# Patient Record
Sex: MALE | Race: WHITE | NOT HISPANIC OR LATINO
[De-identification: names, ages, dates, MRNs, and addresses within clinical notes are randomized per-mention and may not be internally consistent; named-entity substitution may affect disease eponyms.]

---

## 2015-01-19 RX ORDER — ATORVASTATIN CALCIUM 80 MG/1
0.5 TABLET, FILM COATED ORAL
Qty: 0 | Refills: 0 | DISCHARGE
Start: 2015-01-19

## 2015-01-19 RX ORDER — METOPROLOL TARTRATE 50 MG
1 TABLET ORAL
Qty: 0 | Refills: 0 | DISCHARGE
Start: 2015-01-19

## 2017-03-23 ENCOUNTER — APPOINTMENT (OUTPATIENT)
Dept: RHEUMATOLOGY | Facility: CLINIC | Age: 62
End: 2017-03-23

## 2017-03-23 VITALS
HEART RATE: 102 BPM | BODY MASS INDEX: 49.32 KG/M2 | HEIGHT: 62 IN | SYSTOLIC BLOOD PRESSURE: 120 MMHG | TEMPERATURE: 97.3 F | WEIGHT: 268 LBS | OXYGEN SATURATION: 94 % | DIASTOLIC BLOOD PRESSURE: 74 MMHG

## 2017-03-23 DIAGNOSIS — I10 ESSENTIAL (PRIMARY) HYPERTENSION: ICD-10-CM

## 2017-03-24 ENCOUNTER — APPOINTMENT (OUTPATIENT)
Dept: RHEUMATOLOGY | Facility: CLINIC | Age: 62
End: 2017-03-24

## 2017-03-24 LAB
ALBUMIN SERPL ELPH-MCNC: 4.1 G/DL
ALP BLD-CCNC: 69 U/L
ALT SERPL-CCNC: 34 U/L
ANION GAP SERPL CALC-SCNC: 16 MMOL/L
APPEARANCE: CLEAR
AST SERPL-CCNC: 23 U/L
BACTERIA: NEGATIVE
BASOPHILS # BLD AUTO: 0.05 K/UL
BASOPHILS NFR BLD AUTO: 0.4 %
BILIRUB SERPL-MCNC: 0.3 MG/DL
BILIRUBIN URINE: NEGATIVE
BLOOD URINE: NEGATIVE
BUN SERPL-MCNC: 27 MG/DL
C3 SERPL-MCNC: 147 MG/DL
C4 SERPL-MCNC: 37 MG/DL
CALCIUM SERPL-MCNC: 9.4 MG/DL
CHLORIDE SERPL-SCNC: 100 MMOL/L
CO2 SERPL-SCNC: 23 MMOL/L
COLOR: YELLOW
CREAT SERPL-MCNC: 1.3 MG/DL
CREAT SPEC-SCNC: 78 MG/DL
CREAT/PROT UR: 0.1 RATIO
DSDNA AB SER-ACNC: 51 IU/ML
EOSINOPHIL # BLD AUTO: 0.2 K/UL
EOSINOPHIL NFR BLD AUTO: 1.8 %
ERYTHROCYTE [SEDIMENTATION RATE] IN BLOOD BY WESTERGREN METHOD: 9 MM/HR
GLUCOSE QUALITATIVE U: 1000
GLUCOSE SERPL-MCNC: 164 MG/DL
HCT VFR BLD CALC: 48.2 %
HGB BLD-MCNC: 15.9 G/DL
IGA SER QL IEP: 240 MG/DL
IGG SER QL IEP: 981 MG/DL
IGM SER QL IEP: 93 MG/DL
IMM GRANULOCYTES NFR BLD AUTO: 0.6 %
KETONES URINE: NEGATIVE
LEUKOCYTE ESTERASE URINE: NEGATIVE
LYMPHOCYTES # BLD AUTO: 1.46 K/UL
LYMPHOCYTES NFR BLD AUTO: 13.1 %
MAN DIFF?: NORMAL
MCHC RBC-ENTMCNC: 29.6 PG
MCHC RBC-ENTMCNC: 33 GM/DL
MCV RBC AUTO: 89.8 FL
MICROSCOPIC-UA: NORMAL
MONOCYTES # BLD AUTO: 1.28 K/UL
MONOCYTES NFR BLD AUTO: 11.5 %
NEUTROPHILS # BLD AUTO: 8.11 K/UL
NEUTROPHILS NFR BLD AUTO: 72.6 %
NITRITE URINE: NEGATIVE
PH URINE: 5.5
PLATELET # BLD AUTO: 173 K/UL
POTASSIUM SERPL-SCNC: 4.1 MMOL/L
PROT SERPL-MCNC: 6.8 G/DL
PROT UR-MCNC: 5 MG/DL
PROTEIN URINE: NEGATIVE
RBC # BLD: 5.37 M/UL
RBC # FLD: 14.3 %
RED BLOOD CELLS URINE: 1 /HPF
SODIUM SERPL-SCNC: 139 MMOL/L
SPECIFIC GRAVITY URINE: 1.03
SQUAMOUS EPITHELIAL CELLS: 0 /HPF
URATE SERPL-MCNC: 5.6 MG/DL
UROBILINOGEN URINE: NORMAL
WBC # FLD AUTO: 11.17 K/UL
WHITE BLOOD CELLS URINE: 0 /HPF

## 2017-03-27 LAB
ADJUSTED MITOGEN: >10 IU/ML
ADJUSTED TB AG: -0.04 IU/ML
M TB IFN-G BLD-IMP: NEGATIVE
QUANTIFERON GOLD NIL: 0.21 IU/ML

## 2017-04-10 ENCOUNTER — FORM ENCOUNTER (OUTPATIENT)
Age: 62
End: 2017-04-10

## 2017-04-11 ENCOUNTER — APPOINTMENT (OUTPATIENT)
Dept: RADIOLOGY | Facility: CLINIC | Age: 62
End: 2017-04-11

## 2017-04-11 ENCOUNTER — OUTPATIENT (OUTPATIENT)
Dept: OUTPATIENT SERVICES | Facility: HOSPITAL | Age: 62
LOS: 1 days | End: 2017-04-11
Payer: MEDICARE

## 2017-04-11 DIAGNOSIS — Z95.5 PRESENCE OF CORONARY ANGIOPLASTY IMPLANT AND GRAFT: Chronic | ICD-10-CM

## 2017-04-11 DIAGNOSIS — Z98.89 OTHER SPECIFIED POSTPROCEDURAL STATES: Chronic | ICD-10-CM

## 2017-04-11 DIAGNOSIS — G56.00 CARPAL TUNNEL SYNDROME, UNSPECIFIED UPPER LIMB: Chronic | ICD-10-CM

## 2017-04-11 DIAGNOSIS — R10.31 RIGHT LOWER QUADRANT PAIN: ICD-10-CM

## 2017-04-11 PROCEDURE — 73525 CONTRAST X-RAY OF HIP: CPT | Mod: 2,5

## 2017-04-11 PROCEDURE — 77002 NEEDLE LOCALIZATION BY XRAY: CPT | Mod: 26

## 2017-04-11 PROCEDURE — 27093 INJECTION FOR HIP X-RAY: CPT

## 2017-04-11 PROCEDURE — 27093 INJECTION FOR HIP X-RAY: CPT | Mod: 50

## 2017-04-20 ENCOUNTER — APPOINTMENT (OUTPATIENT)
Dept: RHEUMATOLOGY | Facility: CLINIC | Age: 62
End: 2017-04-20

## 2017-04-20 VITALS
DIASTOLIC BLOOD PRESSURE: 80 MMHG | HEART RATE: 96 BPM | SYSTOLIC BLOOD PRESSURE: 129 MMHG | WEIGHT: 265 LBS | HEIGHT: 62 IN | OXYGEN SATURATION: 93 % | BODY MASS INDEX: 48.76 KG/M2

## 2017-04-20 LAB
ALBUMIN SERPL ELPH-MCNC: 4.3 G/DL
ALP BLD-CCNC: 77 U/L
ALT SERPL-CCNC: 49 U/L
ANION GAP SERPL CALC-SCNC: 19 MMOL/L
APPEARANCE: CLEAR
AST SERPL-CCNC: 31 U/L
BACTERIA: NEGATIVE
BASOPHILS # BLD AUTO: 0.02 K/UL
BASOPHILS NFR BLD AUTO: 0.2 %
BILIRUB SERPL-MCNC: 0.3 MG/DL
BILIRUBIN URINE: NEGATIVE
BLOOD URINE: NEGATIVE
BUN SERPL-MCNC: 27 MG/DL
CALCIUM SERPL-MCNC: 9.7 MG/DL
CHLORIDE SERPL-SCNC: 96 MMOL/L
CK SERPL-CCNC: 229 U/L
CO2 SERPL-SCNC: 24 MMOL/L
COLOR: YELLOW
CREAT SERPL-MCNC: 1.57 MG/DL
CREAT SPEC-SCNC: 37 MG/DL
CREAT/PROT UR: 0.2 RATIO
EOSINOPHIL # BLD AUTO: 0.15 K/UL
EOSINOPHIL NFR BLD AUTO: 1.2 %
GLUCOSE QUALITATIVE U: 1000 MG/DL
GLUCOSE SERPL-MCNC: 285 MG/DL
HCT VFR BLD CALC: 48.7 %
HGB BLD-MCNC: 16.3 G/DL
IMM GRANULOCYTES NFR BLD AUTO: 1.1 %
KETONES URINE: NEGATIVE
LEUKOCYTE ESTERASE URINE: NEGATIVE
LYMPHOCYTES # BLD AUTO: 1.41 K/UL
LYMPHOCYTES NFR BLD AUTO: 11.6 %
MAN DIFF?: NORMAL
MCHC RBC-ENTMCNC: 30.2 PG
MCHC RBC-ENTMCNC: 33.5 GM/DL
MCV RBC AUTO: 90.4 FL
MICROSCOPIC-UA: NORMAL
MONOCYTES # BLD AUTO: 1.32 K/UL
MONOCYTES NFR BLD AUTO: 10.8 %
NEUTROPHILS # BLD AUTO: 9.17 K/UL
NEUTROPHILS NFR BLD AUTO: 75.1 %
NITRITE URINE: NEGATIVE
PH URINE: 6
PLATELET # BLD AUTO: 154 K/UL
POTASSIUM SERPL-SCNC: 4 MMOL/L
PROT SERPL-MCNC: 7.1 G/DL
PROT UR-MCNC: 6 MG/DL
PROTEIN URINE: NEGATIVE MG/DL
RBC # BLD: 5.39 M/UL
RBC # FLD: 14.8 %
RED BLOOD CELLS URINE: 0 /HPF
SODIUM SERPL-SCNC: 139 MMOL/L
SPECIFIC GRAVITY URINE: 1.03
SQUAMOUS EPITHELIAL CELLS: 0 /HPF
UROBILINOGEN URINE: NORMAL MG/DL
WBC # FLD AUTO: 12.2 K/UL
WHITE BLOOD CELLS URINE: 0 /HPF

## 2017-04-20 RX ORDER — PREDNISONE 2.5 MG/1
2.5 TABLET ORAL
Qty: 70 | Refills: 0 | Status: DISCONTINUED | COMMUNITY
Start: 2017-03-23 | End: 2017-04-20

## 2017-04-20 RX ORDER — METHYLPRED ACET/NACL,ISO-OS/PF 40 MG/ML
40 VIAL (ML) INJECTION
Qty: 1 | Refills: 0 | Status: COMPLETED | OUTPATIENT
Start: 2017-04-20

## 2017-04-20 RX ADMIN — METHYLPREDNISOLONE ACETATE MG/ML: 40 INJECTION, SUSPENSION INTRA-ARTICULAR; INTRALESIONAL; INTRAMUSCULAR; SOFT TISSUE at 00:00

## 2017-04-23 LAB
C3 SERPL-MCNC: 154 MG/DL
C4 SERPL-MCNC: 38 MG/DL
DSDNA AB SER-ACNC: 60 IU/ML
ERYTHROCYTE [SEDIMENTATION RATE] IN BLOOD BY WESTERGREN METHOD: 18 MM/HR

## 2017-05-04 RX ORDER — PREDNISONE 10 MG/1
10 TABLET ORAL DAILY
Qty: 7 | Refills: 0 | Status: COMPLETED | COMMUNITY
Start: 2017-05-04 | End: 2017-05-11

## 2017-05-23 ENCOUNTER — EMERGENCY (EMERGENCY)
Facility: HOSPITAL | Age: 62
LOS: 1 days | Discharge: HOME | End: 2017-05-23
Attending: EMERGENCY MEDICINE | Admitting: EMERGENCY MEDICINE
Payer: MEDICARE

## 2017-05-23 ENCOUNTER — APPOINTMENT (OUTPATIENT)
Dept: RHEUMATOLOGY | Facility: CLINIC | Age: 62
End: 2017-05-23

## 2017-05-23 VITALS
OXYGEN SATURATION: 92 % | SYSTOLIC BLOOD PRESSURE: 79 MMHG | DIASTOLIC BLOOD PRESSURE: 53 MMHG | TEMPERATURE: 98.1 F | HEART RATE: 103 BPM

## 2017-05-23 VITALS
RESPIRATION RATE: 20 BRPM | DIASTOLIC BLOOD PRESSURE: 69 MMHG | HEIGHT: 62 IN | TEMPERATURE: 98 F | SYSTOLIC BLOOD PRESSURE: 111 MMHG | OXYGEN SATURATION: 95 % | HEART RATE: 104 BPM

## 2017-05-23 VITALS — DIASTOLIC BLOOD PRESSURE: 60 MMHG | SYSTOLIC BLOOD PRESSURE: 90 MMHG | HEART RATE: 104 BPM

## 2017-05-23 VITALS — DIASTOLIC BLOOD PRESSURE: 55 MMHG | SYSTOLIC BLOOD PRESSURE: 75 MMHG

## 2017-05-23 VITALS
RESPIRATION RATE: 20 BRPM | HEART RATE: 98 BPM | SYSTOLIC BLOOD PRESSURE: 109 MMHG | OXYGEN SATURATION: 95 % | DIASTOLIC BLOOD PRESSURE: 56 MMHG

## 2017-05-23 VITALS — SYSTOLIC BLOOD PRESSURE: 85 MMHG | DIASTOLIC BLOOD PRESSURE: 60 MMHG

## 2017-05-23 DIAGNOSIS — Z98.89 OTHER SPECIFIED POSTPROCEDURAL STATES: Chronic | ICD-10-CM

## 2017-05-23 DIAGNOSIS — G56.00 CARPAL TUNNEL SYNDROME, UNSPECIFIED UPPER LIMB: Chronic | ICD-10-CM

## 2017-05-23 DIAGNOSIS — Z95.5 PRESENCE OF CORONARY ANGIOPLASTY IMPLANT AND GRAFT: Chronic | ICD-10-CM

## 2017-05-23 DIAGNOSIS — I95.9 HYPOTENSION, UNSPECIFIED: ICD-10-CM

## 2017-05-23 LAB
ALBUMIN SERPL ELPH-MCNC: 4 G/DL — SIGNIFICANT CHANGE UP (ref 3.3–5)
ALP SERPL-CCNC: 65 U/L — SIGNIFICANT CHANGE UP (ref 40–120)
ALT FLD-CCNC: 34 U/L RC — SIGNIFICANT CHANGE UP (ref 10–45)
ANION GAP SERPL CALC-SCNC: 15 MMOL/L — SIGNIFICANT CHANGE UP (ref 5–17)
AST SERPL-CCNC: 22 U/L — SIGNIFICANT CHANGE UP (ref 10–40)
BASOPHILS # BLD AUTO: 0 K/UL — SIGNIFICANT CHANGE UP (ref 0–0.2)
BASOPHILS NFR BLD AUTO: 0 % — SIGNIFICANT CHANGE UP (ref 0–2)
BILIRUB SERPL-MCNC: 0.3 MG/DL — SIGNIFICANT CHANGE UP (ref 0.2–1.2)
BUN SERPL-MCNC: 32 MG/DL — HIGH (ref 7–23)
CALCIUM SERPL-MCNC: 9.3 MG/DL — SIGNIFICANT CHANGE UP (ref 8.4–10.5)
CHLORIDE SERPL-SCNC: 101 MMOL/L — SIGNIFICANT CHANGE UP (ref 96–108)
CO2 SERPL-SCNC: 26 MMOL/L — SIGNIFICANT CHANGE UP (ref 22–31)
CREAT SERPL-MCNC: 1.52 MG/DL — HIGH (ref 0.5–1.3)
EOSINOPHIL # BLD AUTO: 0.1 K/UL — SIGNIFICANT CHANGE UP (ref 0–0.5)
EOSINOPHIL NFR BLD AUTO: 1.1 % — SIGNIFICANT CHANGE UP (ref 0–6)
GLUCOSE SERPL-MCNC: 130 MG/DL — HIGH (ref 70–99)
HCT VFR BLD CALC: 48.5 % — SIGNIFICANT CHANGE UP (ref 39–50)
HGB BLD-MCNC: 16.2 G/DL — SIGNIFICANT CHANGE UP (ref 13–17)
LYMPHOCYTES # BLD AUTO: 1.4 K/UL — SIGNIFICANT CHANGE UP (ref 1–3.3)
LYMPHOCYTES # BLD AUTO: 11 % — LOW (ref 13–44)
MCHC RBC-ENTMCNC: 30.1 PG — SIGNIFICANT CHANGE UP (ref 27–34)
MCHC RBC-ENTMCNC: 33.4 GM/DL — SIGNIFICANT CHANGE UP (ref 32–36)
MCV RBC AUTO: 90.2 FL — SIGNIFICANT CHANGE UP (ref 80–100)
MONOCYTES # BLD AUTO: 1.5 K/UL — HIGH (ref 0–0.9)
MONOCYTES NFR BLD AUTO: 11.8 % — SIGNIFICANT CHANGE UP (ref 2–14)
NEUTROPHILS # BLD AUTO: 10 K/UL — HIGH (ref 1.8–7.4)
NEUTROPHILS NFR BLD AUTO: 76.1 % — SIGNIFICANT CHANGE UP (ref 43–77)
PLATELET # BLD AUTO: 154 K/UL — SIGNIFICANT CHANGE UP (ref 150–400)
POTASSIUM SERPL-MCNC: 4.2 MMOL/L — SIGNIFICANT CHANGE UP (ref 3.5–5.3)
POTASSIUM SERPL-SCNC: 4.2 MMOL/L — SIGNIFICANT CHANGE UP (ref 3.5–5.3)
PROT SERPL-MCNC: 7 G/DL — SIGNIFICANT CHANGE UP (ref 6–8.3)
RBC # BLD: 5.38 M/UL — SIGNIFICANT CHANGE UP (ref 4.2–5.8)
RBC # FLD: 13.8 % — SIGNIFICANT CHANGE UP (ref 10.3–14.5)
SODIUM SERPL-SCNC: 142 MMOL/L — SIGNIFICANT CHANGE UP (ref 135–145)
WBC # BLD: 13.1 K/UL — HIGH (ref 3.8–10.5)
WBC # FLD AUTO: 13.1 K/UL — HIGH (ref 3.8–10.5)

## 2017-05-23 PROCEDURE — 71046 X-RAY EXAM CHEST 2 VIEWS: CPT

## 2017-05-23 PROCEDURE — 99284 EMERGENCY DEPT VISIT MOD MDM: CPT

## 2017-05-23 PROCEDURE — 99283 EMERGENCY DEPT VISIT LOW MDM: CPT | Mod: 25

## 2017-05-23 PROCEDURE — 71020: CPT | Mod: 26

## 2017-05-23 PROCEDURE — 85027 COMPLETE CBC AUTOMATED: CPT

## 2017-05-23 PROCEDURE — 80053 COMPREHEN METABOLIC PANEL: CPT

## 2017-05-23 NOTE — ED ADULT NURSE NOTE - OBJECTIVE STATEMENT
Patient states that he was at his rheumatoid MD today and had his BP taken and was told it was low. Patient states that he was told to come to the ED. Pt states that he refused to take an ambulance and drove himself here. Patients BP on arrival is WNL, patient denies any CP, SOB, dizziness, lightheadedness or pain. Patient states that he would like to leave if possible. Patient A/Ox4 and ambulatory

## 2017-05-23 NOTE — ED PROVIDER NOTE - OBJECTIVE STATEMENT
62 year old male patient sent to the ED from rheumatology doctor's office with hypotension after a routine check up at the office. Lowest noted blood pressure is 71/50 today. Patient reports being asymptomatic. Reports some fatigue recently after a recent URI like illness. Patient reports taking some of his medications prior to visiting the doctor, but is unsure which. Reports taking Cialis last night.   Denies chest pain, palpitations, weakness, dizziness.  Rheumatology: Dr. Alicia Barroso ([phone]) 62 year old male patient with extensive past medical history sent to the ED from rheumatology doctor's office with hypotension after a routine check up at the office. Lowest noted blood pressure is 71/50 today. Patient reports being asymptomatic. Reports some fatigue recently after a recent URI like illness. Recently completed a steroid taper prescribed by his pulmonologist for his URI. Reports some left sided chest wall discomfort with palpation and cough. Patient reports taking some of his medications prior to visiting the doctor, but is unsure which. Reports taking Cialis last night.   Denies fever, chills, chest pain, palpitations, weakness, dizziness.  Rheumatology: Dr. Alicia Barroso ([phone]) 62 year old male patient with extensive past medical history sent to the ED from rheumatology doctor's office with hypotension after a routine check up at the office. Lowest noted blood pressure is 71/50 today. Patient reports being asymptomatic. Reports some fatigue recently after a recent URI like illness. Recently completed a 10 day course of penicillin and steroid taper prescribed by his pulmonologist for his URI. Did not have an x-ray. Reports some left sided chest wall discomfort with palpation and occasional productive cough with yellow sputum. Patient reports taking some of his medications prior to visiting the doctor, but is unsure which. Reports taking Cialis last night.   Denies fever, chills, chest pain, palpitations, weakness, dizziness.  Rheumatology: Dr. Alicia Barroso ([phone]) 62 year old male patient with extensive past medical history sent to the ED from rheumatology doctor's office with hypotension after a routine check up at the office. Lowest noted blood pressure is 71/50 today. Patient reports being asymptomatic. Reports some fatigue recently after a recent URI like illness. Recently completed a 10 day course of penicillin and steroid taper prescribed by his pulmonologist for his URI. Did not have an x-ray. Reports some left sided chest wall discomfort with palpation and coughing. Notes occasional productive cough with yellow sputum. Patient reports taking some of his medications prior to visiting the doctor, but is unsure which. Reports taking Cialis last night. Has appt to [facility] with pulmonologist Dr Linda Grissom on Friday.   Denies fever, chills, chest pain, palpitations, weakness, dizziness.  Rheumatology: Dr. Alicia Barroso ([phone])

## 2017-05-23 NOTE — ED ADULT NURSE NOTE - PSH
Carpal tunnel syndrome    H/O knee surgery    H/O umbilical hernia repair  mesh  History of rotator cuff surgery    Presence of stent in coronary artery

## 2017-05-23 NOTE — ED ADULT NURSE NOTE - PMH
Asthma    Diabetes    Former smoker    History of hypertension    HLD (hyperlipidemia)    HTN (hypertension)    Lupus    TONYA (obstructive sleep apnea)    Psoriatic arthritis

## 2017-05-23 NOTE — ED PROVIDER NOTE - NEUROLOGICAL, MLM
Alert and oriented, no focal deficits, no motor or sensory deficits. Full strength to all extremities.

## 2017-05-23 NOTE — ED PROVIDER NOTE - PROGRESS NOTE DETAILS
Dr. Godoy: Upon review of AEHR, pt resting HR appears to be 90-100s. Dr. Godoy: Spoke w Dr Barroso who states checked BP in office multiple times and it was low. In setting of recent URI was concerned for sepsis vs dehydration. Sent in for eval. Dr. Godoy: Xray shows no PNA, effusion, CHF. Creatinine slightly elevated at 1.52, however in past has ranged from 1.34-1.50. Advise to stay hydrated and FU with Pulmonology on Friday. Return for any worsening.

## 2017-05-23 NOTE — ED PROVIDER NOTE - NS ED MD SCRIBE ATTENDING SCRIBE SECTIONS
PROGRESS NOTE/HISTORY OF PRESENT ILLNESS/RESULTS/PHYSICAL EXAM/DISPOSITION/VITAL SIGNS( Pullset)/PAST MEDICAL/SURGICAL/SOCIAL HISTORY/REVIEW OF SYSTEMS

## 2017-05-23 NOTE — ED PROVIDER NOTE - MEDICAL DECISION MAKING DETAILS
62 year old male patient with extensive pmhx sent in by rheumatologist for evaluation of low blood pressure noted in her office today. In the ED, patient's blood pressure is within normal limits and is presently without complaints. 62 year old male patient with extensive pmhx sent in by rheumatologist for evaluation of low blood pressure noted in her office today. In the ED, patient's blood pressure is within normal limits and is presently without complaints. Attempted to contact Dr. Barroso. Awaiting return phone call. Reviewed her documentation in the eher. Appears she was concerned for sepsis, will obtain blood work and a chest x-ray. Will monitor vital signs. 62 year old male patient with extensive pmhx sent in by rheumatologist for evaluation of low blood pressure noted in her office today. In the ED, patient's blood pressure is within normal limits and is presently without complaints. Attempted to contact Dr. Barroso. Awaiting return phone call. Reviewed her documentation in the AEHR. Appears she was concerned for sepsis, will obtain blood work and a chest x-ray. Will monitor vital signs.

## 2017-05-23 NOTE — ED ADULT NURSE NOTE - CHPI ED SYMPTOMS NEG
no vomiting/no dizziness/no weakness/no pain/no chills/no numbness/no tingling/no fever/no nausea/no decreased eating/drinking

## 2017-07-12 ENCOUNTER — RESULT REVIEW (OUTPATIENT)
Age: 62
End: 2017-07-12

## 2017-07-12 ENCOUNTER — LABORATORY RESULT (OUTPATIENT)
Age: 62
End: 2017-07-12

## 2017-07-13 ENCOUNTER — APPOINTMENT (OUTPATIENT)
Dept: DERMATOLOGY | Facility: CLINIC | Age: 62
End: 2017-07-13

## 2017-07-13 ENCOUNTER — APPOINTMENT (OUTPATIENT)
Dept: RHEUMATOLOGY | Facility: CLINIC | Age: 62
End: 2017-07-13

## 2017-07-13 VITALS — DIASTOLIC BLOOD PRESSURE: 80 MMHG | SYSTOLIC BLOOD PRESSURE: 128 MMHG

## 2017-07-13 DIAGNOSIS — M75.50 BURSITIS OF UNSPECIFIED SHOULDER: ICD-10-CM

## 2017-07-13 RX ORDER — METHYLPRED ACET/NACL,ISO-OS/PF 40 MG/ML
40 VIAL (ML) INJECTION
Qty: 1 | Refills: 0 | Status: COMPLETED | OUTPATIENT
Start: 2017-07-13

## 2017-07-13 RX ORDER — HYALURONATE SODIUM 30 MG/2 ML
30 SYRINGE (ML) INTRAARTICULAR
Refills: 0 | Status: COMPLETED | OUTPATIENT
Start: 2017-07-13

## 2017-07-13 RX ORDER — LIDOCAINE HYDROCHLORIDE 10 MG/ML
1 INJECTION, SOLUTION INFILTRATION; PERINEURAL
Refills: 0 | Status: COMPLETED | OUTPATIENT
Start: 2017-07-13

## 2017-07-13 RX ADMIN — LIDOCAINE HYDROCHLORIDE %: 10 INJECTION, SOLUTION INFILTRATION; PERINEURAL at 00:00

## 2017-07-13 RX ADMIN — Medication 0 MG/2ML: at 00:00

## 2017-07-13 RX ADMIN — METHYLPREDNISOLONE ACETATE MG/ML: 40 INJECTION, SUSPENSION INTRA-ARTICULAR; INTRALESIONAL; INTRAMUSCULAR; SOFT TISSUE at 00:00

## 2017-07-20 ENCOUNTER — APPOINTMENT (OUTPATIENT)
Dept: RHEUMATOLOGY | Facility: CLINIC | Age: 62
End: 2017-07-20

## 2017-07-20 VITALS
SYSTOLIC BLOOD PRESSURE: 105 MMHG | OXYGEN SATURATION: 93 % | HEART RATE: 97 BPM | DIASTOLIC BLOOD PRESSURE: 65 MMHG | WEIGHT: 265 LBS | BODY MASS INDEX: 48.76 KG/M2 | HEIGHT: 62 IN | TEMPERATURE: 97.9 F

## 2017-07-20 RX ORDER — LIDOCAINE HYDROCHLORIDE 10 MG/ML
1 INJECTION, SOLUTION INFILTRATION; PERINEURAL
Refills: 0 | Status: COMPLETED | OUTPATIENT
Start: 2017-07-20

## 2017-07-20 RX ORDER — HYALURONATE SODIUM 30 MG/2 ML
30 SYRINGE (ML) INTRAARTICULAR
Refills: 0 | Status: COMPLETED | OUTPATIENT
Start: 2017-07-20

## 2017-07-20 RX ADMIN — Medication 0 MG/2ML: at 00:00

## 2017-07-20 RX ADMIN — LIDOCAINE HYDROCHLORIDE 0 %: 10 INJECTION, SOLUTION INFILTRATION; PERINEURAL at 00:00

## 2017-07-27 ENCOUNTER — APPOINTMENT (OUTPATIENT)
Dept: RHEUMATOLOGY | Facility: CLINIC | Age: 62
End: 2017-07-27
Payer: MEDICARE

## 2017-07-27 VITALS
WEIGHT: 264 LBS | HEART RATE: 98 BPM | SYSTOLIC BLOOD PRESSURE: 111 MMHG | BODY MASS INDEX: 48.58 KG/M2 | DIASTOLIC BLOOD PRESSURE: 70 MMHG | HEIGHT: 62 IN | TEMPERATURE: 97.7 F

## 2017-07-27 LAB
APPEARANCE: CLEAR
BASOPHILS # BLD AUTO: 0.03 K/UL
BASOPHILS NFR BLD AUTO: 0.3 %
BILIRUBIN URINE: NEGATIVE
BLOOD URINE: NEGATIVE
COLOR: YELLOW
CREAT SPEC-SCNC: 58 MG/DL
CREAT/PROT UR: 0.1 RATIO
EOSINOPHIL # BLD AUTO: 0.14 K/UL
EOSINOPHIL NFR BLD AUTO: 1.3 %
GLUCOSE QUALITATIVE U: 1000 MG/DL
HCT VFR BLD CALC: 49.2 %
HGB BLD-MCNC: 16 G/DL
IMM GRANULOCYTES NFR BLD AUTO: 0.5 %
KETONES URINE: NEGATIVE
LEUKOCYTE ESTERASE URINE: NEGATIVE
LYMPHOCYTES # BLD AUTO: 1.64 K/UL
LYMPHOCYTES NFR BLD AUTO: 14.8 %
MAN DIFF?: NORMAL
MCHC RBC-ENTMCNC: 29.7 PG
MCHC RBC-ENTMCNC: 32.5 GM/DL
MCV RBC AUTO: 91.4 FL
MONOCYTES # BLD AUTO: 0.87 K/UL
MONOCYTES NFR BLD AUTO: 7.9 %
NEUTROPHILS # BLD AUTO: 8.34 K/UL
NEUTROPHILS NFR BLD AUTO: 75.2 %
NITRITE URINE: NEGATIVE
PH URINE: 6
PLATELET # BLD AUTO: 157 K/UL
PROT UR-MCNC: 7 MG/DL
PROT UR-MCNC: 7 MG/DL
PROTEIN URINE: NEGATIVE MG/DL
RBC # BLD: 5.38 M/UL
RBC # FLD: 14.7 %
SPECIFIC GRAVITY URINE: 1.03
UROBILINOGEN URINE: NORMAL MG/DL
WBC # FLD AUTO: 11.07 K/UL

## 2017-07-27 PROCEDURE — 20610 DRAIN/INJ JOINT/BURSA W/O US: CPT | Mod: 50

## 2017-07-27 RX ORDER — HYALURONATE SODIUM 30 MG/2 ML
30 SYRINGE (ML) INTRAARTICULAR
Refills: 0 | Status: COMPLETED | OUTPATIENT
Start: 2017-07-27

## 2017-07-27 RX ORDER — LIDOCAINE HYDROCHLORIDE 10 MG/ML
1 INJECTION, SOLUTION INFILTRATION; PERINEURAL
Refills: 0 | Status: COMPLETED | OUTPATIENT
Start: 2017-07-27

## 2017-07-27 RX ADMIN — Medication 0 MG/2ML: at 00:00

## 2017-07-27 RX ADMIN — LIDOCAINE HYDROCHLORIDE %: 10 INJECTION, SOLUTION INFILTRATION; PERINEURAL at 00:00

## 2017-07-28 LAB
25(OH)D3 SERPL-MCNC: 32.7 NG/ML
ALBUMIN SERPL ELPH-MCNC: 4.1 G/DL
ALP BLD-CCNC: 68 U/L
ALT SERPL-CCNC: 30 U/L
ANION GAP SERPL CALC-SCNC: 19 MMOL/L
AST SERPL-CCNC: 24 U/L
BILIRUB SERPL-MCNC: 0.5 MG/DL
BUN SERPL-MCNC: 24 MG/DL
C3 SERPL-MCNC: 162 MG/DL
C4 SERPL-MCNC: 39 MG/DL
CALCIUM SERPL-MCNC: 9.1 MG/DL
CHLORIDE SERPL-SCNC: 98 MMOL/L
CO2 SERPL-SCNC: 25 MMOL/L
CREAT SERPL-MCNC: 1.28 MG/DL
CRP SERPL-MCNC: 1.9 MG/DL
ERYTHROCYTE [SEDIMENTATION RATE] IN BLOOD BY WESTERGREN METHOD: 14 MM/HR
GLUCOSE SERPL-MCNC: 166 MG/DL
POTASSIUM SERPL-SCNC: 3.5 MMOL/L
PROT SERPL-MCNC: 7 G/DL
SODIUM SERPL-SCNC: 142 MMOL/L

## 2017-08-29 ENCOUNTER — APPOINTMENT (OUTPATIENT)
Dept: RHEUMATOLOGY | Facility: CLINIC | Age: 62
End: 2017-08-29
Payer: MEDICARE

## 2017-08-29 ENCOUNTER — APPOINTMENT (OUTPATIENT)
Dept: DERMATOLOGY | Facility: CLINIC | Age: 62
End: 2017-08-29

## 2017-08-29 VITALS
WEIGHT: 267 LBS | DIASTOLIC BLOOD PRESSURE: 68 MMHG | HEART RATE: 99 BPM | TEMPERATURE: 97.9 F | RESPIRATION RATE: 16 BRPM | BODY MASS INDEX: 49.13 KG/M2 | HEIGHT: 62 IN | OXYGEN SATURATION: 94 % | SYSTOLIC BLOOD PRESSURE: 113 MMHG

## 2017-08-29 PROCEDURE — 99215 OFFICE O/P EST HI 40 MIN: CPT

## 2017-08-31 LAB
ALBUMIN SERPL ELPH-MCNC: 4.1 G/DL
ALP BLD-CCNC: 88 U/L
ALT SERPL-CCNC: 35 U/L
ANION GAP SERPL CALC-SCNC: 24 MMOL/L
APPEARANCE: CLEAR
AST SERPL-CCNC: 24 U/L
BACTERIA: NEGATIVE
BASOPHILS # BLD AUTO: 0.03 K/UL
BASOPHILS NFR BLD AUTO: 0.2 %
BILIRUB SERPL-MCNC: 0.2 MG/DL
BILIRUBIN URINE: NEGATIVE
BLOOD URINE: NEGATIVE
BUN SERPL-MCNC: 33 MG/DL
C3 SERPL-MCNC: 147 MG/DL
C4 SERPL-MCNC: 37 MG/DL
CALCIUM SERPL-MCNC: 9.8 MG/DL
CHLORIDE SERPL-SCNC: 99 MMOL/L
CO2 SERPL-SCNC: 15 MMOL/L
COLOR: YELLOW
CREAT SERPL-MCNC: 1.57 MG/DL
CREAT SPEC-SCNC: 42 MG/DL
CREAT/PROT UR: NORMAL
DSDNA AB SER-ACNC: 61 IU/ML
EOSINOPHIL # BLD AUTO: 0.17 K/UL
EOSINOPHIL NFR BLD AUTO: 1.2 %
ERYTHROCYTE [SEDIMENTATION RATE] IN BLOOD BY WESTERGREN METHOD: 10 MM/HR
GLUCOSE QUALITATIVE U: 1000 MG/DL
GLUCOSE SERPL-MCNC: 366 MG/DL
HCT VFR BLD CALC: 50.8 %
HGB BLD-MCNC: 16.1 G/DL
IMM GRANULOCYTES NFR BLD AUTO: 0.6 %
KETONES URINE: NEGATIVE
LEUKOCYTE ESTERASE URINE: NEGATIVE
LYMPHOCYTES # BLD AUTO: 1.36 K/UL
LYMPHOCYTES NFR BLD AUTO: 9.9 %
MAN DIFF?: NORMAL
MCHC RBC-ENTMCNC: 29.4 PG
MCHC RBC-ENTMCNC: 31.7 GM/DL
MCV RBC AUTO: 92.9 FL
MICROSCOPIC-UA: NORMAL
MONOCYTES # BLD AUTO: 1.19 K/UL
MONOCYTES NFR BLD AUTO: 8.7 %
NEUTROPHILS # BLD AUTO: 10.86 K/UL
NEUTROPHILS NFR BLD AUTO: 79.4 %
NITRITE URINE: NEGATIVE
PH URINE: 5.5
PLATELET # BLD AUTO: 151 K/UL
POTASSIUM SERPL-SCNC: 4.4 MMOL/L
PROT SERPL-MCNC: 7.2 G/DL
PROT UR-MCNC: <4 MG/DL
PROTEIN URINE: NEGATIVE MG/DL
RBC # BLD: 5.47 M/UL
RBC # FLD: 14.4 %
RED BLOOD CELLS URINE: 2 /HPF
SODIUM SERPL-SCNC: 138 MMOL/L
SPECIFIC GRAVITY URINE: 1.03
SQUAMOUS EPITHELIAL CELLS: 0 /HPF
URATE SERPL-MCNC: 5 MG/DL
UROBILINOGEN URINE: NORMAL MG/DL
WBC # FLD AUTO: 13.69 K/UL
WHITE BLOOD CELLS URINE: 0 /HPF

## 2017-09-06 ENCOUNTER — RX RENEWAL (OUTPATIENT)
Age: 62
End: 2017-09-06

## 2017-09-29 ENCOUNTER — APPOINTMENT (OUTPATIENT)
Dept: RHEUMATOLOGY | Facility: CLINIC | Age: 62
End: 2017-09-29
Payer: MEDICARE

## 2017-09-29 VITALS
HEART RATE: 80 BPM | HEIGHT: 62 IN | RESPIRATION RATE: 16 BRPM | DIASTOLIC BLOOD PRESSURE: 66 MMHG | WEIGHT: 267 LBS | SYSTOLIC BLOOD PRESSURE: 106 MMHG | TEMPERATURE: 97.9 F | BODY MASS INDEX: 49.13 KG/M2 | OXYGEN SATURATION: 96 %

## 2017-09-29 DIAGNOSIS — M25.579 PAIN IN UNSPECIFIED ANKLE AND JOINTS OF UNSPECIFIED FOOT: ICD-10-CM

## 2017-09-29 DIAGNOSIS — Z00.00 ENCOUNTER FOR GENERAL ADULT MEDICAL EXAMINATION W/OUT ABNORMAL FINDINGS: ICD-10-CM

## 2017-09-29 PROCEDURE — G0008: CPT

## 2017-09-29 PROCEDURE — 90686 IIV4 VACC NO PRSV 0.5 ML IM: CPT

## 2017-09-29 PROCEDURE — 99214 OFFICE O/P EST MOD 30 MIN: CPT | Mod: 25

## 2017-09-29 RX ORDER — ACETAMINOPHEN, DEXTROMETHORPHAN HBR, DOXYLAMINE SUCCINATE 650; 30; 12.5 MG/30ML; MG/30ML; MG/30ML
32G X 4 MM LIQUID ORAL
Qty: 400 | Refills: 0 | Status: COMPLETED | COMMUNITY
Start: 2017-08-31

## 2017-09-29 RX ORDER — METHOCARBAMOL 500 MG/1
500 TABLET, FILM COATED ORAL
Qty: 7 | Refills: 0 | Status: COMPLETED | COMMUNITY
Start: 2017-04-28

## 2017-09-29 RX ORDER — LOPERAMIDE HYDROCHLORIDE 2 MG/1
2 CAPSULE ORAL
Qty: 60 | Refills: 0 | Status: COMPLETED | COMMUNITY
Start: 2017-02-02

## 2017-09-29 RX ORDER — PREDNISONE 2.5 MG/1
2.5 TABLET ORAL
Qty: 76 | Refills: 0 | Status: DISCONTINUED | COMMUNITY
Start: 2017-08-29 | End: 2017-09-29

## 2017-09-29 RX ORDER — DOXYCYCLINE 100 MG/1
100 CAPSULE ORAL
Qty: 14 | Refills: 0 | Status: COMPLETED | COMMUNITY
Start: 2017-06-22

## 2017-09-29 RX ORDER — DOXYCYCLINE HYCLATE 100 MG/1
100 CAPSULE ORAL
Qty: 20 | Refills: 0 | Status: COMPLETED | COMMUNITY
Start: 2017-03-30

## 2017-09-29 RX ORDER — DOXYCYCLINE HYCLATE 100 MG/1
100 TABLET ORAL
Qty: 10 | Refills: 0 | Status: COMPLETED | COMMUNITY
Start: 2017-06-28

## 2017-09-30 LAB
ALBUMIN SERPL ELPH-MCNC: 4.1 G/DL
ALP BLD-CCNC: 71 U/L
ALT SERPL-CCNC: 34 U/L
ANION GAP SERPL CALC-SCNC: 18 MMOL/L
AST SERPL-CCNC: 21 U/L
BASOPHILS # BLD AUTO: 0.02 K/UL
BASOPHILS NFR BLD AUTO: 0.2 %
BILIRUB SERPL-MCNC: 0.4 MG/DL
BUN SERPL-MCNC: 31 MG/DL
CALCIUM SERPL-MCNC: 9.4 MG/DL
CHLORIDE SERPL-SCNC: 98 MMOL/L
CO2 SERPL-SCNC: 25 MMOL/L
CREAT SERPL-MCNC: 1.58 MG/DL
EOSINOPHIL # BLD AUTO: 0.15 K/UL
EOSINOPHIL NFR BLD AUTO: 1.5 %
GLUCOSE SERPL-MCNC: 98 MG/DL
HCT VFR BLD CALC: 48.1 %
HGB BLD-MCNC: 15.8 G/DL
IMM GRANULOCYTES NFR BLD AUTO: 0.6 %
LYMPHOCYTES # BLD AUTO: 1.39 K/UL
LYMPHOCYTES NFR BLD AUTO: 14.2 %
MAN DIFF?: NORMAL
MCHC RBC-ENTMCNC: 30.2 PG
MCHC RBC-ENTMCNC: 32.8 GM/DL
MCV RBC AUTO: 91.8 FL
MONOCYTES # BLD AUTO: 1.22 K/UL
MONOCYTES NFR BLD AUTO: 12.4 %
NEUTROPHILS # BLD AUTO: 6.96 K/UL
NEUTROPHILS NFR BLD AUTO: 71.1 %
PLATELET # BLD AUTO: 154 K/UL
POTASSIUM SERPL-SCNC: 4.1 MMOL/L
PROT SERPL-MCNC: 7 G/DL
RBC # BLD: 5.24 M/UL
RBC # FLD: 14.8 %
SODIUM SERPL-SCNC: 141 MMOL/L
WBC # FLD AUTO: 9.8 K/UL

## 2017-10-30 ENCOUNTER — MESSAGE (OUTPATIENT)
Age: 62
End: 2017-10-30

## 2017-11-09 ENCOUNTER — CLINICAL ADVICE (OUTPATIENT)
Age: 62
End: 2017-11-09

## 2017-11-09 ENCOUNTER — APPOINTMENT (OUTPATIENT)
Dept: RHEUMATOLOGY | Facility: CLINIC | Age: 62
End: 2017-11-09
Payer: MEDICARE

## 2017-11-09 VITALS
TEMPERATURE: 98.9 F | WEIGHT: 266 LBS | HEART RATE: 100 BPM | SYSTOLIC BLOOD PRESSURE: 118 MMHG | BODY MASS INDEX: 48.95 KG/M2 | DIASTOLIC BLOOD PRESSURE: 78 MMHG | OXYGEN SATURATION: 95 % | HEIGHT: 62 IN

## 2017-11-09 PROCEDURE — 99214 OFFICE O/P EST MOD 30 MIN: CPT | Mod: 25

## 2017-11-09 PROCEDURE — 96372 THER/PROPH/DIAG INJ SC/IM: CPT

## 2017-11-09 RX ORDER — PREDNISONE 2.5 MG/1
2.5 TABLET ORAL DAILY
Qty: 30 | Refills: 0 | Status: DISCONTINUED | COMMUNITY
Start: 2017-10-31 | End: 2017-11-09

## 2017-11-09 RX ORDER — METHYLPRED ACET/NACL,ISO-OS/PF 40 MG/ML
40 VIAL (ML) INJECTION
Qty: 1 | Refills: 0 | Status: COMPLETED | OUTPATIENT
Start: 2017-11-09

## 2017-11-09 RX ORDER — HYALURONATE SODIUM 30 MG/2 ML
30 SYRINGE (ML) INTRAARTICULAR
Qty: 6 | Refills: 0 | Status: COMPLETED | COMMUNITY
Start: 2017-07-07 | End: 2017-07-07

## 2017-11-09 RX ORDER — HYDROCORTISONE 1 %
12 CREAM (GRAM) TOPICAL TWICE DAILY
Qty: 1 | Refills: 3 | Status: DISCONTINUED | COMMUNITY
Start: 2017-07-13 | End: 2017-11-09

## 2017-11-09 RX ORDER — MYCOPHENOLATE MOFETIL 500 MG/1
500 TABLET ORAL TWICE DAILY
Qty: 180 | Refills: 0 | Status: DISCONTINUED | COMMUNITY
Start: 2017-03-23 | End: 2017-11-09

## 2017-11-09 RX ADMIN — METHYLPREDNISOLONE ACETATE 0 MG/ML: 40 INJECTION, SUSPENSION INTRA-ARTICULAR; INTRALESIONAL; INTRAMUSCULAR; SOFT TISSUE at 00:00

## 2017-11-13 LAB
AMPHET UR-MCNC: NEGATIVE
BARBITURATES UR-MCNC: NEGATIVE
BENZODIAZ UR-MCNC: NEGATIVE
COCAINE METAB.OTHER UR-MCNC: NEGATIVE
CREATININE, URINE: 45.5 MG/DL
METHADONE UR-MCNC: NEGATIVE
METHAQUALONE UR-MCNC: NEGATIVE
OPIATES UR-MCNC: NEGATIVE
PCP UR-MCNC: NEGATIVE
PROPOXYPH UR QL: NEGATIVE
THC UR QL: NEGATIVE

## 2017-12-19 ENCOUNTER — APPOINTMENT (OUTPATIENT)
Dept: RHEUMATOLOGY | Facility: CLINIC | Age: 62
End: 2017-12-19

## 2017-12-26 ENCOUNTER — APPOINTMENT (OUTPATIENT)
Dept: RHEUMATOLOGY | Facility: CLINIC | Age: 62
End: 2017-12-26
Payer: MEDICARE

## 2017-12-26 VITALS
HEIGHT: 62 IN | WEIGHT: 266 LBS | HEART RATE: 101 BPM | DIASTOLIC BLOOD PRESSURE: 78 MMHG | SYSTOLIC BLOOD PRESSURE: 129 MMHG | TEMPERATURE: 98.3 F | OXYGEN SATURATION: 96 % | BODY MASS INDEX: 48.95 KG/M2

## 2017-12-26 PROCEDURE — 20610 DRAIN/INJ JOINT/BURSA W/O US: CPT | Mod: LT

## 2017-12-26 PROCEDURE — 20550 NJX 1 TENDON SHEATH/LIGAMENT: CPT | Mod: RT

## 2017-12-26 PROCEDURE — 99214 OFFICE O/P EST MOD 30 MIN: CPT | Mod: 25

## 2017-12-28 ENCOUNTER — MED ADMIN CHARGE (OUTPATIENT)
Age: 62
End: 2017-12-28

## 2017-12-28 RX ORDER — METHYLPRED ACET/NACL,ISO-OS/PF 40 MG/ML
40 VIAL (ML) INJECTION
Qty: 1 | Refills: 0 | Status: COMPLETED | OUTPATIENT
Start: 2017-12-28

## 2017-12-28 RX ORDER — LIDOCAINE HYDROCHLORIDE 10 MG/ML
1 INJECTION, SOLUTION INFILTRATION; PERINEURAL
Refills: 0 | Status: COMPLETED | OUTPATIENT
Start: 2017-12-28

## 2017-12-28 RX ADMIN — METHYLPREDNISOLONE ACETATE MG/ML: 40 INJECTION, SUSPENSION INTRA-ARTICULAR; INTRALESIONAL; INTRAMUSCULAR; SOFT TISSUE at 00:00

## 2017-12-28 RX ADMIN — LIDOCAINE HYDROCHLORIDE %: 10 INJECTION, SOLUTION INFILTRATION; PERINEURAL at 00:00

## 2018-01-26 ENCOUNTER — APPOINTMENT (OUTPATIENT)
Dept: RHEUMATOLOGY | Facility: CLINIC | Age: 63
End: 2018-01-26

## 2018-02-15 ENCOUNTER — APPOINTMENT (OUTPATIENT)
Dept: RHEUMATOLOGY | Facility: CLINIC | Age: 63
End: 2018-02-15
Payer: MEDICARE

## 2018-02-15 VITALS
HEIGHT: 62 IN | SYSTOLIC BLOOD PRESSURE: 154 MMHG | BODY MASS INDEX: 48.95 KG/M2 | RESPIRATION RATE: 16 BRPM | TEMPERATURE: 98 F | OXYGEN SATURATION: 98 % | WEIGHT: 266 LBS | HEART RATE: 90 BPM | DIASTOLIC BLOOD PRESSURE: 87 MMHG

## 2018-02-15 DIAGNOSIS — I77.1 STRICTURE OF ARTERY: ICD-10-CM

## 2018-02-15 LAB
APPEARANCE: CLEAR
BACTERIA: NEGATIVE
BILIRUBIN URINE: NEGATIVE
BLOOD URINE: NEGATIVE
COLOR: YELLOW
GLUCOSE QUALITATIVE U: NEGATIVE MG/DL
KETONES URINE: NEGATIVE
LEUKOCYTE ESTERASE URINE: NEGATIVE
MICROSCOPIC-UA: NORMAL
NITRITE URINE: NEGATIVE
PH URINE: 5.5
PROTEIN URINE: 100 MG/DL
RED BLOOD CELLS URINE: 1 /HPF
SPECIFIC GRAVITY URINE: 1.02
SQUAMOUS EPITHELIAL CELLS: 0 /HPF
UROBILINOGEN URINE: NEGATIVE MG/DL
WHITE BLOOD CELLS URINE: 1 /HPF

## 2018-02-15 PROCEDURE — 20610 DRAIN/INJ JOINT/BURSA W/O US: CPT | Mod: 50

## 2018-02-15 PROCEDURE — 99215 OFFICE O/P EST HI 40 MIN: CPT | Mod: 25

## 2018-02-15 PROCEDURE — 96372 THER/PROPH/DIAG INJ SC/IM: CPT | Mod: 59

## 2018-02-15 RX ORDER — SYRINGE WITH NEEDLE, 1 ML 25GX5/8"
22G X 1-1/2" SYRINGE, EMPTY DISPOSABLE MISCELLANEOUS
Qty: 10 | Refills: 0 | Status: COMPLETED | COMMUNITY
Start: 2018-01-01

## 2018-02-15 RX ORDER — MOMETASONE FUROATE 1 MG/G
0.1 CREAM TOPICAL
Qty: 45 | Refills: 0 | Status: COMPLETED | COMMUNITY
Start: 2018-01-11

## 2018-02-15 RX ORDER — HYLAN G-F 20 16MG/2ML
48 SYRINGE (ML) INTRAARTICULAR
Refills: 0 | Status: COMPLETED | OUTPATIENT
Start: 2018-02-15

## 2018-02-15 RX ORDER — PREDNISONE 2.5 MG/1
2.5 TABLET ORAL
Qty: 74 | Refills: 0 | Status: DISCONTINUED | COMMUNITY
Start: 2017-11-09 | End: 2018-02-15

## 2018-02-15 RX ORDER — PREDNISOLONE ACETATE 10 MG/ML
1 SUSPENSION/ DROPS OPHTHALMIC
Qty: 5 | Refills: 0 | Status: COMPLETED | COMMUNITY
Start: 2018-01-18

## 2018-02-15 RX ORDER — AMOXICILLIN AND CLAVULANATE POTASSIUM 875; 125 MG/1; MG/1
875-125 TABLET, COATED ORAL
Qty: 28 | Refills: 0 | Status: COMPLETED | COMMUNITY
Start: 2017-12-04

## 2018-02-15 RX ORDER — METHYLPRED ACET/NACL,ISO-OS/PF 40 MG/ML
40 VIAL (ML) INJECTION
Qty: 1 | Refills: 0 | Status: COMPLETED | OUTPATIENT
Start: 2018-02-15

## 2018-02-15 RX ADMIN — METHYLPREDNISOLONE ACETATE 1 MG/ML: 40 INJECTION, SUSPENSION INTRA-ARTICULAR; INTRALESIONAL; INTRAMUSCULAR; SOFT TISSUE at 00:00

## 2018-02-15 RX ADMIN — Medication 0 MG/6ML: at 00:00

## 2018-02-15 RX ADMIN — Medication 48 MG/6ML: at 00:00

## 2018-02-16 LAB
ALBUMIN SERPL ELPH-MCNC: 4.1 G/DL
ALBUMIN SERPL ELPH-MCNC: 4.2 G/DL
ALP BLD-CCNC: 68 U/L
ALP BLD-CCNC: 69 U/L
ALT SERPL-CCNC: 38 U/L
ALT SERPL-CCNC: 38 U/L
ANION GAP SERPL CALC-SCNC: 15 MMOL/L
ANION GAP SERPL CALC-SCNC: 17 MMOL/L
AST SERPL-CCNC: 29 U/L
AST SERPL-CCNC: 29 U/L
BASOPHILS # BLD AUTO: 0.04 K/UL
BASOPHILS # BLD AUTO: 0.06 K/UL
BASOPHILS NFR BLD AUTO: 0.4 %
BASOPHILS NFR BLD AUTO: 0.5 %
BILIRUB SERPL-MCNC: 0.4 MG/DL
BILIRUB SERPL-MCNC: 0.4 MG/DL
BUN SERPL-MCNC: 19 MG/DL
BUN SERPL-MCNC: 20 MG/DL
C3 SERPL-MCNC: 170 MG/DL
C4 SERPL-MCNC: 34 MG/DL
CALCIUM SERPL-MCNC: 9.7 MG/DL
CALCIUM SERPL-MCNC: 9.8 MG/DL
CHLORIDE SERPL-SCNC: 97 MMOL/L
CHLORIDE SERPL-SCNC: 98 MMOL/L
CO2 SERPL-SCNC: 26 MMOL/L
CO2 SERPL-SCNC: 26 MMOL/L
CREAT SERPL-MCNC: 1.22 MG/DL
CREAT SERPL-MCNC: 1.25 MG/DL
CREAT SPEC-SCNC: 130 MG/DL
CREAT/PROT UR: 0.5 RATIO
DSDNA AB SER-ACNC: 56 IU/ML
EOSINOPHIL # BLD AUTO: 0.25 K/UL
EOSINOPHIL # BLD AUTO: 0.26 K/UL
EOSINOPHIL NFR BLD AUTO: 2.3 %
EOSINOPHIL NFR BLD AUTO: 2.3 %
ERYTHROCYTE [SEDIMENTATION RATE] IN BLOOD BY WESTERGREN METHOD: 9 MM/HR
GLUCOSE SERPL-MCNC: 227 MG/DL
GLUCOSE SERPL-MCNC: 227 MG/DL
HCT VFR BLD CALC: 48.1 %
HCT VFR BLD CALC: 48.7 %
HGB BLD-MCNC: 16.2 G/DL
HGB BLD-MCNC: 16.2 G/DL
IMM GRANULOCYTES NFR BLD AUTO: 0.4 %
IMM GRANULOCYTES NFR BLD AUTO: 0.8 %
LYMPHOCYTES # BLD AUTO: 2.03 K/UL
LYMPHOCYTES # BLD AUTO: 2.07 K/UL
LYMPHOCYTES NFR BLD AUTO: 18.5 %
LYMPHOCYTES NFR BLD AUTO: 18.5 %
MAN DIFF?: NORMAL
MAN DIFF?: NORMAL
MCHC RBC-ENTMCNC: 30.2 PG
MCHC RBC-ENTMCNC: 30.3 PG
MCHC RBC-ENTMCNC: 33.3 GM/DL
MCHC RBC-ENTMCNC: 33.7 GM/DL
MCV RBC AUTO: 89.6 FL
MCV RBC AUTO: 91 FL
MONOCYTES # BLD AUTO: 0.93 K/UL
MONOCYTES # BLD AUTO: 0.96 K/UL
MONOCYTES NFR BLD AUTO: 8.3 %
MONOCYTES NFR BLD AUTO: 8.8 %
NEUTROPHILS # BLD AUTO: 7.58 K/UL
NEUTROPHILS # BLD AUTO: 7.79 K/UL
NEUTROPHILS NFR BLD AUTO: 69.2 %
NEUTROPHILS NFR BLD AUTO: 70 %
PLATELET # BLD AUTO: 160 K/UL
PLATELET # BLD AUTO: 168 K/UL
POTASSIUM SERPL-SCNC: 4.3 MMOL/L
POTASSIUM SERPL-SCNC: 4.4 MMOL/L
PROT SERPL-MCNC: 7 G/DL
PROT SERPL-MCNC: 7 G/DL
PROT UR-MCNC: 67 MG/DL
RBC # BLD: 5.35 M/UL
RBC # BLD: 5.37 M/UL
RBC # FLD: 13.8 %
RBC # FLD: 13.9 %
SODIUM SERPL-SCNC: 139 MMOL/L
SODIUM SERPL-SCNC: 140 MMOL/L
URATE SERPL-MCNC: 6.4 MG/DL
WBC # FLD AUTO: 10.95 K/UL
WBC # FLD AUTO: 11.16 K/UL

## 2018-02-17 LAB
AMPHET UR-MCNC: NEGATIVE
BARBITURATES UR-MCNC: NEGATIVE
BENZODIAZ UR-MCNC: NEGATIVE
COCAINE METAB.OTHER UR-MCNC: NEGATIVE
CREATININE, URINE: 123.7 MG/DL
METHADONE UR-MCNC: NEGATIVE
METHAQUALONE UR-MCNC: NEGATIVE
OPIATES UR-MCNC: NEGATIVE
PCP UR-MCNC: NEGATIVE
PROPOXYPH UR QL: NEGATIVE
THC UR QL: NEGATIVE

## 2018-03-27 ENCOUNTER — APPOINTMENT (OUTPATIENT)
Dept: RHEUMATOLOGY | Facility: CLINIC | Age: 63
End: 2018-03-27
Payer: MEDICARE

## 2018-03-27 VITALS
HEIGHT: 62 IN | OXYGEN SATURATION: 98 % | BODY MASS INDEX: 47.84 KG/M2 | HEART RATE: 88 BPM | SYSTOLIC BLOOD PRESSURE: 131 MMHG | DIASTOLIC BLOOD PRESSURE: 82 MMHG | RESPIRATION RATE: 16 BRPM | TEMPERATURE: 98 F | WEIGHT: 260 LBS

## 2018-03-27 PROCEDURE — 99215 OFFICE O/P EST HI 40 MIN: CPT

## 2018-03-27 RX ORDER — METOLAZONE 2.5 MG/1
2.5 TABLET ORAL
Qty: 30 | Refills: 0 | Status: ACTIVE | COMMUNITY
Start: 2018-02-14

## 2018-03-27 RX ORDER — MELOXICAM 15 MG/1
15 TABLET ORAL
Qty: 30 | Refills: 0 | Status: DISCONTINUED | COMMUNITY
Start: 2018-02-20 | End: 2018-03-27

## 2018-04-14 ENCOUNTER — INPATIENT (INPATIENT)
Facility: HOSPITAL | Age: 63
LOS: 1 days | Discharge: ANOTHER HOSPITAL | DRG: 281 | End: 2018-04-16
Attending: INTERNAL MEDICINE | Admitting: INTERNAL MEDICINE
Payer: MEDICARE

## 2018-04-14 VITALS
HEIGHT: 62 IN | HEART RATE: 100 BPM | TEMPERATURE: 98 F | WEIGHT: 259.93 LBS | RESPIRATION RATE: 18 BRPM | DIASTOLIC BLOOD PRESSURE: 96 MMHG | SYSTOLIC BLOOD PRESSURE: 156 MMHG | OXYGEN SATURATION: 92 %

## 2018-04-14 DIAGNOSIS — I24.9 ACUTE ISCHEMIC HEART DISEASE, UNSPECIFIED: ICD-10-CM

## 2018-04-14 DIAGNOSIS — N18.9 CHRONIC KIDNEY DISEASE, UNSPECIFIED: ICD-10-CM

## 2018-04-14 DIAGNOSIS — G56.00 CARPAL TUNNEL SYNDROME, UNSPECIFIED UPPER LIMB: Chronic | ICD-10-CM

## 2018-04-14 DIAGNOSIS — E11.9 TYPE 2 DIABETES MELLITUS WITHOUT COMPLICATIONS: ICD-10-CM

## 2018-04-14 DIAGNOSIS — I10 ESSENTIAL (PRIMARY) HYPERTENSION: ICD-10-CM

## 2018-04-14 DIAGNOSIS — Z95.5 PRESENCE OF CORONARY ANGIOPLASTY IMPLANT AND GRAFT: Chronic | ICD-10-CM

## 2018-04-14 DIAGNOSIS — Z98.89 OTHER SPECIFIED POSTPROCEDURAL STATES: Chronic | ICD-10-CM

## 2018-04-14 DIAGNOSIS — Z29.9 ENCOUNTER FOR PROPHYLACTIC MEASURES, UNSPECIFIED: ICD-10-CM

## 2018-04-14 DIAGNOSIS — J45.909 UNSPECIFIED ASTHMA, UNCOMPLICATED: ICD-10-CM

## 2018-04-14 DIAGNOSIS — L93.0 DISCOID LUPUS ERYTHEMATOSUS: ICD-10-CM

## 2018-04-14 DIAGNOSIS — R07.9 CHEST PAIN, UNSPECIFIED: ICD-10-CM

## 2018-04-14 LAB
ALBUMIN SERPL ELPH-MCNC: 3.2 G/DL — LOW (ref 3.5–5)
ALP SERPL-CCNC: 79 U/L — SIGNIFICANT CHANGE UP (ref 40–120)
ALT FLD-CCNC: 47 U/L DA — SIGNIFICANT CHANGE UP (ref 10–60)
ANION GAP SERPL CALC-SCNC: 10 MMOL/L — SIGNIFICANT CHANGE UP (ref 5–17)
APPEARANCE UR: CLEAR — SIGNIFICANT CHANGE UP
AST SERPL-CCNC: 31 U/L — SIGNIFICANT CHANGE UP (ref 10–40)
BASOPHILS # BLD AUTO: 0.1 K/UL — SIGNIFICANT CHANGE UP (ref 0–0.2)
BASOPHILS NFR BLD AUTO: 0.8 % — SIGNIFICANT CHANGE UP (ref 0–2)
BILIRUB SERPL-MCNC: 0.3 MG/DL — SIGNIFICANT CHANGE UP (ref 0.2–1.2)
BILIRUB UR-MCNC: NEGATIVE — SIGNIFICANT CHANGE UP
BUN SERPL-MCNC: 28 MG/DL — HIGH (ref 7–18)
CALCIUM SERPL-MCNC: 8.1 MG/DL — LOW (ref 8.4–10.5)
CHLORIDE SERPL-SCNC: 103 MMOL/L — SIGNIFICANT CHANGE UP (ref 96–108)
CHOLEST SERPL-MCNC: 104 MG/DL — SIGNIFICANT CHANGE UP (ref 10–199)
CK MB BLD-MCNC: 1.4 % — SIGNIFICANT CHANGE UP (ref 0–3.5)
CK MB CFR SERPL CALC: 10.4 NG/ML — HIGH (ref 0–3.6)
CK SERPL-CCNC: 725 U/L — HIGH (ref 35–232)
CO2 SERPL-SCNC: 25 MMOL/L — SIGNIFICANT CHANGE UP (ref 22–31)
COLOR SPEC: YELLOW — SIGNIFICANT CHANGE UP
CREAT SERPL-MCNC: 1.45 MG/DL — HIGH (ref 0.5–1.3)
D DIMER BLD IA.RAPID-MCNC: 156 NG/ML DDU — SIGNIFICANT CHANGE UP
DIFF PNL FLD: ABNORMAL
EOSINOPHIL # BLD AUTO: 0.1 K/UL — SIGNIFICANT CHANGE UP (ref 0–0.5)
EOSINOPHIL NFR BLD AUTO: 0.9 % — SIGNIFICANT CHANGE UP (ref 0–6)
GLUCOSE BLDC GLUCOMTR-MCNC: 224 MG/DL — HIGH (ref 70–99)
GLUCOSE BLDC GLUCOMTR-MCNC: 281 MG/DL — HIGH (ref 70–99)
GLUCOSE SERPL-MCNC: 292 MG/DL — HIGH (ref 70–99)
GLUCOSE UR QL: 1000 MG/DL
HBA1C BLD-MCNC: 10.2 % — HIGH (ref 4–5.6)
HCT VFR BLD CALC: 47 % — SIGNIFICANT CHANGE UP (ref 39–50)
HDLC SERPL-MCNC: 36 MG/DL — LOW (ref 40–125)
HGB BLD-MCNC: 15.1 G/DL — SIGNIFICANT CHANGE UP (ref 13–17)
KETONES UR-MCNC: NEGATIVE — SIGNIFICANT CHANGE UP
LEUKOCYTE ESTERASE UR-ACNC: NEGATIVE — SIGNIFICANT CHANGE UP
LIPID PNL WITH DIRECT LDL SERPL: 27 MG/DL — SIGNIFICANT CHANGE UP
LYMPHOCYTES # BLD AUTO: 1.2 K/UL — SIGNIFICANT CHANGE UP (ref 1–3.3)
LYMPHOCYTES # BLD AUTO: 10.8 % — LOW (ref 13–44)
MCHC RBC-ENTMCNC: 29.4 PG — SIGNIFICANT CHANGE UP (ref 27–34)
MCHC RBC-ENTMCNC: 32.1 GM/DL — SIGNIFICANT CHANGE UP (ref 32–36)
MCV RBC AUTO: 91.7 FL — SIGNIFICANT CHANGE UP (ref 80–100)
MONOCYTES # BLD AUTO: 1.1 K/UL — HIGH (ref 0–0.9)
MONOCYTES NFR BLD AUTO: 10.1 % — SIGNIFICANT CHANGE UP (ref 2–14)
NEUTROPHILS # BLD AUTO: 8.7 K/UL — HIGH (ref 1.8–7.4)
NEUTROPHILS NFR BLD AUTO: 77.3 % — HIGH (ref 43–77)
NITRITE UR-MCNC: NEGATIVE — SIGNIFICANT CHANGE UP
PCP SPEC-MCNC: SIGNIFICANT CHANGE UP
PH UR: 5 — SIGNIFICANT CHANGE UP (ref 5–8)
PLATELET # BLD AUTO: 135 K/UL — LOW (ref 150–400)
POTASSIUM SERPL-MCNC: 3.8 MMOL/L — SIGNIFICANT CHANGE UP (ref 3.5–5.3)
POTASSIUM SERPL-SCNC: 3.8 MMOL/L — SIGNIFICANT CHANGE UP (ref 3.5–5.3)
PROT SERPL-MCNC: 6.8 G/DL — SIGNIFICANT CHANGE UP (ref 6–8.3)
PROT UR-MCNC: 100
RBC # BLD: 5.13 M/UL — SIGNIFICANT CHANGE UP (ref 4.2–5.8)
RBC # FLD: 12.9 % — SIGNIFICANT CHANGE UP (ref 10.3–14.5)
SODIUM SERPL-SCNC: 138 MMOL/L — SIGNIFICANT CHANGE UP (ref 135–145)
SP GR SPEC: 1.01 — SIGNIFICANT CHANGE UP (ref 1.01–1.02)
TOTAL CHOLESTEROL/HDL RATIO MEASUREMENT: 2.9 RATIO — LOW (ref 3.4–9.6)
TRIGL SERPL-MCNC: 204 MG/DL — HIGH (ref 10–149)
TROPONIN I SERPL-MCNC: 0.28 NG/ML — HIGH (ref 0–0.04)
TROPONIN I SERPL-MCNC: 0.5 NG/ML — HIGH (ref 0–0.04)
TROPONIN I SERPL-MCNC: 1.63 NG/ML — HIGH (ref 0–0.04)
TROPONIN I SERPL-MCNC: <0.015 NG/ML — SIGNIFICANT CHANGE UP (ref 0–0.04)
TSH SERPL-MCNC: 1.9 UU/ML — SIGNIFICANT CHANGE UP (ref 0.34–4.82)
UROBILINOGEN FLD QL: NEGATIVE — SIGNIFICANT CHANGE UP
WBC # BLD: 11.3 K/UL — HIGH (ref 3.8–10.5)
WBC # FLD AUTO: 11.3 K/UL — HIGH (ref 3.8–10.5)

## 2018-04-14 PROCEDURE — 71045 X-RAY EXAM CHEST 1 VIEW: CPT | Mod: 26

## 2018-04-14 PROCEDURE — 99285 EMERGENCY DEPT VISIT HI MDM: CPT | Mod: 25

## 2018-04-14 RX ORDER — ATORVASTATIN CALCIUM 80 MG/1
40 TABLET, FILM COATED ORAL AT BEDTIME
Qty: 0 | Refills: 0 | Status: DISCONTINUED | OUTPATIENT
Start: 2018-04-14 | End: 2018-04-16

## 2018-04-14 RX ORDER — INSULIN LISPRO 100/ML
VIAL (ML) SUBCUTANEOUS
Qty: 0 | Refills: 0 | Status: DISCONTINUED | OUTPATIENT
Start: 2018-04-14 | End: 2018-04-16

## 2018-04-14 RX ORDER — SPIRONOLACTONE 25 MG/1
25 TABLET, FILM COATED ORAL DAILY
Qty: 0 | Refills: 0 | Status: DISCONTINUED | OUTPATIENT
Start: 2018-04-14 | End: 2018-04-16

## 2018-04-14 RX ORDER — FEBUXOSTAT 40 MG/1
40 TABLET ORAL DAILY
Qty: 0 | Refills: 0 | Status: DISCONTINUED | OUTPATIENT
Start: 2018-04-14 | End: 2018-04-16

## 2018-04-14 RX ORDER — HYDROXYCHLOROQUINE SULFATE 200 MG
200 TABLET ORAL
Qty: 0 | Refills: 0 | Status: DISCONTINUED | OUTPATIENT
Start: 2018-04-14 | End: 2018-04-16

## 2018-04-14 RX ORDER — ENOXAPARIN SODIUM 100 MG/ML
40 INJECTION SUBCUTANEOUS DAILY
Qty: 0 | Refills: 0 | Status: DISCONTINUED | OUTPATIENT
Start: 2018-04-14 | End: 2018-04-14

## 2018-04-14 RX ORDER — LOSARTAN POTASSIUM 100 MG/1
50 TABLET, FILM COATED ORAL DAILY
Qty: 0 | Refills: 0 | Status: DISCONTINUED | OUTPATIENT
Start: 2018-04-14 | End: 2018-04-16

## 2018-04-14 RX ORDER — ASPIRIN/CALCIUM CARB/MAGNESIUM 324 MG
325 TABLET ORAL ONCE
Qty: 0 | Refills: 0 | Status: COMPLETED | OUTPATIENT
Start: 2018-04-14 | End: 2018-04-14

## 2018-04-14 RX ORDER — ASPIRIN/CALCIUM CARB/MAGNESIUM 324 MG
81 TABLET ORAL DAILY
Qty: 0 | Refills: 0 | Status: DISCONTINUED | OUTPATIENT
Start: 2018-04-14 | End: 2018-04-16

## 2018-04-14 RX ORDER — ENOXAPARIN SODIUM 100 MG/ML
30 INJECTION SUBCUTANEOUS DAILY
Qty: 0 | Refills: 0 | Status: DISCONTINUED | OUTPATIENT
Start: 2018-04-14 | End: 2018-04-14

## 2018-04-14 RX ORDER — INSULIN GLARGINE 100 [IU]/ML
50 INJECTION, SOLUTION SUBCUTANEOUS AT BEDTIME
Qty: 0 | Refills: 0 | Status: DISCONTINUED | OUTPATIENT
Start: 2018-04-14 | End: 2018-04-16

## 2018-04-14 RX ORDER — CLOPIDOGREL BISULFATE 75 MG/1
75 TABLET, FILM COATED ORAL DAILY
Qty: 0 | Refills: 0 | Status: DISCONTINUED | OUTPATIENT
Start: 2018-04-14 | End: 2018-04-16

## 2018-04-14 RX ORDER — PANTOPRAZOLE SODIUM 20 MG/1
40 TABLET, DELAYED RELEASE ORAL
Qty: 0 | Refills: 0 | Status: DISCONTINUED | OUTPATIENT
Start: 2018-04-14 | End: 2018-04-16

## 2018-04-14 RX ORDER — IPRATROPIUM/ALBUTEROL SULFATE 18-103MCG
3 AEROSOL WITH ADAPTER (GRAM) INHALATION EVERY 6 HOURS
Qty: 0 | Refills: 0 | Status: DISCONTINUED | OUTPATIENT
Start: 2018-04-14 | End: 2018-04-16

## 2018-04-14 RX ORDER — METOPROLOL TARTRATE 50 MG
50 TABLET ORAL DAILY
Qty: 0 | Refills: 0 | Status: DISCONTINUED | OUTPATIENT
Start: 2018-04-14 | End: 2018-04-16

## 2018-04-14 RX ORDER — HEPARIN SODIUM 5000 [USP'U]/ML
5000 INJECTION INTRAVENOUS; SUBCUTANEOUS EVERY 8 HOURS
Qty: 0 | Refills: 0 | Status: DISCONTINUED | OUTPATIENT
Start: 2018-04-14 | End: 2018-04-16

## 2018-04-14 RX ADMIN — Medication 50 MILLIGRAM(S): at 17:59

## 2018-04-14 RX ADMIN — Medication 200 MILLIGRAM(S): at 17:59

## 2018-04-14 RX ADMIN — ATORVASTATIN CALCIUM 40 MILLIGRAM(S): 80 TABLET, FILM COATED ORAL at 22:29

## 2018-04-14 RX ADMIN — Medication 3 MILLILITER(S): at 20:44

## 2018-04-14 RX ADMIN — Medication 6: at 18:00

## 2018-04-14 RX ADMIN — HEPARIN SODIUM 5000 UNIT(S): 5000 INJECTION INTRAVENOUS; SUBCUTANEOUS at 22:29

## 2018-04-14 RX ADMIN — Medication 325 MILLIGRAM(S): at 12:40

## 2018-04-14 RX ADMIN — INSULIN GLARGINE 50 UNIT(S): 100 INJECTION, SOLUTION SUBCUTANEOUS at 22:30

## 2018-04-14 NOTE — H&P ADULT - PROBLEM SELECTOR PLAN 5
patient takes lantus, metformin and apidra  at home, will hold OHA and start on HSS along with lantus.  monitor accuchecks.  f/u a1c

## 2018-04-14 NOTE — H&P ADULT - PROBLEM SELECTOR PLAN 7
[] Previous VTE                                                3  [] Thrombophilia                                             2  [] Lower limb paralysis                                   2    [] Current Cancer                                             2   [x] Immobilization > 24 hrs                              1  [] ICU/CCU stay > 24 hours                             1  [x] Age > 60                                                         1    IMPROVE VTE Score: 2, DVT prophylaxis with lovenox SQ.  GI ppx with protonix given patient on dual antiplatelet and age>60. [] Previous VTE                                                3  [] Thrombophilia                                             2  [] Lower limb paralysis                                   2    [] Current Cancer                                             2   [x] Immobilization > 24 hrs                              1  [] ICU/CCU stay > 24 hours                             1  [x] Age > 60                                                         1    IMPROVE VTE Score: 2, DVT prophylaxis with heparin SQ.  GI ppx with protonix given patient on dual antiplatelet and age>60.

## 2018-04-14 NOTE — H&P ADULT - HISTORY OF PRESENT ILLNESS
63 year old morbidly obese M from home with PMH of  Asthma, HTN, DM, TONYA-no CPAP, SLE, CAD s/p stents, BPH, HLD, psoriatic arthritis presented to ED with c/o chest pain since yesterday night. Patient was at his baseline health until yesterday when he was about to go to bed at 11pm he started to have sudden onset left sided chest pain in upper portion of chest, constant, 5-6 intensity, non radiating, sharp, no aggravating or relieving factors, but reports having burping which is unusual, not associated with SOB, palpitations, dizziness, diaphoresis, nausea vomiting. Patient reports lifting heavy weights and working more than usual a day before having the above symptoms. patient took 81 mg of aspirin and called EMS who gave 2X 81 mg of aspirin, symptoms lasted until 530Am after which patient is asymptomatic. patient was recently treated with some medication which he cannot remember for bronchitis.     In ED, patient had stable vitals on admission. EKG on admission- NSR @96 BPM , no ST T wave changes, EKG 2 sinus rhythm @ 99BPM, no ST T wave changes. cardiac enzymes x1- negative, T2- 0.2, T3- 0.5 , CXR s/o  no evidence of consolidation or effusion. Labs pertinent for WBC-11.3, platelets of 135 , bun/ creat - 28/1.45, s/p 1 dose of 325mg of aspirin in ED.     will admit to telemetry for evaluation of chest pain r/o ACS

## 2018-04-14 NOTE — H&P ADULT - NSHPLABSRESULTS_GEN_ALL_CORE
15.1   11.3  )-----------( 135      ( 14 Apr 2018 07:18 )             47.0       04-14    138  |  103  |  28<H>  ----------------------------<  292<H>  3.8   |  25  |  1.45<H>    Ca    8.1<L>      14 Apr 2018 07:18    TPro  6.8  /  Alb  3.2<L>  /  TBili  0.3  /  DBili  x   /  AST  31  /  ALT  47  /  AlkPhos  79  04-14                      Lactate Trend      CARDIAC MARKERS ( 14 Apr 2018 13:21 )  0.503 ng/mL / x     / x     / x     / x      CARDIAC MARKERS ( 14 Apr 2018 10:59 )  0.281 ng/mL / x     / x     / x     / x      CARDIAC MARKERS ( 14 Apr 2018 07:18 )  <0.015 ng/mL / x     / x     / x     / x            < from: Xray Chest 1 View AP/PA (04.14.18 @ 08:25) >      EXAM:  XR CHEST AP OR PA 1V                            PROCEDURE DATE:  04/14/2018          INTERPRETATION:  CLINICAL INFORMATION: Chest pain.    A single portable view of the chest was obtained.     Comparison: 5/23/2017.     The mediastinal cardiac silhouette is unremarkable.    The lungs are clear.     No acute osseous finding.     IMPRESSION:    No acute process.

## 2018-04-14 NOTE — ED PROVIDER NOTE - MEDICAL DECISION MAKING DETAILS
Pt with multiple cardiac risk factors presents with chest pain. Initial work up, including troponin, EKG and CXR were unremarkable. Advised pt to be admitted for high cardiac risk, however, he is adamant to sign out AMA but agrees to second troponin at 1100. Informed pt of risk of MI and other cardiac and pulmonary complications, however, he accepts responsibility and wishes to leave AMA. Pt demonstrates nml decision making capacities.

## 2018-04-14 NOTE — H&P ADULT - MUSCULOSKELETAL
detailed exam no joint erythema/no joint warmth/ROM intact/no calf tenderness/no joint swelling/normal strength

## 2018-04-14 NOTE — H&P ADULT - RS GEN PE MLT RESP DETAILS PC
airway patent/breath sounds equal/no rhonchi/respirations non-labored/good air movement/no chest wall tenderness/clear to auscultation bilaterally/no rales

## 2018-04-14 NOTE — H&P ADULT - PROBLEM SELECTOR PLAN 3
patient takes lopressor, metolazone, spironolactone, cialis at home.  will continue with lopressor, metolazone, spironolactone with parameters.  monitor Bp closely.

## 2018-04-14 NOTE — ED PROVIDER NOTE - PROGRESS NOTE DETAILS
Pt signed out to Dr. Connolly and follow up for second troponin. Second trop noted, I DW cardiologist Dr Garzon who would admit, but pt is refusing, he wants to repeat the troponin a third time and will decide if he will sign out or stay. He is aaox3, risk benefits, alternatives and hazards reviewed and pt voices understanding. He is aware that he can die. Second trop noted, I DW cardiologist Dr Garzon who would admit.

## 2018-04-14 NOTE — CHART NOTE - NSCHARTNOTEFT_GEN_A_CORE
Troponin level of 1.630  from previous 0.503 noted.   EKG was repeated and no ST T wave changes noted  patient denies symptoms- denies chest pain, palpitation, diaphoresis.   will follow up with another set of cardiac enzymes in AM.

## 2018-04-14 NOTE — ED PROVIDER NOTE - OBJECTIVE STATEMENT
64 y/o M pt with PMHx of CAD, DM, HTN, and HLD presents to ED c/o left sided chest pain x last night. Pt states that pain does not radiate. Pt denies drug use. Pt reports no palpitations, no fever, no chills, no shortness of breath, no cough, or any other complaints.

## 2018-04-14 NOTE — H&P ADULT - PROBLEM SELECTOR PLAN 2
patient has CKD stage III with baseline creatinine of 1.4-1.5  patient has creatinine of 1.45 on admission which is at his baseline.  will avoid nephrotoxic medications.  f/u BMP in AM.

## 2018-04-14 NOTE — ED PROVIDER NOTE - PMH
Asthma    CAD (coronary artery disease)    Diabetes    Former smoker    History of hypertension    HLD (hyperlipidemia)    HTN (hypertension)    Lupus    TONYA (obstructive sleep apnea)    Psoriatic arthritis

## 2018-04-14 NOTE — H&P ADULT - PROBLEM SELECTOR PLAN 1
likely non cardiac( atypical chest pain), more likely due to GERD versus musculoskeletal, but given Hx of multiple risk factors CAD, HTN, HLD, morbid obesity, ex smoker, autoimmune disease, family history of CAD, will admit to telemetry to r/o ACS.  EKG on admission- NSR @96 BPM , no ST T wave changes, EKG 2 sinus rhythm @ 99BPM, no ST T wave changes. cardiac enzymes x1- negative, T2- 0.2, T3- 0.5  will trend cardiac enzymes now and in AM.  will continue home dose of aspirin, plavix, statin and lopressor.  patient had recent echo a week ago, primary team to get documents from Dr Omer office.  may need ischemic eval given multiple risk factors.

## 2018-04-15 ENCOUNTER — TRANSCRIPTION ENCOUNTER (OUTPATIENT)
Age: 63
End: 2018-04-15

## 2018-04-15 LAB
24R-OH-CALCIDIOL SERPL-MCNC: 31.1 NG/ML — SIGNIFICANT CHANGE UP (ref 30–80)
ANION GAP SERPL CALC-SCNC: 9 MMOL/L — SIGNIFICANT CHANGE UP (ref 5–17)
BUN SERPL-MCNC: 21 MG/DL — HIGH (ref 7–18)
CALCIUM SERPL-MCNC: 8.4 MG/DL — SIGNIFICANT CHANGE UP (ref 8.4–10.5)
CHLORIDE SERPL-SCNC: 102 MMOL/L — SIGNIFICANT CHANGE UP (ref 96–108)
CK MB BLD-MCNC: 1 % — SIGNIFICANT CHANGE UP (ref 0–3.5)
CK MB CFR SERPL CALC: 6.9 NG/ML — HIGH (ref 0–3.6)
CK SERPL-CCNC: 680 U/L — HIGH (ref 35–232)
CO2 SERPL-SCNC: 29 MMOL/L — SIGNIFICANT CHANGE UP (ref 22–31)
CREAT SERPL-MCNC: 1.22 MG/DL — SIGNIFICANT CHANGE UP (ref 0.5–1.3)
GLUCOSE BLDC GLUCOMTR-MCNC: 174 MG/DL — HIGH (ref 70–99)
GLUCOSE BLDC GLUCOMTR-MCNC: 203 MG/DL — HIGH (ref 70–99)
GLUCOSE BLDC GLUCOMTR-MCNC: 223 MG/DL — HIGH (ref 70–99)
GLUCOSE BLDC GLUCOMTR-MCNC: 244 MG/DL — HIGH (ref 70–99)
GLUCOSE SERPL-MCNC: 205 MG/DL — HIGH (ref 70–99)
HCT VFR BLD CALC: 46.6 % — SIGNIFICANT CHANGE UP (ref 39–50)
HGB BLD-MCNC: 16.1 G/DL — SIGNIFICANT CHANGE UP (ref 13–17)
MAGNESIUM SERPL-MCNC: 1.4 MG/DL — LOW (ref 1.6–2.6)
MCHC RBC-ENTMCNC: 31.7 PG — SIGNIFICANT CHANGE UP (ref 27–34)
MCHC RBC-ENTMCNC: 34.6 GM/DL — SIGNIFICANT CHANGE UP (ref 32–36)
MCV RBC AUTO: 91.6 FL — SIGNIFICANT CHANGE UP (ref 80–100)
PHOSPHATE SERPL-MCNC: 3.1 MG/DL — SIGNIFICANT CHANGE UP (ref 2.5–4.5)
PLATELET # BLD AUTO: 151 K/UL — SIGNIFICANT CHANGE UP (ref 150–400)
POTASSIUM SERPL-MCNC: 3.3 MMOL/L — LOW (ref 3.5–5.3)
POTASSIUM SERPL-SCNC: 3.3 MMOL/L — LOW (ref 3.5–5.3)
RBC # BLD: 5.08 M/UL — SIGNIFICANT CHANGE UP (ref 4.2–5.8)
RBC # FLD: 12.8 % — SIGNIFICANT CHANGE UP (ref 10.3–14.5)
SODIUM SERPL-SCNC: 140 MMOL/L — SIGNIFICANT CHANGE UP (ref 135–145)
TROPONIN I SERPL-MCNC: 1.54 NG/ML — HIGH (ref 0–0.04)
VIT B12 SERPL-MCNC: 518 PG/ML — SIGNIFICANT CHANGE UP (ref 232–1245)
WBC # BLD: 13 K/UL — HIGH (ref 3.8–10.5)
WBC # FLD AUTO: 13 K/UL — HIGH (ref 3.8–10.5)

## 2018-04-15 RX ORDER — SODIUM CHLORIDE 0.65 %
1 AEROSOL, SPRAY (ML) NASAL THREE TIMES A DAY
Qty: 0 | Refills: 0 | Status: DISCONTINUED | OUTPATIENT
Start: 2018-04-15 | End: 2018-04-16

## 2018-04-15 RX ORDER — LOSARTAN POTASSIUM 100 MG/1
1 TABLET, FILM COATED ORAL
Qty: 30 | Refills: 0 | OUTPATIENT
Start: 2018-04-15 | End: 2018-05-14

## 2018-04-15 RX ORDER — POTASSIUM CHLORIDE 20 MEQ
20 PACKET (EA) ORAL
Qty: 0 | Refills: 0 | Status: COMPLETED | OUTPATIENT
Start: 2018-04-15 | End: 2018-04-15

## 2018-04-15 RX ORDER — LORATADINE 10 MG/1
10 TABLET ORAL AT BEDTIME
Qty: 0 | Refills: 0 | Status: DISCONTINUED | OUTPATIENT
Start: 2018-04-15 | End: 2018-04-16

## 2018-04-15 RX ADMIN — Medication 20 MILLIEQUIVALENT(S): at 16:49

## 2018-04-15 RX ADMIN — PANTOPRAZOLE SODIUM 40 MILLIGRAM(S): 20 TABLET, DELAYED RELEASE ORAL at 06:08

## 2018-04-15 RX ADMIN — Medication 2: at 16:55

## 2018-04-15 RX ADMIN — Medication 50 MILLIGRAM(S): at 18:10

## 2018-04-15 RX ADMIN — Medication 81 MILLIGRAM(S): at 12:05

## 2018-04-15 RX ADMIN — FEBUXOSTAT 40 MILLIGRAM(S): 40 TABLET ORAL at 12:04

## 2018-04-15 RX ADMIN — Medication 50 MILLIGRAM(S): at 06:09

## 2018-04-15 RX ADMIN — Medication 200 MILLIGRAM(S): at 18:10

## 2018-04-15 RX ADMIN — LOSARTAN POTASSIUM 50 MILLIGRAM(S): 100 TABLET, FILM COATED ORAL at 06:09

## 2018-04-15 RX ADMIN — Medication 4: at 07:58

## 2018-04-15 RX ADMIN — Medication 20 MILLIEQUIVALENT(S): at 12:05

## 2018-04-15 RX ADMIN — HEPARIN SODIUM 5000 UNIT(S): 5000 INJECTION INTRAVENOUS; SUBCUTANEOUS at 16:49

## 2018-04-15 RX ADMIN — CLOPIDOGREL BISULFATE 75 MILLIGRAM(S): 75 TABLET, FILM COATED ORAL at 12:05

## 2018-04-15 RX ADMIN — HEPARIN SODIUM 5000 UNIT(S): 5000 INJECTION INTRAVENOUS; SUBCUTANEOUS at 22:12

## 2018-04-15 RX ADMIN — INSULIN GLARGINE 50 UNIT(S): 100 INJECTION, SOLUTION SUBCUTANEOUS at 21:19

## 2018-04-15 RX ADMIN — Medication 3 MILLILITER(S): at 15:24

## 2018-04-15 RX ADMIN — LORATADINE 10 MILLIGRAM(S): 10 TABLET ORAL at 21:19

## 2018-04-15 RX ADMIN — ATORVASTATIN CALCIUM 40 MILLIGRAM(S): 80 TABLET, FILM COATED ORAL at 21:18

## 2018-04-15 RX ADMIN — Medication 3 MILLILITER(S): at 12:09

## 2018-04-15 RX ADMIN — SPIRONOLACTONE 25 MILLIGRAM(S): 25 TABLET, FILM COATED ORAL at 06:09

## 2018-04-15 RX ADMIN — Medication 3 MILLILITER(S): at 20:03

## 2018-04-15 RX ADMIN — Medication 200 MILLIGRAM(S): at 06:09

## 2018-04-15 RX ADMIN — HEPARIN SODIUM 5000 UNIT(S): 5000 INJECTION INTRAVENOUS; SUBCUTANEOUS at 06:09

## 2018-04-15 RX ADMIN — Medication 4: at 12:16

## 2018-04-15 RX ADMIN — Medication 1 SPRAY(S): at 23:46

## 2018-04-15 NOTE — PROGRESS NOTE ADULT - SUBJECTIVE AND OBJECTIVE BOX
CHIEF COMPLAINT:Patient is a 63y old  Male who presents with a chief complaint of chest pain .Pt has had no further chest pain.    	  REVIEW OF SYSTEMS:  CONSTITUTIONAL: No fever, weight loss, or fatigue  EYES: No eye pain, visual disturbances, or discharge  ENT:  No difficulty hearing, tinnitus, vertigo; No sinus or throat pain  NECK: No pain or stiffness  RESPIRATORY: No cough, wheezing, chills or hemoptysis; No Shortness of Breath  CARDIOVASCULAR: No chest pain, palpitations, passing out, dizziness, or leg swelling  GASTROINTESTINAL: No abdominal or epigastric pain. No nausea, vomiting, or hematemesis; No diarrhea or constipation. No melena or hematochezia.  GENITOURINARY: No dysuria, frequency, hematuria, or incontinence  NEUROLOGICAL: No headaches, memory loss, loss of strength, numbness, or tremors  SKIN: No itching, burning, rashes, or lesions   LYMPH Nodes: No enlarged glands  ENDOCRINE: No heat or cold intolerance; No hair loss  MUSCULOSKELETAL: No joint pain or swelling; No muscle, back, or extremity pain  PSYCHIATRIC: No depression, anxiety, mood swings, or difficulty sleeping  HEME/LYMPH: No easy bruising, or bleeding gums  ALLERGY AND IMMUNOLOGIC: No hives or eczema	      PHYSICAL EXAM:  T(C): 36.2 (04-15-18 @ 08:21), Max: 37.2 (04-14-18 @ 15:49)  HR: 80 (04-15-18 @ 08:21) (80 - 102)  BP: 130/77 (04-15-18 @ 08:21) (130/69 - 157/78)  RR: 18 (04-15-18 @ 08:21) (18 - 20)  SpO2: 96% (04-15-18 @ 08:21) (95% - 99%)      Appearance: Normal	  HEENT:   Normal oral mucosa, PERRL, EOMI	  Lymphatic: No lymphadenopathy  Cardiovascular: Normal S1 S2, No JVD, No murmurs, No edema  Respiratory: Lungs clear to auscultation	  Psychiatry: A & O x 3, Mood & affect appropriate  Gastrointestinal:  Soft, Non-tender, + BS	  Skin: No rashes, No ecchymoses, No cyanosis	  Neurologic: Non-focal  Extremities: Normal range of motion, No clubbing, cyanosis or edema  Vascular: Peripheral pulses palpable 2+ bilaterally    MEDICATIONS  (STANDING):  ALBUTerol/ipratropium for Nebulization 3 milliLiter(s) Nebulizer every 6 hours  aspirin enteric coated 81 milliGRAM(s) Oral daily  atorvastatin 40 milliGRAM(s) Oral at bedtime  clopidogrel Tablet 75 milliGRAM(s) Oral daily  febuxostat 40 milliGRAM(s) Oral daily  heparin  Injectable 5000 Unit(s) SubCutaneous every 8 hours  hydroxychloroquine 200 milliGRAM(s) Oral two times a day  insulin glargine Injectable (LANTUS) 50 Unit(s) SubCutaneous at bedtime  insulin lispro (HumaLOG) corrective regimen sliding scale   SubCutaneous three times a day before meals  losartan 50 milliGRAM(s) Oral daily  metolazone 5 milliGRAM(s) Oral daily  metoprolol tartrate 50 milliGRAM(s) Oral daily  pantoprazole    Tablet 40 milliGRAM(s) Oral before breakfast  potassium chloride    Tablet ER 20 milliEquivalent(s) Oral every 2 hours  pregabalin 50 milliGRAM(s) Oral two times a day  spironolactone 25 milliGRAM(s) Oral daily      TELEMETRY: 	      LABS:	 	    CARDIAC MARKERS:  CARDIAC MARKERS ( 15 Apr 2018 07:48 )  1.540 ng/mL / x     / 680 U/L / x     / 6.9 ng/mL  CARDIAC MARKERS ( 14 Apr 2018 19:56 )  1.630 ng/mL / x     / 725 U/L / x     / 10.4 ng/mL  CARDIAC MARKERS ( 14 Apr 2018 13:21 )  0.503 ng/mL / x     / x     / x     / x      CARDIAC MARKERS ( 14 Apr 2018 10:59 )  0.281 ng/mL / x     / x     / x     / x      CARDIAC MARKERS ( 14 Apr 2018 07:18 )  <0.015 ng/mL / x     / x     / x     / x                             16.1   13.0  )-----------( 151      ( 15 Apr 2018 07:48 )             46.6     04-15    140  |  102  |  21<H>  ----------------------------<  205<H>  3.3<L>   |  29  |  1.22    Ca    8.4      15 Apr 2018 07:48  Phos  3.1     04-15  Mg     1.4     04-15    TPro  6.8  /  Alb  3.2<L>  /  TBili  0.3  /  DBili  x   /  AST  31  /  ALT  47  /  AlkPhos  79  04-14      Lipid Profile: Cholesterol 104  LDL 27  HDL 36      HgA1c: Hemoglobin A1C, Whole Blood: 10.2 % (04-14 @ 22:38)    TSH: Thyroid Stimulating Hormone, Serum: 1.90 uU/mL (04-14 @ 15:27)      	  Cath 12/16-patent stent lad and rca

## 2018-04-15 NOTE — DISCHARGE NOTE ADULT - MEDICATION SUMMARY - MEDICATIONS TO TAKE
I will START or STAY ON the medications listed below when I get home from the hospital:    Cialis 5 mg oral tablet  -- 1 tab(s) by mouth once a day    home  -- Indication: For Cialis    spironolactone 25 mg oral tablet  -- 1 tab(s) by mouth once a day HOSP/home  -- Indication: For HTN (hypertension)    aspirin 81 mg oral tablet, chewable  -- 1 tab(s) by mouth once a day   hospital  -- Indication: For CAD    losartan 50 mg oral tablet  -- 1 tab(s) by mouth once a day  -- Indication: For HTN (hypertension)    Lyrica 50 mg oral capsule  -- 1 cap(s) by mouth 2 times a day HOSP/home  -- Indication: For Neuropathic pain    Lantus  -- 50 unit(s) subcutaneous once a day (at bedtime) HOME  -- Indication: For Diabetes    metFORMIN 500 mg oral tablet  -- 2 tab(s) by mouth 2 times a day  -- Indication: For Diabetes    Apidra 100 units/mL subcutaneous solution  -- 35  subcutaneous 3 times a day    home  -- Indication: For Diabetes    loratadine 10 mg oral tablet  -- 1 tab(s) by mouth once a day (at bedtime)  -- Indication: For Allergies    atorvastatin 80 mg oral tablet  -- 0.5 tab(s) by mouth once a day (at bedtime)  -- Indication: For CAD    Uloric 40 mg oral tablet  -- 1 tab(s) by mouth once a day    home  -- Indication: For ANTIMETABOLIC    hydroxychloroquine 200 mg oral tablet  -- 1 tab(s) by mouth 2 times a day   HOSP/home  -- Indication: For Lupus    Plavix 75 mg oral tablet  -- 1 tab(s) by mouth once a day  -- Indication: For CAD    metoprolol tartrate 50 mg oral tablet  -- 1 tab(s) by mouth once a day  -- Indication: For HTN (hypertension)    metolazone 5 mg oral tablet  -- 1 tab(s) by mouth once a day   home/hospital  -- Indication: For HTN (hypertension)    potassium chloride 20 mEq oral tablet, extended release  -- 1 tab(s) by mouth 2 times a day    home  -- Indication: For SUPP    magnesium carbonate 250 mg oral capsule  -- Indication: For SUPP    pantoprazole 40 mg oral delayed release tablet  -- 1 tab(s) by mouth once a day HOSP/home  -- Indication: For GERD

## 2018-04-15 NOTE — DISCHARGE NOTE ADULT - MEDICATION SUMMARY - MEDICATIONS TO STOP TAKING
I will STOP taking the medications listed below when I get home from the hospital:    Benicar 20 mg oral tablet  -- 1 tab(s) by mouth once a day    home

## 2018-04-15 NOTE — DISCHARGE NOTE ADULT - PATIENT PORTAL LINK FT
You can access the [email or web address], offered by [facility], by registering with the following website: [email or web address]

## 2018-04-15 NOTE — DISCHARGE NOTE ADULT - CARE PLAN
Principal Discharge DX:	NSTEMI (non-ST elevated myocardial infarction)  Goal:	cardiac cath  Assessment and plan of treatment:	You will be transferred to [facility] for cardiac cath.  Secondary Diagnosis:	HTN (hypertension)  Goal:	Blood pressure less than 130/80 mmHg  Assessment and plan of treatment:	Continue with blood pressure medication. Maintain a healthy diet that consist of low sugar, low fat, low sodium diet. Exercise frequently if possible. Follow up with primary care physician in one week after discharge.  Secondary Diagnosis:	Lupus  Assessment and plan of treatment:	Continue with home medications.  Secondary Diagnosis:	Diabetes  Goal:	HbA1C less than 7%  Assessment and plan of treatment:	Continue with blood sugar medication as instructed in the note. HbA1C was found in admission on 10.2% .  You must maintain a healthy diet that consist of low sugar, low fat, low sodium diet. Exercise frequently if possible. Consider repeating your Hemoglobin A1c within 3 months after discharge to monitor your average blood glucose control. Follow up with primary care physician in one week after discharge.  Secondary Diagnosis:	Asthma  Assessment and plan of treatment:	Continue with medications instructed by your primary care physician. Avoid triggers for asthma. If you're allergic to dust mites, pollens or molds, they can make your asthma symptoms get worse. Cold air, exercise, fumes from chemicals or perfume, tobacco or wood smoke, and weather changes can also make asthma symptoms worse. So can common colds and sinus infections. Vaccinate with influenza vaccine yearly. Follow up with primary care physician one week after discharge.

## 2018-04-15 NOTE — PROGRESS NOTE ADULT - ASSESSMENT
63 yr old male with PMHX of CAD,DM,HTN,TONYA-not birgit bipap,Lupus,Lipid D/O with Non-STEMI.  1.Tele monitoring.  2.CAD-cont cardiac medication.  3.DM-Insulin,. uncontrolled, Endo eval called.  4.HTN-Cont Arb.  5.TONYA-refuses bipap.  6.Lupus-plaquenil.  7.Replace k+.  8.GI and DVT prophylaxis.  9.D/W pt options cath vs stress test-unable to do allergic to isotope and clausterphobic, agree for cath in am.

## 2018-04-15 NOTE — DISCHARGE NOTE ADULT - PLAN OF CARE
cardiac cath You will be transferred to [facility] for cardiac cath. Blood pressure less than 130/80 mmHg Continue with blood pressure medication. Maintain a healthy diet that consist of low sugar, low fat, low sodium diet. Exercise frequently if possible. Follow up with primary care physician in one week after discharge. Continue with home medications. HbA1C less than 7% Continue with blood sugar medication as instructed in the note. HbA1C was found in admission on 10.2% .  You must maintain a healthy diet that consist of low sugar, low fat, low sodium diet. Exercise frequently if possible. Consider repeating your Hemoglobin A1c within 3 months after discharge to monitor your average blood glucose control. Follow up with primary care physician in one week after discharge. Continue with medications instructed by your primary care physician. Avoid triggers for asthma. If you're allergic to dust mites, pollens or molds, they can make your asthma symptoms get worse. Cold air, exercise, fumes from chemicals or perfume, tobacco or wood smoke, and weather changes can also make asthma symptoms worse. So can common colds and sinus infections. Vaccinate with influenza vaccine yearly. Follow up with primary care physician one week after discharge.

## 2018-04-15 NOTE — DISCHARGE NOTE ADULT - HOSPITAL COURSE
63 year old morbidly obese M from home with PMH of  Asthma, HTN, DM, TONYA-no CPAP, SLE, CAD s/p stents, BPH, HLD, psoriatic arthritis presented to ED with c/o chest pain since yesterday night. Patient was at his baseline health until yesterday when he was about to go to bed at 11pm he started to have sudden onset left sided chest pain in upper portion of chest, constant, 5-6 intensity, non radiating, sharp, no aggravating or relieving factors, but reports having burping which is unusual, not associated with SOB, palpitations, dizziness, diaphoresis, nausea vomiting. Patient reports lifting heavy weights and working more than usual a day before having the above symptoms. patient took 81 mg of aspirin and called EMS who gave 2X 81 mg of aspirin, symptoms lasted until 530Am after which patient is asymptomatic. patient was recently treated with some medication which he cannot remember for bronchitis.     In ED, patient had stable vitals on admission. EKG on admission- NSR @96 BPM , no ST T wave changes, EKG 2 sinus rhythm @ 99BPM, no ST T wave changes. cardiac enzymes x1- negative, T2- 0.2, T3- 0.5 , CXR s/o  no evidence of consolidation or effusion. Labs pertinent for WBC-11.3, platelets of 135 , bun/ creat - 28/1.45, s/p 1 dose of 325mg of aspirin in ED.     will admit to telemetry for evaluation of chest pain r/o ACS      DRAFT******************************* TO EDIT 63 year old morbidly obese M from home with PMH of  Asthma, HTN, DM, TONYA-no CPAP, SLE, CAD s/p stents, BPH, HLD, psoriatic arthritis presented to ED with c/o chest pain since yesterday night. Patient was at his baseline health until yesterday when he was about to go to bed at 11pm he started to have sudden onset left sided chest pain in upper portion of chest, constant, 5-6 intensity, non radiating, sharp, no aggravating or relieving factors, but reports having burping which is unusual, not associated with SOB, palpitations, dizziness, diaphoresis, nausea vomiting. Patient reports lifting heavy weights and working more than usual a day before having the above symptoms. patient took 81 mg of aspirin and called EMS who gave 2X 81 mg of aspirin, symptoms lasted until 530Am after which patient is asymptomatic. patient was recently treated with some medication which he cannot remember for bronchitis.     In ED, patient had stable vitals on admission. EKG on admission- NSR @96 BPM , no ST T wave changes, EKG 2 sinus rhythm @ 99BPM, no ST T wave changes. cardiac enzymes x1- negative, T2- 0.2, T3- 0.5 , CXR s/o  no evidence of consolidation or effusion. Labs pertinent for WBC-11.3, platelets of 135 , bun/ creat - 28/1.45, s/p 1 dose of 325mg of aspirin in ED.     Admitted to telemetry for evaluation of chest pain r/o ACS  Hx of multiple risk factors CAD, HTN, HLD, morbid obesity, ex smoker, autoimmune disease, family history of CAD.  EKG on admission- NSR @96 BPM , no ST T wave changes, EKG 2 sinus rhythm @ 99BPM, no ST T wave changes. cardiac enzymes x1- negative, T2- 0.2, T3- 0.5  TSH: 1.90, HbA1C: 10.2%, Lipid panel: TG's: 204, HDL: 36, Cholesterol: 104.  Patient was started on aspirin, plavix, statin and lopressor. Evaluated by Dr Garzon. Diagnosed with NSTEMI. Patient will be transferred to [facility] for cardiac cath.    Given patient's improved clinical status and current hemodynamic stability, decision was made to discharge.  Please refer to patient's complete medical chart with documents for a full hospital course, for this is only a brief summary.

## 2018-04-16 ENCOUNTER — TRANSCRIPTION ENCOUNTER (OUTPATIENT)
Age: 63
End: 2018-04-16

## 2018-04-16 ENCOUNTER — CHART COPY (OUTPATIENT)
Age: 63
End: 2018-04-16

## 2018-04-16 ENCOUNTER — OUTPATIENT (OUTPATIENT)
Dept: INPATIENT UNIT | Facility: HOSPITAL | Age: 63
LOS: 1 days | End: 2018-04-16
Payer: MEDICARE

## 2018-04-16 VITALS
RESPIRATION RATE: 18 BRPM | SYSTOLIC BLOOD PRESSURE: 135 MMHG | TEMPERATURE: 98 F | HEART RATE: 85 BPM | DIASTOLIC BLOOD PRESSURE: 61 MMHG | OXYGEN SATURATION: 96 %

## 2018-04-16 VITALS
TEMPERATURE: 98 F | HEART RATE: 107 BPM | OXYGEN SATURATION: 93 % | RESPIRATION RATE: 17 BRPM | SYSTOLIC BLOOD PRESSURE: 137 MMHG | DIASTOLIC BLOOD PRESSURE: 73 MMHG

## 2018-04-16 VITALS
HEIGHT: 62 IN | DIASTOLIC BLOOD PRESSURE: 72 MMHG | TEMPERATURE: 98 F | WEIGHT: 271.17 LBS | SYSTOLIC BLOOD PRESSURE: 141 MMHG | HEART RATE: 100 BPM | RESPIRATION RATE: 18 BRPM | OXYGEN SATURATION: 96 %

## 2018-04-16 DIAGNOSIS — Z98.89 OTHER SPECIFIED POSTPROCEDURAL STATES: Chronic | ICD-10-CM

## 2018-04-16 DIAGNOSIS — Z95.5 PRESENCE OF CORONARY ANGIOPLASTY IMPLANT AND GRAFT: Chronic | ICD-10-CM

## 2018-04-16 DIAGNOSIS — G56.00 CARPAL TUNNEL SYNDROME, UNSPECIFIED UPPER LIMB: Chronic | ICD-10-CM

## 2018-04-16 DIAGNOSIS — I21.4 NON-ST ELEVATION (NSTEMI) MYOCARDIAL INFARCTION: ICD-10-CM

## 2018-04-16 LAB
ANION GAP SERPL CALC-SCNC: 7 MMOL/L — SIGNIFICANT CHANGE UP (ref 5–17)
BUN SERPL-MCNC: 20 MG/DL — HIGH (ref 7–18)
CALCIUM SERPL-MCNC: 8.5 MG/DL — SIGNIFICANT CHANGE UP (ref 8.4–10.5)
CHLORIDE SERPL-SCNC: 100 MMOL/L — SIGNIFICANT CHANGE UP (ref 96–108)
CO2 SERPL-SCNC: 32 MMOL/L — HIGH (ref 22–31)
CREAT SERPL-MCNC: 1.3 MG/DL — SIGNIFICANT CHANGE UP (ref 0.5–1.3)
GLUCOSE BLDC GLUCOMTR-MCNC: 197 MG/DL — HIGH (ref 70–99)
GLUCOSE BLDC GLUCOMTR-MCNC: 228 MG/DL — HIGH (ref 70–99)
GLUCOSE BLDC GLUCOMTR-MCNC: 246 MG/DL — HIGH (ref 70–99)
GLUCOSE SERPL-MCNC: 179 MG/DL — HIGH (ref 70–99)
HCT VFR BLD CALC: 48.5 % — SIGNIFICANT CHANGE UP (ref 39–50)
HGB BLD-MCNC: 16.1 G/DL — SIGNIFICANT CHANGE UP (ref 13–17)
MAGNESIUM SERPL-MCNC: 1.6 MG/DL — SIGNIFICANT CHANGE UP (ref 1.6–2.6)
MCHC RBC-ENTMCNC: 30.6 PG — SIGNIFICANT CHANGE UP (ref 27–34)
MCHC RBC-ENTMCNC: 33.2 GM/DL — SIGNIFICANT CHANGE UP (ref 32–36)
MCV RBC AUTO: 91.9 FL — SIGNIFICANT CHANGE UP (ref 80–100)
PHOSPHATE SERPL-MCNC: 3.9 MG/DL — SIGNIFICANT CHANGE UP (ref 2.5–4.5)
PLATELET # BLD AUTO: 140 K/UL — LOW (ref 150–400)
POTASSIUM SERPL-MCNC: 3.3 MMOL/L — LOW (ref 3.5–5.3)
POTASSIUM SERPL-SCNC: 3.3 MMOL/L — LOW (ref 3.5–5.3)
RBC # BLD: 5.28 M/UL — SIGNIFICANT CHANGE UP (ref 4.2–5.8)
RBC # FLD: 13.3 % — SIGNIFICANT CHANGE UP (ref 10.3–14.5)
SODIUM SERPL-SCNC: 139 MMOL/L — SIGNIFICANT CHANGE UP (ref 135–145)
WBC # BLD: 13.4 K/UL — HIGH (ref 3.8–10.5)
WBC # FLD AUTO: 13.4 K/UL — HIGH (ref 3.8–10.5)

## 2018-04-16 PROCEDURE — 99152 MOD SED SAME PHYS/QHP 5/>YRS: CPT

## 2018-04-16 PROCEDURE — 82550 ASSAY OF CK (CPK): CPT

## 2018-04-16 PROCEDURE — 93010 ELECTROCARDIOGRAM REPORT: CPT

## 2018-04-16 PROCEDURE — 82962 GLUCOSE BLOOD TEST: CPT

## 2018-04-16 PROCEDURE — 82306 VITAMIN D 25 HYDROXY: CPT

## 2018-04-16 PROCEDURE — C1769: CPT

## 2018-04-16 PROCEDURE — C1887: CPT

## 2018-04-16 PROCEDURE — 80053 COMPREHEN METABOLIC PANEL: CPT

## 2018-04-16 PROCEDURE — 99153 MOD SED SAME PHYS/QHP EA: CPT

## 2018-04-16 PROCEDURE — 81001 URINALYSIS AUTO W/SCOPE: CPT

## 2018-04-16 PROCEDURE — 80048 BASIC METABOLIC PNL TOTAL CA: CPT

## 2018-04-16 PROCEDURE — 82553 CREATINE MB FRACTION: CPT

## 2018-04-16 PROCEDURE — 80061 LIPID PANEL: CPT

## 2018-04-16 PROCEDURE — 99285 EMERGENCY DEPT VISIT HI MDM: CPT | Mod: 25

## 2018-04-16 PROCEDURE — 94640 AIRWAY INHALATION TREATMENT: CPT

## 2018-04-16 PROCEDURE — 84484 ASSAY OF TROPONIN QUANT: CPT

## 2018-04-16 PROCEDURE — 84443 ASSAY THYROID STIM HORMONE: CPT

## 2018-04-16 PROCEDURE — 93454 CORONARY ARTERY ANGIO S&I: CPT

## 2018-04-16 PROCEDURE — 93306 TTE W/DOPPLER COMPLETE: CPT

## 2018-04-16 PROCEDURE — 93005 ELECTROCARDIOGRAM TRACING: CPT

## 2018-04-16 PROCEDURE — 80307 DRUG TEST PRSMV CHEM ANLYZR: CPT

## 2018-04-16 PROCEDURE — 85027 COMPLETE CBC AUTOMATED: CPT

## 2018-04-16 PROCEDURE — 83036 HEMOGLOBIN GLYCOSYLATED A1C: CPT

## 2018-04-16 PROCEDURE — 71045 X-RAY EXAM CHEST 1 VIEW: CPT

## 2018-04-16 PROCEDURE — 85379 FIBRIN DEGRADATION QUANT: CPT

## 2018-04-16 PROCEDURE — 83735 ASSAY OF MAGNESIUM: CPT

## 2018-04-16 PROCEDURE — 84100 ASSAY OF PHOSPHORUS: CPT

## 2018-04-16 PROCEDURE — 82607 VITAMIN B-12: CPT

## 2018-04-16 RX ORDER — DEXTROSE 50 % IN WATER 50 %
25 SYRINGE (ML) INTRAVENOUS ONCE
Qty: 0 | Refills: 0 | Status: DISCONTINUED | OUTPATIENT
Start: 2018-04-16 | End: 2018-04-16

## 2018-04-16 RX ORDER — MAGNESIUM CARBONATE 54 MG/5 ML
0 LIQUID (ML) ORAL
Qty: 0 | Refills: 0 | COMMUNITY

## 2018-04-16 RX ORDER — INSULIN GLARGINE 100 [IU]/ML
40 INJECTION, SOLUTION SUBCUTANEOUS AT BEDTIME
Qty: 0 | Refills: 0 | Status: DISCONTINUED | OUTPATIENT
Start: 2018-04-16 | End: 2018-04-16

## 2018-04-16 RX ORDER — DEXTROSE 50 % IN WATER 50 %
12.5 SYRINGE (ML) INTRAVENOUS ONCE
Qty: 0 | Refills: 0 | Status: DISCONTINUED | OUTPATIENT
Start: 2018-04-16 | End: 2018-04-16

## 2018-04-16 RX ORDER — INSULIN LISPRO 100/ML
VIAL (ML) SUBCUTANEOUS AT BEDTIME
Qty: 0 | Refills: 0 | Status: DISCONTINUED | OUTPATIENT
Start: 2018-04-16 | End: 2018-04-16

## 2018-04-16 RX ORDER — GLUCAGON INJECTION, SOLUTION 0.5 MG/.1ML
1 INJECTION, SOLUTION SUBCUTANEOUS ONCE
Qty: 0 | Refills: 0 | Status: DISCONTINUED | OUTPATIENT
Start: 2018-04-16 | End: 2018-04-16

## 2018-04-16 RX ORDER — METFORMIN HYDROCHLORIDE 850 MG/1
2 TABLET ORAL
Qty: 0 | Refills: 0 | COMMUNITY

## 2018-04-16 RX ORDER — LORATADINE 10 MG/1
1 TABLET ORAL
Qty: 0 | Refills: 0 | DISCHARGE
Start: 2018-04-16

## 2018-04-16 RX ORDER — SODIUM CHLORIDE 9 MG/ML
1000 INJECTION, SOLUTION INTRAVENOUS
Qty: 0 | Refills: 0 | Status: DISCONTINUED | OUTPATIENT
Start: 2018-04-16 | End: 2018-04-16

## 2018-04-16 RX ORDER — INSULIN LISPRO 100/ML
28 VIAL (ML) SUBCUTANEOUS
Qty: 0 | Refills: 0 | Status: DISCONTINUED | OUTPATIENT
Start: 2018-04-16 | End: 2018-04-16

## 2018-04-16 RX ORDER — POTASSIUM CHLORIDE 20 MEQ
40 PACKET (EA) ORAL ONCE
Qty: 0 | Refills: 0 | Status: COMPLETED | OUTPATIENT
Start: 2018-04-16 | End: 2018-04-16

## 2018-04-16 RX ORDER — DEXTROSE 50 % IN WATER 50 %
1 SYRINGE (ML) INTRAVENOUS ONCE
Qty: 0 | Refills: 0 | Status: DISCONTINUED | OUTPATIENT
Start: 2018-04-16 | End: 2018-04-16

## 2018-04-16 RX ORDER — INSULIN LISPRO 100/ML
VIAL (ML) SUBCUTANEOUS
Qty: 0 | Refills: 0 | Status: DISCONTINUED | OUTPATIENT
Start: 2018-04-16 | End: 2018-04-16

## 2018-04-16 RX ADMIN — PANTOPRAZOLE SODIUM 40 MILLIGRAM(S): 20 TABLET, DELAYED RELEASE ORAL at 06:25

## 2018-04-16 RX ADMIN — Medication 50 MILLIGRAM(S): at 06:24

## 2018-04-16 RX ADMIN — Medication 40 MILLIEQUIVALENT(S): at 08:17

## 2018-04-16 RX ADMIN — LOSARTAN POTASSIUM 50 MILLIGRAM(S): 100 TABLET, FILM COATED ORAL at 06:25

## 2018-04-16 RX ADMIN — Medication 50 MILLIGRAM(S): at 06:25

## 2018-04-16 RX ADMIN — Medication 4: at 13:46

## 2018-04-16 RX ADMIN — Medication 3 MILLILITER(S): at 02:32

## 2018-04-16 RX ADMIN — Medication 200 MILLIGRAM(S): at 06:24

## 2018-04-16 RX ADMIN — Medication 3 MILLILITER(S): at 09:00

## 2018-04-16 RX ADMIN — Medication 1 SPRAY(S): at 06:26

## 2018-04-16 RX ADMIN — Medication 50 MILLIGRAM(S): at 08:17

## 2018-04-16 RX ADMIN — SPIRONOLACTONE 25 MILLIGRAM(S): 25 TABLET, FILM COATED ORAL at 06:25

## 2018-04-16 RX ADMIN — Medication 2: at 08:09

## 2018-04-16 RX ADMIN — Medication 28 UNIT(S): at 13:47

## 2018-04-16 NOTE — DIETITIAN INITIAL EVALUATION ADULT. - ORAL INTAKE PTA
good/Usual breakfast - ham and egg sandwich on roll and coffee with Splenda; Lunch - left over from dinner or cold cut sandwich; Dinner - Chinese or Italian take out, sausage and peppers, mac and cheese, pizza, burgers; Pt snacks on cakes and potato chips. Pt drinks diet soda and water with Crystal Lite

## 2018-04-16 NOTE — DIETITIAN INITIAL EVALUATION ADULT. - SIGNS/SYMPTOMS
review of usual diet recall shows excessive caloric/fat/concentrated intakes, BMI 49 pt verbalizing incomplete diet information and requesting diet education

## 2018-04-16 NOTE — DIETITIAN INITIAL EVALUATION ADULT. - ADHERENCE
poor/Pt with morbid obesity and T2DM, does not follow any modified diet, admits to snacking on concentrated sweets, enjoys fatty foods, and has difficulty with portion controlling

## 2018-04-16 NOTE — DISCHARGE NOTE ADULT - HOSPITAL COURSE
63 year  male, morbidly obese with PMH of  Asthma, HTN, DM, TONYA-no CPAP, SLE, CAD s/p stents, BPH, HLD, psoriatic arthritis presented to [facility] ED  with c/o Left sided chest pain. Patient states he was at his baseline health until the previous day, when he was about to go to bed at 11pm he started to have sudden onset left sided chest pain in upper portion of chest, constant,6-8intensity, non radiating, sharp, no aggravating or relieving factors, but reports having burping which is unusual, not associated with SOB, palpitations, dizziness, diaphoresis, nausea vomiting. Patient reports lifting heavy weights and working more than usual a day before having the above symptoms. patient took 81 mg of aspirin and called EMS who gave 2X 81 mg of aspirin, symptoms lasted until 530Am after which patient is asymptomatic. Patient was recently treated with some medication which he cannot remember for bronchitis. In ED, patient had stable vitals on admission. EKG on admission- NSR @96 BPM , no ST T wave changes, EKG 2 sinus rhythm @ 99BPM, no ST T wave changes. Cardiac enzymes x1- negative, but CE elevated with the third set ( Troponin 1.54>, 1.63 with ( peak). CXR s/o  no evidence of consolidation or effusion. Patient was ruled in for NSTEMI and transferred to [facility] for cardiac cath.  Endo: Dr. Rapp, Card: Dr. Omer. (16 Apr 2018 10:20) 63 year  male, morbidly obese with PMH of  Asthma, HTN, DM, TONYA-no CPAP, SLE, CAD s/p stents, BPH, HLD, psoriatic arthritis presented to [facility] ED  with c/o Left sided chest pain. Patient states he was at his baseline health until the previous day, when he was about to go to bed at 11pm he started to have sudden onset left sided chest pain in upper portion of chest, constant,6-8intensity, non radiating, sharp, no aggravating or relieving factors, but reports having burping which is unusual, not associated with SOB, palpitations, dizziness, diaphoresis, nausea vomiting. Patient reports lifting heavy weights and working more than usual a day before having the above symptoms. patient took 81 mg of aspirin and called EMS who gave 2X 81 mg of aspirin, symptoms lasted until 530Am after which patient is asymptomatic. Patient was recently treated with some medication which he cannot remember for bronchitis. In ED, patient had stable vitals on admission. EKG on admission- NSR @96 BPM , no ST T wave changes, EKG 2 sinus rhythm @ 99BPM, no ST T wave changes. Cardiac enzymes x1- negative, but CE elevated with the third set ( Troponin 1.54>, 1.63 with ( peak). CXR s/o  no evidence of consolidation or effusion. Patient was ruled in for NSTEMI and transferred to [facility] for cardiac cath.  Patient is now s/p diagnotic LHC via RRA access showing luminal irregularities.  He tolerated the procedure well.  RRA site benign without presence of bleeding/hematoma, patient is without complaints.  He remains CP free and hemodynamically stable.  Patients hospital course was otherwise uneventful.  Case discussed with Dr. Tovar, patient is now medically stable for discharge home today.    Endo: Dr. Rapp, Card: Dr. Omer. (16 Apr 2018 10:20) 63 year  male, morbidly obese with PMH of  Asthma, HTN, DM, TONYA-no CPAP, SLE, CAD s/p stents, BPH, HLD, psoriatic arthritis presented to [facility] ED  with c/o Left sided chest pain. Patient states he was at his baseline health until the previous day, when he was about to go to bed at 11pm he started to have sudden onset left sided chest pain in upper portion of chest, constant,6-8intensity, non radiating, sharp, no aggravating or relieving factors, but reports having burping which is unusual, not associated with SOB, palpitations, dizziness, diaphoresis, nausea vomiting. Patient reports lifting heavy weights and working more than usual a day before having the above symptoms. patient took 81 mg of aspirin and called EMS who gave 2X 81 mg of aspirin, symptoms lasted until 530Am after which patient is asymptomatic. Patient was recently treated with some medication which he cannot remember for bronchitis. In ED, patient had stable vitals on admission. EKG on admission- NSR @96 BPM , no ST T wave changes, EKG 2 sinus rhythm @ 99BPM, no ST T wave changes. Cardiac enzymes x1- negative, but CE elevated with the third set ( Troponin 1.54>, 1.63 with ( peak). CXR s/o  no evidence of consolidation or effusion. Patient was ruled in for NSTEMI and transferred to [facility] for cardiac cath.  Patient is now s/p diagnotic LHC via RRA access showing luminal irregularities.  He tolerated the procedure well.  RRA site benign without presence of bleeding/hematoma, patient is without complaints.  He remains CP free and hemodynamically stable.  Patients hospital course was otherwise uneventful.  Case discussed with Dr. Tovar, patient is now medically stable for discharge home today.  Patient's A1C is 10.2, Endo Dr. Rapp aware. Patient will be following up with Endocrinologist as outpatient.   Endo: Dr. Rapp, Card: Dr. Omer. (16 Apr 2018 10:20)

## 2018-04-16 NOTE — DIETITIAN INITIAL EVALUATION ADULT. - OTHER INFO
Nutrition consult received for education. Pt reports gaining 15 pounds past 3 months for reasons mentioned above, current wt is 271.1 pounds. Pt with good appetite and po intakes, 75% po intakes noted per flowsheet. No GI distress, +BM today. Pt denies chewing/swallowing difficulties. NKFA. PTA takes multivitamin, vitamin D and potassium supplement. Pt with T2DM, has been on Levemir and Metformin, checks FS 3 x day with usual BG around 200-400's.

## 2018-04-16 NOTE — DISCHARGE NOTE ADULT - MEDICATION SUMMARY - MEDICATIONS TO TAKE
I will START or STAY ON the medications listed below when I get home from the hospital:    Cialis 5 mg oral tablet  -- 1 tab(s) by mouth once a day, home  -- Indication: For ED     spironolactone 25 mg oral tablet  -- 1 tab(s) by mouth once a day HOSP/home  -- Indication: For diuretic    aspirin 81 mg oral tablet, chewable  -- 1 tab(s) by mouth once a day hospital/ Home  -- Indication: For coronary artery disease    tramadol-acetaminophen 37.5 mg-325 mg oral tablet  -- 1 tab(s) by mouth every 4 hours, As Needed, home  -- Indication: For pain    Benicar 20 mg oral tablet  -- 1 tab(s) by mouth once a day, home  -- Indication: For high blood pressure    alfuzosin 10 mg oral tablet, extended release  -- 1 tab(s) by mouth once a day, home  -- Indication: For prostate    Lyrica 50 mg oral capsule  -- 1 cap(s) by mouth 2 times a day HOSP/home  -- Indication: For pain    Lantus  -- 50 unit(s) subcutaneous once a day (at bedtime) HOME/ hosp  -- Indication: For diabetes    Apidra 100 units/mL subcutaneous solution  -- 35  subcutaneous 3 times a day, home  -- Indication: For diabetes    metFORMIN 500 mg oral tablet  -- 2 tab(s) by mouth 2 times a day, Home  Hold for 2 days you may resume Thursday 4/19  -- Indication: For diabetes    loratadine 10 mg oral tablet  -- 1 tab(s) by mouth once a day (at bedtime), home/ hosp  -- Indication: For allergies    atorvastatin 80 mg oral tablet  -- 0.5 tab(s) by mouth once a day (at bedtime), home/ hosp  -- Indication: For high cholesterol    Uloric 40 mg oral tablet  -- 1 tab(s) by mouth once a day, home/ hosp  -- Indication: For gout    hydroxychloroquine 200 mg oral tablet  -- 1 tab(s) by mouth 2 times a day   HOSP/home  -- Indication: For SLE    Plavix 75 mg oral tablet  -- 1 tab(s) by mouth once a day, Home/ hosp  -- Indication: For coronary artery disease    metoprolol tartrate 50 mg oral tablet  -- 1 tab(s) by mouth once a day, Home/ hosp  -- Indication: For high blood pressure    metolazone 5 mg oral tablet  -- 1 tab(s) by mouth once a day   home/hospital  -- Indication: For diuretic    potassium chloride 20 mEq oral tablet, extended release  -- 1 tab(s) by mouth 2 times a day, home  -- Indication: For supplement    pantoprazole 40 mg oral delayed release tablet  -- 1 tab(s) by mouth once a day HOSP/home  -- Indication: For reflux    Vitamin D3 2000 intl units oral capsule  -- 1 cap(s) by mouth once a day, home  -- Indication: For supplement

## 2018-04-16 NOTE — DIETITIAN INITIAL EVALUATION ADULT. - NS AS NUTRI INTERV ED CONTENT3
Educated pt on consistent carbohydrate diet, portion sizing including benefit of mixed meals, "my plate" model, label reading and strategies to promote improved glucose control. Recommended pt to increase consumption of whole grains, consume protein and fiber with CHO, avoid concentrated sweets. Discussed healthier snack and meal ideas, carbohydrate counting methods, and reviewed sample meal. Recommended pt to start food log along with regular checking of FS at home for better glycemic control. Discussed heart healthy/wt management diet; identified foods high in sodium/calories and fats, limit sodium intake, increased consumption of lean meats, fruit and vegetables, healthy fats and oils, cooking/eating out tips, and healthy snacking options.

## 2018-04-16 NOTE — DIETITIAN INITIAL EVALUATION ADULT. - NS AS NUTRI INTERV MEALS SNACK
Recommend to continue with Consistent Carbohydrate DASH. Provide food preferences within therapeutic diet when requested.

## 2018-04-16 NOTE — PROGRESS NOTE ADULT - ASSESSMENT
63 yr old male with PMHX of CAD,DM,HTN,TONYA-not birgit bipap,Lupus,Lipid D/O with Non-STEMI.  1.Tele monitoring.  2.CAD-cont cardiac medication.  3.DM-Insulin.  4.HTN-Cont Arb.  5.TONYA-refuses bipap.  6.Lupus-plaquenil.  7.Replace k+.  8.GI and DVT prophylaxis.  9.D/W pt options cath vs stress test-unable to do allergic to isotope and clausterphobic, agree for cath today.  10.Pt has not had reaction to iv contrast given during his previous cath, will give prednisone 50mg pox1.

## 2018-04-16 NOTE — DISCHARGE NOTE ADULT - ADDITIONAL INSTRUCTIONS
Follow-up with your Cardiologist in 1-2 weeks Follow-up with your Cardiologist in 1-2 weeks. Please follow up with Dr. Rapp ( endo) and Dr. Grissom ( pulmonology)  No heavy lifting,  pushing, pulling with affected arm for 2 weeks.  No strenuous  activity  for 2 weeks. No sex for 1 week.  No driving for 2 days. You may shower 24 hours following procedure but avoid baths and swimming for 1 week. Check wrist site for bleeding and/or swelling daily following procedure. Call your doctor/cardiologist immediately should it occur or if you have increased/persistent pain at the site. Follow up with your cardiologist in 1- 2 weeks. You may call [facility] at [phone] or [phone] after office hours and weekends with any questions or concerns following your procedure.

## 2018-04-16 NOTE — DIETITIAN INITIAL EVALUATION ADULT. - PERTINENT LABORATORY DATA
Na 139 [135 - 145], K+ 3.3 [3.5 - 5.3], BUN 20 [7 - 18], Cr 1.30 [0.50 - 1.30],  [70 - 99], Phos 3.9 [2.5 - 4.5], Alk Phos --, AST --, ALT --, Mg 1.6 [1.6 - 2.6], Ca 8.5 [8.4 - 10.5], HbA1c 10.2%; Fingersticks (4/14-16) [id number]

## 2018-04-16 NOTE — CHART NOTE - NSCHARTNOTEFT_GEN_A_CORE
Upon Nutritional Assessment by the Registered Dietitian your patient was determined to meet criteria / has evidence of the following diagnosis/diagnoses:          [ ]  Mild Protein Calorie Malnutrition        [ ]  Moderate Protein Calorie Malnutrition        [ ] Severe Protein Calorie Malnutrition        [ ] Unspecified Protein Calorie Malnutrition        [ ] Underweight / BMI <19        [x ] Morbid Obesity / BMI > 40      Findings as based on:  [ x] Comprehensive nutrition assessment   [ ] Nutrition Focused Physical Exam  [x ] Other: Excessive caloric intakes per usual diet recall, BMI 49      Nutrition Plan/Recommendations:  Wt management diet education        PROVIDER Section:     By signing this assessment you are acknowledging and agree with the diagnosis/diagnoses assigned by the Registered Dietitian    Comments:

## 2018-04-16 NOTE — PROGRESS NOTE ADULT - SUBJECTIVE AND OBJECTIVE BOX
CHIEF COMPLAINT:Patient is a 63y old  Male who presents with a chief complaint of chest pain.Pt appears comfortable.    	  REVIEW OF SYSTEMS:  CONSTITUTIONAL: No fever, weight loss, or fatigue  EYES: No eye pain, visual disturbances, or discharge  ENT:  No difficulty hearing, tinnitus, vertigo; No sinus or throat pain  NECK: No pain or stiffness  RESPIRATORY: No cough, wheezing, chills or hemoptysis; No Shortness of Breath  CARDIOVASCULAR: No chest pain, palpitations, passing out, dizziness, or leg swelling  GASTROINTESTINAL: No abdominal or epigastric pain. No nausea, vomiting, or hematemesis; No diarrhea or constipation. No melena or hematochezia.  GENITOURINARY: No dysuria, frequency, hematuria, or incontinence  NEUROLOGICAL: No headaches, memory loss, loss of strength, numbness, or tremors  SKIN: No itching, burning, rashes, or lesions   LYMPH Nodes: No enlarged glands  ENDOCRINE: No heat or cold intolerance; No hair loss  MUSCULOSKELETAL: No joint pain or swelling; No muscle, back, or extremity pain  PSYCHIATRIC: No depression, anxiety, mood swings, or difficulty sleeping  HEME/LYMPH: No easy bruising, or bleeding gums  ALLERGY AND IMMUNOLOGIC: No hives or eczema	      PHYSICAL EXAM:  T(C): 36.8 (04-16-18 @ 07:49), Max: 37 (04-16-18 @ 05:00)  HR: 85 (04-16-18 @ 07:49) (80 - 101)  BP: 135/61 (04-16-18 @ 07:49) (130/69 - 151/84)  RR: 18 (04-16-18 @ 07:49) (18 - 20)  SpO2: 96% (04-16-18 @ 07:49) (93% - 97%)  Wt(kg): --  I&O's Summary    15 Apr 2018 07:01  -  16 Apr 2018 07:00  --------------------------------------------------------  IN: 250 mL / OUT: 0 mL / NET: 250 mL        Appearance: Normal	  HEENT:   Normal oral mucosa, PERRL, EOMI	  Lymphatic: No lymphadenopathy  Cardiovascular: Normal S1 S2, No JVD, No murmurs, No edema  Respiratory: Lungs clear to auscultation	  Psychiatry: A & O x 3, Mood & affect appropriate  Gastrointestinal:  Soft, Non-tender, + BS	  Skin: No rashes, No ecchymoses, No cyanosis	  Neurologic: Non-focal  Extremities: Normal range of motion, No clubbing, cyanosis or edema  Vascular: Peripheral pulses palpable 2+ bilaterally    MEDICATIONS  (STANDING):  ALBUTerol/ipratropium for Nebulization 3 milliLiter(s) Nebulizer every 6 hours  aspirin enteric coated 81 milliGRAM(s) Oral daily  atorvastatin 40 milliGRAM(s) Oral at bedtime  clopidogrel Tablet 75 milliGRAM(s) Oral daily  febuxostat 40 milliGRAM(s) Oral daily  heparin  Injectable 5000 Unit(s) SubCutaneous every 8 hours  hydroxychloroquine 200 milliGRAM(s) Oral two times a day  insulin glargine Injectable (LANTUS) 50 Unit(s) SubCutaneous at bedtime  insulin lispro (HumaLOG) corrective regimen sliding scale   SubCutaneous three times a day before meals  loratadine 10 milliGRAM(s) Oral at bedtime  losartan 50 milliGRAM(s) Oral daily  metolazone 5 milliGRAM(s) Oral daily  metoprolol tartrate 50 milliGRAM(s) Oral daily  pantoprazole    Tablet 40 milliGRAM(s) Oral before breakfast  potassium chloride    Tablet ER 40 milliEquivalent(s) Oral once  predniSONE   Tablet 50 milliGRAM(s) Oral once  pregabalin 50 milliGRAM(s) Oral two times a day  spironolactone 25 milliGRAM(s) Oral daily      TELEMETRY: 	  nsr    	  LABS:	 	    CARDIAC MARKERS:  CARDIAC MARKERS ( 15 Apr 2018 07:48 )  1.540 ng/mL / x     / 680 U/L / x     / 6.9 ng/mL  CARDIAC MARKERS ( 14 Apr 2018 19:56 )  1.630 ng/mL / x     / 725 U/L / x     / 10.4 ng/mL  CARDIAC MARKERS ( 14 Apr 2018 13:21 )  0.503 ng/mL / x     / x     / x     / x      CARDIAC MARKERS ( 14 Apr 2018 10:59 )  0.281 ng/mL / x     / x     / x     / x                                    16.1   13.4  )-----------( 140      ( 16 Apr 2018 05:39 )             48.5     04-16    139  |  100  |  20<H>  ----------------------------<  179<H>  3.3<L>   |  32<H>  |  1.30    Ca    8.5      16 Apr 2018 05:39  Phos  3.9     04-16  Mg     1.6     04-16        Lipid Profile: Cholesterol 104  LDL 27  HDL 36      HgA1c: Hemoglobin A1C, Whole Blood: 10.2 % (04-14 @ 22:38)    TSH: Thyroid Stimulating Hormone, Serum: 1.90 uU/mL (04-14 @ 15:27)

## 2018-04-16 NOTE — H&P CARDIOLOGY - PMH
Asthma    CAD (coronary artery disease)    Diabetes  Type 2  Former smoker    History of hypertension    HLD (hyperlipidemia)    HTN (hypertension)    Lupus    TONYA (obstructive sleep apnea)    Psoriatic arthritis Asthma    CAD (coronary artery disease)    Diabetes  Type 2, A1c: 10.2  Former smoker    History of hypertension    HLD (hyperlipidemia)    HTN (hypertension)    Lupus    TONYA (obstructive sleep apnea)    Psoriatic arthritis

## 2018-04-16 NOTE — DISCHARGE NOTE ADULT - CARE PROVIDER_API CALL
Bear Omer), Cardiology; Internal Medicine  [address] [place] [address]  Phone: [phone]  Fax: [phone] Bear Omer), Cardiology; Internal Medicine  [address] [place] [address]  Phone: [phone]  Fax: [phone]    Nuha Rapp), EndocrinologyMetabDiabetes  [address] [facility] [place]  [address]  [place] [address]  Phone: [phone]  Fax: [phone]    Linda Grissom (), Critical Care Medicine; Internal Medicine; Pulmonary Disease  [facility] [facility] [place]  [address]  [place] [address]  Phone: [phone]  Fax: [phone]

## 2018-04-16 NOTE — DISCHARGE NOTE ADULT - CARE PLAN
Principal Discharge DX:	Chest pain, unspecified type  Goal:	You will remain chest pain free  Assessment and plan of treatment:	Low salt, low fat diet.   Weight management.   Take medications as prescribed.    No smoking.  Follow up appointments with your doctor(s)  as instructed.   No heavy lifting for 2 weeks, no strenuous activity  ( pushing/ pulling) no driving for x 2 days,  you may shower 24 hours following procedure but no bathing or swimming for x1  week, no strenuous sex for x 1 week & follow up with your cardiologist in 1-2 week  Secondary Diagnosis:	Diabetes  Goal:	Your hemoglobin A1C will be at a normal range (normal range is from 6-8)  Assessment and plan of treatment:	Continue to follow with your primary care MD or your endocrinologist. Discuss what the goal hemoglobin A1C level is for you.  Follow a heart healthy diabetic diet. If you check your fingerstick glucose at home, call your MD if it is greater than 250mg/dL on 2 occasions or less than 100mg/dL on 2 occasions. Know signs of low blood sugar, such as: dizziness, shakiness, sweating, confusion, hunger, nervousness- drink 4 ounces apple juice if occurs and call your doctor. Know early signs of high blood sugar, such as: frequent urination, increased thirst, blurry vision, fatigue, headache - call your doctor if this occurs.  Secondary Diagnosis:	History of hypertension  Goal:	Your blood pressure will be controlled.  Assessment and plan of treatment:	Continue with your blood pressure medications; eat a heart healthy diet with low salt diet; exercise regularly (consult with your physician or cardiologist first); maintain a heart healthy weight; if you smoke - quit (A resource to help you stop smoking is the [facility] [employer] for Tobacco Control – phone number [phone].); include healthy ways to manage stress. Continue to follow with your primary care physician or cardiologist.  Secondary Diagnosis:	Hyperlipidemia, unspecified hyperlipidemia type  Goal:	Your LDL cholesterol will be less than 70mg/dL  Assessment and plan of treatment:	Continue with your cholesterol medications. Eat a heart healthy diet that is low in saturated fats and salt, and includes whole grains, fruits, vegetables and lean protein; exercise regularly (consult with your physician or cardiologist first); maintain a heart healthy weight; if you smoke - quit (A resource to help you stop smoking is the [facility] [employer] for Tobacco Control – phone number [phone].). Continue to follow with your primary physician or cardiologist.

## 2018-04-16 NOTE — H&P CARDIOLOGY - PSH
Carpal tunnel syndrome    H/O knee surgery    H/O umbilical hernia repair  mesh  History of rotator cuff surgery    Presence of stent in coronary artery  5 stents

## 2018-04-16 NOTE — DISCHARGE NOTE ADULT - MEDICATION SUMMARY - MEDICATIONS TO STOP TAKING
I will STOP taking the medications listed below when I get home from the hospital:    losartan 50 mg oral tablet  -- 1 tab(s) by mouth once a day, Hosp

## 2018-04-16 NOTE — DISCHARGE NOTE ADULT - PATIENT PORTAL LINK FT
You can access the [facility] Patient Portal, offered by [facility], by registering with the following website: [email or web address]

## 2018-04-16 NOTE — H&P CARDIOLOGY - HISTORY OF PRESENT ILLNESS
63 year old morbidly obese M from home with PMH of  Asthma, HTN, DM, TONYA-no CPAP, SLE, CAD s/p stents, BPH, HLD, psoriatic arthritis presented to ED with c/o chest pain since yesterday night. Patient was at his baseline health until yesterday when he was about to go to bed at 11pm he started to have sudden onset left sided chest pain in upper portion of chest, constant, 5-6 intensity, non radiating, sharp, no aggravating or relieving factors, but reports having burping which is unusual, not associated with SOB, palpitations, dizziness, diaphoresis, nausea vomiting. Patient reports lifting heavy weights and working more than usual a day before having the above symptoms. patient took 81 mg of aspirin and called EMS who gave 2X 81 mg of aspirin, symptoms lasted until 530Am after which patient is asymptomatic. patient was recently treated with some medication which he cannot remember for bronchitis.     In ED, patient had stable vitals on admission. EKG on admission- NSR @96 BPM , no ST T wave changes, EKG 2 sinus rhythm @ 99BPM, no ST T wave changes. cardiac enzymes x1- negative, T2- 0.2, T3- 0.5 , CXR s/o  no evidence of consolidation or effusion. Labs pertinent for WBC-11.3, platelets of 135 , bun/ creat - 28/1.45, s/p 1 dose of 325mg of aspirin in ED.     will admit to telemetry for evaluation of chest pain r/o ACS 63 year  male, morbidly obese with PMH of  Asthma, HTN, DM, TONYA-no CPAP, SLE, CAD s/p stents, BPH, HLD, psoriatic arthritis presented to [facility] ED  with c/o Left sided chest pain. Patient states he was at his baseline health until the previous day, when he was about to go to bed at 11pm he started to have sudden onset left sided chest pain in upper portion of chest, constant,6-8intensity, non radiating, sharp, no aggravating or relieving factors, but reports having burping which is unusual, not associated with SOB, palpitations, dizziness, diaphoresis, nausea vomiting. Patient reports lifting heavy weights and working more than usual a day before having the above symptoms. patient took 81 mg of aspirin and called EMS who gave 2X 81 mg of aspirin, symptoms lasted until 530Am after which patient is asymptomatic. Patient was recently treated with some medication which he cannot remember for bronchitis. In ED, patient had stable vitals on admission. EKG on admission- NSR @96 BPM , no ST T wave changes, EKG 2 sinus rhythm @ 99BPM, no ST T wave changes. Cardiac enzymes x1- negative, but CE elevated with the third set ( Troponin 1.54>, 1.63 with ( peak). CXR s/o  no evidence of consolidation or effusion. Patient was ruled in for NSTEMI and transferred to [facility] for cardiac cath.  Endo: Dr. Rapp, Card: Dr. Omer.

## 2018-04-16 NOTE — DIETITIAN INITIAL EVALUATION ADULT. - ENERGY NEEDS
Height: 62 inches, Weight: 271.1 pounds  BMI: 49 kg/m2 IBW: 118 pounds (+/-10%), %IBW: 230%  Pertinent Info: Per chart, 64 y/o male with T2DM, HLD, HTN, admitted for cardiac cath. +2 bilateral feet, ankle, legs edema, no pressure ulcers noted at this time.

## 2018-04-16 NOTE — DISCHARGE NOTE ADULT - PLAN OF CARE
You will remain chest pain free Low salt, low fat diet.   Weight management.   Take medications as prescribed.    No smoking.  Follow up appointments with your doctor(s)  as instructed.   No heavy lifting for 2 weeks, no strenuous activity  ( pushing/ pulling) no driving for x 2 days,  you may shower 24 hours following procedure but no bathing or swimming for x1  week, no strenuous sex for x 1 week & follow up with your cardiologist in 1-2 week Your hemoglobin A1C will be at a normal range (normal range is from 6-8) Continue to follow with your primary care MD or your endocrinologist. Discuss what the goal hemoglobin A1C level is for you.  Follow a heart healthy diabetic diet. If you check your fingerstick glucose at home, call your MD if it is greater than 250mg/dL on 2 occasions or less than 100mg/dL on 2 occasions. Know signs of low blood sugar, such as: dizziness, shakiness, sweating, confusion, hunger, nervousness- drink 4 ounces apple juice if occurs and call your doctor. Know early signs of high blood sugar, such as: frequent urination, increased thirst, blurry vision, fatigue, headache - call your doctor if this occurs. Your blood pressure will be controlled. Continue with your blood pressure medications; eat a heart healthy diet with low salt diet; exercise regularly (consult with your physician or cardiologist first); maintain a heart healthy weight; if you smoke - quit (A resource to help you stop smoking is the [facility] for Tobacco Control – phone number [phone].); include healthy ways to manage stress. Continue to follow with your primary care physician or cardiologist. Your LDL cholesterol will be less than 70mg/dL Continue with your cholesterol medications. Eat a heart healthy diet that is low in saturated fats and salt, and includes whole grains, fruits, vegetables and lean protein; exercise regularly (consult with your physician or cardiologist first); maintain a heart healthy weight; if you smoke - quit (A resource to help you stop smoking is the [facility] for Tobacco Control – phone number [phone].). Continue to follow with your primary physician or cardiologist.

## 2018-05-17 ENCOUNTER — APPOINTMENT (OUTPATIENT)
Dept: RHEUMATOLOGY | Facility: CLINIC | Age: 63
End: 2018-05-17
Payer: MEDICARE

## 2018-05-17 VITALS
TEMPERATURE: 98.4 F | RESPIRATION RATE: 16 BRPM | SYSTOLIC BLOOD PRESSURE: 130 MMHG | HEART RATE: 84 BPM | DIASTOLIC BLOOD PRESSURE: 81 MMHG | OXYGEN SATURATION: 98 %

## 2018-05-17 PROCEDURE — 96372 THER/PROPH/DIAG INJ SC/IM: CPT

## 2018-05-17 PROCEDURE — 99214 OFFICE O/P EST MOD 30 MIN: CPT | Mod: 25

## 2018-05-17 RX ORDER — METHYLPRED ACET/NACL,ISO-OS/PF 40 MG/ML
40 VIAL (ML) INJECTION
Qty: 1 | Refills: 0 | Status: COMPLETED | OUTPATIENT
Start: 2018-05-17

## 2018-05-17 RX ORDER — MYCOPHENILIC ACID 360 MG/1
360 TABLET, DELAYED RELEASE ORAL
Qty: 360 | Refills: 1 | Status: DISCONTINUED | COMMUNITY
Start: 2017-11-09 | End: 2018-05-17

## 2018-05-17 RX ADMIN — METHYLPREDNISOLONE ACETATE MG/ML: 40 INJECTION, SUSPENSION INTRA-ARTICULAR; INTRALESIONAL; INTRAMUSCULAR; SOFT TISSUE at 00:00

## 2018-05-18 LAB
ALBUMIN SERPL ELPH-MCNC: 4.2 G/DL
ALP BLD-CCNC: 66 U/L
ALT SERPL-CCNC: 35 U/L
ANION GAP SERPL CALC-SCNC: 16 MMOL/L
APPEARANCE: CLEAR
AST SERPL-CCNC: 26 U/L
BACTERIA: NEGATIVE
BASOPHILS # BLD AUTO: 0.04 K/UL
BASOPHILS NFR BLD AUTO: 0.3 %
BILIRUB SERPL-MCNC: 0.4 MG/DL
BILIRUBIN URINE: NEGATIVE
BLOOD URINE: NEGATIVE
BUN SERPL-MCNC: 22 MG/DL
C3 SERPL-MCNC: 158 MG/DL
C4 SERPL-MCNC: 35 MG/DL
CALCIUM SERPL-MCNC: 9.3 MG/DL
CHLORIDE SERPL-SCNC: 102 MMOL/L
CO2 SERPL-SCNC: 22 MMOL/L
COLOR: YELLOW
CREAT SERPL-MCNC: 1.28 MG/DL
CREAT SPEC-SCNC: 123 MG/DL
CREAT/PROT UR: 0.3 RATIO
EOSINOPHIL # BLD AUTO: 0.17 K/UL
EOSINOPHIL NFR BLD AUTO: 1.3 %
ERYTHROCYTE [SEDIMENTATION RATE] IN BLOOD BY WESTERGREN METHOD: 8 MM/HR
GLUCOSE QUALITATIVE U: NEGATIVE MG/DL
GLUCOSE SERPL-MCNC: 236 MG/DL
HCT VFR BLD CALC: 50.2 %
HGB BLD-MCNC: 16.4 G/DL
IMM GRANULOCYTES NFR BLD AUTO: 1 %
KETONES URINE: NEGATIVE
LEUKOCYTE ESTERASE URINE: NEGATIVE
LYMPHOCYTES # BLD AUTO: 1.58 K/UL
LYMPHOCYTES NFR BLD AUTO: 12.4 %
MAN DIFF?: NORMAL
MCHC RBC-ENTMCNC: 30.1 PG
MCHC RBC-ENTMCNC: 32.7 GM/DL
MCV RBC AUTO: 92.1 FL
MICROSCOPIC-UA: NORMAL
MONOCYTES # BLD AUTO: 1.39 K/UL
MONOCYTES NFR BLD AUTO: 10.9 %
NEUTROPHILS # BLD AUTO: 9.39 K/UL
NEUTROPHILS NFR BLD AUTO: 74.1 %
NITRITE URINE: NEGATIVE
PH URINE: 5
PLATELET # BLD AUTO: 171 K/UL
POTASSIUM SERPL-SCNC: 4.7 MMOL/L
PROT SERPL-MCNC: 7.1 G/DL
PROT UR-MCNC: 34 MG/DL
PROTEIN URINE: 30 MG/DL
RBC # BLD: 5.45 M/UL
RBC # FLD: 14.1 %
RED BLOOD CELLS URINE: 1 /HPF
SODIUM SERPL-SCNC: 140 MMOL/L
SPECIFIC GRAVITY URINE: 1.02
SQUAMOUS EPITHELIAL CELLS: 1 /HPF
URATE SERPL-MCNC: 4.7 MG/DL
UROBILINOGEN URINE: NEGATIVE MG/DL
WBC # FLD AUTO: 12.7 K/UL
WHITE BLOOD CELLS URINE: 2 /HPF

## 2018-05-19 LAB — DSDNA AB SER-ACNC: 57 IU/ML

## 2018-05-21 ENCOUNTER — CLINICAL ADVICE (OUTPATIENT)
Age: 63
End: 2018-05-21

## 2018-06-28 ENCOUNTER — APPOINTMENT (OUTPATIENT)
Dept: RHEUMATOLOGY | Facility: CLINIC | Age: 63
End: 2018-06-28
Payer: MEDICARE

## 2018-06-28 VITALS
TEMPERATURE: 98.2 F | BODY MASS INDEX: 48.76 KG/M2 | HEART RATE: 80 BPM | SYSTOLIC BLOOD PRESSURE: 123 MMHG | HEIGHT: 62 IN | WEIGHT: 265 LBS | OXYGEN SATURATION: 93 % | DIASTOLIC BLOOD PRESSURE: 83 MMHG | RESPIRATION RATE: 16 BRPM

## 2018-06-28 PROCEDURE — 20610 DRAIN/INJ JOINT/BURSA W/O US: CPT | Mod: 50

## 2018-06-28 PROCEDURE — 99214 OFFICE O/P EST MOD 30 MIN: CPT | Mod: 25

## 2018-06-28 RX ORDER — LIDOCAINE HYDROCHLORIDE 10 MG/ML
1 INJECTION, SOLUTION INFILTRATION; PERINEURAL
Refills: 0 | Status: COMPLETED | OUTPATIENT
Start: 2018-06-28

## 2018-06-28 RX ORDER — METHYLPRED ACET/NACL,ISO-OS/PF 40 MG/ML
40 VIAL (ML) INJECTION
Qty: 1 | Refills: 0 | Status: COMPLETED | OUTPATIENT
Start: 2018-06-28

## 2018-06-28 RX ORDER — PREDNISONE 2.5 MG/1
2.5 TABLET ORAL
Qty: 70 | Refills: 0 | Status: DISCONTINUED | COMMUNITY
Start: 2018-02-15 | End: 2018-06-28

## 2018-06-28 RX ADMIN — LIDOCAINE HYDROCHLORIDE %: 10 INJECTION, SOLUTION INFILTRATION; PERINEURAL at 00:00

## 2018-06-28 RX ADMIN — METHYLPREDNISOLONE ACETATE 1 MG/ML: 40 INJECTION, SUSPENSION INTRA-ARTICULAR; INTRALESIONAL; INTRAMUSCULAR; SOFT TISSUE at 00:00

## 2018-07-16 ENCOUNTER — LABORATORY RESULT (OUTPATIENT)
Age: 63
End: 2018-07-16

## 2018-07-16 ENCOUNTER — APPOINTMENT (OUTPATIENT)
Dept: DERMATOLOGY | Facility: CLINIC | Age: 63
End: 2018-07-16
Payer: MEDICARE

## 2018-07-16 VITALS — DIASTOLIC BLOOD PRESSURE: 86 MMHG | SYSTOLIC BLOOD PRESSURE: 138 MMHG

## 2018-07-16 DIAGNOSIS — Z12.83 ENCOUNTER FOR SCREENING FOR MALIGNANT NEOPLASM OF SKIN: ICD-10-CM

## 2018-07-16 DIAGNOSIS — L85.3 XEROSIS CUTIS: ICD-10-CM

## 2018-07-16 DIAGNOSIS — D48.5 NEOPLASM OF UNCERTAIN BEHAVIOR OF SKIN: ICD-10-CM

## 2018-07-16 PROCEDURE — 17110 DESTRUCTION B9 LES UP TO 14: CPT | Mod: 59

## 2018-07-16 PROCEDURE — 11100 BX SKIN SUBCUTANEOUS&/MUCOUS MEMBRANE 1 LESION: CPT | Mod: 59

## 2018-07-16 PROCEDURE — 99214 OFFICE O/P EST MOD 30 MIN: CPT | Mod: 25

## 2018-07-20 ENCOUNTER — RESULT REVIEW (OUTPATIENT)
Age: 63
End: 2018-07-20

## 2018-08-20 ENCOUNTER — RX RENEWAL (OUTPATIENT)
Age: 63
End: 2018-08-20

## 2018-08-24 ENCOUNTER — OTHER (OUTPATIENT)
Age: 63
End: 2018-08-24

## 2018-08-31 ENCOUNTER — APPOINTMENT (OUTPATIENT)
Dept: RHEUMATOLOGY | Facility: CLINIC | Age: 63
End: 2018-08-31
Payer: MEDICARE

## 2018-08-31 VITALS
DIASTOLIC BLOOD PRESSURE: 81 MMHG | OXYGEN SATURATION: 94 % | SYSTOLIC BLOOD PRESSURE: 141 MMHG | TEMPERATURE: 98 F | RESPIRATION RATE: 16 BRPM | HEART RATE: 82 BPM | HEIGHT: 62 IN

## 2018-08-31 PROBLEM — I25.10 ATHEROSCLEROTIC HEART DISEASE OF NATIVE CORONARY ARTERY WITHOUT ANGINA PECTORIS: Chronic | Status: ACTIVE | Noted: 2018-04-16

## 2018-08-31 PROCEDURE — 99214 OFFICE O/P EST MOD 30 MIN: CPT | Mod: 25

## 2018-08-31 PROCEDURE — 20610 DRAIN/INJ JOINT/BURSA W/O US: CPT | Mod: 50

## 2018-08-31 RX ORDER — LIDOCAINE HYDROCHLORIDE 10 MG/ML
1 INJECTION, SOLUTION INFILTRATION; PERINEURAL
Refills: 0 | Status: COMPLETED | OUTPATIENT
Start: 2018-08-31

## 2018-08-31 RX ORDER — PREDNISONE 10 MG/1
10 TABLET ORAL
Qty: 12 | Refills: 0 | Status: DISCONTINUED | COMMUNITY
Start: 2018-08-24 | End: 2018-08-31

## 2018-08-31 RX ORDER — METHYLPRED ACET/NACL,ISO-OS/PF 40 MG/ML
40 VIAL (ML) INJECTION
Qty: 1 | Refills: 0 | Status: COMPLETED | OUTPATIENT
Start: 2018-08-31

## 2018-08-31 RX ORDER — TESTOSTERONE CYPIONATE 200 MG/ML
200 INJECTION, SOLUTION INTRAMUSCULAR
Qty: 4 | Refills: 0 | Status: DISCONTINUED | COMMUNITY
Start: 2017-12-23 | End: 2018-08-31

## 2018-08-31 RX ADMIN — METHYLPREDNISOLONE ACETATE 1 MG/ML: 40 INJECTION, SUSPENSION INTRA-ARTICULAR; INTRALESIONAL; INTRAMUSCULAR; SOFT TISSUE at 00:00

## 2018-08-31 RX ADMIN — LIDOCAINE HYDROCHLORIDE %: 10 INJECTION, SOLUTION INFILTRATION; PERINEURAL at 00:00

## 2018-09-04 LAB
ALBUMIN SERPL ELPH-MCNC: 4.6 G/DL
ALP BLD-CCNC: 75 U/L
ALT SERPL-CCNC: 38 U/L
AMPHET UR-MCNC: NEGATIVE
ANION GAP SERPL CALC-SCNC: 18 MMOL/L
APPEARANCE: CLEAR
AST SERPL-CCNC: 29 U/L
BACTERIA: NEGATIVE
BARBITURATES UR-MCNC: NEGATIVE
BASOPHILS # BLD AUTO: 0.03 K/UL
BASOPHILS NFR BLD AUTO: 0.3 %
BENZODIAZ UR-MCNC: NEGATIVE
BILIRUB SERPL-MCNC: 0.4 MG/DL
BILIRUBIN URINE: NEGATIVE
BLOOD URINE: NEGATIVE
BUN SERPL-MCNC: 24 MG/DL
C3 SERPL-MCNC: 191 MG/DL
C4 SERPL-MCNC: 39 MG/DL
CALCIUM SERPL-MCNC: 9.2 MG/DL
CHLORIDE SERPL-SCNC: 99 MMOL/L
CO2 SERPL-SCNC: 24 MMOL/L
COCAINE METAB.OTHER UR-MCNC: NEGATIVE
COLOR: YELLOW
CREAT SERPL-MCNC: 1.19 MG/DL
CREAT SPEC-SCNC: 86 MG/DL
CREAT/PROT UR: 0.4 RATIO
CREATININE, URINE: 88.4 MG/DL
DSDNA AB SER-ACNC: 46 IU/ML
EOSINOPHIL # BLD AUTO: 0.25 K/UL
EOSINOPHIL NFR BLD AUTO: 2.1 %
ERYTHROCYTE [SEDIMENTATION RATE] IN BLOOD BY WESTERGREN METHOD: 9 MM/HR
GLUCOSE QUALITATIVE U: NEGATIVE MG/DL
GLUCOSE SERPL-MCNC: 108 MG/DL
HCT VFR BLD CALC: 46.3 %
HGB BLD-MCNC: 15.7 G/DL
IGA SER QL IEP: 231 MG/DL
IMM GRANULOCYTES NFR BLD AUTO: 0.8 %
KETONES URINE: NEGATIVE
LEUKOCYTE ESTERASE URINE: NEGATIVE
LYMPHOCYTES # BLD AUTO: 1.46 K/UL
LYMPHOCYTES NFR BLD AUTO: 12.4 %
MAN DIFF?: NORMAL
MCHC RBC-ENTMCNC: 30.6 PG
MCHC RBC-ENTMCNC: 33.9 GM/DL
MCV RBC AUTO: 90.3 FL
METHADONE UR-MCNC: NEGATIVE
METHAQUALONE UR-MCNC: NEGATIVE
MICROSCOPIC-UA: NORMAL
MONOCYTES # BLD AUTO: 1.12 K/UL
MONOCYTES NFR BLD AUTO: 9.5 %
NEUTROPHILS # BLD AUTO: 8.78 K/UL
NEUTROPHILS NFR BLD AUTO: 74.9 %
NITRITE URINE: NEGATIVE
OPIATES UR-MCNC: NEGATIVE
PCP UR-MCNC: NEGATIVE
PH URINE: 5.5
PLATELET # BLD AUTO: 159 K/UL
POTASSIUM SERPL-SCNC: 4 MMOL/L
PROPOXYPH UR QL: NEGATIVE
PROT SERPL-MCNC: 7.1 G/DL
PROT UR-MCNC: 32 MG/DL
PROTEIN URINE: 30 MG/DL
RBC # BLD: 5.13 M/UL
RBC # FLD: 14 %
RED BLOOD CELLS URINE: 1 /HPF
SODIUM SERPL-SCNC: 142 MMOL/L
SPECIFIC GRAVITY URINE: 1.02
SQUAMOUS EPITHELIAL CELLS: 0 /HPF
THC UR QL: NEGATIVE
URATE SERPL-MCNC: 7.4 MG/DL
UROBILINOGEN URINE: NEGATIVE MG/DL
WBC # FLD AUTO: 11.73 K/UL
WHITE BLOOD CELLS URINE: 0 /HPF

## 2018-09-10 ENCOUNTER — MEDICATION RENEWAL (OUTPATIENT)
Age: 63
End: 2018-09-10

## 2018-09-18 ENCOUNTER — RESULT CHARGE (OUTPATIENT)
Age: 63
End: 2018-09-18

## 2018-09-19 ENCOUNTER — LABORATORY RESULT (OUTPATIENT)
Age: 63
End: 2018-09-19

## 2018-09-19 ENCOUNTER — APPOINTMENT (OUTPATIENT)
Dept: PULMONOLOGY | Facility: CLINIC | Age: 63
End: 2018-09-19
Payer: MEDICARE

## 2018-09-19 VITALS
HEART RATE: 89 BPM | OXYGEN SATURATION: 96 % | WEIGHT: 268 LBS | BODY MASS INDEX: 49.32 KG/M2 | DIASTOLIC BLOOD PRESSURE: 77 MMHG | SYSTOLIC BLOOD PRESSURE: 127 MMHG | HEIGHT: 62 IN | TEMPERATURE: 98.2 F

## 2018-09-19 PROCEDURE — ZZZZZ: CPT

## 2018-09-19 PROCEDURE — 94060 EVALUATION OF WHEEZING: CPT

## 2018-09-19 PROCEDURE — 71046 X-RAY EXAM CHEST 2 VIEWS: CPT

## 2018-09-19 PROCEDURE — 99214 OFFICE O/P EST MOD 30 MIN: CPT | Mod: 25

## 2018-09-19 PROCEDURE — 36415 COLL VENOUS BLD VENIPUNCTURE: CPT

## 2018-09-21 LAB
A ALTERNATA IGE QN: 0.46 KUA/L
A FUMIGATUS IGE QN: <0.1 KUA/L
BASOPHILS # BLD AUTO: 0.03 K/UL
BASOPHILS NFR BLD AUTO: 0.3 %
C ALBICANS IGE QN: <0.1 KUA/L
C HERBARUM IGE QN: <0.1 KUA/L
CAT DANDER IGE QN: <0.1 KUA/L
CLAM IGE QN: <0.1 KUA/L
CODFISH IGE QN: <0.1 KUA/L
COMMON RAGWEED IGE QN: <0.1 KUA/L
CORN IGE QN: <0.1 KUA/L
COW MILK IGE QN: <0.1 KUA/L
D FARINAE IGE QN: <0.1 KUA/L
D PTERONYSS IGE QN: <0.1 KUA/L
DEPRECATED A ALTERNATA IGE RAST QL: ABNORMAL
DEPRECATED A FUMIGATUS IGE RAST QL: 0
DEPRECATED C ALBICANS IGE RAST QL: 0
DEPRECATED C HERBARUM IGE RAST QL: 0
DEPRECATED CAT DANDER IGE RAST QL: 0
DEPRECATED CLAM IGE RAST QL: 0
DEPRECATED CODFISH IGE RAST QL: 0
DEPRECATED COMMON RAGWEED IGE RAST QL: 0
DEPRECATED CORN IGE RAST QL: 0
DEPRECATED COW MILK IGE RAST QL: 0
DEPRECATED D FARINAE IGE RAST QL: 0
DEPRECATED D PTERONYSS IGE RAST QL: 0
DEPRECATED DOG DANDER IGE RAST QL: 0
DEPRECATED EGG WHITE IGE RAST QL: 0
DEPRECATED M RACEMOSUS IGE RAST QL: 0
DEPRECATED PEANUT IGE RAST QL: 0
DEPRECATED ROACH IGE RAST QL: 0
DEPRECATED SCALLOP IGE RAST QL: 0.11 KUA/L
DEPRECATED SESAME SEED IGE RAST QL: 0
DEPRECATED SHRIMP IGE RAST QL: 0
DEPRECATED SOYBEAN IGE RAST QL: 0
DEPRECATED TIMOTHY IGE RAST QL: 0
DEPRECATED WALNUT IGE RAST QL: 0
DEPRECATED WHEAT IGE RAST QL: 0
DEPRECATED WHITE OAK IGE RAST QL: 0
DOG DANDER IGE QN: <0.1 KUA/L
EGG WHITE IGE QN: <0.1 KUA/L
EOSINOPHIL # BLD AUTO: 0.24 K/UL
EOSINOPHIL NFR BLD AUTO: 2 %
HCT VFR BLD CALC: 47.6 %
HGB BLD-MCNC: 15.5 G/DL
IMM GRANULOCYTES NFR BLD AUTO: 0.7 %
LYMPHOCYTES # BLD AUTO: 1.66 K/UL
LYMPHOCYTES NFR BLD AUTO: 14 %
M RACEMOSUS IGE QN: <0.1 KUA/L
MAN DIFF?: NORMAL
MCHC RBC-ENTMCNC: 30.5 PG
MCHC RBC-ENTMCNC: 32.6 GM/DL
MCV RBC AUTO: 93.5 FL
MONOCYTES # BLD AUTO: 1.25 K/UL
MONOCYTES NFR BLD AUTO: 10.6 %
NEUTROPHILS # BLD AUTO: 8.57 K/UL
NEUTROPHILS NFR BLD AUTO: 72.4 %
PEANUT IGE QN: <0.1 KUA/L
PLATELET # BLD AUTO: 197 K/UL
RBC # BLD: 5.09 M/UL
RBC # FLD: 13.6 %
ROACH IGE QN: <0.1 KUA/L
SCALLOP IGE QN: <0.1 KUA/L
SCALLOP IGE QN: NORMAL
SESAME SEED IGE QN: <0.1 KUA/L
SOYBEAN IGE QN: <0.1 KUA/L
TIMOTHY IGE QN: <0.1 KUA/L
WALNUT IGE QN: <0.1 KUA/L
WBC # FLD AUTO: 11.83 K/UL
WHEAT IGE QN: <0.1 KUA/L
WHITE OAK IGE QN: <0.1 KUA/L

## 2018-09-28 LAB
A ALTERNATA IGE QN: 0.46 KUA/L
A FUMIGATUS IGE QN: <0.1 KUA/L
BERMUDA GRASS IGE QN: <0.1 KUA/L
BOXELDER IGE QN: <0.1 KUA/L
C HERBARUM IGE QN: <0.1 KUA/L
CALIF WALNUT IGE QN: <0.1 KUA/L
CAT DANDER IGE QN: <0.1 KUA/L
CMN PIGWEED IGE QN: <0.1 KUA/L
COMMON RAGWEED IGE QN: <0.1 KUA/L
COTTONWOOD IGE QN: <0.1 KUA/L
D FARINAE IGE QN: <0.1 KUA/L
D PTERONYSS IGE QN: <0.1 KUA/L
DEPRECATED A ALTERNATA IGE RAST QL: ABNORMAL
DEPRECATED A FUMIGATUS IGE RAST QL: 0
DEPRECATED BERMUDA GRASS IGE RAST QL: 0
DEPRECATED BOXELDER IGE RAST QL: 0
DEPRECATED C HERBARUM IGE RAST QL: 0
DEPRECATED CAT DANDER IGE RAST QL: 0
DEPRECATED COMMON PIGWEED IGE RAST QL: 0
DEPRECATED COMMON RAGWEED IGE RAST QL: 0
DEPRECATED COTTONWOOD IGE RAST QL: 0
DEPRECATED D FARINAE IGE RAST QL: 0
DEPRECATED D PTERONYSS IGE RAST QL: 0
DEPRECATED DOG DANDER IGE RAST QL: 0
DEPRECATED GOOSEFOOT IGE RAST QL: 0
DEPRECATED LONDON PLANE IGE RAST QL: 0
DEPRECATED MUGWORT IGE RAST QL: 0
DEPRECATED P NOTATUM IGE RAST QL: 0
DEPRECATED RED CEDAR IGE RAST QL: 0
DEPRECATED ROACH IGE RAST QL: 0
DEPRECATED SHEEP SORREL IGE RAST QL: 0
DEPRECATED SILVER BIRCH IGE RAST QL: 0
DEPRECATED TIMOTHY IGE RAST QL: 0
DEPRECATED WHITE ASH IGE RAST QL: 0
DEPRECATED WHITE OAK IGE RAST QL: 0
DOG DANDER IGE QN: <0.1 KUA/L
GOOSEFOOT IGE QN: <0.1 KUA/L
LONDON PLANE IGE QN: <0.1 KUA/L
MUGWORT IGE QN: <0.1 KUA/L
MULBERRY (T70) CLASS: 0
MULBERRY (T70) CONC: <0.1 KUA/L
P NOTATUM IGE QN: <0.1 KUA/L
RED CEDAR IGE QN: <0.1 KUA/L
ROACH IGE QN: <0.1 KUA/L
SHEEP SORREL IGE QN: <0.1 KUA/L
SILVER BIRCH IGE QN: <0.1 KUA/L
TIMOTHY IGE QN: <0.1 KUA/L
TREE ALLERG MIX1 IGE QL: 0
WHITE ASH IGE QN: <0.1 KUA/L
WHITE ELM IGE QN: 0
WHITE ELM IGE QN: <0.1 KUA/L
WHITE OAK IGE QN: <0.1 KUA/L

## 2018-10-02 ENCOUNTER — APPOINTMENT (OUTPATIENT)
Dept: UROLOGY | Facility: CLINIC | Age: 63
End: 2018-10-02
Payer: MEDICARE

## 2018-10-02 VITALS
RESPIRATION RATE: 16 BRPM | SYSTOLIC BLOOD PRESSURE: 120 MMHG | TEMPERATURE: 98.4 F | HEART RATE: 88 BPM | DIASTOLIC BLOOD PRESSURE: 79 MMHG

## 2018-10-02 DIAGNOSIS — Z80.42 FAMILY HISTORY OF MALIGNANT NEOPLASM OF PROSTATE: ICD-10-CM

## 2018-10-02 PROCEDURE — 99203 OFFICE O/P NEW LOW 30 MIN: CPT

## 2018-10-05 LAB — PSA SERPL-MCNC: 0.4 NG/ML

## 2018-10-08 ENCOUNTER — APPOINTMENT (OUTPATIENT)
Dept: DERMATOLOGY | Facility: CLINIC | Age: 63
End: 2018-10-08
Payer: MEDICARE

## 2018-10-08 VITALS — SYSTOLIC BLOOD PRESSURE: 116 MMHG | DIASTOLIC BLOOD PRESSURE: 74 MMHG

## 2018-10-08 PROCEDURE — 99213 OFFICE O/P EST LOW 20 MIN: CPT | Mod: 25

## 2018-10-08 PROCEDURE — 17003 DESTRUCT PREMALG LES 2-14: CPT

## 2018-10-08 PROCEDURE — 17000 DESTRUCT PREMALG LESION: CPT

## 2018-10-11 ENCOUNTER — RESULT CHARGE (OUTPATIENT)
Age: 63
End: 2018-10-11

## 2018-10-12 ENCOUNTER — APPOINTMENT (OUTPATIENT)
Dept: PULMONOLOGY | Facility: CLINIC | Age: 63
End: 2018-10-12
Payer: MEDICARE

## 2018-10-12 VITALS
SYSTOLIC BLOOD PRESSURE: 147 MMHG | OXYGEN SATURATION: 94 % | DIASTOLIC BLOOD PRESSURE: 84 MMHG | RESPIRATION RATE: 12 BRPM | TEMPERATURE: 98.4 F | HEART RATE: 85 BPM

## 2018-10-12 PROCEDURE — 36415 COLL VENOUS BLD VENIPUNCTURE: CPT

## 2018-10-12 PROCEDURE — 94060 EVALUATION OF WHEEZING: CPT

## 2018-10-12 PROCEDURE — 99213 OFFICE O/P EST LOW 20 MIN: CPT | Mod: 25

## 2018-10-16 ENCOUNTER — APPOINTMENT (OUTPATIENT)
Dept: UROLOGY | Facility: CLINIC | Age: 63
End: 2018-10-16
Payer: MEDICARE

## 2018-10-16 VITALS
WEIGHT: 268 LBS | DIASTOLIC BLOOD PRESSURE: 86 MMHG | HEIGHT: 62 IN | SYSTOLIC BLOOD PRESSURE: 148 MMHG | BODY MASS INDEX: 49.32 KG/M2 | HEART RATE: 91 BPM | RESPIRATION RATE: 16 BRPM

## 2018-10-16 PROCEDURE — 99214 OFFICE O/P EST MOD 30 MIN: CPT

## 2018-10-22 LAB
25(OH)D3 SERPL-MCNC: 35 NG/ML
ANION GAP SERPL CALC-SCNC: 14 MMOL/L
APPEARANCE: CLEAR
BACTERIA: NEGATIVE
BASOPHILS # BLD AUTO: 0.05 K/UL
BASOPHILS NFR BLD AUTO: 0.5 %
BILIRUBIN URINE: NEGATIVE
BLOOD URINE: NEGATIVE
BUN SERPL-MCNC: 20 MG/DL
CALCIUM SERPL-MCNC: 9.7 MG/DL
CHLORIDE SERPL-SCNC: 101 MMOL/L
CHOLEST SERPL-MCNC: 100 MG/DL
CHOLEST/HDLC SERPL: 2.8 RATIO
CO2 SERPL-SCNC: 28 MMOL/L
COLOR: YELLOW
CREAT SERPL-MCNC: 1.22 MG/DL
EOSINOPHIL # BLD AUTO: 0.23 K/UL
EOSINOPHIL NFR BLD AUTO: 2.1 %
ESTRADIOL SERPL-MCNC: 26 PG/ML
GLUCOSE QUALITATIVE U: NEGATIVE MG/DL
GLUCOSE SERPL-MCNC: 81 MG/DL
HBA1C MFR BLD HPLC: 8.1 %
HCT VFR BLD CALC: 46 %
HDLC SERPL-MCNC: 36 MG/DL
HGB BLD-MCNC: 14.7 G/DL
HYALINE CASTS: 0 /LPF
IMM GRANULOCYTES NFR BLD AUTO: 0.7 %
KETONES URINE: NEGATIVE
LDLC SERPL CALC-MCNC: 31 MG/DL
LEUKOCYTE ESTERASE URINE: NEGATIVE
LH SERPL-ACNC: 18 IU/L
LYMPHOCYTES # BLD AUTO: 1.76 K/UL
LYMPHOCYTES NFR BLD AUTO: 16.2 %
MAN DIFF?: NORMAL
MCHC RBC-ENTMCNC: 29.2 PG
MCHC RBC-ENTMCNC: 32 GM/DL
MCV RBC AUTO: 91.3 FL
MICROSCOPIC-UA: NORMAL
MONOCYTES # BLD AUTO: 1.33 K/UL
MONOCYTES NFR BLD AUTO: 12.2 %
NEUTROPHILS # BLD AUTO: 7.42 K/UL
NEUTROPHILS NFR BLD AUTO: 68.3 %
NITRITE URINE: NEGATIVE
PH URINE: 5.5
PLATELET # BLD AUTO: 189 K/UL
POTASSIUM SERPL-SCNC: 3.9 MMOL/L
PROTEIN URINE: 100 MG/DL
RBC # BLD: 5.04 M/UL
RBC # FLD: 13.6 %
RED BLOOD CELLS URINE: 1 /HPF
SODIUM SERPL-SCNC: 143 MMOL/L
SPECIFIC GRAVITY URINE: 1.02
SQUAMOUS EPITHELIAL CELLS: 0 /HPF
TESTOST BND SERPL-MCNC: 4.4 PG/ML
TESTOST SERPL-MCNC: 276.9 NG/DL
TRIGL SERPL-MCNC: 167 MG/DL
TSH SERPL-ACNC: 2.97 UIU/ML
UROBILINOGEN URINE: NEGATIVE MG/DL
WBC # FLD AUTO: 10.87 K/UL
WHITE BLOOD CELLS URINE: 2 /HPF

## 2018-10-23 ENCOUNTER — APPOINTMENT (OUTPATIENT)
Dept: PULMONOLOGY | Facility: CLINIC | Age: 63
End: 2018-10-23

## 2018-10-23 ENCOUNTER — APPOINTMENT (OUTPATIENT)
Dept: UROLOGY | Facility: CLINIC | Age: 63
End: 2018-10-23
Payer: MEDICARE

## 2018-10-23 ENCOUNTER — OUTPATIENT (OUTPATIENT)
Dept: OUTPATIENT SERVICES | Facility: HOSPITAL | Age: 63
LOS: 1 days | End: 2018-10-23
Payer: MEDICARE

## 2018-10-23 DIAGNOSIS — G56.00 CARPAL TUNNEL SYNDROME, UNSPECIFIED UPPER LIMB: Chronic | ICD-10-CM

## 2018-10-23 DIAGNOSIS — Z95.5 PRESENCE OF CORONARY ANGIOPLASTY IMPLANT AND GRAFT: Chronic | ICD-10-CM

## 2018-10-23 DIAGNOSIS — Z98.89 OTHER SPECIFIED POSTPROCEDURAL STATES: Chronic | ICD-10-CM

## 2018-10-23 PROCEDURE — 93980 PENILE VASCULAR STUDY: CPT | Mod: 26

## 2018-10-23 PROCEDURE — 93980 PENILE VASCULAR STUDY: CPT

## 2018-10-23 PROCEDURE — 54235 NJX CORPORA CAVERNOSA RX AGT: CPT

## 2018-10-23 PROCEDURE — 99213 OFFICE O/P EST LOW 20 MIN: CPT | Mod: 25

## 2018-10-24 DIAGNOSIS — N52.9 MALE ERECTILE DYSFUNCTION, UNSPECIFIED: ICD-10-CM

## 2018-10-30 ENCOUNTER — APPOINTMENT (OUTPATIENT)
Dept: UROLOGY | Facility: CLINIC | Age: 63
End: 2018-10-30
Payer: MEDICARE

## 2018-10-30 DIAGNOSIS — N52.9 MALE ERECTILE DYSFUNCTION, UNSPECIFIED: ICD-10-CM

## 2018-10-30 PROCEDURE — 99214 OFFICE O/P EST MOD 30 MIN: CPT

## 2018-11-06 ENCOUNTER — APPOINTMENT (OUTPATIENT)
Dept: RHEUMATOLOGY | Facility: CLINIC | Age: 63
End: 2018-11-06
Payer: MEDICARE

## 2018-11-06 VITALS
DIASTOLIC BLOOD PRESSURE: 68 MMHG | OXYGEN SATURATION: 95 % | WEIGHT: 266 LBS | HEIGHT: 62 IN | BODY MASS INDEX: 48.95 KG/M2 | SYSTOLIC BLOOD PRESSURE: 126 MMHG | HEART RATE: 97 BPM

## 2018-11-06 DIAGNOSIS — M25.569 PAIN IN UNSPECIFIED KNEE: ICD-10-CM

## 2018-11-06 PROCEDURE — 99215 OFFICE O/P EST HI 40 MIN: CPT

## 2018-11-07 LAB
ALBUMIN SERPL ELPH-MCNC: 4 G/DL
ALP BLD-CCNC: 83 U/L
ALT SERPL-CCNC: 37 U/L
ANION GAP SERPL CALC-SCNC: 16 MMOL/L
APPEARANCE: CLEAR
AST SERPL-CCNC: 20 U/L
BACTERIA: NEGATIVE
BASOPHILS # BLD AUTO: 0.01 K/UL
BASOPHILS NFR BLD AUTO: 0.1 %
BILIRUB SERPL-MCNC: 0.3 MG/DL
BILIRUBIN URINE: NEGATIVE
BLOOD URINE: NEGATIVE
BUN SERPL-MCNC: 18 MG/DL
C3 SERPL-MCNC: 167 MG/DL
C4 SERPL-MCNC: 35 MG/DL
CALCIUM SERPL-MCNC: 9.4 MG/DL
CHLORIDE SERPL-SCNC: 101 MMOL/L
CO2 SERPL-SCNC: 24 MMOL/L
COLOR: YELLOW
CREAT SERPL-MCNC: 1.12 MG/DL
CREAT SPEC-SCNC: 105 MG/DL
CREAT/PROT UR: 0.4 RATIO
DSDNA AB SER-ACNC: 52 IU/ML
EOSINOPHIL # BLD AUTO: 0.06 K/UL
EOSINOPHIL NFR BLD AUTO: 0.5 %
ERYTHROCYTE [SEDIMENTATION RATE] IN BLOOD BY WESTERGREN METHOD: 12 MM/HR
GLUCOSE QUALITATIVE U: NEGATIVE MG/DL
GLUCOSE SERPL-MCNC: 202 MG/DL
HCT VFR BLD CALC: 47.7 %
HGB BLD-MCNC: 15.5 G/DL
HYALINE CASTS: 1 /LPF
IMM GRANULOCYTES NFR BLD AUTO: 0.7 %
KETONES URINE: NEGATIVE
LEUKOCYTE ESTERASE URINE: NEGATIVE
LYMPHOCYTES # BLD AUTO: 1.22 K/UL
LYMPHOCYTES NFR BLD AUTO: 10.2 %
MAN DIFF?: NORMAL
MCHC RBC-ENTMCNC: 30.4 PG
MCHC RBC-ENTMCNC: 32.5 GM/DL
MCV RBC AUTO: 93.5 FL
MICROSCOPIC-UA: NORMAL
MONOCYTES # BLD AUTO: 1.06 K/UL
MONOCYTES NFR BLD AUTO: 8.9 %
NEUTROPHILS # BLD AUTO: 9.51 K/UL
NEUTROPHILS NFR BLD AUTO: 79.6 %
NITRITE URINE: NEGATIVE
PH URINE: 5.5
PLATELET # BLD AUTO: 200 K/UL
POTASSIUM SERPL-SCNC: 4.7 MMOL/L
PROT SERPL-MCNC: 6.7 G/DL
PROT UR-MCNC: 45 MG/DL
PROTEIN URINE: 30 MG/DL
RBC # BLD: 5.1 M/UL
RBC # FLD: 13.4 %
RED BLOOD CELLS URINE: 1 /HPF
SODIUM SERPL-SCNC: 141 MMOL/L
SPECIFIC GRAVITY URINE: 1.02
SQUAMOUS EPITHELIAL CELLS: 0 /HPF
URATE SERPL-MCNC: 5.3 MG/DL
UROBILINOGEN URINE: NEGATIVE MG/DL
WBC # FLD AUTO: 11.94 K/UL
WHITE BLOOD CELLS URINE: 0 /HPF

## 2018-11-09 LAB
AMPHET UR-MCNC: NEGATIVE
BARBITURATES UR-MCNC: NEGATIVE
BENZODIAZ UR-MCNC: NEGATIVE
COCAINE METAB.OTHER UR-MCNC: NEGATIVE
CREATININE, URINE: 114 MG/DL
METHADONE UR-MCNC: NEGATIVE
METHAQUALONE UR-MCNC: NEGATIVE
OPIATES UR-MCNC: NEGATIVE
PCP UR-MCNC: NEGATIVE
PROPOXYPH UR QL: NEGATIVE
THC UR QL: NEGATIVE

## 2018-11-30 ENCOUNTER — APPOINTMENT (OUTPATIENT)
Dept: RHEUMATOLOGY | Facility: CLINIC | Age: 63
End: 2018-11-30

## 2018-12-13 ENCOUNTER — APPOINTMENT (OUTPATIENT)
Dept: UROLOGY | Facility: CLINIC | Age: 63
End: 2018-12-13

## 2018-12-15 ENCOUNTER — APPOINTMENT (OUTPATIENT)
Dept: ORTHOPEDIC SURGERY | Facility: CLINIC | Age: 63
End: 2018-12-15
Payer: MEDICARE

## 2018-12-15 VITALS
WEIGHT: 262 LBS | DIASTOLIC BLOOD PRESSURE: 82 MMHG | HEART RATE: 98 BPM | HEIGHT: 62 IN | SYSTOLIC BLOOD PRESSURE: 129 MMHG | BODY MASS INDEX: 48.21 KG/M2

## 2018-12-15 PROCEDURE — 99214 OFFICE O/P EST MOD 30 MIN: CPT

## 2018-12-15 PROCEDURE — 73562 X-RAY EXAM OF KNEE 3: CPT | Mod: 50

## 2018-12-15 RX ORDER — ATORVASTATIN CALCIUM 80 MG/1
80 TABLET, FILM COATED ORAL
Qty: 30 | Refills: 0 | Status: DISCONTINUED | COMMUNITY
Start: 2018-04-20 | End: 2018-12-15

## 2018-12-15 RX ORDER — CLOPIDOGREL BISULFATE 75 MG/1
75 TABLET, FILM COATED ORAL
Refills: 0 | Status: ACTIVE | COMMUNITY

## 2018-12-15 RX ORDER — AZITHROMYCIN 250 MG/1
250 TABLET, FILM COATED ORAL
Qty: 1 | Refills: 0 | Status: DISCONTINUED | COMMUNITY
Start: 2018-09-19 | End: 2018-12-15

## 2018-12-15 RX ORDER — INSULIN GLARGINE 300 U/ML
300 INJECTION, SOLUTION SUBCUTANEOUS
Qty: 18 | Refills: 0 | Status: DISCONTINUED | COMMUNITY
Start: 2018-02-28 | End: 2018-12-15

## 2018-12-15 RX ORDER — LOSARTAN POTASSIUM 50 MG/1
50 TABLET, FILM COATED ORAL
Qty: 30 | Refills: 0 | Status: DISCONTINUED | COMMUNITY
Start: 2018-04-15 | End: 2018-12-15

## 2018-12-31 ENCOUNTER — APPOINTMENT (OUTPATIENT)
Dept: DERMATOLOGY | Facility: CLINIC | Age: 63
End: 2018-12-31

## 2019-01-17 ENCOUNTER — APPOINTMENT (OUTPATIENT)
Dept: RHEUMATOLOGY | Facility: CLINIC | Age: 64
End: 2019-01-17
Payer: MEDICARE

## 2019-01-17 VITALS
WEIGHT: 268 LBS | HEIGHT: 62 IN | DIASTOLIC BLOOD PRESSURE: 74 MMHG | SYSTOLIC BLOOD PRESSURE: 132 MMHG | BODY MASS INDEX: 49.32 KG/M2 | HEART RATE: 85 BPM | OXYGEN SATURATION: 96 % | TEMPERATURE: 98.2 F

## 2019-01-17 PROCEDURE — 99214 OFFICE O/P EST MOD 30 MIN: CPT

## 2019-01-18 LAB
25(OH)D3 SERPL-MCNC: 39.1 NG/ML
ALBUMIN SERPL ELPH-MCNC: 4 G/DL
ALP BLD-CCNC: 69 U/L
ALT SERPL-CCNC: 41 U/L
ANION GAP SERPL CALC-SCNC: 14 MMOL/L
APPEARANCE: CLEAR
AST SERPL-CCNC: 25 U/L
BACTERIA: NEGATIVE
BASOPHILS # BLD AUTO: 0.02 K/UL
BASOPHILS NFR BLD AUTO: 0.2 %
BILIRUB SERPL-MCNC: 0.3 MG/DL
BILIRUBIN URINE: NEGATIVE
BLOOD URINE: NEGATIVE
BUN SERPL-MCNC: 20 MG/DL
C3 SERPL-MCNC: 172 MG/DL
C4 SERPL-MCNC: 38 MG/DL
CALCIUM SERPL-MCNC: 8.9 MG/DL
CHLORIDE SERPL-SCNC: 103 MMOL/L
CO2 SERPL-SCNC: 27 MMOL/L
COLOR: YELLOW
CREAT SERPL-MCNC: 1.3 MG/DL
CREAT SPEC-SCNC: 129 MG/DL
CREAT/PROT UR: 0.4 RATIO
EOSINOPHIL # BLD AUTO: 0.22 K/UL
EOSINOPHIL NFR BLD AUTO: 2 %
ERYTHROCYTE [SEDIMENTATION RATE] IN BLOOD BY WESTERGREN METHOD: 18 MM/HR
GLUCOSE QUALITATIVE U: NEGATIVE MG/DL
GLUCOSE SERPL-MCNC: 107 MG/DL
HCT VFR BLD CALC: 44 %
HGB BLD-MCNC: 14.1 G/DL
HYALINE CASTS: 1 /LPF
IMM GRANULOCYTES NFR BLD AUTO: 0.4 %
KETONES URINE: NEGATIVE
LEUKOCYTE ESTERASE URINE: NEGATIVE
LYMPHOCYTES # BLD AUTO: 1.84 K/UL
LYMPHOCYTES NFR BLD AUTO: 17 %
MAN DIFF?: NORMAL
MCHC RBC-ENTMCNC: 29.4 PG
MCHC RBC-ENTMCNC: 32 GM/DL
MCV RBC AUTO: 91.9 FL
MICROSCOPIC-UA: NORMAL
MONOCYTES # BLD AUTO: 1.22 K/UL
MONOCYTES NFR BLD AUTO: 11.3 %
NEUTROPHILS # BLD AUTO: 7.47 K/UL
NEUTROPHILS NFR BLD AUTO: 69.1 %
NITRITE URINE: NEGATIVE
PH URINE: 5.5
PLATELET # BLD AUTO: 175 K/UL
POTASSIUM SERPL-SCNC: 4.1 MMOL/L
PROT SERPL-MCNC: 6.5 G/DL
PROT UR-MCNC: 46 MG/DL
PROTEIN URINE: 30 MG/DL
RBC # BLD: 4.79 M/UL
RBC # FLD: 13.7 %
RED BLOOD CELLS URINE: 1 /HPF
SODIUM SERPL-SCNC: 144 MMOL/L
SPECIFIC GRAVITY URINE: 1.02
SQUAMOUS EPITHELIAL CELLS: 0 /HPF
URATE SERPL-MCNC: 6.5 MG/DL
UROBILINOGEN URINE: NEGATIVE MG/DL
WBC # FLD AUTO: 10.81 K/UL
WHITE BLOOD CELLS URINE: 1 /HPF

## 2019-01-20 LAB — DSDNA AB SER-ACNC: 40 IU/ML

## 2019-01-21 LAB
AMPHET UR-MCNC: NEGATIVE
BARBITURATES UR-MCNC: NEGATIVE
BENZODIAZ UR-MCNC: NEGATIVE
COCAINE METAB.OTHER UR-MCNC: NEGATIVE
CREATININE, URINE: 135.5 MG/DL
METHADONE UR-MCNC: NEGATIVE
METHAQUALONE UR-MCNC: NEGATIVE
OPIATES UR-MCNC: NEGATIVE
PCP UR-MCNC: NEGATIVE
PROPOXYPH UR QL: NEGATIVE
THC UR QL: NEGATIVE

## 2019-03-06 ENCOUNTER — CLINICAL ADVICE (OUTPATIENT)
Age: 64
End: 2019-03-06

## 2019-03-19 ENCOUNTER — APPOINTMENT (OUTPATIENT)
Dept: ORTHOPEDIC SURGERY | Facility: CLINIC | Age: 64
End: 2019-03-19

## 2019-04-08 ENCOUNTER — RX RENEWAL (OUTPATIENT)
Age: 64
End: 2019-04-08

## 2019-04-30 ENCOUNTER — APPOINTMENT (OUTPATIENT)
Dept: RHEUMATOLOGY | Facility: CLINIC | Age: 64
End: 2019-04-30
Payer: MEDICARE

## 2019-04-30 VITALS
BODY MASS INDEX: 49.2 KG/M2 | SYSTOLIC BLOOD PRESSURE: 132 MMHG | OXYGEN SATURATION: 98 % | HEART RATE: 85 BPM | TEMPERATURE: 97.9 F | DIASTOLIC BLOOD PRESSURE: 82 MMHG | WEIGHT: 269 LBS

## 2019-04-30 DIAGNOSIS — L82.0 INFLAMED SEBORRHEIC KERATOSIS: ICD-10-CM

## 2019-04-30 PROCEDURE — 99214 OFFICE O/P EST MOD 30 MIN: CPT

## 2019-05-01 ENCOUNTER — APPOINTMENT (OUTPATIENT)
Dept: PULMONOLOGY | Facility: CLINIC | Age: 64
End: 2019-05-01
Payer: MEDICARE

## 2019-05-01 VITALS
RESPIRATION RATE: 16 BRPM | SYSTOLIC BLOOD PRESSURE: 135 MMHG | DIASTOLIC BLOOD PRESSURE: 83 MMHG | WEIGHT: 265 LBS | HEART RATE: 98 BPM | BODY MASS INDEX: 48.76 KG/M2 | HEIGHT: 62 IN | OXYGEN SATURATION: 90 %

## 2019-05-01 PROCEDURE — 99214 OFFICE O/P EST MOD 30 MIN: CPT | Mod: 25

## 2019-05-01 PROCEDURE — 94727 GAS DIL/WSHOT DETER LNG VOL: CPT

## 2019-05-01 PROCEDURE — 94729 DIFFUSING CAPACITY: CPT

## 2019-05-01 PROCEDURE — 94060 EVALUATION OF WHEEZING: CPT

## 2019-05-01 PROCEDURE — 88738 HGB QUANT TRANSCUTANEOUS: CPT

## 2019-05-02 NOTE — ASSESSMENT
[FreeTextEntry1] : Continue to use Trelegy, and albuterol via nebulizer. Ordering a home sleep study to reassess overall OS the severity, tai llikely need to be started on nocturnal APAP for suspected worsening obstructive sleep apnea. Ordered noncontrast chest CT scan to evaluate his underlying lung problem

## 2019-05-02 NOTE — PHYSICAL EXAM
[Normal Appearance] : normal appearance [General Appearance - Well Developed] : well developed [Well Groomed] : well groomed [No Deformities] : no deformities [General Appearance - In No Acute Distress] : no acute distress [General Appearance - Well Nourished] : well nourished [Murmurs] : no murmurs present [Heart Sounds] : normal S1 and S2 [Heart Rate And Rhythm] : heart rate and rhythm were normal [Abdomen Tenderness] : non-tender [Abdomen Soft] : soft [Abdomen Mass (___ Cm)] : no abdominal mass palpated [Nail Clubbing] : no clubbing of the fingernails [Cyanosis, Localized] : no localized cyanosis [] : no ischemic changes [Petechial Hemorrhages (___cm)] : no petechial hemorrhages [FreeTextEntry1] : Decreased breath sounds

## 2019-05-02 NOTE — PROCEDURE
[FreeTextEntry1] : Pulmonary function test performed in the office today: [place] within normal limits with air trapping; spirometry showed suggestive evidence of moderate restrictive defect with no improvement post bronchodilator; diffusion showed moderate impairment.\par \par [place] sleepiness scale is 23/24

## 2019-05-02 NOTE — REASON FOR VISIT
[Acute] : an acute visit [Cough] : cough [Shortness of Breath] : shortness of Breath [Hypersomnolence] : hypersomnolence  [Sleep Apnea] : sleep apnea [FreeTextEntry2] : Had SOB 3 weeks ago, given prednisone taper. Complaints of excessive daytime sleepiness

## 2019-05-06 ENCOUNTER — APPOINTMENT (OUTPATIENT)
Dept: PULMONOLOGY | Facility: CLINIC | Age: 64
End: 2019-05-06
Payer: MEDICARE

## 2019-05-06 PROCEDURE — 95800 SLP STDY UNATTENDED: CPT | Mod: 52

## 2019-05-07 PROCEDURE — 95800 SLP STDY UNATTENDED: CPT | Mod: 52

## 2019-05-20 ENCOUNTER — FORM ENCOUNTER (OUTPATIENT)
Age: 64
End: 2019-05-20

## 2019-05-22 ENCOUNTER — APPOINTMENT (OUTPATIENT)
Dept: PULMONOLOGY | Facility: CLINIC | Age: 64
End: 2019-05-22
Payer: MEDICARE

## 2019-05-22 VITALS
DIASTOLIC BLOOD PRESSURE: 78 MMHG | SYSTOLIC BLOOD PRESSURE: 109 MMHG | RESPIRATION RATE: 16 BRPM | WEIGHT: 265 LBS | HEART RATE: 90 BPM | OXYGEN SATURATION: 93 % | HEIGHT: 62 IN | BODY MASS INDEX: 48.76 KG/M2

## 2019-05-22 PROCEDURE — 99214 OFFICE O/P EST MOD 30 MIN: CPT

## 2019-05-22 NOTE — REASON FOR VISIT
[Follow-Up] : a follow-up visit [COPD] : COPD [Cough] : cough [Hypersomnolence] : hypersomnolence  [Shortness of Breath] : shortness of Breath

## 2019-05-27 NOTE — ASSESSMENT
[FreeTextEntry1] : Discussed with patient at length regarding the aforementioned sleep study findings and all treatment options in the office today, will start patient on APAP(Auto-titrating positive airway pressure) at 5-20 cm H2O via nasal pillow at this point.\par Trelegy

## 2019-05-31 ENCOUNTER — LABORATORY RESULT (OUTPATIENT)
Age: 64
End: 2019-05-31

## 2019-06-06 ENCOUNTER — RX RENEWAL (OUTPATIENT)
Age: 64
End: 2019-06-06

## 2019-06-07 ENCOUNTER — APPOINTMENT (OUTPATIENT)
Dept: ORTHOPEDIC SURGERY | Facility: CLINIC | Age: 64
End: 2019-06-07
Payer: MEDICARE

## 2019-06-07 PROCEDURE — 20550 NJX 1 TENDON SHEATH/LIGAMENT: CPT | Mod: F8

## 2019-06-07 PROCEDURE — 99203 OFFICE O/P NEW LOW 30 MIN: CPT | Mod: 25

## 2019-06-07 NOTE — PHYSICAL EXAM
[de-identified] : - Constitutional: This is an overweight male, in no obvious distress.  \par - Psych: Patient is alert and oriented to person, place and time.  Patient has a normal mood and affect.\par - Cardiovascular: Normal pulses throughout the upper extremities.  No significant varicosities are noted in the upper extremities. \par - Neuro: Strength and sensation are intact throughout the upper extremities.  Patient has normal coordination.\par - Respiratory:  Patient exhibits no evidence of shortness of breath or difficulty breathing.\par - Skin: No rashes, lesions, or other abnormalities are noted in the upper extremities.\par \par ---\par \par Examination of his right hand demonstrates multiple well-healed incisions.  He has swelling and tenderness along the A1 pulley of the right ring finger.  There is triggering.  There is no contracture.  There is no triggering of the other digits.\par \par Examination of his left hand, demonstrate no obvious triggering.  He has decreased sensation to light touch along the median nerve distribution, with normal sensation along the radial and ulnar nerve distributions.  There is no obvious thenar atrophy.

## 2019-06-07 NOTE — DISCUSSION/SUMMARY
[FreeTextEntry1] : He has findings consistent with a recurrent right ring finger trigger finger, status post a prior cortisone injection by another physician 7 months ago.  He also has complaints consistent with left carpal tunnel syndrome.  He has a complex past medical history, which includes insulin-dependent diabetes, and psoriatic arthritis.\par \par I had a discussion regarding today's visit, the diagnosis, and treatment options / recommendations.  He would like to try a repeat cortisone injection today at the right ring finger.  He understands that this will not likely provide him with long-term relief, and that he will likely ultimately, require surgical release.  In addition, he understands that as a diabetic.  This will likely elevate his blood sugars in the short term.  We also discussed potential future treatment for his left carpal tunnel syndrome.\par \par The patient has agreed to this plan of management and has expressed full understanding.  All questions were fully answered to the patient's satisfaction.\par \par Over 50% of the time spent with the patient was on counseling the patient on the above diagnosis, treatment plan and prognosis.

## 2019-06-07 NOTE — HISTORY OF PRESENT ILLNESS
[Right] : right hand dominant [FreeTextEntry1] : He comes in today for evaluation of a right ring finger trigger finger.  He has had symptoms for a period of time and had a cortisone injection by another physician 7 months ago.  His symptoms recurred over the past month.  He also has complaints of numbness and tingling in his left hand, which he attributes to carpal tunnel syndrome.  He underwent right carpal tunnel release in the past, as well as right trigger thumb, index, finger, and middle finger trigger release.\par \par He was referred by Dr. Alicia Barroso.  He has a history of psoriatic arthritis and insulin-dependent diabetes.

## 2019-06-07 NOTE — REVIEW OF SYSTEMS
[Right] : right [de-identified] : See history of present illness [Negative] : Allergic/Immunologic [de-identified] : CRYSTAL

## 2019-06-11 ENCOUNTER — CLINICAL ADVICE (OUTPATIENT)
Age: 64
End: 2019-06-11

## 2019-06-18 ENCOUNTER — APPOINTMENT (OUTPATIENT)
Dept: RHEUMATOLOGY | Facility: CLINIC | Age: 64
End: 2019-06-18

## 2019-06-24 ENCOUNTER — RX RENEWAL (OUTPATIENT)
Age: 64
End: 2019-06-24

## 2019-07-01 ENCOUNTER — APPOINTMENT (OUTPATIENT)
Dept: PULMONOLOGY | Facility: CLINIC | Age: 64
End: 2019-07-01
Payer: MEDICARE

## 2019-07-01 VITALS
HEART RATE: 92 BPM | RESPIRATION RATE: 15 BRPM | OXYGEN SATURATION: 92 % | HEIGHT: 62 IN | DIASTOLIC BLOOD PRESSURE: 72 MMHG | BODY MASS INDEX: 48.76 KG/M2 | WEIGHT: 265 LBS | TEMPERATURE: 98.7 F | SYSTOLIC BLOOD PRESSURE: 113 MMHG

## 2019-07-01 PROCEDURE — 99214 OFFICE O/P EST MOD 30 MIN: CPT | Mod: 25

## 2019-07-01 PROCEDURE — 94060 EVALUATION OF WHEEZING: CPT

## 2019-07-01 NOTE — ASSESSMENT
[FreeTextEntry1] : Medications reviewed. Continue present medications.\par Ventolin prn\par APAP\par RTO in one month to download APAP compliance

## 2019-07-01 NOTE — PROCEDURE
[FreeTextEntry1] : Spirometry showed suggestive evidence of moderate restrictive defect with no improvement post bronchodilator

## 2019-07-01 NOTE — PHYSICAL EXAM
[General Appearance - Well Developed] : well developed [Normal Appearance] : normal appearance [Well Groomed] : well groomed [General Appearance - Well Nourished] : well nourished [No Deformities] : no deformities [General Appearance - In No Acute Distress] : no acute distress [Heart Rate And Rhythm] : heart rate and rhythm were normal [Heart Sounds] : normal S1 and S2 [Murmurs] : no murmurs present [Abdomen Soft] : soft [Abdomen Tenderness] : non-tender [Abdomen Mass (___ Cm)] : no abdominal mass palpated [Nail Clubbing] : no clubbing of the fingernails [Cyanosis, Localized] : no localized cyanosis [Petechial Hemorrhages (___cm)] : no petechial hemorrhages [] : no ischemic changes [FreeTextEntry1] : Decreased breath sounds

## 2019-07-01 NOTE — REASON FOR VISIT
[Follow-Up] : a follow-up visit [Asthma] : asthma [Cough] : cough [Shortness of Breath] : shortness of Breath [Sleep Apnea] : sleep apnea [FreeTextEntry2] : Just got APAP started

## 2019-07-08 ENCOUNTER — APPOINTMENT (OUTPATIENT)
Dept: RHEUMATOLOGY | Facility: CLINIC | Age: 64
End: 2019-07-08
Payer: MEDICARE

## 2019-07-08 VITALS
DIASTOLIC BLOOD PRESSURE: 74 MMHG | OXYGEN SATURATION: 95 % | BODY MASS INDEX: 49.02 KG/M2 | SYSTOLIC BLOOD PRESSURE: 108 MMHG | WEIGHT: 268 LBS | HEART RATE: 92 BPM

## 2019-07-08 DIAGNOSIS — M67.912 UNSPECIFIED DISORDER OF SYNOVIUM AND TENDON, LEFT SHOULDER: ICD-10-CM

## 2019-07-08 PROCEDURE — 99215 OFFICE O/P EST HI 40 MIN: CPT

## 2019-07-08 RX ORDER — PREDNISONE 2.5 MG/1
2.5 TABLET ORAL
Qty: 90 | Refills: 0 | Status: DISCONTINUED | COMMUNITY
Start: 2018-11-06 | End: 2019-07-08

## 2019-07-09 ENCOUNTER — MED ADMIN CHARGE (OUTPATIENT)
Age: 64
End: 2019-07-09

## 2019-07-09 ENCOUNTER — CLINICAL ADVICE (OUTPATIENT)
Age: 64
End: 2019-07-09

## 2019-07-09 LAB
ALBUMIN SERPL ELPH-MCNC: 4.3 G/DL
ALP BLD-CCNC: 72 U/L
ALT SERPL-CCNC: 36 U/L
ANION GAP SERPL CALC-SCNC: 15 MMOL/L
APPEARANCE: CLEAR
AST SERPL-CCNC: 27 U/L
BACTERIA: NEGATIVE
BASOPHILS # BLD AUTO: 0.05 K/UL
BASOPHILS NFR BLD AUTO: 0.5 %
BILIRUB SERPL-MCNC: 0.2 MG/DL
BILIRUBIN URINE: NEGATIVE
BLOOD URINE: NEGATIVE
BUN SERPL-MCNC: 21 MG/DL
C3 SERPL-MCNC: 181 MG/DL
C4 SERPL-MCNC: 36 MG/DL
CALCIUM SERPL-MCNC: 9.3 MG/DL
CHLORIDE SERPL-SCNC: 103 MMOL/L
CO2 SERPL-SCNC: 24 MMOL/L
COLOR: YELLOW
CREAT SERPL-MCNC: 1.2 MG/DL
CREAT SPEC-SCNC: 126 MG/DL
CREAT/PROT UR: 0.3 RATIO
DSDNA AB SER-ACNC: 50 IU/ML
EOSINOPHIL # BLD AUTO: 0.17 K/UL
EOSINOPHIL NFR BLD AUTO: 1.8 %
ERYTHROCYTE [SEDIMENTATION RATE] IN BLOOD BY WESTERGREN METHOD: 13 MM/HR
GLUCOSE QUALITATIVE U: NEGATIVE
GLUCOSE SERPL-MCNC: 156 MG/DL
HCT VFR BLD CALC: 45.2 %
HGB BLD-MCNC: 14.5 G/DL
HYALINE CASTS: 0 /LPF
IMM GRANULOCYTES NFR BLD AUTO: 0.6 %
KETONES URINE: NEGATIVE
LEUKOCYTE ESTERASE URINE: NEGATIVE
LYMPHOCYTES # BLD AUTO: 1.43 K/UL
LYMPHOCYTES NFR BLD AUTO: 15 %
MAN DIFF?: NORMAL
MCHC RBC-ENTMCNC: 29.8 PG
MCHC RBC-ENTMCNC: 32.1 GM/DL
MCV RBC AUTO: 93 FL
MICROSCOPIC-UA: NORMAL
MONOCYTES # BLD AUTO: 1.17 K/UL
MONOCYTES NFR BLD AUTO: 12.3 %
NEUTROPHILS # BLD AUTO: 6.64 K/UL
NEUTROPHILS NFR BLD AUTO: 69.8 %
NITRITE URINE: NEGATIVE
PH URINE: 6
PLATELET # BLD AUTO: 157 K/UL
POTASSIUM SERPL-SCNC: 4.1 MMOL/L
PROT SERPL-MCNC: 6.6 G/DL
PROT UR-MCNC: 43 MG/DL
PROTEIN URINE: ABNORMAL
RBC # BLD: 4.86 M/UL
RBC # FLD: 13.8 %
RED BLOOD CELLS URINE: 1 /HPF
SODIUM SERPL-SCNC: 142 MMOL/L
SPECIFIC GRAVITY URINE: 1.02
SQUAMOUS EPITHELIAL CELLS: 0 /HPF
URATE SERPL-MCNC: 8.2 MG/DL
UROBILINOGEN URINE: NORMAL
WBC # FLD AUTO: 9.52 K/UL
WHITE BLOOD CELLS URINE: 0 /HPF

## 2019-08-15 ENCOUNTER — APPOINTMENT (OUTPATIENT)
Dept: PULMONOLOGY | Facility: CLINIC | Age: 64
End: 2019-08-15
Payer: MEDICARE

## 2019-08-15 VITALS — SYSTOLIC BLOOD PRESSURE: 119 MMHG | OXYGEN SATURATION: 93 % | HEART RATE: 104 BPM | DIASTOLIC BLOOD PRESSURE: 71 MMHG

## 2019-08-15 PROCEDURE — 99214 OFFICE O/P EST MOD 30 MIN: CPT

## 2019-08-18 NOTE — REASON FOR VISIT
[Follow-Up] : a follow-up visit [Asthma] : asthma [Shortness of Breath] : shortness of Breath [Hypersomnolence] : hypersomnolence  [Sleep Apnea] : sleep apnea [FreeTextEntry2] : Nithin feels APAP pressure is too strong

## 2019-08-18 NOTE — PHYSICAL EXAM
[General Appearance - Well Developed] : well developed [Normal Appearance] : normal appearance [Well Groomed] : well groomed [General Appearance - Well Nourished] : well nourished [No Deformities] : no deformities [General Appearance - In No Acute Distress] : no acute distress [Heart Sounds] : normal S1 and S2 [Heart Rate And Rhythm] : heart rate and rhythm were normal [Murmurs] : no murmurs present [Abdomen Tenderness] : non-tender [Abdomen Soft] : soft [Abdomen Mass (___ Cm)] : no abdominal mass palpated [Nail Clubbing] : no clubbing of the fingernails [Cyanosis, Localized] : no localized cyanosis [Petechial Hemorrhages (___cm)] : no petechial hemorrhages [] : no ischemic changes [FreeTextEntry1] : Decreased breath sounds

## 2019-08-18 NOTE — ASSESSMENT
[FreeTextEntry1] : Patient is not compliant or  benefiting from APAP usage.\par Will change APAP setting to 5-12 cm H2O\par Medications reviewed. Continue present medications.\par

## 2019-08-23 ENCOUNTER — RX RENEWAL (OUTPATIENT)
Age: 64
End: 2019-08-23

## 2019-08-29 ENCOUNTER — APPOINTMENT (OUTPATIENT)
Dept: DERMATOLOGY | Facility: CLINIC | Age: 64
End: 2019-08-29
Payer: MEDICARE

## 2019-08-29 DIAGNOSIS — B07.8 OTHER VIRAL WARTS: ICD-10-CM

## 2019-08-29 PROCEDURE — 17110 DESTRUCTION B9 LES UP TO 14: CPT | Mod: 59

## 2019-08-29 PROCEDURE — 15789 CHEMICAL PEEL FACIAL DERMAL: CPT | Mod: 59

## 2019-10-07 ENCOUNTER — APPOINTMENT (OUTPATIENT)
Dept: PULMONOLOGY | Facility: CLINIC | Age: 64
End: 2019-10-07
Payer: MEDICARE

## 2019-10-07 VITALS
DIASTOLIC BLOOD PRESSURE: 82 MMHG | RESPIRATION RATE: 17 BRPM | SYSTOLIC BLOOD PRESSURE: 130 MMHG | HEIGHT: 62 IN | HEART RATE: 88 BPM | BODY MASS INDEX: 49.32 KG/M2 | OXYGEN SATURATION: 93 % | WEIGHT: 268 LBS

## 2019-10-07 DIAGNOSIS — Z23 ENCOUNTER FOR IMMUNIZATION: ICD-10-CM

## 2019-10-07 PROCEDURE — 94729 DIFFUSING CAPACITY: CPT

## 2019-10-07 PROCEDURE — 94060 EVALUATION OF WHEEZING: CPT

## 2019-10-07 PROCEDURE — 88738 HGB QUANT TRANSCUTANEOUS: CPT

## 2019-10-07 PROCEDURE — G0008: CPT

## 2019-10-07 PROCEDURE — 99214 OFFICE O/P EST MOD 30 MIN: CPT | Mod: 25

## 2019-10-07 PROCEDURE — 90686 IIV4 VACC NO PRSV 0.5 ML IM: CPT

## 2019-10-07 PROCEDURE — 94727 GAS DIL/WSHOT DETER LNG VOL: CPT

## 2019-10-07 NOTE — ASSESSMENT
[FreeTextEntry1] : Fluzone vaccine administered today.\par Nebs.\par Patient is not compliant, but benefiting from APAP usage.\par

## 2019-10-07 NOTE — PHYSICAL EXAM
[General Appearance - Well Developed] : well developed [Normal Appearance] : normal appearance [Well Groomed] : well groomed [General Appearance - Well Nourished] : well nourished [No Deformities] : no deformities [General Appearance - In No Acute Distress] : no acute distress [Heart Rate And Rhythm] : heart rate and rhythm were normal [Heart Sounds] : normal S1 and S2 [Murmurs] : no murmurs present [Abdomen Soft] : soft [Abdomen Tenderness] : non-tender [Abdomen Mass (___ Cm)] : no abdominal mass palpated [Nail Clubbing] : no clubbing of the fingernails [Cyanosis, Localized] : no localized cyanosis [] : no ischemic changes [Petechial Hemorrhages (___cm)] : no petechial hemorrhages [FreeTextEntry1] : Decreased breath sounds

## 2019-10-07 NOTE — REASON FOR VISIT
[Follow-Up] : a follow-up visit [Asthma] : asthma [Cough] : cough [Hypersomnolence] : hypersomnolence  [Sleep Apnea] : sleep apnea [Shortness of Breath] : shortness of Breath [FreeTextEntry2] : SOB is better

## 2019-10-07 NOTE — PROCEDURE
[FreeTextEntry1] : Auto-titrating Positive Airway Pressure(APAP) compliance reviewed with patient in office today\par \par Pulmonary function test performed in office today: Lung volume was within normal limits with air-trapping and post spirometry showed suggestive evidence of mild restrictive defect with no improvement postbronchodilator; diffusion showed moderate impairment. SpO2 at rest on room air is 93%.

## 2019-10-11 ENCOUNTER — APPOINTMENT (OUTPATIENT)
Dept: RHEUMATOLOGY | Facility: CLINIC | Age: 64
End: 2019-10-11
Payer: MEDICARE

## 2019-10-11 VITALS
BODY MASS INDEX: 49.32 KG/M2 | SYSTOLIC BLOOD PRESSURE: 121 MMHG | OXYGEN SATURATION: 91 % | TEMPERATURE: 98.1 F | WEIGHT: 268 LBS | DIASTOLIC BLOOD PRESSURE: 78 MMHG | RESPIRATION RATE: 16 BRPM | HEIGHT: 62 IN | HEART RATE: 82 BPM

## 2019-10-11 DIAGNOSIS — M10.071 IDIOPATHIC GOUT, RIGHT ANKLE AND FOOT: ICD-10-CM

## 2019-10-11 DIAGNOSIS — M79.643 PAIN IN UNSPECIFIED HAND: ICD-10-CM

## 2019-10-11 PROCEDURE — 99215 OFFICE O/P EST HI 40 MIN: CPT

## 2019-10-11 RX ORDER — DICLOFENAC SODIUM 10 MG/G
1 GEL TOPICAL DAILY
Qty: 1 | Refills: 2 | Status: DISCONTINUED | COMMUNITY
Start: 2019-01-17 | End: 2019-10-11

## 2019-10-11 RX ORDER — PAPAVERINE HYDROCHLORIDE 30 MG/ML
30 INJECTION, SOLUTION INTRAVENOUS
Qty: 5 | Refills: 3 | Status: DISCONTINUED | COMMUNITY
Start: 2018-10-30 | End: 2019-10-11

## 2019-10-11 RX ORDER — METOPROLOL TARTRATE 25 MG/1
25 TABLET, FILM COATED ORAL
Qty: 60 | Refills: 0 | Status: DISCONTINUED | COMMUNITY
Start: 2018-04-20 | End: 2019-10-11

## 2019-10-11 RX ORDER — PAPAVERINE HYDROCHLORIDE 30 MG/ML
30 INJECTION, SOLUTION INTRAVENOUS
Qty: 2 | Refills: 0 | Status: DISCONTINUED | COMMUNITY
Start: 2018-10-16 | End: 2019-10-11

## 2019-10-11 RX ORDER — SILDENAFIL 100 MG/1
100 TABLET, FILM COATED ORAL
Qty: 4 | Refills: 0 | Status: DISCONTINUED | COMMUNITY
Start: 2017-12-23 | End: 2019-10-11

## 2019-10-11 RX ORDER — DICLOFENAC SODIUM 10 MG/G
1 GEL TOPICAL
Qty: 5 | Refills: 0 | Status: DISCONTINUED | COMMUNITY
Start: 2019-07-08 | End: 2019-10-11

## 2019-10-11 RX ORDER — FLUOROURACIL 50 MG/G
5 CREAM TOPICAL
Qty: 1 | Refills: 1 | Status: COMPLETED | COMMUNITY
Start: 2018-10-08 | End: 2018-10-31

## 2019-10-12 LAB
ALBUMIN SERPL ELPH-MCNC: 4.3 G/DL
ALP BLD-CCNC: 84 U/L
ALT SERPL-CCNC: 36 U/L
ANION GAP SERPL CALC-SCNC: 20 MMOL/L
APPEARANCE: CLEAR
AST SERPL-CCNC: 28 U/L
BACTERIA: NEGATIVE
BASOPHILS # BLD AUTO: 0.06 K/UL
BASOPHILS NFR BLD AUTO: 0.8 %
BILIRUB SERPL-MCNC: 0.2 MG/DL
BILIRUBIN URINE: NEGATIVE
BLOOD URINE: NEGATIVE
BUN SERPL-MCNC: 25 MG/DL
C3 SERPL-MCNC: 170 MG/DL
C4 SERPL-MCNC: 37 MG/DL
CALCIUM SERPL-MCNC: 9.1 MG/DL
CHLORIDE SERPL-SCNC: 99 MMOL/L
CO2 SERPL-SCNC: 20 MMOL/L
COLOR: NORMAL
CREAT SERPL-MCNC: 1.18 MG/DL
CREAT SPEC-SCNC: 92 MG/DL
CREAT/PROT UR: 0.5 RATIO
EOSINOPHIL # BLD AUTO: 0.17 K/UL
EOSINOPHIL NFR BLD AUTO: 2.2 %
ERYTHROCYTE [SEDIMENTATION RATE] IN BLOOD BY WESTERGREN METHOD: 12 MM/HR
GLUCOSE QUALITATIVE U: ABNORMAL
GLUCOSE SERPL-MCNC: 283 MG/DL
HCT VFR BLD CALC: 42.7 %
HGB BLD-MCNC: 14.3 G/DL
HYALINE CASTS: 0 /LPF
IMM GRANULOCYTES NFR BLD AUTO: 0.7 %
KETONES URINE: NEGATIVE
LEUKOCYTE ESTERASE URINE: NEGATIVE
LYMPHOCYTES # BLD AUTO: 1.32 K/UL
LYMPHOCYTES NFR BLD AUTO: 17.4 %
MAN DIFF?: NORMAL
MCHC RBC-ENTMCNC: 32.4 PG
MCHC RBC-ENTMCNC: 33.5 GM/DL
MCV RBC AUTO: 96.6 FL
MICROSCOPIC-UA: NORMAL
MONOCYTES # BLD AUTO: 0.94 K/UL
MONOCYTES NFR BLD AUTO: 12.4 %
NEUTROPHILS # BLD AUTO: 5.05 K/UL
NEUTROPHILS NFR BLD AUTO: 66.5 %
NITRITE URINE: NEGATIVE
PH URINE: 6
PLATELET # BLD AUTO: 170 K/UL
POTASSIUM SERPL-SCNC: 4.3 MMOL/L
PROT SERPL-MCNC: 6.5 G/DL
PROT UR-MCNC: 49 MG/DL
PROTEIN URINE: ABNORMAL
RBC # BLD: 4.42 M/UL
RBC # FLD: 14.3 %
RED BLOOD CELLS URINE: 1 /HPF
SODIUM SERPL-SCNC: 139 MMOL/L
SPECIFIC GRAVITY URINE: 1.02
SQUAMOUS EPITHELIAL CELLS: 0 /HPF
URATE SERPL-MCNC: 6.4 MG/DL
UROBILINOGEN URINE: NORMAL
WBC # FLD AUTO: 7.59 K/UL
WHITE BLOOD CELLS URINE: 0 /HPF

## 2019-10-13 LAB — DSDNA AB SER-ACNC: 72 IU/ML

## 2019-10-14 PROBLEM — M65.341 TRIGGER RING FINGER OF RIGHT HAND: Status: ACTIVE | Noted: 2019-06-07

## 2019-10-18 ENCOUNTER — APPOINTMENT (OUTPATIENT)
Dept: ORTHOPEDIC SURGERY | Facility: CLINIC | Age: 64
End: 2019-10-18
Payer: MEDICARE

## 2019-10-18 DIAGNOSIS — M65.341 TRIGGER FINGER, RIGHT RING FINGER: ICD-10-CM

## 2019-10-18 PROCEDURE — 99214 OFFICE O/P EST MOD 30 MIN: CPT | Mod: 25

## 2019-10-18 PROCEDURE — 73130 X-RAY EXAM OF HAND: CPT | Mod: 50

## 2019-10-18 PROCEDURE — 20550 NJX 1 TENDON SHEATH/LIGAMENT: CPT | Mod: F8

## 2019-10-18 NOTE — HISTORY OF PRESENT ILLNESS
[FreeTextEntry1] : 10 1/2 months status post right ring finger trigger cortisone injection #2.  His first injection was given by another physician.  He also has left carpal tunnel syndrome.\par \par He returns today, as has noted worsening symptoms at his right ring finger.  He also has complaints of recent left middle finger pain and triggering.  He has also noticed worsening numbness and tingling in the left greater than right hand.  He also has complaints of pain and stiffness in both hands.\par \par He underwent right carpal tunnel release and right trigger thumb, index finger and middle finger trigger releases 30 years ago by another physician.\par \par He has a history of psoriatic arthritis and insulin-dependent diabetes.

## 2019-10-18 NOTE — PROCEDURE
[FreeTextEntry1] : -  After a discussion of risks and benefits, the patient agreed to proceed with a cortisone injection.  \par -  Side: Right \par -  Finger: Ring finger\par -  Medications: 0.5 cc of 1% Lidocaine and 1 cc of Betamethasone, 6mg/cc, using sterile technique.\par -  Patient tolerated procedure well, without complications.\par -  Patient was told that the symptoms may worsen for a day or two, and should then begin to improve. \par -  Instructions: Patient was instructed on activity modification for the next several days.\par -  Follow-up: Within 4 weeks to assess response to the injection.

## 2019-10-18 NOTE — PHYSICAL EXAM
[de-identified] : AP, lateral and oblique radiographs of both hands demonstrate mild joint space narrowing at the CMC joints of the thumbs and at the MCP joints.  There is no evidence of advanced arthritis. [de-identified] : - Constitutional: This is an overweight male, in no obvious distress.  \par - Psych: Patient is alert and oriented to person, place and time.  Patient has a normal mood and affect.\par - Cardiovascular: Normal pulses throughout the upper extremities.  No significant varicosities are noted in the upper extremities. \par - Neuro: Strength and sensation are intact throughout the upper extremities.  Patient has normal coordination.\par - Respiratory:  Patient exhibits no evidence of shortness of breath or difficulty breathing.\par - Skin: No rashes, lesions, or other abnormalities are noted in the upper extremities.\par \par ---\par \par Examination of his right hand demonstrates multiple well-healed incisions.  He has swelling and tenderness along the A1 pulley of the right ring finger.  There is triggering.  There is no flexion contracture.  There is no triggering of the other digits.  However, he does have limitation of terminal flexion and extension of the digits with complaints of pain with motion.\par \par Examination of his left hand, demonstrate tenderness without swelling along the A1 pulley of the middle finger.  There is no obvious triggering.  He has decreased sensation to light touch along the median nerve distribution, with normal sensation along the radial and ulnar nerve distributions.  There is no obvious thenar atrophy.  Similar to the right side, he has limitation of terminal flexion and extension of the digits with complaints of pain with motion.

## 2019-10-18 NOTE — REVIEW OF SYSTEMS
[Right] : right [Negative] : Allergic/Immunologic [de-identified] : See history of present illness [de-identified] : CRYSTAL

## 2019-10-18 NOTE — DISCUSSION/SUMMARY
[FreeTextEntry1] : I had a discussion regarding today's visit, the diagnosis, and treatment options / recommendations. Further treatment recommendations were discussed.  At this time, I recommended surgical release of the right ring finger trigger.  He would like to have one more cortisone injection, knowing that this will only provide him with short-term relief.  I would not recommend injecting the left middle finger trigger at the same time, as this will likely elevate his blood sugars too much.  He also has evidence of left carpal tunnel syndrome and possibly recurrent right carpal tunnel syndrome.  He may also have a component of a diabetic peripheral neuropathy.  I have ordered new EMGs.  He will follow-up after the EMGs to discuss the results and treatment recommendations.\par \par The patient has agreed to this plan of management and has expressed full understanding.  All questions were fully answered to the patient's satisfaction.\par \par I spent at least 25 minutes of face to face time with the patient.  Over 50% of the time spent with the patient was on counseling the patient on the above diagnosis, treatment plan and prognosis.

## 2019-10-20 ENCOUNTER — RX RENEWAL (OUTPATIENT)
Age: 64
End: 2019-10-20

## 2019-10-23 ENCOUNTER — APPOINTMENT (OUTPATIENT)
Dept: PULMONOLOGY | Facility: CLINIC | Age: 64
End: 2019-10-23
Payer: MEDICARE

## 2019-10-23 VITALS
TEMPERATURE: 98 F | RESPIRATION RATE: 16 BRPM | BODY MASS INDEX: 49.32 KG/M2 | HEIGHT: 62 IN | WEIGHT: 268 LBS | SYSTOLIC BLOOD PRESSURE: 121 MMHG | OXYGEN SATURATION: 94 % | HEART RATE: 100 BPM | DIASTOLIC BLOOD PRESSURE: 81 MMHG

## 2019-10-23 PROCEDURE — 95012 NITRIC OXIDE EXP GAS DETER: CPT

## 2019-10-23 PROCEDURE — 99214 OFFICE O/P EST MOD 30 MIN: CPT | Mod: 25

## 2019-10-23 PROCEDURE — 94060 EVALUATION OF WHEEZING: CPT

## 2019-10-23 PROCEDURE — 71046 X-RAY EXAM CHEST 2 VIEWS: CPT

## 2019-10-23 NOTE — PHYSICAL EXAM
[General Appearance - Well Developed] : well developed [Normal Appearance] : normal appearance [General Appearance - Well Nourished] : well nourished [Well Groomed] : well groomed [General Appearance - In No Acute Distress] : no acute distress [No Deformities] : no deformities [Eyelids - No Xanthelasma] : the eyelids demonstrated no xanthelasmas [Normal Conjunctiva] : the conjunctiva exhibited no abnormalities [Normal Oropharynx] : normal oropharynx [Neck Appearance] : the appearance of the neck was normal [Jugular Venous Distention Increased] : there was no jugular-venous distention [Neck Cervical Mass (___cm)] : no neck mass was observed [Thyroid Nodule] : there were no palpable thyroid nodules [Thyroid Diffuse Enlargement] : the thyroid was not enlarged [Heart Rate And Rhythm] : heart rate and rhythm were normal [Murmurs] : no murmurs present [Heart Sounds] : normal S1 and S2 [Respiration, Rhythm And Depth] : normal respiratory rhythm and effort [Exaggerated Use Of Accessory Muscles For Inspiration] : no accessory muscle use [Abdomen Soft] : soft [Auscultation Breath Sounds / Voice Sounds] : lungs were clear to auscultation bilaterally [Abdomen Mass (___ Cm)] : no abdominal mass palpated [Abdomen Tenderness] : non-tender [Abnormal Walk] : normal gait [Gait - Sufficient For Exercise Testing] : the gait was sufficient for exercise testing [Nail Clubbing] : no clubbing of the fingernails [Cyanosis, Localized] : no localized cyanosis [Petechial Hemorrhages (___cm)] : no petechial hemorrhages [Skin Color & Pigmentation] : normal skin color and pigmentation [] : no rash [No Venous Stasis] : no venous stasis [Skin Lesions] : no skin lesions [No Xanthoma] : no  xanthoma was observed [No Skin Ulcers] : no skin ulcer [Deep Tendon Reflexes (DTR)] : deep tendon reflexes were 2+ and symmetric [Sensation] : the sensory exam was normal to light touch and pinprick [No Focal Deficits] : no focal deficits [Oriented To Time, Place, And Person] : oriented to person, place, and time [Impaired Insight] : insight and judgment were intact [Affect] : the affect was normal

## 2019-10-24 ENCOUNTER — CHART COPY (OUTPATIENT)
Age: 64
End: 2019-10-24

## 2019-10-24 NOTE — HISTORY OF PRESENT ILLNESS
[FreeTextEntry1] : States last week he developed a cough and SOB, went to urgent care and was prescribed abx but only took one days worth. Still with chronic cough, coughing green mucus, and SOB. Denies any fever, chills.

## 2019-10-24 NOTE — PROCEDURE
[FreeTextEntry1] : Chest x-ray PA and lateral views performed in my office today showed clear lungs, no evidence of infiltrates or pleural effusions.\par \par Spirometry showed moderate obstructive airway disease with some improvement postbronchodilator\par \par Exhaled nitric oxide level is <5  PPB\par \par Auto-titrating Positive Airway Pressure(APAP) compliance reviewed with patient in office today\par

## 2019-10-24 NOTE — ASSESSMENT
[FreeTextEntry1] : Started him on doxycycline and a course of prednisone taper\par Advise him to continue to use nebulizer\par Patient is not compliant, but benefiting from APAP usage.\par

## 2019-10-28 RX ORDER — AMOXICILLIN AND CLAVULANATE POTASSIUM 875; 125 MG/1; MG/1
875-125 TABLET, COATED ORAL
Qty: 14 | Refills: 0 | Status: COMPLETED | COMMUNITY
Start: 2019-10-28 | End: 2019-11-04

## 2019-11-04 ENCOUNTER — APPOINTMENT (OUTPATIENT)
Dept: PULMONOLOGY | Facility: CLINIC | Age: 64
End: 2019-11-04
Payer: MEDICARE

## 2019-11-04 VITALS
OXYGEN SATURATION: 95 % | DIASTOLIC BLOOD PRESSURE: 75 MMHG | HEART RATE: 96 BPM | TEMPERATURE: 98.6 F | SYSTOLIC BLOOD PRESSURE: 125 MMHG

## 2019-11-04 PROCEDURE — 94060 EVALUATION OF WHEEZING: CPT

## 2019-11-04 PROCEDURE — 99214 OFFICE O/P EST MOD 30 MIN: CPT | Mod: 25

## 2019-11-05 NOTE — PROCEDURE
[FreeTextEntry1] : Spirometry showed suggestive evidence of moderate restrictive defect with no improvement postbronchodilator

## 2019-11-27 ENCOUNTER — APPOINTMENT (OUTPATIENT)
Dept: DERMATOLOGY | Facility: CLINIC | Age: 64
End: 2019-11-27

## 2019-12-05 ENCOUNTER — FORM ENCOUNTER (OUTPATIENT)
Age: 64
End: 2019-12-05

## 2019-12-06 ENCOUNTER — APPOINTMENT (OUTPATIENT)
Dept: CT IMAGING | Facility: CLINIC | Age: 64
End: 2019-12-06
Payer: MEDICARE

## 2019-12-06 ENCOUNTER — APPOINTMENT (OUTPATIENT)
Dept: PULMONOLOGY | Facility: CLINIC | Age: 64
End: 2019-12-06
Payer: MEDICARE

## 2019-12-06 ENCOUNTER — OUTPATIENT (OUTPATIENT)
Dept: OUTPATIENT SERVICES | Facility: HOSPITAL | Age: 64
LOS: 1 days | End: 2019-12-06
Payer: MEDICARE

## 2019-12-06 ENCOUNTER — APPOINTMENT (OUTPATIENT)
Dept: RHEUMATOLOGY | Facility: CLINIC | Age: 64
End: 2019-12-06
Payer: MEDICARE

## 2019-12-06 VITALS
TEMPERATURE: 98.3 F | SYSTOLIC BLOOD PRESSURE: 125 MMHG | DIASTOLIC BLOOD PRESSURE: 77 MMHG | HEART RATE: 103 BPM | HEIGHT: 62 IN | OXYGEN SATURATION: 94 % | WEIGHT: 266 LBS | BODY MASS INDEX: 48.95 KG/M2

## 2019-12-06 VITALS
SYSTOLIC BLOOD PRESSURE: 137 MMHG | HEART RATE: 94 BPM | DIASTOLIC BLOOD PRESSURE: 83 MMHG | OXYGEN SATURATION: 92 % | RESPIRATION RATE: 15 BRPM

## 2019-12-06 DIAGNOSIS — G56.00 CARPAL TUNNEL SYNDROME, UNSPECIFIED UPPER LIMB: Chronic | ICD-10-CM

## 2019-12-06 DIAGNOSIS — Z98.89 OTHER SPECIFIED POSTPROCEDURAL STATES: Chronic | ICD-10-CM

## 2019-12-06 DIAGNOSIS — Z95.5 PRESENCE OF CORONARY ANGIOPLASTY IMPLANT AND GRAFT: Chronic | ICD-10-CM

## 2019-12-06 DIAGNOSIS — R55 SYNCOPE AND COLLAPSE: ICD-10-CM

## 2019-12-06 PROCEDURE — 70450 CT HEAD/BRAIN W/O DYE: CPT | Mod: 26

## 2019-12-06 PROCEDURE — 99214 OFFICE O/P EST MOD 30 MIN: CPT | Mod: 25

## 2019-12-06 PROCEDURE — 94060 EVALUATION OF WHEEZING: CPT

## 2019-12-06 PROCEDURE — 99215 OFFICE O/P EST HI 40 MIN: CPT

## 2019-12-06 PROCEDURE — 70450 CT HEAD/BRAIN W/O DYE: CPT

## 2019-12-06 RX ORDER — PREDNISONE 5 MG/1
5 TABLET ORAL
Qty: 40 | Refills: 0 | Status: DISCONTINUED | COMMUNITY
Start: 2019-10-11 | End: 2019-12-06

## 2019-12-06 RX ORDER — PREDNISONE 10 MG/1
10 TABLET ORAL
Qty: 12 | Refills: 0 | Status: DISCONTINUED | COMMUNITY
Start: 2019-10-23 | End: 2019-12-06

## 2019-12-06 RX ORDER — AMOXICILLIN AND CLAVULANATE POTASSIUM 875; 125 MG/1; MG/1
875-125 TABLET, COATED ORAL
Qty: 14 | Refills: 0 | Status: COMPLETED | COMMUNITY
Start: 2019-12-06 | End: 2019-12-13

## 2019-12-09 NOTE — PHYSICAL EXAM
[General Appearance - Well Developed] : well developed [General Appearance - Well Nourished] : well nourished [Normal Appearance] : normal appearance [Well Groomed] : well groomed [No Deformities] : no deformities [General Appearance - In No Acute Distress] : no acute distress [Heart Sounds] : normal S1 and S2 [Heart Rate And Rhythm] : heart rate and rhythm were normal [Murmurs] : no murmurs present [FreeTextEntry1] : Decreased breath sounds [Abdomen Mass (___ Cm)] : no abdominal mass palpated [Abdomen Soft] : soft [Abdomen Tenderness] : non-tender [Cyanosis, Localized] : no localized cyanosis [Petechial Hemorrhages (___cm)] : no petechial hemorrhages [Nail Clubbing] : no clubbing of the fingernails [] : no ischemic changes

## 2019-12-09 NOTE — DISCUSSION/SUMMARY
[FreeTextEntry1] : Cough secondary to asthmatic bronchitis/URI. Secondly, he is a patient with insomnia

## 2019-12-09 NOTE — ASSESSMENT
[FreeTextEntry1] : Start a course of Augmentin and Mucinex for asthma and bronchitis/URI.\par Start Remeron 15 mg p.o. q.h.s. p.r.n. insomnia

## 2019-12-16 ENCOUNTER — APPOINTMENT (OUTPATIENT)
Dept: PULMONOLOGY | Facility: CLINIC | Age: 64
End: 2019-12-16

## 2019-12-23 ENCOUNTER — APPOINTMENT (OUTPATIENT)
Dept: PULMONOLOGY | Facility: CLINIC | Age: 64
End: 2019-12-23
Payer: MEDICARE

## 2019-12-23 VITALS — DIASTOLIC BLOOD PRESSURE: 85 MMHG | SYSTOLIC BLOOD PRESSURE: 121 MMHG | HEART RATE: 97 BPM | OXYGEN SATURATION: 94 %

## 2019-12-23 PROCEDURE — 99214 OFFICE O/P EST MOD 30 MIN: CPT | Mod: 25

## 2019-12-23 PROCEDURE — 94060 EVALUATION OF WHEEZING: CPT

## 2019-12-23 RX ORDER — AMOXICILLIN AND CLAVULANATE POTASSIUM 875; 125 MG/1; MG/1
875-125 TABLET, COATED ORAL
Qty: 20 | Refills: 0 | Status: COMPLETED | COMMUNITY
Start: 2019-12-23 | End: 2020-01-02

## 2019-12-23 NOTE — ASSESSMENT
[FreeTextEntry1] : Start Augmentin for 7 days\par Singulair\par Medications reviewed. Continue present medications.\par

## 2019-12-23 NOTE — PHYSICAL EXAM
[General Appearance - Well Developed] : well developed [Normal Appearance] : normal appearance [Well Groomed] : well groomed [General Appearance - Well Nourished] : well nourished [No Deformities] : no deformities [General Appearance - In No Acute Distress] : no acute distress [Heart Rate And Rhythm] : heart rate and rhythm were normal [Heart Sounds] : normal S1 and S2 [Murmurs] : no murmurs present [FreeTextEntry1] : Decreased breath sounds [Abdomen Soft] : soft [Abdomen Tenderness] : non-tender [Abdomen Mass (___ Cm)] : no abdominal mass palpated [Nail Clubbing] : no clubbing of the fingernails [Cyanosis, Localized] : no localized cyanosis [Petechial Hemorrhages (___cm)] : no petechial hemorrhages [] : no ischemic changes

## 2019-12-23 NOTE — REASON FOR VISIT
[Follow-Up] : a follow-up visit [Cough] : cough [Shortness of Breath] : shortness of Breath [FreeTextEntry2] : Yellowish cough, no shortness of breath

## 2019-12-23 NOTE — PROCEDURE
[FreeTextEntry1] : Spirometry showed suggestive evidence of moderate defects with no improvement post bronchodilator

## 2020-01-17 RX ORDER — BUDESONIDE 0.5 MG/2ML
0.5 INHALANT ORAL
Qty: 360 | Refills: 0 | Status: ACTIVE | COMMUNITY
Start: 2020-01-16 | End: 1900-01-01

## 2020-02-11 ENCOUNTER — APPOINTMENT (OUTPATIENT)
Dept: RHEUMATOLOGY | Facility: CLINIC | Age: 65
End: 2020-02-11
Payer: MEDICARE

## 2020-02-11 VITALS
TEMPERATURE: 98.3 F | HEIGHT: 62 IN | OXYGEN SATURATION: 96 % | WEIGHT: 262 LBS | HEART RATE: 97 BPM | DIASTOLIC BLOOD PRESSURE: 84 MMHG | SYSTOLIC BLOOD PRESSURE: 130 MMHG | BODY MASS INDEX: 48.21 KG/M2

## 2020-02-11 DIAGNOSIS — G56.02 CARPAL TUNNEL SYNDROME, LEFT UPPER LIMB: ICD-10-CM

## 2020-02-11 DIAGNOSIS — G56.01 CARPAL TUNNEL SYNDROME, RIGHT UPPER LIMB: ICD-10-CM

## 2020-02-11 DIAGNOSIS — M65.30 TRIGGER FINGER, UNSPECIFIED FINGER: ICD-10-CM

## 2020-02-11 DIAGNOSIS — M18.9 OSTEOARTHRITIS OF FIRST CARPOMETACARPAL JOINT, UNSPECIFIED: ICD-10-CM

## 2020-02-11 DIAGNOSIS — M17.10 UNILATERAL PRIMARY OSTEOARTHRITIS, UNSPECIFIED KNEE: ICD-10-CM

## 2020-02-11 DIAGNOSIS — M65.332 TRIGGER FINGER, LEFT MIDDLE FINGER: ICD-10-CM

## 2020-02-11 PROCEDURE — 99214 OFFICE O/P EST MOD 30 MIN: CPT

## 2020-02-12 LAB
ALBUMIN SERPL ELPH-MCNC: 4.2 G/DL
ALP BLD-CCNC: 80 U/L
ALT SERPL-CCNC: 28 U/L
ANION GAP SERPL CALC-SCNC: 18 MMOL/L
AST SERPL-CCNC: 22 U/L
BASOPHILS # BLD AUTO: 0.05 K/UL
BASOPHILS NFR BLD AUTO: 0.6 %
BILIRUB SERPL-MCNC: 0.3 MG/DL
BUN SERPL-MCNC: 20 MG/DL
C3 SERPL-MCNC: 179 MG/DL
C4 SERPL-MCNC: 38 MG/DL
CALCIUM SERPL-MCNC: 9.1 MG/DL
CHLORIDE SERPL-SCNC: 103 MMOL/L
CO2 SERPL-SCNC: 20 MMOL/L
CREAT SERPL-MCNC: 1.13 MG/DL
DSDNA AB SER-ACNC: 43 IU/ML
EOSINOPHIL # BLD AUTO: 0.19 K/UL
EOSINOPHIL NFR BLD AUTO: 2.2 %
ERYTHROCYTE [SEDIMENTATION RATE] IN BLOOD BY WESTERGREN METHOD: 34 MM/HR
GLUCOSE SERPL-MCNC: 319 MG/DL
HCT VFR BLD CALC: 39.6 %
HGB BLD-MCNC: 13.1 G/DL
IMM GRANULOCYTES NFR BLD AUTO: 0.8 %
LYMPHOCYTES # BLD AUTO: 1.2 K/UL
LYMPHOCYTES NFR BLD AUTO: 13.8 %
MAN DIFF?: NORMAL
MCHC RBC-ENTMCNC: 32.3 PG
MCHC RBC-ENTMCNC: 33.1 GM/DL
MCV RBC AUTO: 97.5 FL
MONOCYTES # BLD AUTO: 0.96 K/UL
MONOCYTES NFR BLD AUTO: 11 %
NEUTROPHILS # BLD AUTO: 6.22 K/UL
NEUTROPHILS NFR BLD AUTO: 71.6 %
PLATELET # BLD AUTO: 170 K/UL
POTASSIUM SERPL-SCNC: 4.1 MMOL/L
PROT SERPL-MCNC: 6.5 G/DL
RBC # BLD: 4.06 M/UL
RBC # FLD: 13.5 %
SODIUM SERPL-SCNC: 140 MMOL/L
URATE SERPL-MCNC: 6.2 MG/DL
WBC # FLD AUTO: 8.69 K/UL

## 2020-03-04 LAB
BASOPHILS # BLD AUTO: 0.04 K/UL
BASOPHILS NFR BLD AUTO: 0.4 %
EOSINOPHIL # BLD AUTO: 0.18 K/UL
EOSINOPHIL NFR BLD AUTO: 1.8 %
HCT VFR BLD CALC: 43.4 %
HGB BLD-MCNC: 13.8 G/DL
IMM GRANULOCYTES NFR BLD AUTO: 0.8 %
LYMPHOCYTES # BLD AUTO: 1.49 K/UL
LYMPHOCYTES NFR BLD AUTO: 14.7 %
MAN DIFF?: NORMAL
MCHC RBC-ENTMCNC: 28.9 PG
MCHC RBC-ENTMCNC: 31.8 GM/DL
MCV RBC AUTO: 90.8 FL
MONOCYTES # BLD AUTO: 0.89 K/UL
MONOCYTES NFR BLD AUTO: 8.8 %
NEUTROPHILS # BLD AUTO: 7.48 K/UL
NEUTROPHILS NFR BLD AUTO: 73.5 %
PLATELET # BLD AUTO: 157 K/UL
RBC # BLD: 4.78 M/UL
RBC # FLD: 13.7 %
WBC # FLD AUTO: 10.16 K/UL

## 2020-03-25 RX ORDER — AMOXICILLIN AND CLAVULANATE POTASSIUM 875; 125 MG/1; MG/1
875-125 TABLET, COATED ORAL
Qty: 14 | Refills: 0 | Status: COMPLETED | COMMUNITY
Start: 2020-03-23 | End: 2020-04-01

## 2020-04-19 ENCOUNTER — RX RENEWAL (OUTPATIENT)
Age: 65
End: 2020-04-19

## 2020-04-20 RX ORDER — PREDNISONE 2.5 MG/1
2.5 TABLET ORAL
Qty: 42 | Refills: 0 | Status: DISCONTINUED | COMMUNITY
Start: 2020-02-24 | End: 2020-04-20

## 2020-04-20 RX ORDER — PREDNISONE 5 MG/1
5 TABLET ORAL
Qty: 18 | Refills: 0 | Status: DISCONTINUED | COMMUNITY
Start: 2020-03-10 | End: 2020-04-20

## 2020-05-20 ENCOUNTER — APPOINTMENT (OUTPATIENT)
Dept: PULMONOLOGY | Facility: CLINIC | Age: 65
End: 2020-05-20

## 2020-05-20 ENCOUNTER — APPOINTMENT (OUTPATIENT)
Dept: PULMONOLOGY | Facility: CLINIC | Age: 65
End: 2020-05-20
Payer: MEDICARE

## 2020-05-20 PROCEDURE — 99214 OFFICE O/P EST MOD 30 MIN: CPT | Mod: 95

## 2020-05-21 NOTE — HISTORY OF PRESENT ILLNESS
[TextBox_4] : Nithin is complaining of cough and chest congestion for the last 3 weeks, he also has mild shortness of breath, but no fever or chills. He was just started on p.o. Biaxin, still has significant symptoms \par \par \par This visit was provided via telehealth using real-time 2-way audio visual technology.  The patient, NITHIN PERDOMO, was located at home, [address] [place] [address]\[place] [address] at the time of the visit.  \par The provider, Linda Grissom, was located at the office [address] [facility], [address], [place], [place] at the time of the visit. \par The patient, Mr. NITHIN PERDOMO  and physician Linda Grissom DO, participated in the telehealth encounter.\par \par Verbal consent was obtained by the  from patient.\par

## 2020-05-21 NOTE — ASSESSMENT
[FreeTextEntry1] : Advised him to finish his Biaxin\par Starting him on prednisone taper: 30 mg pill q.d. for 2 days, then 20 mg p.o. q.d. for 2 days, then 10 mg p.o. q.d. for 2 days\par Advised to use Mucinex as an expectorant\par \par \par Time spent in TeleHealth consultation and charting is 25 minutes\par

## 2020-05-29 ENCOUNTER — APPOINTMENT (OUTPATIENT)
Dept: RHEUMATOLOGY | Facility: CLINIC | Age: 65
End: 2020-05-29
Payer: MEDICARE

## 2020-05-29 VITALS
HEART RATE: 94 BPM | BODY MASS INDEX: 49.87 KG/M2 | SYSTOLIC BLOOD PRESSURE: 132 MMHG | OXYGEN SATURATION: 95 % | WEIGHT: 271 LBS | HEIGHT: 62 IN | DIASTOLIC BLOOD PRESSURE: 78 MMHG | TEMPERATURE: 98.1 F

## 2020-05-29 PROCEDURE — 99214 OFFICE O/P EST MOD 30 MIN: CPT | Mod: CS

## 2020-06-01 LAB
25(OH)D3 SERPL-MCNC: 37.4 NG/ML
ALBUMIN SERPL ELPH-MCNC: 4.1 G/DL
ALP BLD-CCNC: 71 U/L
ALT SERPL-CCNC: 53 U/L
ANION GAP SERPL CALC-SCNC: 21 MMOL/L
APPEARANCE: CLEAR
AST SERPL-CCNC: 37 U/L
BACTERIA: NEGATIVE
BASOPHILS # BLD AUTO: 0.07 K/UL
BASOPHILS NFR BLD AUTO: 0.7 %
BILIRUB SERPL-MCNC: 0.2 MG/DL
BILIRUBIN URINE: NEGATIVE
BLOOD URINE: NEGATIVE
BUN SERPL-MCNC: 19 MG/DL
C3 SERPL-MCNC: 188 MG/DL
C4 SERPL-MCNC: 39 MG/DL
CALCIUM SERPL-MCNC: 9.2 MG/DL
CHLORIDE SERPL-SCNC: 97 MMOL/L
CO2 SERPL-SCNC: 23 MMOL/L
COLOR: YELLOW
CREAT SERPL-MCNC: 1.15 MG/DL
CREAT SPEC-SCNC: 134 MG/DL
CREAT/PROT UR: 0.5 RATIO
CRP SERPL-MCNC: 1.43 MG/DL
EOSINOPHIL # BLD AUTO: 0.21 K/UL
EOSINOPHIL NFR BLD AUTO: 2 %
ERYTHROCYTE [SEDIMENTATION RATE] IN BLOOD BY WESTERGREN METHOD: 49 MM/HR
GLUCOSE QUALITATIVE U: NEGATIVE
GLUCOSE SERPL-MCNC: 205 MG/DL
HCT VFR BLD CALC: 45.8 %
HGB BLD-MCNC: 14.8 G/DL
HYALINE CASTS: 0 /LPF
IMM GRANULOCYTES NFR BLD AUTO: 0.8 %
KETONES URINE: NEGATIVE
LEUKOCYTE ESTERASE URINE: NEGATIVE
LYMPHOCYTES # BLD AUTO: 1.64 K/UL
LYMPHOCYTES NFR BLD AUTO: 15.7 %
MAGNESIUM SERPL-MCNC: 1.2 MG/DL
MAN DIFF?: NORMAL
MCHC RBC-ENTMCNC: 30.5 PG
MCHC RBC-ENTMCNC: 32.3 GM/DL
MCV RBC AUTO: 94.2 FL
MICROSCOPIC-UA: NORMAL
MONOCYTES # BLD AUTO: 1.37 K/UL
MONOCYTES NFR BLD AUTO: 13.1 %
NEUTROPHILS # BLD AUTO: 7.06 K/UL
NEUTROPHILS NFR BLD AUTO: 67.7 %
NITRITE URINE: NEGATIVE
PH URINE: 6
PHOSPHATE SERPL-MCNC: 2.7 MG/DL
PLATELET # BLD AUTO: 187 K/UL
POTASSIUM SERPL-SCNC: 3.9 MMOL/L
PROT SERPL-MCNC: 6.4 G/DL
PROT UR-MCNC: 62 MG/DL
PROTEIN URINE: ABNORMAL
RBC # BLD: 4.86 M/UL
RBC # FLD: 13.2 %
RED BLOOD CELLS URINE: 1 /HPF
SARS-COV-2 IGG SERPL IA-ACNC: 0.01 INDEX
SARS-COV-2 IGG SERPL QL IA: NEGATIVE
SODIUM SERPL-SCNC: 141 MMOL/L
SPECIFIC GRAVITY URINE: 1.02
SQUAMOUS EPITHELIAL CELLS: 0 /HPF
URATE SERPL-MCNC: 6.4 MG/DL
UROBILINOGEN URINE: NORMAL
WBC # FLD AUTO: 10.43 K/UL
WHITE BLOOD CELLS URINE: 1 /HPF

## 2020-06-02 LAB — DSDNA AB SER-ACNC: 31 IU/ML

## 2020-06-24 ENCOUNTER — APPOINTMENT (OUTPATIENT)
Dept: PULMONOLOGY | Facility: CLINIC | Age: 65
End: 2020-06-24
Payer: MEDICARE

## 2020-06-24 VITALS
OXYGEN SATURATION: 92 % | SYSTOLIC BLOOD PRESSURE: 109 MMHG | DIASTOLIC BLOOD PRESSURE: 64 MMHG | TEMPERATURE: 98.6 F | HEART RATE: 106 BPM

## 2020-06-24 DIAGNOSIS — H10.13 ACUTE ATOPIC CONJUNCTIVITIS, BILATERAL: ICD-10-CM

## 2020-06-24 DIAGNOSIS — I50.9 HEART FAILURE, UNSPECIFIED: ICD-10-CM

## 2020-06-24 PROCEDURE — 99215 OFFICE O/P EST HI 40 MIN: CPT | Mod: 25

## 2020-06-24 PROCEDURE — 71046 X-RAY EXAM CHEST 2 VIEWS: CPT

## 2020-06-24 RX ORDER — OLOPATADINE HYDROCHLORIDE 2 MG/ML
0.2 SOLUTION OPHTHALMIC DAILY
Qty: 1 | Refills: 3 | Status: ACTIVE | COMMUNITY
Start: 2020-06-24 | End: 1900-01-01

## 2020-06-24 NOTE — PHYSICAL EXAM
[No Acute Distress] : no acute distress [Normal Oropharynx] : normal oropharynx [Normal Appearance] : normal appearance [No Neck Mass] : no neck mass [Normal S1, S2] : normal s1, s2 [Normal Rate/Rhythm] : normal rate/rhythm [No Murmurs] : no murmurs [Clear to Auscultation Bilaterally] : clear to auscultation bilaterally [No Abnormalities] : no abnormalities [No Resp Distress] : no resp distress [No Clubbing] : no clubbing [Normal Gait] : normal gait [Benign] : benign [No Cyanosis] : no cyanosis [No Edema] : no edema [FROM] : FROM [Normal Color/ Pigmentation] : normal color/ pigmentation [No Focal Deficits] : no focal deficits [Oriented x3] : oriented x3 [Normal Affect] : normal affect

## 2020-06-25 NOTE — HISTORY OF PRESENT ILLNESS
[TextBox_4] : Nithin is complaining of cough  he also has mild shortness of breath, but no fever or chills. \par this occurs periodically \par he is adherent to his medications and has hx of multiple medical problems. \par he is also c/o itchy eyes in the morning.   
no

## 2020-06-25 NOTE — REVIEW OF SYSTEMS
[Cough] : cough [Negative] : Psychiatric [Chest Tightness] : no chest tightness [Hemoptysis] : no hemoptysis [Pleuritic Pain] : no pleuritic pain [Dyspnea] : no dyspnea [Sputum] : no sputum [SOB on Exertion] : no sob on exertion

## 2020-06-25 NOTE — ASSESSMENT
[FreeTextEntry1] : start Symbicort 160/4.5 mcg HFA\par Check PFT\par Medications reviewed. Continue present medications.\par

## 2020-07-10 ENCOUNTER — APPOINTMENT (OUTPATIENT)
Dept: DISASTER EMERGENCY | Facility: CLINIC | Age: 65
End: 2020-07-10

## 2020-07-10 DIAGNOSIS — Z01.818 ENCOUNTER FOR OTHER PREPROCEDURAL EXAMINATION: ICD-10-CM

## 2020-07-11 LAB — SARS-COV-2 N GENE NPH QL NAA+PROBE: NOT DETECTED

## 2020-07-13 ENCOUNTER — APPOINTMENT (OUTPATIENT)
Dept: PULMONOLOGY | Facility: CLINIC | Age: 65
End: 2020-07-13
Payer: MEDICARE

## 2020-07-13 VITALS
HEART RATE: 101 BPM | RESPIRATION RATE: 14 BRPM | DIASTOLIC BLOOD PRESSURE: 68 MMHG | OXYGEN SATURATION: 91 % | SYSTOLIC BLOOD PRESSURE: 109 MMHG | TEMPERATURE: 98.7 F

## 2020-07-13 PROCEDURE — 94060 EVALUATION OF WHEEZING: CPT

## 2020-07-13 PROCEDURE — 99214 OFFICE O/P EST MOD 30 MIN: CPT | Mod: 25

## 2020-07-13 PROCEDURE — 94729 DIFFUSING CAPACITY: CPT

## 2020-07-13 PROCEDURE — 94727 GAS DIL/WSHOT DETER LNG VOL: CPT

## 2020-07-13 PROCEDURE — 88738 HGB QUANT TRANSCUTANEOUS: CPT

## 2020-07-13 NOTE — ASSESSMENT
[FreeTextEntry1] : Continue Symbicort 160/4.5 mcg HFA\par Medications reviewed. Continue present medications.\par

## 2020-07-13 NOTE — REASON FOR VISIT
[Follow-Up] : a follow-up visit [Asthma] : asthma [Shortness of Breath] : shortness of breath [TextBox_44] : Shortness of breath has improved since Symbicort was started

## 2020-07-13 NOTE — PHYSICAL EXAM
[No Acute Distress] : no acute distress [Normal Oropharynx] : normal oropharynx [Normal Appearance] : normal appearance [No Neck Mass] : no neck mass [Normal Rate/Rhythm] : normal rate/rhythm [No Murmurs] : no murmurs [Normal S1, S2] : normal s1, s2 [No Resp Distress] : no resp distress [Clear to Auscultation Bilaterally] : clear to auscultation bilaterally [No Abnormalities] : no abnormalities [Benign] : benign [Normal Gait] : normal gait [No Clubbing] : no clubbing [No Cyanosis] : no cyanosis [No Edema] : no edema [FROM] : FROM [Normal Color/ Pigmentation] : normal color/ pigmentation [No Focal Deficits] : no focal deficits [Oriented x3] : oriented x3 [Normal Affect] : normal affect [TextBox_2] : Appears comfortable

## 2020-07-13 NOTE — PROCEDURE
[FreeTextEntry1] : Pulmonary function test: Lung volume was within normal limits with air trapping; spirometry showed a suggestive evidence of moderate restrictive defect with no improvement postbronchodilator; diffusion showed moderate impairment

## 2020-07-14 ENCOUNTER — RX RENEWAL (OUTPATIENT)
Age: 65
End: 2020-07-14

## 2020-08-06 ENCOUNTER — APPOINTMENT (OUTPATIENT)
Dept: RHEUMATOLOGY | Facility: CLINIC | Age: 65
End: 2020-08-06
Payer: MEDICARE

## 2020-08-06 VITALS
BODY MASS INDEX: 50.97 KG/M2 | HEART RATE: 96 BPM | SYSTOLIC BLOOD PRESSURE: 143 MMHG | WEIGHT: 277 LBS | OXYGEN SATURATION: 95 % | DIASTOLIC BLOOD PRESSURE: 81 MMHG | HEIGHT: 62 IN | TEMPERATURE: 96.5 F

## 2020-08-06 PROCEDURE — 99214 OFFICE O/P EST MOD 30 MIN: CPT | Mod: CS

## 2020-08-11 DIAGNOSIS — E83.42 HYPOMAGNESEMIA: ICD-10-CM

## 2020-08-11 LAB
25(OH)D3 SERPL-MCNC: 38.3 NG/ML
ALBUMIN SERPL ELPH-MCNC: 4.1 G/DL
ALP BLD-CCNC: 73 U/L
ALT SERPL-CCNC: 36 U/L
ANION GAP SERPL CALC-SCNC: 20 MMOL/L
APPEARANCE: CLEAR
AST SERPL-CCNC: 31 U/L
BACTERIA: NEGATIVE
BASOPHILS # BLD AUTO: 0.05 K/UL
BASOPHILS NFR BLD AUTO: 0.4 %
BILIRUB SERPL-MCNC: 0.4 MG/DL
BILIRUBIN URINE: NEGATIVE
BLOOD URINE: NEGATIVE
BUN SERPL-MCNC: 19 MG/DL
C3 SERPL-MCNC: 177 MG/DL
C4 SERPL-MCNC: 38 MG/DL
CALCIUM SERPL-MCNC: 8.8 MG/DL
CHLORIDE SERPL-SCNC: 97 MMOL/L
CK SERPL-CCNC: 477 U/L
CO2 SERPL-SCNC: 24 MMOL/L
COLOR: YELLOW
CREAT SERPL-MCNC: 1.17 MG/DL
CREAT SPEC-SCNC: 124 MG/DL
CREAT/PROT UR: 1 RATIO
DSDNA AB SER-ACNC: 20 IU/ML
EOSINOPHIL # BLD AUTO: 0.22 K/UL
EOSINOPHIL NFR BLD AUTO: 1.8 %
ERYTHROCYTE [SEDIMENTATION RATE] IN BLOOD BY WESTERGREN METHOD: 50 MM/HR
GLUCOSE QUALITATIVE U: NEGATIVE
GLUCOSE SERPL-MCNC: 141 MG/DL
HCT VFR BLD CALC: 46.4 %
HGB BLD-MCNC: 14.7 G/DL
HYALINE CASTS: 0 /LPF
IMM GRANULOCYTES NFR BLD AUTO: 1 %
KETONES URINE: NEGATIVE
LEUKOCYTE ESTERASE URINE: NEGATIVE
LYMPHOCYTES # BLD AUTO: 1.69 K/UL
LYMPHOCYTES NFR BLD AUTO: 14 %
MAGNESIUM SERPL-MCNC: 1.2 MG/DL
MAN DIFF?: NORMAL
MCHC RBC-ENTMCNC: 29.3 PG
MCHC RBC-ENTMCNC: 31.7 GM/DL
MCV RBC AUTO: 92.4 FL
MICROSCOPIC-UA: NORMAL
MONOCYTES # BLD AUTO: 1.49 K/UL
MONOCYTES NFR BLD AUTO: 12.4 %
NEUTROPHILS # BLD AUTO: 8.46 K/UL
NEUTROPHILS NFR BLD AUTO: 70.4 %
NITRITE URINE: NEGATIVE
PH URINE: 6
PHOSPHATE SERPL-MCNC: 3.6 MG/DL
PLATELET # BLD AUTO: 198 K/UL
POTASSIUM SERPL-SCNC: 3.2 MMOL/L
PROT SERPL-MCNC: 6.4 G/DL
PROT UR-MCNC: 121 MG/DL
PROTEIN URINE: ABNORMAL
RBC # BLD: 5.02 M/UL
RBC # FLD: 13 %
RED BLOOD CELLS URINE: 1 /HPF
SARS-COV-2 IGG SERPL IA-ACNC: <0.1 INDEX
SARS-COV-2 IGG SERPL QL IA: NEGATIVE
SODIUM SERPL-SCNC: 142 MMOL/L
SPECIFIC GRAVITY URINE: >=1.03
SQUAMOUS EPITHELIAL CELLS: 0 /HPF
UROBILINOGEN URINE: NORMAL
WBC # FLD AUTO: 12.03 K/UL
WHITE BLOOD CELLS URINE: 1 /HPF

## 2020-08-18 ENCOUNTER — APPOINTMENT (OUTPATIENT)
Dept: PULMONOLOGY | Facility: CLINIC | Age: 65
End: 2020-08-18

## 2020-09-25 ENCOUNTER — RX RENEWAL (OUTPATIENT)
Age: 65
End: 2020-09-25

## 2020-10-06 ENCOUNTER — EMERGENCY (EMERGENCY)
Facility: HOSPITAL | Age: 65
LOS: 1 days | Discharge: HOME | End: 2020-10-06
Attending: EMERGENCY MEDICINE
Payer: MEDICARE

## 2020-10-06 VITALS
SYSTOLIC BLOOD PRESSURE: 182 MMHG | TEMPERATURE: 98 F | OXYGEN SATURATION: 92 % | WEIGHT: 273.37 LBS | HEIGHT: 62 IN | HEART RATE: 88 BPM | RESPIRATION RATE: 17 BRPM | DIASTOLIC BLOOD PRESSURE: 96 MMHG

## 2020-10-06 DIAGNOSIS — G56.00 CARPAL TUNNEL SYNDROME, UNSPECIFIED UPPER LIMB: Chronic | ICD-10-CM

## 2020-10-06 DIAGNOSIS — Z95.5 PRESENCE OF CORONARY ANGIOPLASTY IMPLANT AND GRAFT: Chronic | ICD-10-CM

## 2020-10-06 DIAGNOSIS — Z98.89 OTHER SPECIFIED POSTPROCEDURAL STATES: Chronic | ICD-10-CM

## 2020-10-06 PROCEDURE — 99284 EMERGENCY DEPT VISIT MOD MDM: CPT

## 2020-10-06 PROCEDURE — 70450 CT HEAD/BRAIN W/O DYE: CPT | Mod: 26

## 2020-10-06 RX ORDER — LABETALOL HCL 100 MG
10 TABLET ORAL
Refills: 0 | Status: DISCONTINUED | OUTPATIENT
Start: 2020-10-06 | End: 2020-10-07

## 2020-10-06 NOTE — ED PROVIDER NOTE - CARE PROVIDER_API CALL
Tonja Elizabeth (MD)  Clinical Neurophysiology; Neurology  [address] [facility], [address] [place] [address]  Phone: [phone]  Fax: [phone]  Follow Up Time:

## 2020-10-06 NOTE — ED PROVIDER NOTE - NSFOLLOWUPINSTRUCTIONS_ED_ALL_ED_FT
Take medications as prescribed-make sure to discuss with your endocrinologist regarding keeping your blood sugar controlled while taking steroids.  Apply 1 eye drop every 4-6 hours and prior to bedtime to prevent eye dryness. Use tape to shut eye during sleep.  Followup with neurologist above for reevaluation. Call office for an appointment.  Return to ED if you develop double vision, difficulty speaking or weakness/numbness of your arms or legs.      Paredes Palsy    WHAT YOU NEED TO KNOW:    Bell palsy is a sudden weakness or paralysis of one side of your face. Bell palsy occurs when the nerve that controls the muscles in your face becomes swollen or irritated.     DISCHARGE INSTRUCTIONS:    Medicines:   •Medicine may be given to decrease swelling and irritation of your facial nerve. You may receive antiviral medicine if your healthcare provider thinks a virus caused your Bell palsy. Your healthcare provider may also suggest acetaminophen or ibuprofen to reduce pain. These medicines are available without a doctor's order. Ask which medicine to take, and how much you need. Follow directions. Acetaminophen can cause liver damage, and ibuprofen can cause stomach bleeding or kidney damage.       •Take your medicine as directed. Contact your healthcare provider if you think your medicine is not helping or if you have side effects. Tell him if you are allergic to any medicine. Keep a list of the medicines, vitamins, and herbs you take. Include the amounts, and when and why you take them. Bring the list or the pill bottles to follow-up visits. Carry your medicine list with you in case of an emergency.      Follow up with your healthcare provider as directed: Write down your questions so you remember to ask them during your visits.    Eye care: Your healthcare provider may recommend that you use artificial tears during the day to keep your eye moist. You may need to use an eye ointment at night. You may also need to wear a patch over your eye and tape it shut while you sleep. This helps keep your eye from getting dry and infected. Wear sunglasses to protect your eye from direct sunlight. Stay away from places that have fumes, dust, or other particles in the air that may harm your eye.    Physical therapy: A physical therapist may teach you how to massage your face and exercise the nerves and muscles in your face. This may help you get better sooner and prevent long-term problems. You can exercise on your own when your facial movement begins to return. Open and close your eye, wink, and smile wide. Do the exercises for 15 or 20 minutes several times a day.    Contact your healthcare provider if:   •You have a fever.      •Your eye becomes red, irritated, or painful.      •You have questions or concerns about your condition or care.      Return to the emergency department if:   •You develop weakness or numbness on one side of your body (other than your face).      •You have double vision, or you lose vision in your eye.      •You have trouble thinking clearly.      Hypokalemia    WHAT YOU NEED TO KNOW:    Hypokalemia is a low level of potassium in your blood. Potassium helps control how your muscles, heart, and digestive system work. Hypokalemia occurs when your body loses too much potassium or does not absorb enough from food.     DISCHARGE INSTRUCTIONS:    Return to the emergency department if:   •You cannot move your arm or leg.      •You have a fast or irregular heartbeat.      •You are too tired or weak to stand up.      Contact your healthcare provider if:   •You are vomiting, or you have diarrhea.      •You have numbness or tingling in your arms or legs.      •Your symptoms do not go away or they get worse.      •You have questions or concerns about your condition or care.      Medicines:   •Potassium will be given to bring your potassium levels back to normal.      •Take your medicine as directed. Contact your healthcare provider if you think your medicine is not helping or if you have side effects. Tell him of her if you are allergic to any medicine. Keep a list of the medicines, vitamins, and herbs you take. Include the amounts, and when and why you take them. Bring the list or the pill bottles to follow-up visits. Carry your medicine list with you in case of an emergency.      Eat foods that are high in potassium: Foods that are high in potassium include bananas, oranges, tomatoes, potatoes, and avocado. Harris beans, turkey, salmon, lean beef, yogurt, and milk are also high in potassium. Ask your healthcare provider or dietitian for more information about foods that are high in potassium.     Follow up with your healthcare provider as directed: Write down your questions so you remember to ask them during your visits.

## 2020-10-06 NOTE — ED PROVIDER NOTE - CARE PROVIDERS DIRECT ADDRESSES
[email or web address]

## 2020-10-06 NOTE — ED PROVIDER NOTE - CRANIAL NERVE AND PUPILLARY EXAM
mild left facial weakness/extra-ocular movements intact/corneal reflex intact/cough reflex intact/peripheral vision intact/gag reflex intact/tongue is midline

## 2020-10-06 NOTE — ED PROVIDER NOTE - PATIENT PORTAL LINK FT
You can access the FollowMyHealth Patient Portal offered by [facility] by registering at the following website: [email or web address]. By joining [employer]’s FollowMyHealth portal, you will also be able to view your health information using other applications (apps) compatible with our system.

## 2020-10-06 NOTE — ED PROVIDER NOTE - CLINICAL SUMMARY MEDICAL DECISION MAKING FREE TEXT BOX
66yo M with hx CAD s/p 5 stents on ASA/Plavix, HTN/HLD, IDDM, psoriatic arthritis presents with left facial droop and slurred speech. Exam shows left facial droop, speech clear, no other neuro deficits. Likely Bell's palsy, will obtain FSG, EKG, labs, CT head. Given labetalol for elevated BP. Will reassess. 66yo M with hx CAD s/p 5 stents on ASA/Plavix, HTN/HLD, IDDM, psoriatic arthritis presents with left facial droop and slurred speech. Exam shows left facial droop, speech clear, no other neuro deficits. Likely Bell's palsy, will obtain FSG, EKG, labs, CT head. Will reassess. passed dysphagia screen.  symptoms first noted by patient @7pm  ED arrival @2232  MD exam performed@2240pm, symptoms do not meet criteria for Code Stroke or tPA intervention.    labs unremarkable, Glu 232, K 3.1-given po repletion, CT head unremarkable  discussed above with patient. Patient given valtrex and artificial tears, patient declines prednisone at this time, he wants to discuss it with his endocrinologist prior to starting steroid in setting of his elevated blood glucose. patient advised to followup with neurology as outpatient for further management of symptoms.

## 2020-10-06 NOTE — ED PROVIDER NOTE - PMH
Asthma    CAD (coronary artery disease)    Diabetes  Type 2, A1c: 10.2  Former smoker    History of hypertension    HLD (hyperlipidemia)    HTN (hypertension)    Lupus    TONYA (obstructive sleep apnea)    Psoriatic arthritis

## 2020-10-06 NOTE — ED PROVIDER NOTE - OBJECTIVE STATEMENT
66yo M with hx CAD s/p 5 stents on ASA/Plavix, HTN/HLD, IDDM, psoriatic arthritis presents with left facial droop and slurred speech. patient reports he was doing well all day, at 7pm he attempted to whistle to grandchild through facetime but found himself unable to do it-normally whistles without difficulty and reports he felt as if his lips were swollen but did not look at them. At 10pm he looked at the mirror and noticed the left upper eyelid/face was drooping, when he called EMS he was told by  his speech was slurred. Patient denies any arm/leg weakness or numbness. Denies similar symptoms in past. Currently reports he feels his speech is improved. Denies headache, chest pain. Denies recent travel, new rashes, tick exposures. Reports his FSG was high at home and he took insulin prior to being transported to ED by EMS.

## 2020-10-07 VITALS
RESPIRATION RATE: 16 BRPM | SYSTOLIC BLOOD PRESSURE: 167 MMHG | DIASTOLIC BLOOD PRESSURE: 96 MMHG | OXYGEN SATURATION: 96 % | TEMPERATURE: 98 F | HEART RATE: 86 BPM

## 2020-10-07 LAB
ALBUMIN SERPL ELPH-MCNC: 3.3 G/DL — LOW (ref 3.5–5)
ALP SERPL-CCNC: 105 U/L — SIGNIFICANT CHANGE UP (ref 40–120)
ALT FLD-CCNC: 52 U/L DA — SIGNIFICANT CHANGE UP (ref 10–60)
ANION GAP SERPL CALC-SCNC: 5 MMOL/L — SIGNIFICANT CHANGE UP (ref 5–17)
APTT BLD: 42.6 SEC — HIGH (ref 27.5–35.5)
AST SERPL-CCNC: 28 U/L — SIGNIFICANT CHANGE UP (ref 10–40)
B BURGDOR C6 AB SER-ACNC: NEGATIVE — SIGNIFICANT CHANGE UP
B BURGDOR IGG+IGM SER-ACNC: 0.02 INDEX — SIGNIFICANT CHANGE UP (ref 0.01–0.89)
BASOPHILS # BLD AUTO: 0.05 K/UL — SIGNIFICANT CHANGE UP (ref 0–0.2)
BASOPHILS NFR BLD AUTO: 0.4 % — SIGNIFICANT CHANGE UP (ref 0–2)
BILIRUB SERPL-MCNC: 0.4 MG/DL — SIGNIFICANT CHANGE UP (ref 0.2–1.2)
BUN SERPL-MCNC: 25 MG/DL — HIGH (ref 7–18)
CALCIUM SERPL-MCNC: 8.8 MG/DL — SIGNIFICANT CHANGE UP (ref 8.4–10.5)
CHLORIDE SERPL-SCNC: 100 MMOL/L — SIGNIFICANT CHANGE UP (ref 96–108)
CO2 SERPL-SCNC: 33 MMOL/L — HIGH (ref 22–31)
CREAT SERPL-MCNC: 1.29 MG/DL — SIGNIFICANT CHANGE UP (ref 0.5–1.3)
EOSINOPHIL # BLD AUTO: 0.12 K/UL — SIGNIFICANT CHANGE UP (ref 0–0.5)
EOSINOPHIL NFR BLD AUTO: 1 % — SIGNIFICANT CHANGE UP (ref 0–6)
GLUCOSE SERPL-MCNC: 232 MG/DL — HIGH (ref 70–99)
HCT VFR BLD CALC: 43.5 % — SIGNIFICANT CHANGE UP (ref 39–50)
HGB BLD-MCNC: 15.1 G/DL — SIGNIFICANT CHANGE UP (ref 13–17)
HIV 1 & 2 AB SERPL IA.RAPID: SIGNIFICANT CHANGE UP
IMM GRANULOCYTES NFR BLD AUTO: 1.1 % — SIGNIFICANT CHANGE UP (ref 0–1.5)
INR BLD: 1.22 RATIO — HIGH (ref 0.88–1.16)
LYMPHOCYTES # BLD AUTO: 1.45 K/UL — SIGNIFICANT CHANGE UP (ref 1–3.3)
LYMPHOCYTES # BLD AUTO: 11.5 % — LOW (ref 13–44)
MCHC RBC-ENTMCNC: 30.4 PG — SIGNIFICANT CHANGE UP (ref 27–34)
MCHC RBC-ENTMCNC: 34.7 GM/DL — SIGNIFICANT CHANGE UP (ref 32–36)
MCV RBC AUTO: 87.5 FL — SIGNIFICANT CHANGE UP (ref 80–100)
MONOCYTES # BLD AUTO: 1.08 K/UL — HIGH (ref 0–0.9)
MONOCYTES NFR BLD AUTO: 8.6 % — SIGNIFICANT CHANGE UP (ref 2–14)
NEUTROPHILS # BLD AUTO: 9.73 K/UL — HIGH (ref 1.8–7.4)
NEUTROPHILS NFR BLD AUTO: 77.4 % — HIGH (ref 43–77)
NRBC # BLD: 0 /100 WBCS — SIGNIFICANT CHANGE UP (ref 0–0)
PLATELET # BLD AUTO: 180 K/UL — SIGNIFICANT CHANGE UP (ref 150–400)
POTASSIUM SERPL-MCNC: 3.1 MMOL/L — LOW (ref 3.5–5.3)
POTASSIUM SERPL-SCNC: 3.1 MMOL/L — LOW (ref 3.5–5.3)
PROT SERPL-MCNC: 7.3 G/DL — SIGNIFICANT CHANGE UP (ref 6–8.3)
PROTHROM AB SERPL-ACNC: 14.4 SEC — HIGH (ref 10.6–13.6)
RBC # BLD: 4.97 M/UL — SIGNIFICANT CHANGE UP (ref 4.2–5.8)
RBC # FLD: 13.1 % — SIGNIFICANT CHANGE UP (ref 10.3–14.5)
SODIUM SERPL-SCNC: 138 MMOL/L — SIGNIFICANT CHANGE UP (ref 135–145)
TROPONIN I SERPL-MCNC: <0.015 NG/ML — SIGNIFICANT CHANGE UP (ref 0–0.04)
WBC # BLD: 12.57 K/UL — HIGH (ref 3.8–10.5)
WBC # FLD AUTO: 12.57 K/UL — HIGH (ref 3.8–10.5)

## 2020-10-07 PROCEDURE — 80053 COMPREHEN METABOLIC PANEL: CPT

## 2020-10-07 PROCEDURE — 82962 GLUCOSE BLOOD TEST: CPT

## 2020-10-07 PROCEDURE — 93005 ELECTROCARDIOGRAM TRACING: CPT

## 2020-10-07 PROCEDURE — 84484 ASSAY OF TROPONIN QUANT: CPT

## 2020-10-07 PROCEDURE — 99284 EMERGENCY DEPT VISIT MOD MDM: CPT | Mod: 25

## 2020-10-07 PROCEDURE — 36415 COLL VENOUS BLD VENIPUNCTURE: CPT

## 2020-10-07 PROCEDURE — 86703 HIV-1/HIV-2 1 RESULT ANTBDY: CPT

## 2020-10-07 PROCEDURE — 70450 CT HEAD/BRAIN W/O DYE: CPT

## 2020-10-07 PROCEDURE — 85610 PROTHROMBIN TIME: CPT

## 2020-10-07 PROCEDURE — 86618 LYME DISEASE ANTIBODY: CPT

## 2020-10-07 PROCEDURE — 85730 THROMBOPLASTIN TIME PARTIAL: CPT

## 2020-10-07 PROCEDURE — 85025 COMPLETE CBC W/AUTO DIFF WBC: CPT

## 2020-10-07 RX ORDER — POTASSIUM CHLORIDE 20 MEQ
40 PACKET (EA) ORAL ONCE
Refills: 0 | Status: COMPLETED | OUTPATIENT
Start: 2020-10-07 | End: 2020-10-07

## 2020-10-07 RX ORDER — VALACYCLOVIR 500 MG/1
1 TABLET, FILM COATED ORAL
Qty: 21 | Refills: 0
Start: 2020-10-07 | End: 2020-10-13

## 2020-10-07 RX ORDER — VALACYCLOVIR 500 MG/1
1000 TABLET, FILM COATED ORAL ONCE
Refills: 0 | Status: COMPLETED | OUTPATIENT
Start: 2020-10-07 | End: 2020-10-07

## 2020-10-07 RX ADMIN — Medication 1 DROP(S): at 01:19

## 2020-10-07 RX ADMIN — VALACYCLOVIR 1000 MILLIGRAM(S): 500 TABLET, FILM COATED ORAL at 01:19

## 2020-10-07 RX ADMIN — Medication 40 MILLIEQUIVALENT(S): at 02:05

## 2020-10-07 NOTE — ED ADULT NURSE NOTE - OBJECTIVE STATEMENT
pt presents to ED with complaints of left facial droop. PT states " I was video calling my granddaughter and I noticed that I can't close my left eye". Denies headache, LOC, dizziness and any pain.

## 2020-10-21 ENCOUNTER — RX RENEWAL (OUTPATIENT)
Age: 65
End: 2020-10-21

## 2020-10-29 ENCOUNTER — APPOINTMENT (OUTPATIENT)
Dept: RHEUMATOLOGY | Facility: CLINIC | Age: 65
End: 2020-10-29

## 2020-11-10 ENCOUNTER — APPOINTMENT (OUTPATIENT)
Dept: RHEUMATOLOGY | Facility: CLINIC | Age: 65
End: 2020-11-10

## 2020-11-19 ENCOUNTER — APPOINTMENT (OUTPATIENT)
Dept: RHEUMATOLOGY | Facility: CLINIC | Age: 65
End: 2020-11-19
Payer: MEDICARE

## 2020-11-19 VITALS
HEIGHT: 62 IN | TEMPERATURE: 96.7 F | DIASTOLIC BLOOD PRESSURE: 79 MMHG | WEIGHT: 270 LBS | HEART RATE: 94 BPM | OXYGEN SATURATION: 96 % | BODY MASS INDEX: 49.69 KG/M2 | RESPIRATION RATE: 16 BRPM | SYSTOLIC BLOOD PRESSURE: 126 MMHG

## 2020-11-19 DIAGNOSIS — Z20.828 CONTACT WITH AND (SUSPECTED) EXPOSURE TO OTHER VIRAL COMMUNICABLE DISEASES: ICD-10-CM

## 2020-11-19 PROCEDURE — 99214 OFFICE O/P EST MOD 30 MIN: CPT | Mod: CS,25

## 2020-11-19 PROCEDURE — 20610 DRAIN/INJ JOINT/BURSA W/O US: CPT | Mod: 50

## 2020-11-19 RX ORDER — LIDOCAINE HYDROCHLORIDE 10 MG/ML
1 INJECTION, SOLUTION INFILTRATION; PERINEURAL
Refills: 0 | Status: COMPLETED | OUTPATIENT
Start: 2020-11-19

## 2020-11-19 RX ORDER — METHYLPRED ACET/NACL,ISO-OS/PF 40 MG/ML
40 VIAL (ML) INJECTION
Qty: 1 | Refills: 0 | Status: COMPLETED | OUTPATIENT
Start: 2020-11-19

## 2020-11-19 RX ADMIN — LIDOCAINE HYDROCHLORIDE %: 10 INJECTION, SOLUTION INFILTRATION; PERINEURAL at 00:00

## 2020-11-19 RX ADMIN — METHYLPREDNISOLONE ACETATE 1 MG/ML: 40 INJECTION, SUSPENSION INTRA-ARTICULAR; INTRALESIONAL; INTRAMUSCULAR; SOFT TISSUE at 00:00

## 2020-11-20 LAB
25(OH)D3 SERPL-MCNC: 33.1 NG/ML
ALBUMIN SERPL ELPH-MCNC: 4.1 G/DL
ALP BLD-CCNC: 84 U/L
ALT SERPL-CCNC: 34 U/L
ANION GAP SERPL CALC-SCNC: 17 MMOL/L
APPEARANCE: CLEAR
AST SERPL-CCNC: 24 U/L
BACTERIA: NEGATIVE
BASOPHILS # BLD AUTO: 0.07 K/UL
BASOPHILS NFR BLD AUTO: 0.5 %
BILIRUB SERPL-MCNC: 0.3 MG/DL
BILIRUBIN URINE: NEGATIVE
BLOOD URINE: NEGATIVE
BUN SERPL-MCNC: 19 MG/DL
CALCIUM SERPL-MCNC: 9.1 MG/DL
CHLORIDE SERPL-SCNC: 99 MMOL/L
CO2 SERPL-SCNC: 25 MMOL/L
COLOR: YELLOW
CREAT SERPL-MCNC: 1.14 MG/DL
CREAT SPEC-SCNC: 144 MG/DL
CREAT/PROT UR: 1.9 RATIO
CRP SERPL-MCNC: 1.02 MG/DL
EOSINOPHIL # BLD AUTO: 0.14 K/UL
EOSINOPHIL NFR BLD AUTO: 1 %
ERYTHROCYTE [SEDIMENTATION RATE] IN BLOOD BY WESTERGREN METHOD: 21 MM/HR
GLUCOSE QUALITATIVE U: NEGATIVE
GLUCOSE SERPL-MCNC: 128 MG/DL
HCT VFR BLD CALC: 50 %
HGB BLD-MCNC: 16.3 G/DL
HYALINE CASTS: 1 /LPF
IMM GRANULOCYTES NFR BLD AUTO: 0.7 %
KETONES URINE: NEGATIVE
LEUKOCYTE ESTERASE URINE: NEGATIVE
LYMPHOCYTES # BLD AUTO: 1.87 K/UL
LYMPHOCYTES NFR BLD AUTO: 13.2 %
MAN DIFF?: NORMAL
MCHC RBC-ENTMCNC: 29.7 PG
MCHC RBC-ENTMCNC: 32.6 GM/DL
MCV RBC AUTO: 91.2 FL
MICROSCOPIC-UA: NORMAL
MONOCYTES # BLD AUTO: 1.43 K/UL
MONOCYTES NFR BLD AUTO: 10.1 %
NEUTROPHILS # BLD AUTO: 10.57 K/UL
NEUTROPHILS NFR BLD AUTO: 74.5 %
NITRITE URINE: NEGATIVE
PH URINE: 6
PLATELET # BLD AUTO: 172 K/UL
POTASSIUM SERPL-SCNC: 4 MMOL/L
PROT SERPL-MCNC: 6.4 G/DL
PROT UR-MCNC: 267 MG/DL
PROTEIN URINE: ABNORMAL
RBC # BLD: 5.48 M/UL
RBC # FLD: 13.7 %
RED BLOOD CELLS URINE: 1 /HPF
SODIUM SERPL-SCNC: 141 MMOL/L
SPECIFIC GRAVITY URINE: 1.02
SQUAMOUS EPITHELIAL CELLS: 0 /HPF
URATE SERPL-MCNC: 5.9 MG/DL
UROBILINOGEN URINE: NORMAL
WBC # FLD AUTO: 14.18 K/UL
WHITE BLOOD CELLS URINE: 1 /HPF

## 2020-11-23 LAB
C3 SERPL-MCNC: 184 MG/DL
C4 SERPL-MCNC: 44 MG/DL

## 2020-12-09 RX ORDER — AMOXICILLIN AND CLAVULANATE POTASSIUM 875; 125 MG/1; MG/1
875-125 TABLET, COATED ORAL
Qty: 14 | Refills: 0 | Status: COMPLETED | COMMUNITY
Start: 2020-12-09 | End: 2020-12-16

## 2020-12-28 ENCOUNTER — NON-APPOINTMENT (OUTPATIENT)
Age: 65
End: 2020-12-28

## 2021-01-05 ENCOUNTER — NON-APPOINTMENT (OUTPATIENT)
Age: 66
End: 2021-01-05

## 2021-01-12 ENCOUNTER — APPOINTMENT (OUTPATIENT)
Dept: RHEUMATOLOGY | Facility: CLINIC | Age: 66
End: 2021-01-12
Payer: MEDICARE

## 2021-01-12 VITALS
SYSTOLIC BLOOD PRESSURE: 125 MMHG | WEIGHT: 268 LBS | HEIGHT: 62 IN | HEART RATE: 107 BPM | TEMPERATURE: 97.2 F | DIASTOLIC BLOOD PRESSURE: 73 MMHG | OXYGEN SATURATION: 95 % | BODY MASS INDEX: 49.32 KG/M2

## 2021-01-12 PROCEDURE — 99215 OFFICE O/P EST HI 40 MIN: CPT | Mod: 25

## 2021-01-12 PROCEDURE — 20610 DRAIN/INJ JOINT/BURSA W/O US: CPT | Mod: LT

## 2021-01-12 RX ORDER — PREDNISONE 5 MG/1
5 TABLET ORAL
Qty: 27 | Refills: 0 | Status: DISCONTINUED | COMMUNITY
Start: 2020-04-20 | End: 2021-01-12

## 2021-01-12 RX ORDER — LIDOCAINE HYDROCHLORIDE 10 MG/ML
1 INJECTION, SOLUTION INFILTRATION; PERINEURAL
Refills: 0 | Status: COMPLETED | OUTPATIENT
Start: 2021-01-12

## 2021-01-12 RX ORDER — PREDNISONE 10 MG/1
10 TABLET ORAL
Qty: 9 | Refills: 0 | Status: DISCONTINUED | COMMUNITY
Start: 2020-08-06 | End: 2021-01-12

## 2021-01-12 RX ORDER — METHYLPRED ACET/NACL,ISO-OS/PF 40 MG/ML
40 VIAL (ML) INJECTION
Qty: 1 | Refills: 0 | Status: COMPLETED | OUTPATIENT
Start: 2021-01-12

## 2021-01-12 RX ORDER — PREDNISONE 5 MG/1
5 TABLET ORAL
Qty: 70 | Refills: 0 | Status: DISCONTINUED | COMMUNITY
Start: 2020-11-19 | End: 2021-01-12

## 2021-01-12 RX ORDER — PREDNISONE 10 MG/1
10 TABLET ORAL
Qty: 12 | Refills: 0 | Status: DISCONTINUED | OUTPATIENT
Start: 2020-05-20 | End: 2021-01-12

## 2021-01-12 RX ADMIN — METHYLPREDNISOLONE ACETATE 1 MG/ML: 40 INJECTION, SUSPENSION INTRA-ARTICULAR; INTRALESIONAL; INTRAMUSCULAR; SOFT TISSUE at 00:00

## 2021-01-12 RX ADMIN — LIDOCAINE HYDROCHLORIDE %: 10 INJECTION, SOLUTION INFILTRATION; PERINEURAL at 00:00

## 2021-01-13 LAB
25(OH)D3 SERPL-MCNC: 27.7 NG/ML
ALBUMIN SERPL ELPH-MCNC: 3.9 G/DL
ALP BLD-CCNC: 87 U/L
ALT SERPL-CCNC: 32 U/L
ANION GAP SERPL CALC-SCNC: 16 MMOL/L
AST SERPL-CCNC: 26 U/L
BASOPHILS # BLD AUTO: 0.06 K/UL
BASOPHILS NFR BLD AUTO: 0.5 %
BILIRUB SERPL-MCNC: 0.3 MG/DL
BUN SERPL-MCNC: 28 MG/DL
C3 SERPL-MCNC: 163 MG/DL
C4 SERPL-MCNC: 40 MG/DL
CALCIUM SERPL-MCNC: 9.2 MG/DL
CCP AB SER IA-ACNC: <8 UNITS
CHLORIDE SERPL-SCNC: 101 MMOL/L
CO2 SERPL-SCNC: 23 MMOL/L
CREAT SERPL-MCNC: 1.36 MG/DL
DSDNA AB SER-ACNC: 22 IU/ML
ENA SS-A AB SER IA-ACNC: <0.2 AL
ENA SS-B AB SER IA-ACNC: <0.2 AL
EOSINOPHIL # BLD AUTO: 0.26 K/UL
EOSINOPHIL NFR BLD AUTO: 2.2 %
ERYTHROCYTE [SEDIMENTATION RATE] IN BLOOD BY WESTERGREN METHOD: 21 MM/HR
GLUCOSE SERPL-MCNC: 192 MG/DL
HCT VFR BLD CALC: 47 %
HGB BLD-MCNC: 14.8 G/DL
IMM GRANULOCYTES NFR BLD AUTO: 0.9 %
LYMPHOCYTES # BLD AUTO: 1.49 K/UL
LYMPHOCYTES NFR BLD AUTO: 12.4 %
MAN DIFF?: NORMAL
MCHC RBC-ENTMCNC: 30.2 PG
MCHC RBC-ENTMCNC: 31.5 GM/DL
MCV RBC AUTO: 95.9 FL
MONOCYTES # BLD AUTO: 1.07 K/UL
MONOCYTES NFR BLD AUTO: 8.9 %
NEUTROPHILS # BLD AUTO: 9 K/UL
NEUTROPHILS NFR BLD AUTO: 75.1 %
PLATELET # BLD AUTO: 166 K/UL
POTASSIUM SERPL-SCNC: 4.2 MMOL/L
PROT SERPL-MCNC: 6.1 G/DL
RBC # BLD: 4.9 M/UL
RBC # FLD: 13.8 %
RF+CCP IGG SER-IMP: NEGATIVE
RHEUMATOID FACT SER QL: <10 IU/ML
SARS-COV-2 IGG SERPL IA-ACNC: <0.1 INDEX
SARS-COV-2 IGG SERPL QL IA: NEGATIVE
SODIUM SERPL-SCNC: 139 MMOL/L
URATE SERPL-MCNC: 6.1 MG/DL
WBC # FLD AUTO: 11.99 K/UL

## 2021-01-15 ENCOUNTER — NON-APPOINTMENT (OUTPATIENT)
Age: 66
End: 2021-01-15

## 2021-01-15 LAB
ALBUMIN MFR SERPL ELPH: 55.1 %
ALBUMIN SERPL-MCNC: 3.4 G/DL
ALBUMIN/GLOB SERPL: 1.3 RATIO
ALPHA1 GLOB MFR SERPL ELPH: 4.1 %
ALPHA1 GLOB SERPL ELPH-MCNC: 0.3 G/DL
ALPHA2 GLOB MFR SERPL ELPH: 14.4 %
ALPHA2 GLOB SERPL ELPH-MCNC: 0.9 G/DL
B-GLOBULIN MFR SERPL ELPH: 13.2 %
B-GLOBULIN SERPL ELPH-MCNC: 0.8 G/DL
DEPRECATED KAPPA LC FREE/LAMBDA SER: 1 RATIO
GAMMA GLOB FLD ELPH-MCNC: 0.8 G/DL
GAMMA GLOB MFR SERPL ELPH: 13.2 %
IGA SER QL IEP: 244 MG/DL
IGG SER QL IEP: 810 MG/DL
IGM SER QL IEP: 86 MG/DL
INTERPRETATION SERPL IEP-IMP: NORMAL
KAPPA LC CSF-MCNC: 2.35 MG/DL
KAPPA LC SERPL-MCNC: 2.35 MG/DL
M PROTEIN SPEC IFE-MCNC: NORMAL
PROT SERPL-MCNC: 6.1 G/DL
PROT SERPL-MCNC: 6.1 G/DL

## 2021-02-25 ENCOUNTER — NON-APPOINTMENT (OUTPATIENT)
Age: 66
End: 2021-02-25

## 2021-03-02 ENCOUNTER — APPOINTMENT (OUTPATIENT)
Dept: RHEUMATOLOGY | Facility: CLINIC | Age: 66
End: 2021-03-02
Payer: MEDICARE

## 2021-03-02 VITALS
HEIGHT: 62 IN | WEIGHT: 268 LBS | SYSTOLIC BLOOD PRESSURE: 126 MMHG | HEART RATE: 100 BPM | OXYGEN SATURATION: 96 % | TEMPERATURE: 97.6 F | DIASTOLIC BLOOD PRESSURE: 76 MMHG | RESPIRATION RATE: 16 BRPM | BODY MASS INDEX: 49.32 KG/M2

## 2021-03-02 DIAGNOSIS — Q82.8 OTHER SPECIFIED CONGENITAL MALFORMATIONS OF SKIN: ICD-10-CM

## 2021-03-02 DIAGNOSIS — M25.561 PAIN IN RIGHT KNEE: ICD-10-CM

## 2021-03-02 DIAGNOSIS — M25.562 PAIN IN LEFT KNEE: ICD-10-CM

## 2021-03-02 PROCEDURE — 99214 OFFICE O/P EST MOD 30 MIN: CPT

## 2021-04-16 ENCOUNTER — APPOINTMENT (OUTPATIENT)
Dept: RHEUMATOLOGY | Facility: CLINIC | Age: 66
End: 2021-04-16
Payer: MEDICARE

## 2021-04-16 VITALS
HEART RATE: 90 BPM | SYSTOLIC BLOOD PRESSURE: 156 MMHG | RESPIRATION RATE: 16 BRPM | TEMPERATURE: 97.9 F | OXYGEN SATURATION: 93 % | DIASTOLIC BLOOD PRESSURE: 92 MMHG | HEIGHT: 62 IN

## 2021-04-16 DIAGNOSIS — G62.9 POLYNEUROPATHY, UNSPECIFIED: ICD-10-CM

## 2021-04-16 DIAGNOSIS — M25.512 PAIN IN LEFT SHOULDER: ICD-10-CM

## 2021-04-16 DIAGNOSIS — I73.00 RAYNAUD'S SYNDROME W/OUT GANGRENE: ICD-10-CM

## 2021-04-16 DIAGNOSIS — M12.811 OTHER SPECIFIC ARTHROPATHIES, NOT ELSEWHERE CLASSIFIED, RIGHT SHOULDER: ICD-10-CM

## 2021-04-16 PROCEDURE — 20610 DRAIN/INJ JOINT/BURSA W/O US: CPT | Mod: LT

## 2021-04-16 PROCEDURE — 99214 OFFICE O/P EST MOD 30 MIN: CPT | Mod: 25

## 2021-04-16 RX ORDER — LIDOCAINE HYDROCHLORIDE 10 MG/ML
1 INJECTION, SOLUTION INFILTRATION; PERINEURAL
Refills: 0 | Status: COMPLETED | OUTPATIENT
Start: 2021-04-16

## 2021-04-16 RX ORDER — PREDNISONE 2.5 MG/1
2.5 TABLET ORAL
Qty: 180 | Refills: 0 | Status: DISCONTINUED | COMMUNITY
Start: 2021-01-05 | End: 2021-04-16

## 2021-04-16 RX ORDER — METHYLPRED ACET/NACL,ISO-OS/PF 40 MG/ML
40 VIAL (ML) INJECTION
Qty: 1 | Refills: 0 | Status: COMPLETED | OUTPATIENT
Start: 2021-04-16

## 2021-04-16 RX ADMIN — LIDOCAINE HYDROCHLORIDE %: 10 INJECTION, SOLUTION INFILTRATION; PERINEURAL at 00:00

## 2021-04-16 RX ADMIN — METHYLPREDNISOLONE ACETATE 1 MG/ML: 40 INJECTION, SUSPENSION INTRA-ARTICULAR; INTRALESIONAL; INTRAMUSCULAR; SOFT TISSUE at 00:00

## 2021-04-17 LAB
25(OH)D3 SERPL-MCNC: 25.6 NG/ML
ALBUMIN SERPL ELPH-MCNC: 4 G/DL
ALP BLD-CCNC: 76 U/L
ALT SERPL-CCNC: 30 U/L
ANION GAP SERPL CALC-SCNC: 14 MMOL/L
APPEARANCE: CLEAR
AST SERPL-CCNC: 24 U/L
BACTERIA: NEGATIVE
BASOPHILS # BLD AUTO: 0.07 K/UL
BASOPHILS NFR BLD AUTO: 0.6 %
BILIRUB SERPL-MCNC: 0.4 MG/DL
BILIRUBIN URINE: NEGATIVE
BLOOD URINE: NEGATIVE
BUN SERPL-MCNC: 20 MG/DL
C3 SERPL-MCNC: 160 MG/DL
C4 SERPL-MCNC: 37 MG/DL
CALCIUM SERPL-MCNC: 9.7 MG/DL
CHLORIDE SERPL-SCNC: 99 MMOL/L
CO2 SERPL-SCNC: 28 MMOL/L
COLOR: YELLOW
CREAT SERPL-MCNC: 1.12 MG/DL
CREAT SPEC-SCNC: 123 MG/DL
CREAT/PROT UR: 1.8 RATIO
EOSINOPHIL # BLD AUTO: 0.2 K/UL
EOSINOPHIL NFR BLD AUTO: 1.8 %
ERYTHROCYTE [SEDIMENTATION RATE] IN BLOOD BY WESTERGREN METHOD: 27 MM/HR
GLUCOSE QUALITATIVE U: NORMAL
GLUCOSE SERPL-MCNC: 225 MG/DL
HCT VFR BLD CALC: 47.7 %
HGB BLD-MCNC: 15.7 G/DL
HYALINE CASTS: 1 /LPF
IMM GRANULOCYTES NFR BLD AUTO: 0.6 %
KETONES URINE: NEGATIVE
LEUKOCYTE ESTERASE URINE: NEGATIVE
LYMPHOCYTES # BLD AUTO: 1.48 K/UL
LYMPHOCYTES NFR BLD AUTO: 13.6 %
MAN DIFF?: NORMAL
MCHC RBC-ENTMCNC: 30.4 PG
MCHC RBC-ENTMCNC: 32.9 GM/DL
MCV RBC AUTO: 92.3 FL
MICROSCOPIC-UA: NORMAL
MONOCYTES # BLD AUTO: 1.19 K/UL
MONOCYTES NFR BLD AUTO: 10.9 %
NEUTROPHILS # BLD AUTO: 7.89 K/UL
NEUTROPHILS NFR BLD AUTO: 72.5 %
NITRITE URINE: NEGATIVE
PH URINE: 6
PLATELET # BLD AUTO: 170 K/UL
POTASSIUM SERPL-SCNC: 3.8 MMOL/L
PROT SERPL-MCNC: 6.7 G/DL
PROT UR-MCNC: 225 MG/DL
PROTEIN URINE: ABNORMAL
RBC # BLD: 5.17 M/UL
RBC # FLD: 13.2 %
RED BLOOD CELLS URINE: 1 /HPF
SODIUM SERPL-SCNC: 140 MMOL/L
SPECIFIC GRAVITY URINE: 1.02
SQUAMOUS EPITHELIAL CELLS: 1 /HPF
URATE SERPL-MCNC: 7.7 MG/DL
UROBILINOGEN URINE: NORMAL
WBC # FLD AUTO: 10.9 K/UL
WHITE BLOOD CELLS URINE: 1 /HPF

## 2021-04-18 LAB — DSDNA AB SER-ACNC: 27 IU/ML

## 2021-04-22 ENCOUNTER — NON-APPOINTMENT (OUTPATIENT)
Age: 66
End: 2021-04-22

## 2021-04-26 ENCOUNTER — NON-APPOINTMENT (OUTPATIENT)
Age: 66
End: 2021-04-26

## 2021-05-19 ENCOUNTER — RX RENEWAL (OUTPATIENT)
Age: 66
End: 2021-05-19

## 2021-06-25 ENCOUNTER — NON-APPOINTMENT (OUTPATIENT)
Age: 66
End: 2021-06-25

## 2021-06-25 DIAGNOSIS — M35.1 OTHER OVERLAP SYNDROMES: ICD-10-CM

## 2021-06-25 DIAGNOSIS — M54.9 DORSALGIA, UNSPECIFIED: ICD-10-CM

## 2021-07-09 ENCOUNTER — NON-APPOINTMENT (OUTPATIENT)
Age: 66
End: 2021-07-09

## 2021-07-27 ENCOUNTER — APPOINTMENT (OUTPATIENT)
Dept: RHEUMATOLOGY | Facility: CLINIC | Age: 66
End: 2021-07-27
Payer: MEDICARE

## 2021-07-27 VITALS
BODY MASS INDEX: 47.84 KG/M2 | DIASTOLIC BLOOD PRESSURE: 75 MMHG | WEIGHT: 260 LBS | SYSTOLIC BLOOD PRESSURE: 148 MMHG | OXYGEN SATURATION: 93 % | HEIGHT: 62 IN | HEART RATE: 103 BPM | TEMPERATURE: 97.2 F | RESPIRATION RATE: 16 BRPM

## 2021-07-27 DIAGNOSIS — G51.0 BELL'S PALSY: ICD-10-CM

## 2021-07-27 DIAGNOSIS — M17.10 UNILATERAL PRIMARY OSTEOARTHRITIS, UNSPECIFIED KNEE: ICD-10-CM

## 2021-07-27 PROCEDURE — 99215 OFFICE O/P EST HI 40 MIN: CPT

## 2021-07-28 LAB
25(OH)D3 SERPL-MCNC: 29.8 NG/ML
ALBUMIN SERPL ELPH-MCNC: 3.8 G/DL
ALP BLD-CCNC: 80 U/L
ALT SERPL-CCNC: 31 U/L
ANION GAP SERPL CALC-SCNC: 15 MMOL/L
APPEARANCE: CLEAR
AST SERPL-CCNC: 22 U/L
BACTERIA: NEGATIVE
BASOPHILS # BLD AUTO: 0.05 K/UL
BASOPHILS NFR BLD AUTO: 0.5 %
BILIRUB SERPL-MCNC: 0.2 MG/DL
BILIRUBIN URINE: NEGATIVE
BLOOD URINE: NEGATIVE
BUN SERPL-MCNC: 24 MG/DL
C3 SERPL-MCNC: 182 MG/DL
C4 SERPL-MCNC: 43 MG/DL
CALCIUM SERPL-MCNC: 8.7 MG/DL
CHLORIDE SERPL-SCNC: 100 MMOL/L
CO2 SERPL-SCNC: 26 MMOL/L
COLOR: YELLOW
CREAT SERPL-MCNC: 1.45 MG/DL
CREAT SPEC-SCNC: 276 MG/DL
CREAT/PROT UR: 0.4 RATIO
EOSINOPHIL # BLD AUTO: 0.13 K/UL
EOSINOPHIL NFR BLD AUTO: 1.3 %
ERYTHROCYTE [SEDIMENTATION RATE] IN BLOOD BY WESTERGREN METHOD: 31 MM/HR
GLUCOSE QUALITATIVE U: NEGATIVE
GLUCOSE SERPL-MCNC: 197 MG/DL
HCT VFR BLD CALC: 43.3 %
HGB BLD-MCNC: 13.7 G/DL
HYALINE CASTS: 0 /LPF
IMM GRANULOCYTES NFR BLD AUTO: 0.7 %
KETONES URINE: NEGATIVE
LEUKOCYTE ESTERASE URINE: NEGATIVE
LYMPHOCYTES # BLD AUTO: 1.55 K/UL
LYMPHOCYTES NFR BLD AUTO: 15.5 %
MAN DIFF?: NORMAL
MCHC RBC-ENTMCNC: 30.4 PG
MCHC RBC-ENTMCNC: 31.6 GM/DL
MCV RBC AUTO: 96 FL
MICROSCOPIC-UA: NORMAL
MONOCYTES # BLD AUTO: 1.02 K/UL
MONOCYTES NFR BLD AUTO: 10.2 %
NEUTROPHILS # BLD AUTO: 7.21 K/UL
NEUTROPHILS NFR BLD AUTO: 71.8 %
NITRITE URINE: NEGATIVE
PH URINE: 6
PLATELET # BLD AUTO: 162 K/UL
POTASSIUM SERPL-SCNC: 4.1 MMOL/L
PROT SERPL-MCNC: 6.4 G/DL
PROT UR-MCNC: 106 MG/DL
PROTEIN URINE: ABNORMAL
RBC # BLD: 4.51 M/UL
RBC # FLD: 13.8 %
RED BLOOD CELLS URINE: 3 /HPF
SODIUM SERPL-SCNC: 141 MMOL/L
SPECIFIC GRAVITY URINE: 1.03
SQUAMOUS EPITHELIAL CELLS: 1 /HPF
URATE SERPL-MCNC: 6.4 MG/DL
UROBILINOGEN URINE: NORMAL
WBC # FLD AUTO: 10.03 K/UL
WHITE BLOOD CELLS URINE: 2 /HPF

## 2021-07-29 LAB — DSDNA AB SER-ACNC: 17 IU/ML

## 2021-09-17 ENCOUNTER — NON-APPOINTMENT (OUTPATIENT)
Age: 66
End: 2021-09-17

## 2021-10-02 ENCOUNTER — INPATIENT (INPATIENT)
Facility: HOSPITAL | Age: 66
LOS: 3 days | Discharge: HOME HEALTH | DRG: 871 | End: 2021-10-06
Attending: INTERNAL MEDICINE | Admitting: INTERNAL MEDICINE
Payer: MEDICARE

## 2021-10-02 VITALS
TEMPERATURE: 98 F | HEIGHT: 62 IN | HEART RATE: 110 BPM | RESPIRATION RATE: 20 BRPM | WEIGHT: 265 LBS | OXYGEN SATURATION: 95 % | SYSTOLIC BLOOD PRESSURE: 107 MMHG | DIASTOLIC BLOOD PRESSURE: 56 MMHG

## 2021-10-02 DIAGNOSIS — A41.9 SEPSIS, UNSPECIFIED ORGANISM: ICD-10-CM

## 2021-10-02 DIAGNOSIS — Z98.89 OTHER SPECIFIED POSTPROCEDURAL STATES: Chronic | ICD-10-CM

## 2021-10-02 DIAGNOSIS — E87.6 HYPOKALEMIA: ICD-10-CM

## 2021-10-02 DIAGNOSIS — Z95.5 PRESENCE OF CORONARY ANGIOPLASTY IMPLANT AND GRAFT: Chronic | ICD-10-CM

## 2021-10-02 DIAGNOSIS — G56.00 CARPAL TUNNEL SYNDROME, UNSPECIFIED UPPER LIMB: Chronic | ICD-10-CM

## 2021-10-02 LAB
ALBUMIN SERPL ELPH-MCNC: 2.9 G/DL — LOW (ref 3.5–5)
ALBUMIN SERPL ELPH-MCNC: 3 G/DL — LOW (ref 3.5–5)
ALP SERPL-CCNC: 63 U/L — SIGNIFICANT CHANGE UP (ref 40–120)
ALP SERPL-CCNC: 67 U/L — SIGNIFICANT CHANGE UP (ref 40–120)
ALT FLD-CCNC: 51 U/L DA — SIGNIFICANT CHANGE UP (ref 10–60)
ALT FLD-CCNC: 70 U/L DA — HIGH (ref 10–60)
ANION GAP SERPL CALC-SCNC: 10 MMOL/L — SIGNIFICANT CHANGE UP (ref 5–17)
ANION GAP SERPL CALC-SCNC: 17 MMOL/L — SIGNIFICANT CHANGE UP (ref 5–17)
AST SERPL-CCNC: 150 U/L — HIGH (ref 10–40)
AST SERPL-CCNC: 63 U/L — HIGH (ref 10–40)
B-OH-BUTYR SERPL-SCNC: 0.2 MMOL/L — SIGNIFICANT CHANGE UP
BASE EXCESS BLDV CALC-SCNC: -0.1 MMOL/L — SIGNIFICANT CHANGE UP
BASE EXCESS BLDV CALC-SCNC: -9.2 MMOL/L — SIGNIFICANT CHANGE UP
BASOPHILS # BLD AUTO: 0.05 K/UL — SIGNIFICANT CHANGE UP (ref 0–0.2)
BASOPHILS NFR BLD AUTO: 0.2 % — SIGNIFICANT CHANGE UP (ref 0–2)
BILIRUB SERPL-MCNC: 0.5 MG/DL — SIGNIFICANT CHANGE UP (ref 0.2–1.2)
BILIRUB SERPL-MCNC: 0.6 MG/DL — SIGNIFICANT CHANGE UP (ref 0.2–1.2)
BUN SERPL-MCNC: 29 MG/DL — HIGH (ref 7–18)
BUN SERPL-MCNC: 32 MG/DL — HIGH (ref 7–18)
CALCIUM SERPL-MCNC: 7.3 MG/DL — LOW (ref 8.4–10.5)
CALCIUM SERPL-MCNC: 7.7 MG/DL — LOW (ref 8.4–10.5)
CHLORIDE SERPL-SCNC: 101 MMOL/L — SIGNIFICANT CHANGE UP (ref 96–108)
CHLORIDE SERPL-SCNC: 93 MMOL/L — LOW (ref 96–108)
CK MB BLD-MCNC: 0.7 % — SIGNIFICANT CHANGE UP (ref 0–3.5)
CK MB CFR SERPL CALC: 55.3 NG/ML — HIGH (ref 0–3.6)
CK SERPL-CCNC: 3222 U/L — HIGH (ref 35–232)
CK SERPL-CCNC: 8306 U/L — HIGH (ref 35–232)
CO2 SERPL-SCNC: 21 MMOL/L — LOW (ref 22–31)
CO2 SERPL-SCNC: 25 MMOL/L — SIGNIFICANT CHANGE UP (ref 22–31)
CREAT ?TM UR-MCNC: 106 MG/DL — SIGNIFICANT CHANGE UP
CREAT SERPL-MCNC: 1.81 MG/DL — HIGH (ref 0.5–1.3)
CREAT SERPL-MCNC: 2.58 MG/DL — HIGH (ref 0.5–1.3)
EOSINOPHIL # BLD AUTO: 0.14 K/UL — SIGNIFICANT CHANGE UP (ref 0–0.5)
EOSINOPHIL NFR BLD AUTO: 0.7 % — SIGNIFICANT CHANGE UP (ref 0–6)
GLUCOSE BLDC GLUCOMTR-MCNC: 302 MG/DL — HIGH (ref 70–99)
GLUCOSE BLDC GLUCOMTR-MCNC: 345 MG/DL — HIGH (ref 70–99)
GLUCOSE BLDC GLUCOMTR-MCNC: 426 MG/DL — HIGH (ref 70–99)
GLUCOSE BLDC GLUCOMTR-MCNC: 457 MG/DL — CRITICAL HIGH (ref 70–99)
GLUCOSE SERPL-MCNC: 310 MG/DL — HIGH (ref 70–99)
GLUCOSE SERPL-MCNC: 624 MG/DL — CRITICAL HIGH (ref 70–99)
HCO3 BLDV-SCNC: 17 MMOL/L — LOW (ref 22–29)
HCO3 BLDV-SCNC: 24 MMOL/L — SIGNIFICANT CHANGE UP (ref 22–29)
HCT VFR BLD CALC: 41.4 % — SIGNIFICANT CHANGE UP (ref 39–50)
HGB BLD-MCNC: 14 G/DL — SIGNIFICANT CHANGE UP (ref 13–17)
IMM GRANULOCYTES NFR BLD AUTO: 1.5 % — SIGNIFICANT CHANGE UP (ref 0–1.5)
LACTATE SERPL-SCNC: 3.2 MMOL/L — HIGH (ref 0.7–2)
LYMPHOCYTES # BLD AUTO: 0.25 K/UL — LOW (ref 1–3.3)
LYMPHOCYTES # BLD AUTO: 1.2 % — LOW (ref 13–44)
MCHC RBC-ENTMCNC: 30.6 PG — SIGNIFICANT CHANGE UP (ref 27–34)
MCHC RBC-ENTMCNC: 33.8 GM/DL — SIGNIFICANT CHANGE UP (ref 32–36)
MCV RBC AUTO: 90.4 FL — SIGNIFICANT CHANGE UP (ref 80–100)
MONOCYTES # BLD AUTO: 1.26 K/UL — HIGH (ref 0–0.9)
MONOCYTES NFR BLD AUTO: 6.2 % — SIGNIFICANT CHANGE UP (ref 2–14)
NEUTROPHILS # BLD AUTO: 18.44 K/UL — HIGH (ref 1.8–7.4)
NEUTROPHILS NFR BLD AUTO: 90.2 % — HIGH (ref 43–77)
NRBC # BLD: 0 /100 WBCS — SIGNIFICANT CHANGE UP (ref 0–0)
PCO2 BLDV: 36 MMHG — LOW (ref 42–55)
PCO2 BLDV: 37 MMHG — LOW (ref 42–55)
PH BLDV: 7.27 — LOW (ref 7.32–7.43)
PH BLDV: 7.43 — SIGNIFICANT CHANGE UP (ref 7.32–7.43)
PLATELET # BLD AUTO: 196 K/UL — SIGNIFICANT CHANGE UP (ref 150–400)
PO2 BLDV: 89 MMHG — SIGNIFICANT CHANGE UP
PO2 BLDV: 90 MMHG — SIGNIFICANT CHANGE UP
POTASSIUM SERPL-MCNC: 2.9 MMOL/L — CRITICAL LOW (ref 3.5–5.3)
POTASSIUM SERPL-MCNC: 3.2 MMOL/L — LOW (ref 3.5–5.3)
POTASSIUM SERPL-SCNC: 2.9 MMOL/L — CRITICAL LOW (ref 3.5–5.3)
POTASSIUM SERPL-SCNC: 3.2 MMOL/L — LOW (ref 3.5–5.3)
PROT SERPL-MCNC: 6.5 G/DL — SIGNIFICANT CHANGE UP (ref 6–8.3)
PROT SERPL-MCNC: 6.7 G/DL — SIGNIFICANT CHANGE UP (ref 6–8.3)
RBC # BLD: 4.58 M/UL — SIGNIFICANT CHANGE UP (ref 4.2–5.8)
RBC # FLD: 13.2 % — SIGNIFICANT CHANGE UP (ref 10.3–14.5)
SAO2 % BLDV: 97 % — SIGNIFICANT CHANGE UP
SAO2 % BLDV: 97.8 % — SIGNIFICANT CHANGE UP
SARS-COV-2 RNA SPEC QL NAA+PROBE: SIGNIFICANT CHANGE UP
SODIUM SERPL-SCNC: 131 MMOL/L — LOW (ref 135–145)
SODIUM SERPL-SCNC: 136 MMOL/L — SIGNIFICANT CHANGE UP (ref 135–145)
SODIUM UR-SCNC: 25 MMOL/L — SIGNIFICANT CHANGE UP
TROPONIN I SERPL-MCNC: 0.18 NG/ML — HIGH (ref 0–0.04)
TROPONIN I SERPL-MCNC: 0.24 NG/ML — HIGH (ref 0–0.04)
WBC # BLD: 20.29 K/UL — HIGH (ref 3.8–10.5)
WBC # FLD AUTO: 20.29 K/UL — HIGH (ref 3.8–10.5)

## 2021-10-02 PROCEDURE — 71045 X-RAY EXAM CHEST 1 VIEW: CPT | Mod: 26

## 2021-10-02 PROCEDURE — 73030 X-RAY EXAM OF SHOULDER: CPT | Mod: 26,RT

## 2021-10-02 PROCEDURE — 93010 ELECTROCARDIOGRAM REPORT: CPT

## 2021-10-02 PROCEDURE — 99291 CRITICAL CARE FIRST HOUR: CPT | Mod: CS

## 2021-10-02 RX ORDER — SODIUM CHLORIDE 9 MG/ML
1000 INJECTION INTRAMUSCULAR; INTRAVENOUS; SUBCUTANEOUS ONCE
Refills: 0 | Status: COMPLETED | OUTPATIENT
Start: 2021-10-02 | End: 2021-10-02

## 2021-10-02 RX ORDER — LIDOCAINE 4 G/100G
1 CREAM TOPICAL ONCE
Refills: 0 | Status: COMPLETED | OUTPATIENT
Start: 2021-10-02 | End: 2021-10-02

## 2021-10-02 RX ORDER — ACETAMINOPHEN 500 MG
650 TABLET ORAL EVERY 6 HOURS
Refills: 0 | Status: DISCONTINUED | OUTPATIENT
Start: 2021-10-02 | End: 2021-10-06

## 2021-10-02 RX ORDER — SPIRONOLACTONE 25 MG/1
0 TABLET, FILM COATED ORAL
Qty: 0 | Refills: 0 | DISCHARGE

## 2021-10-02 RX ORDER — CLOPIDOGREL BISULFATE 75 MG/1
1 TABLET, FILM COATED ORAL
Qty: 0 | Refills: 0 | DISCHARGE

## 2021-10-02 RX ORDER — INSULIN HUMAN 100 [IU]/ML
0 INJECTION, SOLUTION SUBCUTANEOUS
Qty: 0 | Refills: 0 | DISCHARGE

## 2021-10-02 RX ORDER — SODIUM CHLORIDE 9 MG/ML
1000 INJECTION INTRAMUSCULAR; INTRAVENOUS; SUBCUTANEOUS
Refills: 0 | Status: DISCONTINUED | OUTPATIENT
Start: 2021-10-02 | End: 2021-10-02

## 2021-10-02 RX ORDER — CLOPIDOGREL BISULFATE 75 MG/1
75 TABLET, FILM COATED ORAL DAILY
Refills: 0 | Status: DISCONTINUED | OUTPATIENT
Start: 2021-10-02 | End: 2021-10-06

## 2021-10-02 RX ORDER — POTASSIUM CHLORIDE 20 MEQ
10 PACKET (EA) ORAL
Refills: 0 | Status: COMPLETED | OUTPATIENT
Start: 2021-10-02 | End: 2021-10-02

## 2021-10-02 RX ORDER — INSULIN LISPRO 100/ML
VIAL (ML) SUBCUTANEOUS
Refills: 0 | Status: DISCONTINUED | OUTPATIENT
Start: 2021-10-02 | End: 2021-10-06

## 2021-10-02 RX ORDER — TADALAFIL 10 MG/1
1 TABLET, FILM COATED ORAL
Qty: 0 | Refills: 0 | DISCHARGE

## 2021-10-02 RX ORDER — IPRATROPIUM/ALBUTEROL SULFATE 18-103MCG
3 AEROSOL WITH ADAPTER (GRAM) INHALATION ONCE
Refills: 0 | Status: COMPLETED | OUTPATIENT
Start: 2021-10-02 | End: 2021-10-02

## 2021-10-02 RX ORDER — METFORMIN HYDROCHLORIDE 850 MG/1
0 TABLET ORAL
Qty: 0 | Refills: 0 | DISCHARGE

## 2021-10-02 RX ORDER — MORPHINE SULFATE 50 MG/1
2 CAPSULE, EXTENDED RELEASE ORAL ONCE
Refills: 0 | Status: DISCONTINUED | OUTPATIENT
Start: 2021-10-02 | End: 2021-10-02

## 2021-10-02 RX ORDER — SODIUM CHLORIDE 9 MG/ML
1000 INJECTION INTRAMUSCULAR; INTRAVENOUS; SUBCUTANEOUS
Refills: 0 | Status: DISCONTINUED | OUTPATIENT
Start: 2021-10-02 | End: 2021-10-03

## 2021-10-02 RX ORDER — CHOLECALCIFEROL (VITAMIN D3) 125 MCG
1 CAPSULE ORAL
Qty: 0 | Refills: 0 | DISCHARGE

## 2021-10-02 RX ORDER — PANTOPRAZOLE SODIUM 20 MG/1
40 TABLET, DELAYED RELEASE ORAL
Refills: 0 | Status: DISCONTINUED | OUTPATIENT
Start: 2021-10-02 | End: 2021-10-06

## 2021-10-02 RX ORDER — INSULIN GLARGINE 100 [IU]/ML
50 INJECTION, SOLUTION SUBCUTANEOUS ONCE
Refills: 0 | Status: COMPLETED | OUTPATIENT
Start: 2021-10-02 | End: 2021-10-02

## 2021-10-02 RX ORDER — ATORVASTATIN CALCIUM 80 MG/1
80 TABLET, FILM COATED ORAL AT BEDTIME
Refills: 0 | Status: DISCONTINUED | OUTPATIENT
Start: 2021-10-02 | End: 2021-10-03

## 2021-10-02 RX ORDER — OLMESARTAN MEDOXOMIL 5 MG/1
1 TABLET, FILM COATED ORAL
Qty: 0 | Refills: 0 | DISCHARGE

## 2021-10-02 RX ORDER — CEFEPIME 1 G/1
2000 INJECTION, POWDER, FOR SOLUTION INTRAMUSCULAR; INTRAVENOUS EVERY 12 HOURS
Refills: 0 | Status: DISCONTINUED | OUTPATIENT
Start: 2021-10-02 | End: 2021-10-04

## 2021-10-02 RX ORDER — POTASSIUM CHLORIDE 20 MEQ
40 PACKET (EA) ORAL EVERY 4 HOURS
Refills: 0 | Status: COMPLETED | OUTPATIENT
Start: 2021-10-02 | End: 2021-10-02

## 2021-10-02 RX ORDER — METOPROLOL TARTRATE 50 MG
12.5 TABLET ORAL
Refills: 0 | Status: DISCONTINUED | OUTPATIENT
Start: 2021-10-02 | End: 2021-10-06

## 2021-10-02 RX ORDER — HYDROXYCHLOROQUINE SULFATE 200 MG
0 TABLET ORAL
Qty: 0 | Refills: 0 | DISCHARGE

## 2021-10-02 RX ORDER — INSULIN GLARGINE 100 [IU]/ML
50 INJECTION, SOLUTION SUBCUTANEOUS AT BEDTIME
Refills: 0 | Status: DISCONTINUED | OUTPATIENT
Start: 2021-10-03 | End: 2021-10-05

## 2021-10-02 RX ORDER — ALFUZOSIN HYDROCHLORIDE 10 MG/1
0 TABLET, EXTENDED RELEASE ORAL
Qty: 0 | Refills: 0 | DISCHARGE

## 2021-10-02 RX ORDER — INSULIN HUMAN 100 [IU]/ML
8 INJECTION, SOLUTION SUBCUTANEOUS
Qty: 100 | Refills: 0 | Status: DISCONTINUED | OUTPATIENT
Start: 2021-10-02 | End: 2021-10-02

## 2021-10-02 RX ORDER — CEFEPIME 1 G/1
1000 INJECTION, POWDER, FOR SOLUTION INTRAMUSCULAR; INTRAVENOUS ONCE
Refills: 0 | Status: COMPLETED | OUTPATIENT
Start: 2021-10-02 | End: 2021-10-02

## 2021-10-02 RX ORDER — METOPROLOL TARTRATE 50 MG
0 TABLET ORAL
Qty: 0 | Refills: 0 | DISCHARGE

## 2021-10-02 RX ORDER — HYDROXYCHLOROQUINE SULFATE 200 MG
200 TABLET ORAL
Refills: 0 | Status: DISCONTINUED | OUTPATIENT
Start: 2021-10-02 | End: 2021-10-03

## 2021-10-02 RX ORDER — HEPARIN SODIUM 5000 [USP'U]/ML
5000 INJECTION INTRAVENOUS; SUBCUTANEOUS EVERY 12 HOURS
Refills: 0 | Status: DISCONTINUED | OUTPATIENT
Start: 2021-10-02 | End: 2021-10-06

## 2021-10-02 RX ADMIN — LIDOCAINE 1 PATCH: 4 CREAM TOPICAL at 19:09

## 2021-10-02 RX ADMIN — Medication 100 MILLIEQUIVALENT(S): at 23:01

## 2021-10-02 RX ADMIN — INSULIN HUMAN 8 UNIT(S)/HR: 100 INJECTION, SOLUTION SUBCUTANEOUS at 15:06

## 2021-10-02 RX ADMIN — Medication 650 MILLIGRAM(S): at 19:54

## 2021-10-02 RX ADMIN — Medication 100 MILLIEQUIVALENT(S): at 21:24

## 2021-10-02 RX ADMIN — INSULIN GLARGINE 50 UNIT(S): 100 INJECTION, SOLUTION SUBCUTANEOUS at 23:00

## 2021-10-02 RX ADMIN — Medication 100 MILLIEQUIVALENT(S): at 16:59

## 2021-10-02 RX ADMIN — Medication 40 MILLIEQUIVALENT(S): at 15:35

## 2021-10-02 RX ADMIN — Medication 60 MILLIGRAM(S): at 13:18

## 2021-10-02 RX ADMIN — Medication 200 MILLIGRAM(S): at 19:12

## 2021-10-02 RX ADMIN — Medication 3 MILLILITER(S): at 13:18

## 2021-10-02 RX ADMIN — Medication 12.5 MILLIGRAM(S): at 19:17

## 2021-10-02 RX ADMIN — Medication 40 MILLIEQUIVALENT(S): at 19:17

## 2021-10-02 RX ADMIN — ATORVASTATIN CALCIUM 80 MILLIGRAM(S): 80 TABLET, FILM COATED ORAL at 21:24

## 2021-10-02 RX ADMIN — Medication 60 MILLIGRAM(S): at 14:36

## 2021-10-02 RX ADMIN — SODIUM CHLORIDE 1000 MILLILITER(S): 9 INJECTION INTRAMUSCULAR; INTRAVENOUS; SUBCUTANEOUS at 13:18

## 2021-10-02 RX ADMIN — SODIUM CHLORIDE 70 MILLILITER(S): 9 INJECTION INTRAMUSCULAR; INTRAVENOUS; SUBCUTANEOUS at 16:56

## 2021-10-02 RX ADMIN — Medication 100 MILLIEQUIVALENT(S): at 14:55

## 2021-10-02 RX ADMIN — Medication 650 MILLIGRAM(S): at 19:17

## 2021-10-02 RX ADMIN — Medication 100 MILLIEQUIVALENT(S): at 17:47

## 2021-10-02 RX ADMIN — HEPARIN SODIUM 5000 UNIT(S): 5000 INJECTION INTRAVENOUS; SUBCUTANEOUS at 19:54

## 2021-10-02 RX ADMIN — MORPHINE SULFATE 2 MILLIGRAM(S): 50 CAPSULE, EXTENDED RELEASE ORAL at 14:19

## 2021-10-02 RX ADMIN — SODIUM CHLORIDE 1000 MILLILITER(S): 9 INJECTION INTRAMUSCULAR; INTRAVENOUS; SUBCUTANEOUS at 13:31

## 2021-10-02 RX ADMIN — Medication 40 MILLIEQUIVALENT(S): at 21:24

## 2021-10-02 RX ADMIN — Medication 3 MILLILITER(S): at 14:36

## 2021-10-02 RX ADMIN — MORPHINE SULFATE 2 MILLIGRAM(S): 50 CAPSULE, EXTENDED RELEASE ORAL at 14:49

## 2021-10-02 NOTE — ED PROVIDER NOTE - SECONDARY DIAGNOSIS.
Asthma with acute exacerbation Hyperosmolar hyperglycemic state (HHS) Rhabdomyolysis JAISON (acute kidney injury)

## 2021-10-02 NOTE — ED PROVIDER NOTE - NSICDXPASTSURGICALHX_GEN_ALL_CORE_FT
PAST SURGICAL HISTORY:  Carpal tunnel syndrome     H/O knee surgery     H/O umbilical hernia repair mesh    History of rotator cuff surgery     Presence of stent in coronary artery 5 stents

## 2021-10-02 NOTE — PATIENT PROFILE ADULT - STATED REASON FOR ADMISSION
As per pt he didn't take  blood sugar medication for 2 days and  blood sugar was high and he fell on the floor for about 10hrs.

## 2021-10-02 NOTE — ED PROVIDER NOTE - ENMT, MLM
Calm Airway patent, Nasal mucosa clear. Mouth with normal mucosa. Throat has no vesicles, no oropharyngeal exudates and uvula is midline. Head: NCAT

## 2021-10-02 NOTE — H&P ADULT - NSHPPHYSICALEXAM_GEN_ALL_CORE
PHYSICAL EXAM:  GENERAL: no signs of respiratory distress, morbidly obese,   HEAD:  Atraumatic, Normocephalic  EYES: EOMI, PERRLA, conjunctiva and sclera clear  NECK: Supple, No JVD  CHEST/LUNG:  + wheeze; No crackles; No accessory muscles used  HEART: s1, s2,  No murmurs;   ABDOMEN: Soft, Nontender, No guarding  EXTREMITIES:  2+ Peripheral Pulses, No cyanosis or edema, cannot move right shoulcder  PSYCH: AAOx3  NEUROLOGY: no-focal deficit present  SKIN: No rashes or lesions

## 2021-10-02 NOTE — ED PROVIDER NOTE - PROGRESS NOTE DETAILS
Spoke to Dr. Sanabria of ICU. Recommended insulin drip for HHS. Will come see patient. Patient unable to lie flat for CTH due to dyspnea. Code sepsis called for leukocytosis and tachycardia. Patient accepted to ICU

## 2021-10-02 NOTE — ED ADULT NURSE NOTE - OBJECTIVE STATEMENT
pt is here for s/p fall.  pt stated that fell down since yesterday, right shoulder 5-9/10, c/o difficulty breathing, h/x asthma, +LOC, denied headache,

## 2021-10-02 NOTE — ED PROVIDER NOTE - OBJECTIVE STATEMENT
Patient reports he was having diarrhea all day yesterday, did not take any of his medicines including insulin. While walking to the bathroom around 10pm last night, passed out. Denies hitting his head. States he landed on his right shoulder. Was too weak to get up, laid on the floor all night. c/o shortness of breath that feels like his asthma and severe pain in his right shoulder. No fever, cp, ap, n/v/d, dysuria, urgency, frequency.

## 2021-10-02 NOTE — ED ADULT NURSE NOTE - NSIMPLEMENTINTERV_GEN_ALL_ED
Implemented All Fall Risk Interventions:  [place] to call system. Call bell, personal items and telephone within reach. Instruct patient to call for assistance. Room bathroom lighting operational. Non-slip footwear when patient is off stretcher. Physically safe environment: no spills, clutter or unnecessary equipment. Stretcher in lowest position, wheels locked, appropriate side rails in place. Provide visual cue, wrist band, yellow gown, etc. Monitor gait and stability. Monitor for mental status changes and reorient to person, place, and time. Review medications for side effects contributing to fall risk. Reinforce activity limits and safety measures with patient and family.

## 2021-10-02 NOTE — ED PROVIDER NOTE - NSICDXPASTMEDICALHX_GEN_ALL_CORE_FT
PAST MEDICAL HISTORY:  Asthma     CAD (coronary artery disease)     Diabetes Type 2, A1c: 10.2    Former smoker     History of hypertension     HLD (hyperlipidemia)     HTN (hypertension)     Lupus     TONYA (obstructive sleep apnea)     Psoriatic arthritis

## 2021-10-02 NOTE — H&P ADULT - HISTORY OF PRESENT ILLNESS
Patient is 66 year old  Male with hx CAD s/p 5 stents ( last one 6 year ago )  on ASA/Plavix, HTN/HLD, DM, psoriatic arthritis, asthma presents with syncope after diarrhoea. Pt has hyperglycemia.  Pt Patient is 66 year old  Male morbidly obese with hx CAD s/p 5 stents ( last one 6 year ago )  on ASA/Plavix, HTN/HLD, DM, psoriatic arthritis, asthma presents with syncope after diarrhoea. Pt has hyperglycemia. Pt had a visit from grand kids who were having diarrhoea and pt thinks he cought same bug from kids. Pt was vomiting yesterday 5-6 times and was not feeling weel so didnt took his medications. Pt then felt dizzy and slipped to floor but never lost consciousness. Pt is morbidly obese so could not get up and spent night on floor. Pt daughter and girlfriend were calling in morning and pt crawled to phone and let them know that pt fell. EMS arrived and pt was found to have very high blood glucose. Pt usually takes insulins at home and has never bene admitted to hospital for icu. Pt had last year episode of tia in 2020.

## 2021-10-02 NOTE — ED ADULT TRIAGE NOTE - CHIEF COMPLAINT QUOTE
PT REPORTS  HAD DIARRHEA AND FELL AROUND 10P AND 11P LAST NIGHT; UNKNOWN HOW HE GOT ON FLOOR AND WAS UNABLE TO GET OFF FLOOR. EMS REPORTS F/S IN THE FIELD WAS HI

## 2021-10-02 NOTE — H&P ADULT - ATTENDING COMMENTS
IMP: This is a 66 year old  Male morbidly obese with hx CAD s/p 5 stents ( last one 6 year ago )  on ASA/Plavix, HTN/HLD, DM, psoriatic arthritis, asthma presents with syncope after diarrhoea. Pt has hyperglycemia. Pt found to have blood glucose in 600s and admitted to icu for HONK requiring insulin gtt and q1h FS .       ASSESSMENT AND PLAN :     - HONK  - Asthma exacerbation  - Rhabdomyolysis  - Elevated troponin/ NSTEMI  - Hypokalemia  - Hyponatremia  - Pneumonia  - JAISON vs CKD   - Severe metabolic acidosis   - Morbid Obesity   - Electrolyte imbalance   - Viral gastro-enteritis       Plan   -admit to icu   -insulin gtt  -q1h FS  -Hypokalemia due to diarrhea   -correct K   -ivf slowly .. pat has 5 stents   -trend cpk  -trend troponin   -Echo   -endo eval  -TSH  -NPO for now   -cards eval   -endo eval   -monitor renal status   -solumedrol   -albuterol   -BiPaP as needed   -hemodynamic monitoring       d/c with icu team and

## 2021-10-02 NOTE — ED PROVIDER NOTE - CARE PLAN
Principal Discharge DX:	Severe sepsis with acute organ dysfunction  Secondary Diagnosis:	Asthma with acute exacerbation  Secondary Diagnosis:	Rhabdomyolysis  Secondary Diagnosis:	JAISON (acute kidney injury)  Secondary Diagnosis:	Hyperosmolar hyperglycemic state (HHS)   1

## 2021-10-02 NOTE — H&P ADULT - ASSESSMENT
Pt Patient is 66 year old  Male morbidly obese with hx CAD s/p 5 stents ( last one 6 year ago )  on ASA/Plavix, HTN/HLD, DM, psoriatic arthritis, asthma presents with syncope after diarrhoea. Pt has hyperglycemia. Pt found to have blood glucose in 600s and admitted to icu for blood glucose monitoring on  insulin drip.       ASSESSMENT AND PLAN :     [facility]  asthma exacerbation  rhabdomyolysis  elevated troponin  hypokalemia  hyponatremia  pneumonia  jaison vs ckd      CNS:  pT AWAKE AND ALERT  X 3  Pt has no focal deficit and never loose consciousness and cannot lie flat in ct scan so deffering ct head     CVS:    CAD:   hx of stents on aspirin and plavix      HYPOTENSION:   will hold all blood pressure meds  in hx of stent mild iv fluids  monitor blood glucose    ELEVATED TROPONIN:   pT p/w rhabdo in setting of fall  t1 0.18  f/u t2 t3  cardiology Dr GOMEZ     RESPIRATORY:    ASTHMA:  -pt has hx of pna  -at home has inhalers   -s/p solumedrol in ed as was unable to lie flat    PNEUMONIA SUSPECTED:  -wbc 19,   xray = RLL pneumonia suspicion  -will continue abx  - f/u lactate, procal  -f/u bcx      GI:  no acitve vomiting or diarrhoea currently      endo:    DM :   -pt has hx of DM   -on lantus 50 units and premeal 7 units tid    HONC:  - pt missed his insulin   - bg 600s, anion gap corrected 19, ph 7.27  - started on insulin drip  - cmp q4   - will hold insulin drip till k is above 3.5  -finger stick q1  - monitor blood glucose    URO:    JAISON VS CKD    OCT 2020 CR 1.29  p/w cr 2.5  f/u urine lytes   iv fluids  intake and output monitoring     HYPOKALEMIA:   k 2.9   will replace aggresively   f/u cmp q 4    HYPONATREMIA :  In setting of vomiting   poor oral intake   on iv fluids mild  f/u bmp     EXTREMITIES:      RHABDOMYLYSIS:   - pt was on floor all night , never lost consciousness , mechanical fall  - ck 3k   - will start gentle hydration in setting of cardiac hx  - f/u ck  -f/u renal function    PROPHYLAXIX:   DVT heparin  GI ppi    LINES:  none     goc: FULL CODE

## 2021-10-03 LAB
A1C WITH ESTIMATED AVERAGE GLUCOSE RESULT: 9.6 % — HIGH (ref 4–5.6)
ALBUMIN SERPL ELPH-MCNC: 2.8 G/DL — LOW (ref 3.5–5)
ALP SERPL-CCNC: 54 U/L — SIGNIFICANT CHANGE UP (ref 40–120)
ALT FLD-CCNC: 96 U/L DA — HIGH (ref 10–60)
ANION GAP SERPL CALC-SCNC: 7 MMOL/L — SIGNIFICANT CHANGE UP (ref 5–17)
ANION GAP SERPL CALC-SCNC: 7 MMOL/L — SIGNIFICANT CHANGE UP (ref 5–17)
AST SERPL-CCNC: 235 U/L — HIGH (ref 10–40)
BILIRUB SERPL-MCNC: 0.6 MG/DL — SIGNIFICANT CHANGE UP (ref 0.2–1.2)
BUN SERPL-MCNC: 21 MG/DL — HIGH (ref 7–18)
BUN SERPL-MCNC: 24 MG/DL — HIGH (ref 7–18)
CALCIUM SERPL-MCNC: 7.4 MG/DL — LOW (ref 8.4–10.5)
CALCIUM SERPL-MCNC: 7.5 MG/DL — LOW (ref 8.4–10.5)
CHLORIDE SERPL-SCNC: 104 MMOL/L — SIGNIFICANT CHANGE UP (ref 96–108)
CHLORIDE SERPL-SCNC: 105 MMOL/L — SIGNIFICANT CHANGE UP (ref 96–108)
CK SERPL-CCNC: CRITICAL HIGH U/L (ref 35–232)
CO2 SERPL-SCNC: 28 MMOL/L — SIGNIFICANT CHANGE UP (ref 22–31)
CO2 SERPL-SCNC: 29 MMOL/L — SIGNIFICANT CHANGE UP (ref 22–31)
COVID-19 SPIKE DOMAIN AB INTERP: POSITIVE
COVID-19 SPIKE DOMAIN ANTIBODY RESULT: 112 U/ML — HIGH
CREAT SERPL-MCNC: 1.17 MG/DL — SIGNIFICANT CHANGE UP (ref 0.5–1.3)
CREAT SERPL-MCNC: 1.38 MG/DL — HIGH (ref 0.5–1.3)
ESTIMATED AVERAGE GLUCOSE: 229 MG/DL — HIGH (ref 68–114)
GLUCOSE BLDC GLUCOMTR-MCNC: 151 MG/DL — HIGH (ref 70–99)
GLUCOSE BLDC GLUCOMTR-MCNC: 173 MG/DL — HIGH (ref 70–99)
GLUCOSE BLDC GLUCOMTR-MCNC: 231 MG/DL — HIGH (ref 70–99)
GLUCOSE BLDC GLUCOMTR-MCNC: 282 MG/DL — HIGH (ref 70–99)
GLUCOSE SERPL-MCNC: 245 MG/DL — HIGH (ref 70–99)
GLUCOSE SERPL-MCNC: 254 MG/DL — HIGH (ref 70–99)
HCT VFR BLD CALC: 42.4 % — SIGNIFICANT CHANGE UP (ref 39–50)
HGB BLD-MCNC: 14.4 G/DL — SIGNIFICANT CHANGE UP (ref 13–17)
LACTATE SERPL-SCNC: 1.7 MMOL/L — SIGNIFICANT CHANGE UP (ref 0.7–2)
MAGNESIUM SERPL-MCNC: 1.5 MG/DL — LOW (ref 1.6–2.6)
MCHC RBC-ENTMCNC: 30.4 PG — SIGNIFICANT CHANGE UP (ref 27–34)
MCHC RBC-ENTMCNC: 34 GM/DL — SIGNIFICANT CHANGE UP (ref 32–36)
MCV RBC AUTO: 89.5 FL — SIGNIFICANT CHANGE UP (ref 80–100)
NRBC # BLD: 0 /100 WBCS — SIGNIFICANT CHANGE UP (ref 0–0)
PHOSPHATE SERPL-MCNC: 2.1 MG/DL — LOW (ref 2.5–4.5)
PLATELET # BLD AUTO: 139 K/UL — LOW (ref 150–400)
POTASSIUM SERPL-MCNC: 3.5 MMOL/L — SIGNIFICANT CHANGE UP (ref 3.5–5.3)
POTASSIUM SERPL-MCNC: 3.8 MMOL/L — SIGNIFICANT CHANGE UP (ref 3.5–5.3)
POTASSIUM SERPL-SCNC: 3.5 MMOL/L — SIGNIFICANT CHANGE UP (ref 3.5–5.3)
POTASSIUM SERPL-SCNC: 3.8 MMOL/L — SIGNIFICANT CHANGE UP (ref 3.5–5.3)
PROCALCITONIN SERPL-MCNC: 3.48 NG/ML — HIGH (ref 0.02–0.1)
PROT SERPL-MCNC: 6.3 G/DL — SIGNIFICANT CHANGE UP (ref 6–8.3)
RBC # BLD: 4.74 M/UL — SIGNIFICANT CHANGE UP (ref 4.2–5.8)
RBC # FLD: 13.2 % — SIGNIFICANT CHANGE UP (ref 10.3–14.5)
SARS-COV-2 IGG+IGM SERPL QL IA: 112 U/ML — HIGH
SARS-COV-2 IGG+IGM SERPL QL IA: POSITIVE
SODIUM SERPL-SCNC: 140 MMOL/L — SIGNIFICANT CHANGE UP (ref 135–145)
SODIUM SERPL-SCNC: 140 MMOL/L — SIGNIFICANT CHANGE UP (ref 135–145)
TROPONIN I SERPL-MCNC: 0.22 NG/ML — HIGH (ref 0–0.04)
WBC # BLD: 21.83 K/UL — HIGH (ref 3.8–10.5)
WBC # FLD AUTO: 21.83 K/UL — HIGH (ref 3.8–10.5)

## 2021-10-03 RX ORDER — POTASSIUM CHLORIDE 20 MEQ
40 PACKET (EA) ORAL ONCE
Refills: 0 | Status: COMPLETED | OUTPATIENT
Start: 2021-10-03 | End: 2021-10-03

## 2021-10-03 RX ORDER — INSULIN LISPRO 100/ML
7 VIAL (ML) SUBCUTANEOUS
Refills: 0 | Status: DISCONTINUED | OUTPATIENT
Start: 2021-10-03 | End: 2021-10-05

## 2021-10-03 RX ORDER — LIDOCAINE 4 G/100G
1 CREAM TOPICAL ONCE
Refills: 0 | Status: COMPLETED | OUTPATIENT
Start: 2021-10-03 | End: 2021-10-03

## 2021-10-03 RX ORDER — SODIUM CHLORIDE 9 MG/ML
1000 INJECTION INTRAMUSCULAR; INTRAVENOUS; SUBCUTANEOUS
Refills: 0 | Status: DISCONTINUED | OUTPATIENT
Start: 2021-10-03 | End: 2021-10-05

## 2021-10-03 RX ORDER — HYDROMORPHONE HYDROCHLORIDE 2 MG/ML
1 INJECTION INTRAMUSCULAR; INTRAVENOUS; SUBCUTANEOUS ONCE
Refills: 0 | Status: DISCONTINUED | OUTPATIENT
Start: 2021-10-03 | End: 2021-10-03

## 2021-10-03 RX ORDER — POTASSIUM PHOSPHATE, MONOBASIC POTASSIUM PHOSPHATE, DIBASIC 236; 224 MG/ML; MG/ML
15 INJECTION, SOLUTION INTRAVENOUS ONCE
Refills: 0 | Status: COMPLETED | OUTPATIENT
Start: 2021-10-03 | End: 2021-10-03

## 2021-10-03 RX ORDER — TRAMADOL HYDROCHLORIDE 50 MG/1
50 TABLET ORAL ONCE
Refills: 0 | Status: DISCONTINUED | OUTPATIENT
Start: 2021-10-03 | End: 2021-10-03

## 2021-10-03 RX ORDER — MAGNESIUM SULFATE 500 MG/ML
1 VIAL (ML) INJECTION ONCE
Refills: 0 | Status: COMPLETED | OUTPATIENT
Start: 2021-10-03 | End: 2021-10-03

## 2021-10-03 RX ADMIN — Medication 650 MILLIGRAM(S): at 17:48

## 2021-10-03 RX ADMIN — LIDOCAINE 1 PATCH: 4 CREAM TOPICAL at 05:30

## 2021-10-03 RX ADMIN — CEFEPIME 100 MILLIGRAM(S): 1 INJECTION, POWDER, FOR SOLUTION INTRAMUSCULAR; INTRAVENOUS at 18:08

## 2021-10-03 RX ADMIN — Medication 7 UNIT(S): at 17:43

## 2021-10-03 RX ADMIN — SODIUM CHLORIDE 150 MILLILITER(S): 9 INJECTION INTRAMUSCULAR; INTRAVENOUS; SUBCUTANEOUS at 19:03

## 2021-10-03 RX ADMIN — SODIUM CHLORIDE 150 MILLILITER(S): 9 INJECTION INTRAMUSCULAR; INTRAVENOUS; SUBCUTANEOUS at 12:23

## 2021-10-03 RX ADMIN — Medication 200 MILLIGRAM(S): at 05:46

## 2021-10-03 RX ADMIN — Medication 650 MILLIGRAM(S): at 12:42

## 2021-10-03 RX ADMIN — CEFEPIME 100 MILLIGRAM(S): 1 INJECTION, POWDER, FOR SOLUTION INTRAMUSCULAR; INTRAVENOUS at 05:46

## 2021-10-03 RX ADMIN — Medication 3: at 06:06

## 2021-10-03 RX ADMIN — Medication 1: at 21:00

## 2021-10-03 RX ADMIN — Medication 40 MILLIEQUIVALENT(S): at 14:00

## 2021-10-03 RX ADMIN — Medication 100 GRAM(S): at 05:46

## 2021-10-03 RX ADMIN — PANTOPRAZOLE SODIUM 40 MILLIGRAM(S): 20 TABLET, DELAYED RELEASE ORAL at 06:05

## 2021-10-03 RX ADMIN — CLOPIDOGREL BISULFATE 75 MILLIGRAM(S): 75 TABLET, FILM COATED ORAL at 12:12

## 2021-10-03 RX ADMIN — POTASSIUM PHOSPHATE, MONOBASIC POTASSIUM PHOSPHATE, DIBASIC 62.5 MILLIMOLE(S): 236; 224 INJECTION, SOLUTION INTRAVENOUS at 05:47

## 2021-10-03 RX ADMIN — TRAMADOL HYDROCHLORIDE 50 MILLIGRAM(S): 50 TABLET ORAL at 08:54

## 2021-10-03 RX ADMIN — LIDOCAINE 1 PATCH: 4 CREAM TOPICAL at 05:29

## 2021-10-03 RX ADMIN — INSULIN GLARGINE 50 UNIT(S): 100 INJECTION, SOLUTION SUBCUTANEOUS at 21:00

## 2021-10-03 RX ADMIN — HYDROMORPHONE HYDROCHLORIDE 1 MILLIGRAM(S): 2 INJECTION INTRAMUSCULAR; INTRAVENOUS; SUBCUTANEOUS at 20:58

## 2021-10-03 RX ADMIN — Medication 100 MILLIEQUIVALENT(S): at 00:45

## 2021-10-03 RX ADMIN — HEPARIN SODIUM 5000 UNIT(S): 5000 INJECTION INTRAVENOUS; SUBCUTANEOUS at 05:47

## 2021-10-03 RX ADMIN — LIDOCAINE 1 PATCH: 4 CREAM TOPICAL at 18:53

## 2021-10-03 RX ADMIN — LIDOCAINE 1 PATCH: 4 CREAM TOPICAL at 13:59

## 2021-10-03 RX ADMIN — Medication 1: at 17:42

## 2021-10-03 RX ADMIN — Medication 12.5 MILLIGRAM(S): at 17:47

## 2021-10-03 RX ADMIN — Medication 7 UNIT(S): at 12:11

## 2021-10-03 RX ADMIN — HYDROMORPHONE HYDROCHLORIDE 1 MILLIGRAM(S): 2 INJECTION INTRAMUSCULAR; INTRAVENOUS; SUBCUTANEOUS at 21:20

## 2021-10-03 RX ADMIN — HEPARIN SODIUM 5000 UNIT(S): 5000 INJECTION INTRAVENOUS; SUBCUTANEOUS at 17:48

## 2021-10-03 RX ADMIN — Medication 2: at 12:11

## 2021-10-03 RX ADMIN — Medication 650 MILLIGRAM(S): at 12:12

## 2021-10-03 RX ADMIN — Medication 12.5 MILLIGRAM(S): at 05:47

## 2021-10-03 RX ADMIN — Medication 650 MILLIGRAM(S): at 18:18

## 2021-10-03 NOTE — PROGRESS NOTE ADULT - ASSESSMENT
Pt Patient is 66 year old  Male morbidly obese with hx CAD s/p 5 stents ( last one 6 year ago )  on ASA/Plavix, HTN/HLD, DM, psoriatic arthritis, asthma presents with syncope after diarrhoea. Pt has hyperglycemia. Pt found to have blood glucose in 600s and admitted to icu for blood glucose monitoring on  insulin drip.       ASSESSMENT AND PLAN :     [facility]  asthma exacerbation  rhabdomyolysis  elevated troponin  hypokalemia  hyponatremia  pneumonia  jaison vs ckd      CNS:  pT AWAKE AND ALERT  X 3  Pt has no focal deficit and never loose consciousness and cannot lie flat in ct scan so deffering ct head     CVS:    CAD:   hx of stents on aspirin and plavix      HYPOTENSION:   will hold all blood pressure meds  in hx of stent mild iv fluids  monitor blood glucose    ELEVATED TROPONIN:   pT p/w rhabdo in setting of fall  t1 0.18  f/u t2 t3  cardiology Dr GOMEZ     RESPIRATORY:    ASTHMA:  -pt has hx of pna  -at home has inhalers   -s/p solumedrol in ed as was unable to lie flat    PNEUMONIA SUSPECTED:  -wbc 19,   xray = RLL pneumonia suspicion  -will continue abx  - f/u lactate, procal  -f/u bcx      GI:  no acitve vomiting or diarrhoea currently      endo:    DM :   -pt has hx of DM   -on lantus 50 units and premeal 7 units tid    HONC:  - pt missed his insulin   - bg 600s, anion gap corrected 19, ph 7.27  - started on insulin drip  - cmp q4   - will hold insulin drip till k is above 3.5  -finger stick q1  - monitor blood glucose    URO:    JAISON VS CKD    OCT 2020 CR 1.29  p/w cr 2.5  f/u urine lytes   iv fluids  intake and output monitoring     HYPOKALEMIA:   k 2.9   will replace aggresively   f/u cmp q 4    HYPONATREMIA :  In setting of vomiting   poor oral intake   on iv fluids mild  f/u bmp     EXTREMITIES:      RHABDOMYLYSIS:   - pt was on floor all night , never lost consciousness , mechanical fall  - ck 3k   - will start gentle hydration in setting of cardiac hx  - f/u ck  -f/u renal function    PROPHYLAXIX:   DVT heparin  GI ppi    LINES:  none     goc: FULL CODE Pt Patient is 66 year old  Male morbidly obese with hx CAD s/p 5 stents ( last one 6 year ago )  on ASA/Plavix, HTN/HLD, DM, psoriatic arthritis, asthma presents with syncope after diarrhoea. Pt has hyperglycemia. Pt found to have blood glucose in 600s and admitted to icu for blood glucose monitoring on  insulin drip.       ASSESSMENT AND PLAN :     [facility]  asthma exacerbation  rhabdomyolysis  elevated troponin  hypokalemia  hyponatremia  pneumonia  jaison vs ckd      CNS:  pT AWAKE AND ALERT  X 3  Pt has no focal deficit and never loose consciousness and cannot lie flat in ct scan so deffering ct head     CVS:    CAD:   hx of stents on aspirin and plavix   if c/p or sob, give lasix cautiously       HYPOTENSION:   will hold all blood pressure meds  in hx of stent mild iv fluids  monitor blood glucose  now resolved     ELEVATED TROPONIN:   pT p/w rhabdo in setting of fall  t1 0.18, t2 .24, t.22   cardiology Dr GOMEZ     RESPIRATORY:    ASTHMA:  -pt has hx of pna  -at home has inhalers   -s/p solumedrol in ed as was unable to lie flat  -no trouble breathing     PNEUMONIA SUSPECTED:  -wbc 19,   xray = RLL pneumonia suspicion on cefepime and levaquin   -will continue abx  - lactate trended downward  -f/u bcx      GI:  no acitve vomiting or diarrhoea currently  tolerating PO     endo:    DM :   -pt has hx of DM   -on lantus 50 units and premeal 7 units tid    [facility]:  - pt missed his insulin   - bg 600s, anion gap corrected 19, ph 7.27  - started on insulin drip  - cmp q4   - will hold insulin drip till k is above 3.5  -finger stick q1  - monitor blood glucose  -NOW resolved     URO:    JAISON VS CKD    OCT 2020 CR 1.29  p/w cr 2.5, trended downwards   f/u urine lytes, likely prerenal   iv fluids cautiously due to CAD   intake and output monitoring     HYPOKALEMIA:   k 2.9   will replace aggresively   f/u cmp q 4  now resolved     HYPONATREMIA :  In setting of vomiting   poor oral intake   on iv fluids mild  f/u bmp   now resolved     EXTREMITIES:      RHABDOMYLYSIS:   - pt was on floor all night , never lost consciousness , mechanical fall  - ck 3k   - will start gentle hydration in setting of cardiac hx  -f/u renal function  -ck trending upwards  continue to monitor CK     PROPHYLAXIX:   DVT heparin  GI ppi    LINES:  none     goc: FULL CODE

## 2021-10-03 NOTE — PROGRESS NOTE ADULT - SUBJECTIVE AND OBJECTIVE BOX
INTERVAL HPI/OVERNIGHT EVENTS: Held insulin drip, bridged with lantus 50U and started diet.     PRESSORS: [ ] YES [x] NO    Antimicrobial:  cefepime   IVPB 2000 milliGRAM(s) IV Intermittent every 12 hours  hydroxychloroquine 200 milliGRAM(s) Oral two times a day  levoFLOXacin IVPB 750 milliGRAM(s) IV Intermittent every 24 hours    Cardiovascular:  metoprolol tartrate 12.5 milliGRAM(s) Oral two times a day    Pulmonary:  benzonatate 100 milliGRAM(s) Oral three times a day PRN    Hematalogic:  clopidogrel Tablet 75 milliGRAM(s) Oral daily  heparin   Injectable 5000 Unit(s) SubCutaneous every 12 hours    Other:  acetaminophen   Tablet .. 650 milliGRAM(s) Oral every 6 hours  atorvastatin 80 milliGRAM(s) Oral at bedtime  insulin glargine Injectable (LANTUS) 50 Unit(s) SubCutaneous at bedtime  insulin lispro (ADMELOG) corrective regimen sliding scale   SubCutaneous Before meals and at bedtime  pantoprazole    Tablet 40 milliGRAM(s) Oral before breakfast  potassium chloride  10 mEq/100 mL IVPB 10 milliEquivalent(s) IV Intermittent every 1 hour  sodium chloride 0.9%. 1000 milliLiter(s) IV Continuous <Continuous>    acetaminophen   Tablet .. 650 milliGRAM(s) Oral every 6 hours  atorvastatin 80 milliGRAM(s) Oral at bedtime  benzonatate 100 milliGRAM(s) Oral three times a day PRN  cefepime   IVPB 2000 milliGRAM(s) IV Intermittent every 12 hours  clopidogrel Tablet 75 milliGRAM(s) Oral daily  heparin   Injectable 5000 Unit(s) SubCutaneous every 12 hours  hydroxychloroquine 200 milliGRAM(s) Oral two times a day  insulin glargine Injectable (LANTUS) 50 Unit(s) SubCutaneous at bedtime  insulin lispro (ADMELOG) corrective regimen sliding scale   SubCutaneous Before meals and at bedtime  levoFLOXacin IVPB 750 milliGRAM(s) IV Intermittent every 24 hours  metoprolol tartrate 12.5 milliGRAM(s) Oral two times a day  pantoprazole    Tablet 40 milliGRAM(s) Oral before breakfast  potassium chloride  10 mEq/100 mL IVPB 10 milliEquivalent(s) IV Intermittent every 1 hour  sodium chloride 0.9%. 1000 milliLiter(s) IV Continuous <Continuous>    Drug Dosing Weight  Height (cm): 157.5 (02 Oct 2021 15:31)  Weight (kg): 126.4 (02 Oct 2021 15:31)  BMI (kg/m2): 51 (02 Oct 2021 15:31)  BSA (m2): 2.2 (02 Oct 2021 15:31)    CENTRAL LINE: [ ] YES [x] NO  LOCATION:         KIRBY: [ ] YES [x] NO          A-LINE:  [ ] YES [x] NO  LOCATION:         ICU Vital Signs Last 24 Hrs  T(C): 36.4 (02 Oct 2021 20:05), Max: 37.4 (02 Oct 2021 12:40)  T(F): 97.5 (02 Oct 2021 20:05), Max: 99.4 (02 Oct 2021 12:40)  HR: 88 (03 Oct 2021 00:00) (88 - 113)  BP: 122/61 (03 Oct 2021 00:00) (107/56 - 156/69)  BP(mean): 66 (03 Oct 2021 00:00) (66 - 100)  ABP: --  ABP(mean): --  RR: 19 (03 Oct 2021 00:00) (13 - 30)  SpO2: 98% (03 Oct 2021 00:00) (93% - 100%)      PHYSICAL EXAM:  GENERAL: no signs of respiratory distress, morbidly obese,   HEAD:  Atraumatic, Normocephalic  EYES: EOMI, PERRLA, conjunctiva and sclera clear  NECK: Supple, No JVD  CHEST/LUNG:  + wheeze; No crackles; No accessory muscles used  HEART: s1, s2,  No murmurs;   ABDOMEN: Soft, Nontender, No guarding  EXTREMITIES:  2+ Peripheral Pulses, No cyanosis or edema, cannot move right shoulcder  PSYCH: AAOx3  NEUROLOGY: no-focal deficit present  SKIN: No rashes or lesions      LABS:  CBC Full  -  ( 02 Oct 2021 13:29 )  WBC Count : 20.29 K/uL  RBC Count : 4.58 M/uL  Hemoglobin : 14.0 g/dL  Hematocrit : 41.4 %  Platelet Count - Automated : 196 K/uL  Mean Cell Volume : 90.4 fl  Mean Cell Hemoglobin : 30.6 pg  Mean Cell Hemoglobin Concentration : 33.8 gm/dL  Auto Neutrophil # : 18.44 K/uL  Auto Lymphocyte # : 0.25 K/uL  Auto Monocyte # : 1.26 K/uL  Auto Eosinophil # : 0.14 K/uL  Auto Basophil # : 0.05 K/uL  Auto Neutrophil % : 90.2 %  Auto Lymphocyte % : 1.2 %  Auto Monocyte % : 6.2 %  Auto Eosinophil % : 0.7 %  Auto Basophil % : 0.2 %    10-02    136  |  101  |  29<H>  ----------------------------<  310<H>  3.2<L>   |  25  |  1.81<H>    Ca    7.7<L>      02 Oct 2021 19:38    TPro  6.7  /  Alb  3.0<L>  /  TBili  0.6  /  DBili  x   /  AST  150<H>  /  ALT  70<H>  /  AlkPhos  63  10-02    RADIOLOGY & ADDITIONAL STUDIES REVIEWED:  ***    [ ]GOALS OF CARE DISCUSSION WITH PATIENT/FAMILY/PROXY:    CRITICAL CARE TIME SPENT: 35 minutes INTERVAL HPI/OVERNIGHT EVENTS: Held insulin drip, bridged with lantus 50U and started diet.     PRESSORS: [ ] YES [x] NO    Antimicrobial:  cefepime   IVPB 2000 milliGRAM(s) IV Intermittent every 12 hours  hydroxychloroquine 200 milliGRAM(s) Oral two times a day  levoFLOXacin IVPB 750 milliGRAM(s) IV Intermittent every 24 hours    Cardiovascular:  metoprolol tartrate 12.5 milliGRAM(s) Oral two times a day    Pulmonary:  benzonatate 100 milliGRAM(s) Oral three times a day PRN    Hematalogic:  clopidogrel Tablet 75 milliGRAM(s) Oral daily  heparin   Injectable 5000 Unit(s) SubCutaneous every 12 hours    Other:  acetaminophen   Tablet .. 650 milliGRAM(s) Oral every 6 hours  atorvastatin 80 milliGRAM(s) Oral at bedtime  insulin glargine Injectable (LANTUS) 50 Unit(s) SubCutaneous at bedtime  insulin lispro (ADMELOG) corrective regimen sliding scale   SubCutaneous Before meals and at bedtime  pantoprazole    Tablet 40 milliGRAM(s) Oral before breakfast  potassium chloride  10 mEq/100 mL IVPB 10 milliEquivalent(s) IV Intermittent every 1 hour  sodium chloride 0.9%. 1000 milliLiter(s) IV Continuous <Continuous>    acetaminophen   Tablet .. 650 milliGRAM(s) Oral every 6 hours  atorvastatin 80 milliGRAM(s) Oral at bedtime  benzonatate 100 milliGRAM(s) Oral three times a day PRN  cefepime   IVPB 2000 milliGRAM(s) IV Intermittent every 12 hours  clopidogrel Tablet 75 milliGRAM(s) Oral daily  heparin   Injectable 5000 Unit(s) SubCutaneous every 12 hours  hydroxychloroquine 200 milliGRAM(s) Oral two times a day  insulin glargine Injectable (LANTUS) 50 Unit(s) SubCutaneous at bedtime  insulin lispro (ADMELOG) corrective regimen sliding scale   SubCutaneous Before meals and at bedtime  levoFLOXacin IVPB 750 milliGRAM(s) IV Intermittent every 24 hours  metoprolol tartrate 12.5 milliGRAM(s) Oral two times a day  pantoprazole    Tablet 40 milliGRAM(s) Oral before breakfast  potassium chloride  10 mEq/100 mL IVPB 10 milliEquivalent(s) IV Intermittent every 1 hour  sodium chloride 0.9%. 1000 milliLiter(s) IV Continuous <Continuous>    Drug Dosing Weight  Height (cm): 157.5 (02 Oct 2021 15:31)  Weight (kg): 126.4 (02 Oct 2021 15:31)  BMI (kg/m2): 51 (02 Oct 2021 15:31)  BSA (m2): 2.2 (02 Oct 2021 15:31)    CENTRAL LINE: [ ] YES [x] NO  LOCATION:         KIRBY: [ ] YES [x] NO          A-LINE:  [ ] YES [x] NO  LOCATION:         ICU Vital Signs Last 24 Hrs  T(C): 36.4 (02 Oct 2021 20:05), Max: 37.4 (02 Oct 2021 12:40)  T(F): 97.5 (02 Oct 2021 20:05), Max: 99.4 (02 Oct 2021 12:40)  HR: 88 (03 Oct 2021 00:00) (88 - 113)  BP: 122/61 (03 Oct 2021 00:00) (107/56 - 156/69)  BP(mean): 66 (03 Oct 2021 00:00) (66 - 100)  ABP: --  ABP(mean): --  RR: 19 (03 Oct 2021 00:00) (13 - 30)  SpO2: 98% (03 Oct 2021 00:00) (93% - 100%)      PHYSICAL EXAM:  GENERAL: no signs of respiratory distress, morbidly obese,   HEAD:  Atraumatic, Normocephalic  EYES: EOMI, PERRLA, conjunctiva and sclera clear  NECK: Supple, No JVD  CHEST/LUNG:  + wheeze; No crackles; No accessory muscles used  HEART: s1, s2,  No murmurs;   ABDOMEN: Soft, Nontender, No guarding  EXTREMITIES:  2+ Peripheral Pulses, No cyanosis or edema, cannot move right shoulder  PSYCH: AAOx3  NEUROLOGY: no-focal deficit present  SKIN: No rashes or lesions      LABS:  CBC Full  -  ( 02 Oct 2021 13:29 )  WBC Count : 20.29 K/uL  RBC Count : 4.58 M/uL  Hemoglobin : 14.0 g/dL  Hematocrit : 41.4 %  Platelet Count - Automated : 196 K/uL  Mean Cell Volume : 90.4 fl  Mean Cell Hemoglobin : 30.6 pg  Mean Cell Hemoglobin Concentration : 33.8 gm/dL  Auto Neutrophil # : 18.44 K/uL  Auto Lymphocyte # : 0.25 K/uL  Auto Monocyte # : 1.26 K/uL  Auto Eosinophil # : 0.14 K/uL  Auto Basophil # : 0.05 K/uL  Auto Neutrophil % : 90.2 %  Auto Lymphocyte % : 1.2 %  Auto Monocyte % : 6.2 %  Auto Eosinophil % : 0.7 %  Auto Basophil % : 0.2 %    10-02    136  |  101  |  29<H>  ----------------------------<  310<H>  3.2<L>   |  25  |  1.81<H>    Ca    7.7<L>      02 Oct 2021 19:38    TPro  6.7  /  Alb  3.0<L>  /  TBili  0.6  /  DBili  x   /  AST  150<H>  /  ALT  70<H>  /  AlkPhos  63  10-02    RADIOLOGY & ADDITIONAL STUDIES REVIEWED:  ***    [ ]GOALS OF CARE DISCUSSION WITH PATIENT/FAMILY/PROXY:    CRITICAL CARE TIME SPENT: 35 minutes

## 2021-10-04 DIAGNOSIS — E11.9 TYPE 2 DIABETES MELLITUS WITHOUT COMPLICATIONS: ICD-10-CM

## 2021-10-04 DIAGNOSIS — M62.82 RHABDOMYOLYSIS: ICD-10-CM

## 2021-10-04 DIAGNOSIS — E78.5 HYPERLIPIDEMIA, UNSPECIFIED: ICD-10-CM

## 2021-10-04 DIAGNOSIS — I25.10 ATHEROSCLEROTIC HEART DISEASE OF NATIVE CORONARY ARTERY WITHOUT ANGINA PECTORIS: ICD-10-CM

## 2021-10-04 DIAGNOSIS — I10 ESSENTIAL (PRIMARY) HYPERTENSION: ICD-10-CM

## 2021-10-04 LAB
ALBUMIN SERPL ELPH-MCNC: 2.7 G/DL — LOW (ref 3.5–5)
ALP SERPL-CCNC: 52 U/L — SIGNIFICANT CHANGE UP (ref 40–120)
ALT FLD-CCNC: 129 U/L DA — HIGH (ref 10–60)
ANION GAP SERPL CALC-SCNC: 6 MMOL/L — SIGNIFICANT CHANGE UP (ref 5–17)
AST SERPL-CCNC: 250 U/L — HIGH (ref 10–40)
BASOPHILS # BLD AUTO: 0.03 K/UL — SIGNIFICANT CHANGE UP (ref 0–0.2)
BASOPHILS NFR BLD AUTO: 0.3 % — SIGNIFICANT CHANGE UP (ref 0–2)
BILIRUB SERPL-MCNC: 0.4 MG/DL — SIGNIFICANT CHANGE UP (ref 0.2–1.2)
BUN SERPL-MCNC: 18 MG/DL — SIGNIFICANT CHANGE UP (ref 7–18)
CALCIUM SERPL-MCNC: 7.4 MG/DL — LOW (ref 8.4–10.5)
CHLORIDE SERPL-SCNC: 106 MMOL/L — SIGNIFICANT CHANGE UP (ref 96–108)
CK SERPL-CCNC: 6065 U/L — HIGH (ref 35–232)
CK SERPL-CCNC: 6948 U/L — HIGH (ref 35–232)
CO2 SERPL-SCNC: 28 MMOL/L — SIGNIFICANT CHANGE UP (ref 22–31)
CREAT SERPL-MCNC: 0.92 MG/DL — SIGNIFICANT CHANGE UP (ref 0.5–1.3)
EOSINOPHIL # BLD AUTO: 0.11 K/UL — SIGNIFICANT CHANGE UP (ref 0–0.5)
EOSINOPHIL NFR BLD AUTO: 0.9 % — SIGNIFICANT CHANGE UP (ref 0–6)
GLUCOSE BLDC GLUCOMTR-MCNC: 108 MG/DL — HIGH (ref 70–99)
GLUCOSE BLDC GLUCOMTR-MCNC: 109 MG/DL — HIGH (ref 70–99)
GLUCOSE BLDC GLUCOMTR-MCNC: 120 MG/DL — HIGH (ref 70–99)
GLUCOSE BLDC GLUCOMTR-MCNC: 156 MG/DL — HIGH (ref 70–99)
GLUCOSE BLDC GLUCOMTR-MCNC: 309 MG/DL — HIGH (ref 70–99)
GLUCOSE BLDC GLUCOMTR-MCNC: 97 MG/DL — SIGNIFICANT CHANGE UP (ref 70–99)
GLUCOSE SERPL-MCNC: 155 MG/DL — HIGH (ref 70–99)
HCT VFR BLD CALC: 42.4 % — SIGNIFICANT CHANGE UP (ref 39–50)
HGB BLD-MCNC: 14 G/DL — SIGNIFICANT CHANGE UP (ref 13–17)
IMM GRANULOCYTES NFR BLD AUTO: 0.7 % — SIGNIFICANT CHANGE UP (ref 0–1.5)
LYMPHOCYTES # BLD AUTO: 0.81 K/UL — LOW (ref 1–3.3)
LYMPHOCYTES # BLD AUTO: 6.8 % — LOW (ref 13–44)
MAGNESIUM SERPL-MCNC: 1.6 MG/DL — SIGNIFICANT CHANGE UP (ref 1.6–2.6)
MCHC RBC-ENTMCNC: 30 PG — SIGNIFICANT CHANGE UP (ref 27–34)
MCHC RBC-ENTMCNC: 33 GM/DL — SIGNIFICANT CHANGE UP (ref 32–36)
MCV RBC AUTO: 90.8 FL — SIGNIFICANT CHANGE UP (ref 80–100)
MONOCYTES # BLD AUTO: 1.13 K/UL — HIGH (ref 0–0.9)
MONOCYTES NFR BLD AUTO: 9.4 % — SIGNIFICANT CHANGE UP (ref 2–14)
NEUTROPHILS # BLD AUTO: 9.81 K/UL — HIGH (ref 1.8–7.4)
NEUTROPHILS NFR BLD AUTO: 81.9 % — HIGH (ref 43–77)
NRBC # BLD: 0 /100 WBCS — SIGNIFICANT CHANGE UP (ref 0–0)
PHOSPHATE SERPL-MCNC: 2.3 MG/DL — LOW (ref 2.5–4.5)
PLATELET # BLD AUTO: 150 K/UL — SIGNIFICANT CHANGE UP (ref 150–400)
POTASSIUM SERPL-MCNC: 3.6 MMOL/L — SIGNIFICANT CHANGE UP (ref 3.5–5.3)
POTASSIUM SERPL-SCNC: 3.6 MMOL/L — SIGNIFICANT CHANGE UP (ref 3.5–5.3)
PROT SERPL-MCNC: 6.1 G/DL — SIGNIFICANT CHANGE UP (ref 6–8.3)
RBC # BLD: 4.67 M/UL — SIGNIFICANT CHANGE UP (ref 4.2–5.8)
RBC # FLD: 13.2 % — SIGNIFICANT CHANGE UP (ref 10.3–14.5)
SODIUM SERPL-SCNC: 140 MMOL/L — SIGNIFICANT CHANGE UP (ref 135–145)
WBC # BLD: 11.97 K/UL — HIGH (ref 3.8–10.5)
WBC # FLD AUTO: 11.97 K/UL — HIGH (ref 3.8–10.5)

## 2021-10-04 PROCEDURE — 93306 TTE W/DOPPLER COMPLETE: CPT | Mod: 26

## 2021-10-04 PROCEDURE — 71045 X-RAY EXAM CHEST 1 VIEW: CPT | Mod: 26

## 2021-10-04 RX ORDER — POTASSIUM PHOSPHATE, MONOBASIC POTASSIUM PHOSPHATE, DIBASIC 236; 224 MG/ML; MG/ML
15 INJECTION, SOLUTION INTRAVENOUS ONCE
Refills: 0 | Status: COMPLETED | OUTPATIENT
Start: 2021-10-04 | End: 2021-10-04

## 2021-10-04 RX ORDER — FUROSEMIDE 40 MG
40 TABLET ORAL ONCE
Refills: 0 | Status: COMPLETED | OUTPATIENT
Start: 2021-10-04 | End: 2021-10-04

## 2021-10-04 RX ORDER — SODIUM CHLORIDE 9 MG/ML
1000 INJECTION INTRAMUSCULAR; INTRAVENOUS; SUBCUTANEOUS
Refills: 0 | Status: DISCONTINUED | OUTPATIENT
Start: 2021-10-04 | End: 2021-10-06

## 2021-10-04 RX ORDER — ALBUTEROL 90 UG/1
1 AEROSOL, METERED ORAL
Refills: 0 | Status: DISCONTINUED | OUTPATIENT
Start: 2021-10-04 | End: 2021-10-06

## 2021-10-04 RX ADMIN — POTASSIUM PHOSPHATE, MONOBASIC POTASSIUM PHOSPHATE, DIBASIC 62.5 MILLIMOLE(S): 236; 224 INJECTION, SOLUTION INTRAVENOUS at 06:10

## 2021-10-04 RX ADMIN — Medication 650 MILLIGRAM(S): at 11:49

## 2021-10-04 RX ADMIN — Medication 650 MILLIGRAM(S): at 19:08

## 2021-10-04 RX ADMIN — HEPARIN SODIUM 5000 UNIT(S): 5000 INJECTION INTRAVENOUS; SUBCUTANEOUS at 06:10

## 2021-10-04 RX ADMIN — HEPARIN SODIUM 5000 UNIT(S): 5000 INJECTION INTRAVENOUS; SUBCUTANEOUS at 18:12

## 2021-10-04 RX ADMIN — Medication 12.5 MILLIGRAM(S): at 18:12

## 2021-10-04 RX ADMIN — SODIUM CHLORIDE 150 MILLILITER(S): 9 INJECTION INTRAMUSCULAR; INTRAVENOUS; SUBCUTANEOUS at 22:15

## 2021-10-04 RX ADMIN — PANTOPRAZOLE SODIUM 40 MILLIGRAM(S): 20 TABLET, DELAYED RELEASE ORAL at 06:11

## 2021-10-04 RX ADMIN — Medication 12.5 MILLIGRAM(S): at 06:11

## 2021-10-04 RX ADMIN — Medication 650 MILLIGRAM(S): at 18:12

## 2021-10-04 RX ADMIN — Medication 650 MILLIGRAM(S): at 11:19

## 2021-10-04 RX ADMIN — Medication 7 UNIT(S): at 07:57

## 2021-10-04 RX ADMIN — CEFEPIME 100 MILLIGRAM(S): 1 INJECTION, POWDER, FOR SOLUTION INTRAMUSCULAR; INTRAVENOUS at 06:10

## 2021-10-04 RX ADMIN — CLOPIDOGREL BISULFATE 75 MILLIGRAM(S): 75 TABLET, FILM COATED ORAL at 11:19

## 2021-10-04 RX ADMIN — Medication 40 MILLIGRAM(S): at 11:18

## 2021-10-04 RX ADMIN — Medication 650 MILLIGRAM(S): at 06:48

## 2021-10-04 RX ADMIN — Medication 1: at 07:56

## 2021-10-04 RX ADMIN — Medication 650 MILLIGRAM(S): at 06:11

## 2021-10-04 RX ADMIN — Medication 7 UNIT(S): at 16:27

## 2021-10-04 RX ADMIN — LIDOCAINE 1 PATCH: 4 CREAM TOPICAL at 01:00

## 2021-10-04 RX ADMIN — SODIUM CHLORIDE 150 MILLILITER(S): 9 INJECTION INTRAMUSCULAR; INTRAVENOUS; SUBCUTANEOUS at 14:36

## 2021-10-04 NOTE — PHYSICAL THERAPY INITIAL EVALUATION ADULT - DIAGNOSIS, PT EVAL
right shoulder pain, ecchymosis, and limitation of active motion; difficulty with ADLs, mild, unsteady standing balance, gait, and transfers

## 2021-10-04 NOTE — CHART NOTE - NSCHARTNOTEFT_GEN_A_CORE
Patient is 66 year old  Male morbidly obese with hx CAD s/p 5 stents ( last one 6 year ago )  on ASA/Plavix, HTN/HLD, DM, psoriatic arthritis, asthma presents with syncope after diarrhoea. Pt has hyperglycemia. Pt had a visit from grand kids who were having diarrhoea and pt thinks he caught same bug from kids. Pt was vomiting day prior admission 5-6 times and was not feeling well so didn't take his medications. Pt then felt dizzy and slipped to floor but never lost consciousness. Pt is morbidly obese so could not get up and spent night on floor. Pt daughter and girlfriend were calling in morning and pt crawled to phone and let them know that pt fell. EMS arrived and pt was found to have very high blood glucose. Pt usually takes insulins at home and has never bene admitted to hospital for icu. Pt had last year episode of tia in 2020. In ED, VS T: 98.2, , /80, RR 23, O2 sat 97% on 2LNC. WBC 20.29, sodium 131, K 2.9, Cr: 2.58, glucose >600. Creatine Kinase >3000, Trop .184. Patient was admitted to ICU for HHS with anion gap of 19 requiring Insulin and potassium drip.    ICU Course:    In ICU, patient was started on Insulin and potassium drip for hyperglycemia and hypokalemia. Rhabdomyolysis was treated with gentle hydration given cardiac history. He was started on Heparin for DVT prophylaxis and Protonix for GI prophylaxis. Patient was bridged to subcutaneous insulin and was able to tolerate PO diet without any nausea or vomiting. He was treated with 50units of lantus at night and 7units admelog three times a day before meals. He continued to receive IVF for his rhabdomyolysis. His glucose and potassium levels normalized. Creatinine, creatine kinase, and troponin levels trended downward. Patient was seen by cardiology and got an echocardiogram. He is now stable for downgrade to the floors.      Things to follow up:      CNS:  -AOOX3  -no neuro issues    CVS:    CAD:   -hx of stents on aspirin and plavix   -if c/p or sob, give lasix cautiously    -f/u echo results    HTN:  -c/w Lopressor 12.5mg BID       RESPIRATORY:  -History of TONYA, does not use CPAP at home.   -no active issues    GI:  -no active issues. c/w DASH diet    endo:    DM :   -pt has hx of DM   -on lantus 50 units and premeal 7 units tid  -Patient states he has appt scheduled with his endocrinologist in 2 weeks.    Renal:  -JAISON now resolved  -CK trending downward, continue to monitor  -c/w gentle hydration      PROPHYLAXIS:   DVT heparin  GI ppi    goc: FULL CODE Brief Hospital Course:  Patient is 66 year old  Male morbidly obese with hx CAD s/p 5 stents ( last one 6 year ago )  on ASA/Plavix, HTN/HLD, DM, psoriatic arthritis, asthma presents with syncope after diarrhoea. Pt has hyperglycemia. Pt had a visit from grand kids who were having diarrhoea and pt thinks he caught same bug from kids. Pt was vomiting day prior admission 5-6 times and was not feeling well so didn't take his medications. Pt then felt dizzy and slipped to floor but never lost consciousness. Pt is morbidly obese so could not get up and spent night on floor. Pt daughter and girlfriend were calling in morning and pt crawled to phone and let them know that pt fell. EMS arrived and pt was found to have very high blood glucose. Pt usually takes insulins at home and has never bene admitted to hospital for icu. Pt had last year episode of tia in 2020. In ED, VS T: 98.2, , /80, RR 23, O2 sat 97% on 2LNC. WBC 20.29, sodium 131, K 2.9, Cr: 2.58, glucose >600. Creatine Kinase >3000, Trop .184. Patient was admitted to ICU for HHS with anion gap of 19 requiring Insulin and potassium drip.    ICU Course:    In ICU, patient was on Insulin drip and potassium drip for hyperglycemia and hypokalemia. Rhabdomyolysis was treated with gentle hydration given cardiac history. He was started on Heparin for DVT prophylaxis and Protonix for GI prophylaxis. Patient was bridged to subcutaneous insulin and was able to tolerate PO diet without any nausea or vomiting. He was treated with 50units of lantus at night and 7units admelog three times a day before meals. He continued to receive IVF for his rhabdomyolysis. His glucose and potassium levels normalized. Creatinine, creatine kinase, and troponin levels trended downward. Patient was seen by cardiology and got an echocardiogram. He is now stable for downgrade to the floors.      Things to follow up:      CNS:  -AOOX3  -no neuro issues    CVS:    CAD:   -hx of stents on aspirin and plavix   -if c/p or sob, give lasix cautiously    -f/u echo results    HTN:  -c/w Lopressor 12.5mg BID       RESPIRATORY:  -History of TONYA, does not use CPAP at home.   -no active issues    GI:  -no active issues. c/w DASH diet    endo:    DM :   -pt has hx of DM   -on lantus 50 units and premeal 7 units tid  -Patient states he has appt scheduled with his endocrinologist in 2 weeks.    Renal:  -JAISON now resolved  -CK trending downward, continue to monitor  -c/w gentle hydration      PROPHYLAXIS:   DVT heparin  GI ppi    goc: FULL CODE Brief Hospital Course:  Patient is 66 year old  Male morbidly obese with hx CAD s/p 5 stents ( last one 6 year ago )  on ASA/Plavix, HTN/HLD, DM, psoriatic arthritis, asthma presents with syncope after diarrhoea. Pt has hyperglycemia. Pt had a visit from grand kids who were having diarrhoea and pt thinks he caught same bug from kids. Pt was vomiting day prior admission 5-6 times and was not feeling well so didn't take his medications. Pt then felt dizzy and slipped to floor but never lost consciousness. Pt is morbidly obese so could not get up and spent night on floor. Pt daughter and girlfriend were calling in morning and pt crawled to phone and let them know that pt fell. EMS arrived and pt was found to have very high blood glucose. Pt usually takes insulins at home and has never bene admitted to hospital for icu. Pt had last year episode of tia in 2020. In ED, VS T: 98.2, , /80, RR 23, O2 sat 97% on 2LNC. WBC 20.29, sodium 131, K 2.9, Cr: 2.58, glucose >600. Creatine Kinase >3000, Trop .184. Patient was admitted to ICU for HHS with anion gap of 19 requiring Insulin and potassium drip.    ICU Course:    In ICU, patient was on Insulin drip and potassium drip for hyperglycemia and hypokalemia. Rhabdomyolysis was treated with gentle hydration given cardiac history. He was started on Heparin for DVT prophylaxis and Protonix for GI prophylaxis. Patient was bridged to subcutaneous insulin and was able to tolerate PO diet without any nausea or vomiting. He was treated with 50units of lantus at night and 7units admelog three times a day before meals. He continued to receive IVF for his rhabdomyolysis. His glucose and potassium levels normalized. Creatinine, creatine kinase, and troponin levels trended downward. Patient was seen by cardiology and got an echocardiogram. He is now stable for downgrade to the floors.      Things to follow up:      CNS:  -AOOX3  -no neuro issues  -seen by PT, recommending Home PT for 2-3 weeks    CVS:    CAD:   -hx of stents on aspirin and plavix   -if c/p or sob, give lasix cautiously    -f/u echo results    HTN:  -c/w Lopressor 12.5mg BID     ID:  no issues      RESPIRATORY:  -History of TONYA, does not use CPAP at home.   -no active issues    GI:  -no active issues. c/w DASH diet    endo:    DM :   -pt has hx of DM   -on lantus 50 units and premeal 7 units tid  -Patient states he has appt scheduled with his endocrinologist in 2 weeks.    Renal:  -JAISON now resolved  -CK trending downward, continue to monitor  -c/w gentle hydration      PROPHYLAXIS:   DVT heparin  GI ppi    goc: FULL CODE Brief Hospital Course:  Patient is 66 year old  Male morbidly obese with hx CAD s/p 5 stents ( last one 6 year ago )  on ASA/Plavix, HTN/HLD, DM, psoriatic arthritis, asthma presents with syncope after diarrhoea. Pt has hyperglycemia. Pt had a visit from grand kids who were having diarrhoea and pt thinks he caught same bug from kids. Pt was vomiting day prior admission 5-6 times and was not feeling well so didn't take his medications. Pt then felt dizzy and slipped to floor but never lost consciousness. Pt is morbidly obese so could not get up and spent night on floor. Pt daughter and girlfriend were calling in morning and pt crawled to phone and let them know that pt fell. EMS arrived and pt was found to have very high blood glucose. Pt usually takes insulins at home and has never bene admitted to hospital for icu. Pt had last year episode of tia in 2020. In ED, VS T: 98.2, , /80, RR 23, O2 sat 97% on 2LNC. WBC 20.29, sodium 131, K 2.9, Cr: 2.58, glucose >600. Creatine Kinase >3000, Trop .184. Patient was admitted to ICU for HHS with anion gap of 19 requiring Insulin and potassium drip.    ICU Course:    In ICU, patient was on Insulin drip and potassium drip for hyperglycemia and hypokalemia. Rhabdomyolysis was treated with gentle hydration given cardiac history. He was started on Heparin for DVT prophylaxis and Protonix for GI prophylaxis. Patient was bridged to subcutaneous insulin and was able to tolerate PO diet without any nausea or vomiting. He was treated with 50units of lantus at night and 7units admelog three times a day before meals. He continued to receive IVF for his rhabdomyolysis. His glucose and potassium levels normalized. Creatinine, creatine kinase, and troponin levels trended downward. Patient was seen by cardiology and got an echocardiogram. He is now stable for downgrade to the floors. Report was given to Dr. Maier and Floor resident Dr. Martins.      Things to follow up:      CNS:  -AOOX3  -no neuro issues  -seen by PT, recommending Home PT for 2-3 weeks    CVS:    CAD:   -hx of stents on aspirin and plavix   -if c/p or sob, give lasix cautiously    -f/u echo results    HTN:  -c/w Lopressor 12.5mg BID     ID:  no issues      RESPIRATORY:  -History of TONYA, does not use CPAP at home.   -no active issues    GI:  -no active issues. c/w DASH diet    endo:    DM :   -pt has hx of DM   -on lantus 50 units and premeal 7 units tid  -Patient states he has appt scheduled with his endocrinologist in 2 weeks.    Renal:  -JAISON now resolved  -CK trending downward, continue to monitor  -c/w gentle hydration      PROPHYLAXIS:   DVT heparin  GI ppi    goc: FULL CODE

## 2021-10-04 NOTE — PHYSICAL THERAPY INITIAL EVALUATION ADULT - ACTIVE RANGE OF MOTION EXAMINATION, REHAB EVAL
right shoulder limited to 0-15 degrees of shoulder flexion and abduction due to pain on active movement of anterior shoulder musculature including long head of biceps/no Active ROM deficits were identified

## 2021-10-04 NOTE — PHYSICAL THERAPY INITIAL EVALUATION ADULT - GENERAL OBSERVATIONS, REHAB EVAL
awake, alert, NAD; currently on O2@2L/min via NC; + peripheral IV access on left wrist/forearm; + ecchymosis on anterior aspect of right shoulder and upper arm with noted swelling, tender to palpation

## 2021-10-04 NOTE — PROGRESS NOTE ADULT - SUBJECTIVE AND OBJECTIVE BOX
INTERVAL HPI/OVERNIGHT EVENTS: ***    PRESSORS: [ ] YES [ ] NO  WHICH:    ANTIBIOTICS:                  DATE STARTED:    Antimicrobial:  cefepime   IVPB 2000 milliGRAM(s) IV Intermittent every 12 hours  levoFLOXacin IVPB 750 milliGRAM(s) IV Intermittent every 24 hours    Cardiovascular:  metoprolol tartrate 12.5 milliGRAM(s) Oral two times a day    Pulmonary:  benzonatate 100 milliGRAM(s) Oral three times a day PRN    Hematalogic:  clopidogrel Tablet 75 milliGRAM(s) Oral daily  heparin   Injectable 5000 Unit(s) SubCutaneous every 12 hours    Other:  acetaminophen   Tablet .. 650 milliGRAM(s) Oral every 6 hours  insulin glargine Injectable (LANTUS) 50 Unit(s) SubCutaneous at bedtime  insulin lispro (ADMELOG) corrective regimen sliding scale   SubCutaneous Before meals and at bedtime  insulin lispro Injectable (ADMELOG) 7 Unit(s) SubCutaneous three times a day before meals  pantoprazole    Tablet 40 milliGRAM(s) Oral before breakfast  sodium chloride 0.9%. 1000 milliLiter(s) IV Continuous <Continuous>    acetaminophen   Tablet .. 650 milliGRAM(s) Oral every 6 hours  benzonatate 100 milliGRAM(s) Oral three times a day PRN  cefepime   IVPB 2000 milliGRAM(s) IV Intermittent every 12 hours  clopidogrel Tablet 75 milliGRAM(s) Oral daily  heparin   Injectable 5000 Unit(s) SubCutaneous every 12 hours  insulin glargine Injectable (LANTUS) 50 Unit(s) SubCutaneous at bedtime  insulin lispro (ADMELOG) corrective regimen sliding scale   SubCutaneous Before meals and at bedtime  insulin lispro Injectable (ADMELOG) 7 Unit(s) SubCutaneous three times a day before meals  levoFLOXacin IVPB 750 milliGRAM(s) IV Intermittent every 24 hours  metoprolol tartrate 12.5 milliGRAM(s) Oral two times a day  pantoprazole    Tablet 40 milliGRAM(s) Oral before breakfast  sodium chloride 0.9%. 1000 milliLiter(s) IV Continuous <Continuous>    Drug Dosing Weight  Height (cm): 157.5 (02 Oct 2021 15:31)  Weight (kg): 126.4 (02 Oct 2021 15:31)  BMI (kg/m2): 51 (02 Oct 2021 15:31)  BSA (m2): 2.2 (02 Oct 2021 15:31)    CENTRAL LINE: [ ] YES [ ] NO  LOCATION:         KIRBY: [ ] YES [ ] NO          A-LINE:  [ ] YES [ ] NO  LOCATION:         ICU Vital Signs Last 24 Hrs  T(C): 36.4 (03 Oct 2021 23:02), Max: 36.7 (03 Oct 2021 16:04)  T(F): 97.5 (03 Oct 2021 23:02), Max: 98.1 (03 Oct 2021 16:04)  HR: 82 (04 Oct 2021 07:00) (79 - 105)  BP: 169/94 (04 Oct 2021 07:00) (71/55 - 169/94)  BP(mean): 111 (04 Oct 2021 07:00) (58 - 129)  ABP: --  ABP(mean): --  RR: 21 (04 Oct 2021 07:00) (15 - 27)  SpO2: 98% (04 Oct 2021 07:00) (92% - 100%)            10-03 @ 07:01  -  10-04 @ 07:00  --------------------------------------------------------  IN: 4420 mL / OUT: 2500 mL / NET: 1920 mL              PHYSICAL EXAM:    GENERAL: NAD, well-groomed, well-developed  HEAD: Nomocephalic, Atraumatic  EYES: EOMI, PERRLA,   NECK: Supple, No JVD; Normal thyroid; Trachea midline  NERVOUS SYSTEM:  Alert & Oriented X3,  Motor Strength 5/5 B/L upper and lower extremities; DTRs 2+ intact and symmetric  CHEST/LUNG: No rales, rhonchi, wheezing   HEART: Regular rate and rhythm; No murmurs,   ABDOMEN: Soft, Nontender, Nondistended; Bowel sounds present  EXTREMITIES:  2+ Peripheral Pulses, No clubbing, cyanosis, or edema  SKIN: No lesions      LABS:  CBC Full  -  ( 04 Oct 2021 03:47 )  WBC Count : 11.97 K/uL  RBC Count : 4.67 M/uL  Hemoglobin : 14.0 g/dL  Hematocrit : 42.4 %  Platelet Count - Automated : 150 K/uL  Mean Cell Volume : 90.8 fl  Mean Cell Hemoglobin : 30.0 pg  Mean Cell Hemoglobin Concentration : 33.0 gm/dL  Auto Neutrophil # : 9.81 K/uL  Auto Lymphocyte # : 0.81 K/uL  Auto Monocyte # : 1.13 K/uL  Auto Eosinophil # : 0.11 K/uL  Auto Basophil # : 0.03 K/uL  Auto Neutrophil % : 81.9 %  Auto Lymphocyte % : 6.8 %  Auto Monocyte % : 9.4 %  Auto Eosinophil % : 0.9 %  Auto Basophil % : 0.3 %    10-04    140  |  106  |  18  ----------------------------<  155<H>  3.6   |  28  |  0.92    Ca    7.4<L>      04 Oct 2021 03:47  Phos  2.3     10-04  Mg     1.6     10-04    TPro  6.1  /  Alb  2.7<L>  /  TBili  0.4  /  DBili  x   /  AST  250<H>  /  ALT  129<H>  /  AlkPhos  52  10-04        Culture Results:   No growth to date. (10-03 @ 01:53)  Culture Results:   No growth to date. (10-02 @ 21:04)      RADIOLOGY & ADDITIONAL STUDIES REVIEWED:  ***    [ ]GOALS OF CARE DISCUSSION WITH PATIENT/FAMILY/PROXY:    CRITICAL CARE TIME SPENT: 35 minutes INTERVAL HPI/OVERNIGHT EVENTS: No acute events overnight.  Patient evaluated at bedside, no new complaints.     PRESSORS: [ ] YES [X ] NO  WHICH:    ANTIBIOTICS:                  DATE STARTED:    Antimicrobial:      Cardiovascular:  metoprolol tartrate 12.5 milliGRAM(s) Oral two times a day    Pulmonary:  benzonatate 100 milliGRAM(s) Oral three times a day PRN    Hematalogic:  clopidogrel Tablet 75 milliGRAM(s) Oral daily  heparin   Injectable 5000 Unit(s) SubCutaneous every 12 hours    Other:  acetaminophen   Tablet .. 650 milliGRAM(s) Oral every 6 hours  insulin glargine Injectable (LANTUS) 50 Unit(s) SubCutaneous at bedtime  insulin lispro (ADMELOG) corrective regimen sliding scale   SubCutaneous Before meals and at bedtime  insulin lispro Injectable (ADMELOG) 7 Unit(s) SubCutaneous three times a day before meals  pantoprazole    Tablet 40 milliGRAM(s) Oral before breakfast  sodium chloride 0.9%. 1000 milliLiter(s) IV Continuous <Continuous>    acetaminophen   Tablet .. 650 milliGRAM(s) Oral every 6 hours  benzonatate 100 milliGRAM(s) Oral three times a day PRN  cefepime   IVPB 2000 milliGRAM(s) IV Intermittent every 12 hours  clopidogrel Tablet 75 milliGRAM(s) Oral daily  heparin   Injectable 5000 Unit(s) SubCutaneous every 12 hours  insulin glargine Injectable (LANTUS) 50 Unit(s) SubCutaneous at bedtime  insulin lispro (ADMELOG) corrective regimen sliding scale   SubCutaneous Before meals and at bedtime  insulin lispro Injectable (ADMELOG) 7 Unit(s) SubCutaneous three times a day before meals  levoFLOXacin IVPB 750 milliGRAM(s) IV Intermittent every 24 hours  metoprolol tartrate 12.5 milliGRAM(s) Oral two times a day  pantoprazole    Tablet 40 milliGRAM(s) Oral before breakfast  sodium chloride 0.9%. 1000 milliLiter(s) IV Continuous <Continuous>    Drug Dosing Weight  Height (cm): 157.5 (02 Oct 2021 15:31)  Weight (kg): 126.4 (02 Oct 2021 15:31)  BMI (kg/m2): 51 (02 Oct 2021 15:31)  BSA (m2): 2.2 (02 Oct 2021 15:31)    CENTRAL LINE: [ ] YES [X ] NO  LOCATION:         KIRBY: [ ] YES [ X] NO          A-LINE:  [ ] YES [X ] NO  LOCATION:         ICU Vital Signs Last 24 Hrs  T(C): 36.4 (03 Oct 2021 23:02), Max: 36.7 (03 Oct 2021 16:04)  T(F): 97.5 (03 Oct 2021 23:02), Max: 98.1 (03 Oct 2021 16:04)  HR: 82 (04 Oct 2021 07:00) (79 - 105)  BP: 169/94 (04 Oct 2021 07:00) (71/55 - 169/94)  BP(mean): 111 (04 Oct 2021 07:00) (58 - 129)  ABP: --  ABP(mean): --  RR: 21 (04 Oct 2021 07:00) (15 - 27)  SpO2: 98% (04 Oct 2021 07:00) (92% - 100%)            10-03 @ 07:01  -  10-04 @ 07:00  --------------------------------------------------------  IN: 4420 mL / OUT: 2500 mL / NET: 1920 mL              PHYSICAL EXAM:    GENERAL: NAD morbidly obese male   HEAD: Nomocephalic, Atraumatic  EYES: EOMI, PERRLA,   NECK: Supple, No JVD; Normal thyroid; Trachea midline  NERVOUS SYSTEM:  Alert & Oriented X3,  Motor Strength 5/5 B/L upper and lower extremities; DTRs 2+ intact and symmetric  CHEST/LUNG: No rales, rhonchi, wheezing   HEART: Regular rate and rhythm; No murmurs,   ABDOMEN: Soft, Nontender, Nondistended; Bowel sounds present  EXTREMITIES:  tenderness in right shoulder   SKIN: No lesions      LABS:  CBC Full  -  ( 04 Oct 2021 03:47 )  WBC Count : 11.97 K/uL  RBC Count : 4.67 M/uL  Hemoglobin : 14.0 g/dL  Hematocrit : 42.4 %  Platelet Count - Automated : 150 K/uL  Mean Cell Volume : 90.8 fl  Mean Cell Hemoglobin : 30.0 pg  Mean Cell Hemoglobin Concentration : 33.0 gm/dL  Auto Neutrophil # : 9.81 K/uL  Auto Lymphocyte # : 0.81 K/uL  Auto Monocyte # : 1.13 K/uL  Auto Eosinophil # : 0.11 K/uL  Auto Basophil # : 0.03 K/uL  Auto Neutrophil % : 81.9 %  Auto Lymphocyte % : 6.8 %  Auto Monocyte % : 9.4 %  Auto Eosinophil % : 0.9 %  Auto Basophil % : 0.3 %    10-04    140  |  106  |  18  ----------------------------<  155<H>  3.6   |  28  |  0.92    Ca    7.4<L>      04 Oct 2021 03:47  Phos  2.3     10-04  Mg     1.6     10-04    TPro  6.1  /  Alb  2.7<L>  /  TBili  0.4  /  DBili  x   /  AST  250<H>  /  ALT  129<H>  /  AlkPhos  52  10-04        Culture Results:   No growth to date. (10-03 @ 01:53)  Culture Results:   No growth to date. (10-02 @ 21:04)      RADIOLOGY & ADDITIONAL STUDIES REVIEWED:  ***    [ ]GOALS OF CARE DISCUSSION WITH PATIENT/FAMILY/PROXY:    CRITICAL CARE TIME SPENT: 35 minutes

## 2021-10-04 NOTE — CONSULT NOTE ADULT - SUBJECTIVE AND OBJECTIVE BOX
HPI:  Pt Patient is 66 year old  Male morbidly obese with hx CAD s/p 5 stents ( last one 6 year ago )  on ASA/Plavix, HTN/HLD, DM, psoriatic arthritis, asthma presents with syncope after diarrhoea. Pt has hyperglycemia. Pt had a visit from grand kids who were having diarrhoea and pt thinks he cought same bug from kids. Pt was vomiting yesterday 5-6 times and was not feeling weel so didnt took his medications. Pt then felt dizzy and slipped to floor but never lost consciousness. Pt is morbidly obese so could not get up and spent night on floor. Pt daughter and girlfriend were calling in morning and pt crawled to phone and let them know that pt fell. EMS arrived and pt was found to have very high blood glucose. Pt usually takes insulins at home and has never bene admitted to hospital for icu. Pt had last year episode of tia in 2020.  (02 Oct 2021 14:58)    Interval History:     MEDICATIONS  (STANDING):  acetaminophen   Tablet .. 650 milliGRAM(s) Oral every 6 hours  clopidogrel Tablet 75 milliGRAM(s) Oral daily  furosemide   Injectable 40 milliGRAM(s) IV Push once  heparin   Injectable 5000 Unit(s) SubCutaneous every 12 hours  insulin glargine Injectable (LANTUS) 50 Unit(s) SubCutaneous at bedtime  insulin lispro (ADMELOG) corrective regimen sliding scale   SubCutaneous Before meals and at bedtime  insulin lispro Injectable (ADMELOG) 7 Unit(s) SubCutaneous three times a day before meals  metoprolol tartrate 12.5 milliGRAM(s) Oral two times a day  pantoprazole    Tablet 40 milliGRAM(s) Oral before breakfast  sodium chloride 0.9%. 1000 milliLiter(s) (150 mL/Hr) IV Continuous <Continuous>  sodium chloride 0.9%. 1000 milliLiter(s) (150 mL/Hr) IV Continuous <Continuous>    MEDICATIONS  (PRN):  benzonatate 100 milliGRAM(s) Oral three times a day PRN Cough    PAST MEDICAL & SURGICAL HISTORY:  Diabetes  Type 2, A1c: 10.2    History of hypertension    Asthma    Lupus    Psoriatic arthritis    Former smoker    HTN (hypertension)    HLD (hyperlipidemia)    TONYA (obstructive sleep apnea)    CAD (coronary artery disease)    H/O knee surgery    Carpal tunnel syndrome    History of rotator cuff surgery    H/O umbilical hernia repair  mesh    Presence of stent in coronary artery  5 stents      SOCIAL HISTORY:  Smoker:  YES / NO        PACK YEARS:                         WHEN QUIT?  ETOH use:  YES / NO               FREQUENCY / QUANTITY:  Ilicit Drug use:  YES / NO      REVIEW OF SYSTEMS:    CONSTITUTIONAL: No weakness, fevers or chills  RESPIRATORY: No cough, wheezing, hemoptysis; No shortness of breath  CARDIOVASCULAR: No chest pain or palpitations  GASTROINTESTINAL: No abdominal or epigastric pain. No nausea, vomiting, or hematemesis; No diarrhea or constipation. No melena or hematochezia.  NEUROLOGICAL: No numbness or weakness  All other review of systems is negative unless indicated above.    Vital Signs Last 24 Hrs  T(C): 36.6 (04 Oct 2021 08:00), Max: 36.7 (03 Oct 2021 16:04)  T(F): 97.8 (04 Oct 2021 08:00), Max: 98.1 (03 Oct 2021 16:04)  HR: 87 (04 Oct 2021 08:00) (79 - 97)  BP: 151/100 (04 Oct 2021 08:00) (71/55 - 169/94)  BP(mean): 113 (04 Oct 2021 08:00) (58 - 129)  RR: 21 (04 Oct 2021 08:00) (15 - 26)  SpO2: 98% (04 Oct 2021 07:00) (92% - 100%)  General: A/ox 3, No acute Distress  Neck: Supple, NO JVD  Cardiac: S1 S2, No M/R/G  Pulmonary: CTAB, Breathing unlabored, No Rhonchi/Rales/Wheezing  Abdomen: Soft, Non -tender, +BS x 4 quads  Extremities: No Rashes, No edema  Neuro: A/o x 3, No focal deficits        Telemetry:  EKG:  CHADSVASC:                          14.0   11.97 )-----------( 150      ( 04 Oct 2021 03:47 )             42.4     10-04    140  |  106  |  18  ----------------------------<  155<H>  3.6   |  28  |  0.92    Ca    7.4<L>      04 Oct 2021 03:47  Phos  2.3     10-04  Mg     1.6     10-04    TPro  6.1  /  Alb  2.7<L>  /  TBili  0.4  /  DBili  x   /  AST  250<H>  /  ALT  129<H>  /  AlkPhos  52  10-04      Assessment/Plan    1)  HPI:  Patient is 66 year old  Male morbidly obese with hx CAD s/p 5 stents ( last one 6 year ago )  on ASA/Plavix, HTN/HLD, DM, psoriatic arthritis, asthma presents with syncope after diarrhoea. Pt has hyperglycemia. Pt had a visit from grand kids who were having diarrhoea and pt thinks he cought same bug from kids. Pt was vomiting yesterday 5-6 times and was not feeling weel so didnt took his medications. Pt then felt dizzy and slipped to floor but never lost consciousness. Pt is morbidly obese so could not get up and spent night on floor. Pt daughter and girlfriend were calling in morning and pt crawled to phone and let them know that pt fell. EMS arrived and pt was found to have very high blood glucose. Pt usually takes insulins at home and has never bene admitted to hospital for icu. Pt had last year episode of tia in 2020.  (02 Oct 2021 14:58)    Interval History: Patient seen and examined at bedside. Patient states he is feeling well. Denies any chest pain or shortness of breath. Patient has some pain in right shoulder due to fall at home. Patient has history of CAD with 5 stents placed around 6 years ago. Currently on aspirin and plavix. Denies any other cardiac history. Patient is diabetic and takes his insulin daily but had missed his dose prior to admission due to not feeling well. Currently denies any other complaints or concerns.     MEDICATIONS  (STANDING):  acetaminophen   Tablet .. 650 milliGRAM(s) Oral every 6 hours  clopidogrel Tablet 75 milliGRAM(s) Oral daily  furosemide   Injectable 40 milliGRAM(s) IV Push once  heparin   Injectable 5000 Unit(s) SubCutaneous every 12 hours  insulin glargine Injectable (LANTUS) 50 Unit(s) SubCutaneous at bedtime  insulin lispro (ADMELOG) corrective regimen sliding scale   SubCutaneous Before meals and at bedtime  insulin lispro Injectable (ADMELOG) 7 Unit(s) SubCutaneous three times a day before meals  metoprolol tartrate 12.5 milliGRAM(s) Oral two times a day  pantoprazole    Tablet 40 milliGRAM(s) Oral before breakfast  sodium chloride 0.9%. 1000 milliLiter(s) (150 mL/Hr) IV Continuous <Continuous>  sodium chloride 0.9%. 1000 milliLiter(s) (150 mL/Hr) IV Continuous <Continuous>    MEDICATIONS  (PRN):  benzonatate 100 milliGRAM(s) Oral three times a day PRN Cough    PAST MEDICAL & SURGICAL HISTORY:  Diabetes  Type 2, A1c: 10.2    History of hypertension    Asthma    Lupus    Psoriatic arthritis    Former smoker    HTN (hypertension)    HLD (hyperlipidemia)    TONYA (obstructive sleep apnea)    CAD (coronary artery disease)    H/O knee surgery    Carpal tunnel syndrome    History of rotator cuff surgery    H/O umbilical hernia repair  mesh    Presence of stent in coronary artery  5 stents    SOCIAL HISTORY:  Smoker: Former smoker   ETOH use: Denies any recent alcohol use   Illicit Drug use: No    REVIEW OF SYSTEMS:  CONSTITUTIONAL: No weakness, fevers or chills  RESPIRATORY: No cough, wheezing, hemoptysis; No shortness of breath  CARDIOVASCULAR: No chest pain or palpitations  GASTROINTESTINAL: No abdominal or epigastric pain. No nausea, vomiting, or hematemesis; No diarrhea or constipation   NEUROLOGICAL: No numbness or weakness  All other review of systems is negative unless indicated above.    Vital Signs Last 24 Hrs  T(C): 36.6 (04 Oct 2021 08:00), Max: 36.7 (03 Oct 2021 16:04)  T(F): 97.8 (04 Oct 2021 08:00), Max: 98.1 (03 Oct 2021 16:04)  HR: 87 (04 Oct 2021 08:00) (79 - 97)  BP: 151/100 (04 Oct 2021 08:00) (71/55 - 169/94)  BP(mean): 113 (04 Oct 2021 08:00) (58 - 129)  RR: 21 (04 Oct 2021 08:00) (15 - 26)  SpO2: 98% (04 Oct 2021 07:00) (92% - 100%)    General: A/ox 3, No acute Distress  Neck: Supple, NO JVD  Cardiac: S1 S2, No M/R/G  Pulmonary: CTAB, Breathing unlabored, No Rhonchi/Rales/Wheezing  Abdomen: Soft, Non -tender, +BS x 4 quads  Extremities: No Rashes, No edema  Neuro: A/o x 3, No focal deficits      EKG: Sinus tachycardia                           14.0   11.97 )-----------( 150      ( 04 Oct 2021 03:47 )             42.4     10-04    140  |  106  |  18  ----------------------------<  155<H>  3.6   |  28  |  0.92    Ca    7.4<L>      04 Oct 2021 03:47  Phos  2.3     10-04  Mg     1.6     10-04    TPro  6.1  /  Alb  2.7<L>  /  TBili  0.4  /  DBili  x   /  AST  250<H>  /  ALT  129<H>  /  AlkPhos  52  10-04       HPI:  Patient is 66 year old  Male morbidly obese with hx CAD s/p 5 stents ( last one 6 year ago )  on ASA/Plavix, HTN/HLD, DM, psoriatic arthritis, asthma presents with syncope after diarrhoea. Pt has hyperglycemia. Pt had a visit from grand kids who were having diarrhoea and pt thinks he cought same bug from kids. Pt was vomiting yesterday 5-6 times and was not feeling weel so didnt took his medications. Pt then felt dizzy and slipped to floor but never lost consciousness. Pt is morbidly obese so could not get up and spent night on floor. Pt daughter and girlfriend were calling in morning and pt crawled to phone and let them know that pt fell. EMS arrived and pt was found to have very high blood glucose. Pt usually takes insulins at home and has never bene admitted to hospital for icu. Pt had last year episode of tia in 2020.  (02 Oct 2021 14:58)    Interval History: Patient seen and examined at bedside. Patient states he is feeling well. Denies any chest pain or shortness of breath. Patient has some pain in right shoulder due to fall at home. Patient has history of CAD with 5 stents placed around 6 years ago. Currently on aspirin and plavix. Denies any other cardiac history. Patient is diabetic and takes his insulin daily but had missed his dose prior to admission due to not feeling well. Currently denies any other complaints or concerns.     MEDICATIONS  (STANDING):  acetaminophen   Tablet .. 650 milliGRAM(s) Oral every 6 hours  clopidogrel Tablet 75 milliGRAM(s) Oral daily  furosemide   Injectable 40 milliGRAM(s) IV Push once  heparin   Injectable 5000 Unit(s) SubCutaneous every 12 hours  insulin glargine Injectable (LANTUS) 50 Unit(s) SubCutaneous at bedtime  insulin lispro (ADMELOG) corrective regimen sliding scale   SubCutaneous Before meals and at bedtime  insulin lispro Injectable (ADMELOG) 7 Unit(s) SubCutaneous three times a day before meals  metoprolol tartrate 12.5 milliGRAM(s) Oral two times a day  pantoprazole    Tablet 40 milliGRAM(s) Oral before breakfast  sodium chloride 0.9%. 1000 milliLiter(s) (150 mL/Hr) IV Continuous <Continuous>  sodium chloride 0.9%. 1000 milliLiter(s) (150 mL/Hr) IV Continuous <Continuous>    MEDICATIONS  (PRN):  benzonatate 100 milliGRAM(s) Oral three times a day PRN Cough    PAST MEDICAL & SURGICAL HISTORY:  Diabetes  Type 2, A1c: 10.2    History of hypertension    Asthma    Lupus    Psoriatic arthritis    Former smoker    HTN (hypertension)    HLD (hyperlipidemia)    TONYA (obstructive sleep apnea)    CAD (coronary artery disease)    H/O knee surgery    Carpal tunnel syndrome    History of rotator cuff surgery    H/O umbilical hernia repair  mesh    Presence of stent in coronary artery  5 stents    SOCIAL HISTORY:  Smoker: Former smoker   ETOH use: Denies any recent alcohol use   Illicit Drug use: No    REVIEW OF SYSTEMS:  CONSTITUTIONAL: No weakness, fevers or chills  RESPIRATORY: No cough, wheezing, hemoptysis; No shortness of breath  CARDIOVASCULAR: No chest pain or palpitations  GASTROINTESTINAL: No abdominal or epigastric pain. No nausea, vomiting, or hematemesis; No diarrhea or constipation   NEUROLOGICAL: No numbness or weakness  All other review of systems is negative unless indicated above.    Vital Signs Last 24 Hrs  T(C): 36.6 (04 Oct 2021 08:00), Max: 36.7 (03 Oct 2021 16:04)  T(F): 97.8 (04 Oct 2021 08:00), Max: 98.1 (03 Oct 2021 16:04)  HR: 87 (04 Oct 2021 08:00) (79 - 97)  BP: 151/100 (04 Oct 2021 08:00) (71/55 - 169/94)  BP(mean): 113 (04 Oct 2021 08:00) (58 - 129)  RR: 21 (04 Oct 2021 08:00) (15 - 26)  SpO2: 98% (04 Oct 2021 07:00) (92% - 100%)    General: A/ox 3, No acute Distress; obese male  Neck: Supple   Cardiac: S1 S2, no murmurs present   Pulmonary: CTAB, Breathing unlabored, No Rhonchi/Rales/Wheezing  Abdomen: Soft, Non -tender; bowel sounds present   Extremities: No edema  Neuro: A/o x 3, No focal deficits      EKG: Sinus tachycardia                           14.0   11.97 )-----------( 150      ( 04 Oct 2021 03:47 )             42.4     10-04    140  |  106  |  18  ----------------------------<  155<H>  3.6   |  28  |  0.92    Ca    7.4<L>      04 Oct 2021 03:47  Phos  2.3     10-04  Mg     1.6     10-04    TPro  6.1  /  Alb  2.7<L>  /  TBili  0.4  /  DBili  x   /  AST  250<H>  /  ALT  129<H>  /  AlkPhos  52  10-04

## 2021-10-04 NOTE — PROGRESS NOTE ADULT - ASSESSMENT
Pt Patient is 66 year old  Male morbidly obese with hx CAD s/p 5 stents ( last one 6 year ago )  on ASA/Plavix, HTN/HLD, DM, psoriatic arthritis, asthma presents with syncope after diarrhoea. Pt has hyperglycemia. Pt found to have blood glucose in 600s and admitted to icu for blood glucose monitoring on  insulin drip.       ASSESSMENT AND PLAN :     [facility]  asthma exacerbation  rhabdomyolysis  elevated troponin  hypokalemia  hyponatremia  pneumonia  jaison vs ckd      CNS:  pT AWAKE AND ALERT  X 3  Pt has no focal deficit and never loose consciousness and cannot lie flat in ct scan so deffering ct head     CVS:    CAD:   hx of stents on aspirin and plavix   if c/p or sob, give lasix cautiously   -lasix 40mg IV x 1 dose for net positive 1.9L   f/u echo   f/u cxray     HTN:  c/w Lopressor 12.5mg BID     ELEVATED TROPONIN:   pT p/w rhabdo in setting of fall  t1 0.18, t2 .24, t.22   cardiology Dr GOMEZ     RESPIRATORY:    ASTHMA:  -pt has hx of pna  -at home has inhalers   -s/p solumedrol in ed as was unable to lie flat  -no trouble breathing       GI:  no acitve vomiting or diarrhoea currently  tolerating PO     endo:    DM :   -pt has hx of DM   -on lantus 50 units and premeal 7 units tid    HONC:  - pt missed his insulin   - bg 600s, anion gap corrected 19, ph 7.27  - started on insulin drip  - cmp q4   - will hold insulin drip till k is above 3.5  -finger stick q1  - monitor blood glucose  -NOW resolved     URO:    JAISON VS CKD    OCT 2020 CR 1.29  p/w cr 2.5, trended downwards   f/u urine lytes, likely prerenal   iv fluids cautiously due to CAD   intake and output monitoring   Now resolved    HYPOKALEMIA:   k 2.9   will replace aggresively   f/u cmp q 4  now resolved     HYPONATREMIA :  In setting of vomiting   poor oral intake   on iv fluids mild  f/u bmp   now resolved     EXTREMITIES:      RHABDOMYLYSIS:   - pt was on floor all night , never lost consciousness , mechanical fall  - ck 3k   - will start gentle hydration in setting of cardiac hx  -f/u renal function  -ck trending upwards  continue to monitor CK   CK 6000 today, continue to trend q12h     PROPHYLAXIX:   DVT heparin  GI ppi    LINES:  none     goc: FULL CODE

## 2021-10-04 NOTE — PHYSICAL THERAPY INITIAL EVALUATION ADULT - MANUAL MUSCLE TESTING RESULTS, REHAB EVAL
MMT on right upper extremity are grossly graded 3/5, right lower extremity grossly graded 4-/5 ; left upper and lower extremities are grossly graded 5/5

## 2021-10-05 DIAGNOSIS — Z29.9 ENCOUNTER FOR PROPHYLACTIC MEASURES, UNSPECIFIED: ICD-10-CM

## 2021-10-05 LAB
ALBUMIN SERPL ELPH-MCNC: 2.7 G/DL — LOW (ref 3.5–5)
ALP SERPL-CCNC: 52 U/L — SIGNIFICANT CHANGE UP (ref 40–120)
ALT FLD-CCNC: 126 U/L DA — HIGH (ref 10–60)
ANION GAP SERPL CALC-SCNC: 10 MMOL/L — SIGNIFICANT CHANGE UP (ref 5–17)
AST SERPL-CCNC: 213 U/L — HIGH (ref 10–40)
BILIRUB SERPL-MCNC: 0.5 MG/DL — SIGNIFICANT CHANGE UP (ref 0.2–1.2)
BUN SERPL-MCNC: 14 MG/DL — SIGNIFICANT CHANGE UP (ref 7–18)
CALCIUM SERPL-MCNC: 7.7 MG/DL — LOW (ref 8.4–10.5)
CHLORIDE SERPL-SCNC: 107 MMOL/L — SIGNIFICANT CHANGE UP (ref 96–108)
CK SERPL-CCNC: 4256 U/L — HIGH (ref 35–232)
CO2 SERPL-SCNC: 27 MMOL/L — SIGNIFICANT CHANGE UP (ref 22–31)
CREAT SERPL-MCNC: 0.92 MG/DL — SIGNIFICANT CHANGE UP (ref 0.5–1.3)
GLUCOSE BLDC GLUCOMTR-MCNC: 121 MG/DL — HIGH (ref 70–99)
GLUCOSE BLDC GLUCOMTR-MCNC: 88 MG/DL — SIGNIFICANT CHANGE UP (ref 70–99)
GLUCOSE BLDC GLUCOMTR-MCNC: 89 MG/DL — SIGNIFICANT CHANGE UP (ref 70–99)
GLUCOSE BLDC GLUCOMTR-MCNC: 97 MG/DL — SIGNIFICANT CHANGE UP (ref 70–99)
GLUCOSE SERPL-MCNC: 88 MG/DL — SIGNIFICANT CHANGE UP (ref 70–99)
HCT VFR BLD CALC: 40.4 % — SIGNIFICANT CHANGE UP (ref 39–50)
HGB BLD-MCNC: 13.7 G/DL — SIGNIFICANT CHANGE UP (ref 13–17)
MAGNESIUM SERPL-MCNC: 1.4 MG/DL — LOW (ref 1.6–2.6)
MCHC RBC-ENTMCNC: 30.4 PG — SIGNIFICANT CHANGE UP (ref 27–34)
MCHC RBC-ENTMCNC: 33.9 GM/DL — SIGNIFICANT CHANGE UP (ref 32–36)
MCV RBC AUTO: 89.8 FL — SIGNIFICANT CHANGE UP (ref 80–100)
NRBC # BLD: 0 /100 WBCS — SIGNIFICANT CHANGE UP (ref 0–0)
PHOSPHATE SERPL-MCNC: 3 MG/DL — SIGNIFICANT CHANGE UP (ref 2.5–4.5)
PLATELET # BLD AUTO: 151 K/UL — SIGNIFICANT CHANGE UP (ref 150–400)
POTASSIUM SERPL-MCNC: 3.2 MMOL/L — LOW (ref 3.5–5.3)
POTASSIUM SERPL-SCNC: 3.2 MMOL/L — LOW (ref 3.5–5.3)
PROT SERPL-MCNC: 6.3 G/DL — SIGNIFICANT CHANGE UP (ref 6–8.3)
RBC # BLD: 4.5 M/UL — SIGNIFICANT CHANGE UP (ref 4.2–5.8)
RBC # FLD: 13.1 % — SIGNIFICANT CHANGE UP (ref 10.3–14.5)
SODIUM SERPL-SCNC: 144 MMOL/L — SIGNIFICANT CHANGE UP (ref 135–145)
WBC # BLD: 9.22 K/UL — SIGNIFICANT CHANGE UP (ref 3.8–10.5)
WBC # FLD AUTO: 9.22 K/UL — SIGNIFICANT CHANGE UP (ref 3.8–10.5)

## 2021-10-05 RX ORDER — POTASSIUM CHLORIDE 20 MEQ
40 PACKET (EA) ORAL ONCE
Refills: 0 | Status: COMPLETED | OUTPATIENT
Start: 2021-10-05 | End: 2021-10-05

## 2021-10-05 RX ORDER — MAGNESIUM SULFATE 500 MG/ML
2 VIAL (ML) INJECTION ONCE
Refills: 0 | Status: COMPLETED | OUTPATIENT
Start: 2021-10-05 | End: 2021-10-05

## 2021-10-05 RX ADMIN — Medication 12.5 MILLIGRAM(S): at 17:49

## 2021-10-05 RX ADMIN — ALBUTEROL 1 PUFF(S): 90 AEROSOL, METERED ORAL at 00:01

## 2021-10-05 RX ADMIN — HEPARIN SODIUM 5000 UNIT(S): 5000 INJECTION INTRAVENOUS; SUBCUTANEOUS at 17:49

## 2021-10-05 RX ADMIN — Medication 650 MILLIGRAM(S): at 00:01

## 2021-10-05 RX ADMIN — PANTOPRAZOLE SODIUM 40 MILLIGRAM(S): 20 TABLET, DELAYED RELEASE ORAL at 06:13

## 2021-10-05 RX ADMIN — Medication 650 MILLIGRAM(S): at 00:02

## 2021-10-05 RX ADMIN — Medication 12.5 MILLIGRAM(S): at 06:13

## 2021-10-05 RX ADMIN — Medication 40 MILLIEQUIVALENT(S): at 17:48

## 2021-10-05 RX ADMIN — Medication 650 MILLIGRAM(S): at 06:13

## 2021-10-05 RX ADMIN — Medication 650 MILLIGRAM(S): at 23:00

## 2021-10-05 RX ADMIN — Medication 50 GRAM(S): at 12:18

## 2021-10-05 RX ADMIN — ALBUTEROL 1 PUFF(S): 90 AEROSOL, METERED ORAL at 12:18

## 2021-10-05 RX ADMIN — Medication 650 MILLIGRAM(S): at 22:25

## 2021-10-05 RX ADMIN — Medication 650 MILLIGRAM(S): at 18:23

## 2021-10-05 RX ADMIN — Medication 650 MILLIGRAM(S): at 06:14

## 2021-10-05 RX ADMIN — Medication 7 UNIT(S): at 12:17

## 2021-10-05 RX ADMIN — SODIUM CHLORIDE 150 MILLILITER(S): 9 INJECTION INTRAMUSCULAR; INTRAVENOUS; SUBCUTANEOUS at 06:13

## 2021-10-05 RX ADMIN — HEPARIN SODIUM 5000 UNIT(S): 5000 INJECTION INTRAVENOUS; SUBCUTANEOUS at 06:13

## 2021-10-05 RX ADMIN — SODIUM CHLORIDE 150 MILLILITER(S): 9 INJECTION INTRAMUSCULAR; INTRAVENOUS; SUBCUTANEOUS at 17:48

## 2021-10-05 RX ADMIN — CLOPIDOGREL BISULFATE 75 MILLIGRAM(S): 75 TABLET, FILM COATED ORAL at 12:17

## 2021-10-05 RX ADMIN — ALBUTEROL 1 PUFF(S): 90 AEROSOL, METERED ORAL at 22:24

## 2021-10-05 RX ADMIN — Medication 650 MILLIGRAM(S): at 17:50

## 2021-10-05 NOTE — PROGRESS NOTE ADULT - PROBLEM SELECTOR PLAN 1
patient noted to have elevated CPK levels [place] [address]  may likely be related to fall at home  CPK now downtrending on IVF and is 4236 this morning   continue IVF   ECHO noted; grossly low-normal LV size and function    monitor closely for signs of fluid overload.

## 2021-10-05 NOTE — PROGRESS NOTE ADULT - PROBLEM SELECTOR PLAN 2
Pt had multiple episodes of diarrhea, felt weak and fell. Was alone at home and had difficuly getting up for a few hours  - CK downtrending 6,000 to 4200  with IVF   - c/w IVF Pt had multiple episodes of diarrhea, felt weak and fell. Was alone at home and had difficulty getting up for a few hours  - CK downtrending 6,000 to 4200  with IVF   - c/w IVF

## 2021-10-05 NOTE — PROGRESS NOTE ADULT - PROBLEM SELECTOR PLAN 2
patient had elevated troponins which have now downtrended; likely due to demand ischemia   continue with plavix

## 2021-10-05 NOTE — CONSULT NOTE ADULT - SUBJECTIVE AND OBJECTIVE BOX
Time of visit:    CHIEF COMPLAINT: Patient is a 66y old  Male who presents with a chief complaint of [facility] (05 Oct 2021 13:04)      HPI:  Pt Patient is 66 year old  Male morbidly obese with hx CAD s/p 5 stents ( last one 6 year ago )  on ASA/Plavix, HTN/HLD, DM, psoriatic arthritis, asthma presents with syncope after diarrhoea. Pt has hyperglycemia. Pt had a visit from grand kids who were having diarrhoea and pt thinks he cought same bug from kids. Pt was vomiting yesterday 5-6 times and was not feeling weel so didnt took his medications. Pt then felt dizzy and slipped to floor but never lost consciousness. Pt is morbidly obese so could not get up and spent night on floor. Pt daughter and girlfriend were calling in morning and pt crawled to phone and let them know that pt fell. EMS arrived and pt was found to have very high blood glucose. Pt usually takes insulins at home and has never bene admitted to hospital for icu. Pt had last year episode of tia in 2020.  (02 Oct 2021 14:58)   Patient seen and examined.     PAST MEDICAL & SURGICAL HISTORY:  Diabetes  Type 2, A1c: 10.2    History of hypertension    Asthma    Lupus    Psoriatic arthritis    Former smoker    HTN (hypertension)    HLD (hyperlipidemia)    TONYA (obstructive sleep apnea)    CAD (coronary artery disease)    H/O knee surgery    Carpal tunnel syndrome    History of rotator cuff surgery    H/O umbilical hernia repair  mesh    Presence of stent in coronary artery  5 stents        Allergies    iodinated radiocontrast agents (Rash)    Intolerances        MEDICATIONS  (STANDING):  acetaminophen   Tablet .. 650 milliGRAM(s) Oral every 6 hours  ALBUTerol    90 MICROgram(s) HFA Inhaler 1 Puff(s) Inhalation two times a day  clopidogrel Tablet 75 milliGRAM(s) Oral daily  heparin   Injectable 5000 Unit(s) SubCutaneous every 12 hours  insulin lispro (ADMELOG) corrective regimen sliding scale   SubCutaneous Before meals and at bedtime  metoprolol tartrate 12.5 milliGRAM(s) Oral two times a day  pantoprazole    Tablet 40 milliGRAM(s) Oral before breakfast  sodium chloride 0.9%. 1000 milliLiter(s) (150 mL/Hr) IV Continuous <Continuous>      MEDICATIONS  (PRN):  benzonatate 100 milliGRAM(s) Oral three times a day PRN Cough   Medications up to date at time of exam.    Medications up to date at time of exam.    FAMILY HISTORY:  Family history of heart disease        SOCIAL HISTORY  Smoking History: [   ] smoking/smoke exposure, [ x  ] former smoker  Living Condition: [   ] apartment, [   ] private house  Work History:   Travel History: denies recent travel  Illicit Substance Use: denies  Alcohol Use: denies    REVIEW OF SYSTEMS: please refer to ICU note     CONSTITUTIONAL:  denies fevers, chills, sweats, weight loss    HEENT:  denies diplopia or blurred vision, sore throat or runny nose.    CARDIOVASCULAR:  denies pressure, squeezing, tightness, or heaviness about the chest; no palpitations.    RESPIRATORY:  denies SOB, cough, STINSON, wheezing.    GASTROINTESTINAL:  denies abdominal pain, nausea, vomiting or diarrhea.    GENITOURINARY: denies dysuria, frequency or urgency.    NEUROLOGIC:  denies numbness, tingling, seizures or weakness.    PSYCHIATRIC:  denies disorder of thought or mood.    MSK: denies swelling, redness      PHYSICAL EXAMINATION:    GENERAL: The patient is morbid obese man  in no apparent distress.     Vital Signs Last 24 Hrs  T(C): 36.7 (05 Oct 2021 12:54), Max: 37.1 (04 Oct 2021 21:17)  T(F): 98.1 (05 Oct 2021 12:54), Max: 98.8 (04 Oct 2021 21:17)  HR: 94 (05 Oct 2021 18:00) (88 - 94)  BP: 163/93 (05 Oct 2021 18:00) (142/91 - 174/91)  BP(mean): --  RR: 20 (05 Oct 2021 18:00) (18 - 20)  SpO2: 94% (05 Oct 2021 18:00) (94% - 100%)   (if applicable)    Chest Tube (if applicable)    HEENT: Head is normocephalic and atraumatic. Extraocular muscles are intact. Mucous membranes are moist.     NECK: Supple, no palpable adenopathy.    LUNGS: Clear to auscultation, no wheezing, rales, or rhonchi.    HEART: Regular rate and rhythm without murmur.    ABDOMEN: Soft, nontender, and nondistended.  No hepatosplenomegaly is noted.    RENAL: No difficulty voiding, no pelvic pain    EXTREMITIES: Without any cyanosis, clubbing, rash, lesions or edema.    NEUROLOGIC: Awake, alert, oriented, grossly intact    SKIN: Warm, dry, good turgor.      LABS:                        13.7   9.22  )-----------( 151      ( 05 Oct 2021 08:54 )             40.4     10-05    144  |  107  |  14  ----------------------------<  88  3.2<L>   |  27  |  0.92    Ca    7.7<L>      05 Oct 2021 08:54  Phos  3.0     10-05  Mg     1.4     10-05    TPro  6.3  /  Alb  2.7<L>  /  TBili  0.5  /  DBili  x   /  AST  213<H>  /  ALT  126<H>  /  AlkPhos  52  10-05          CARDIAC MARKERS ( 05 Oct 2021 08:54 )  x     / x     / 4256 U/L / x     / x      CARDIAC MARKERS ( 04 Oct 2021 14:06 )  x     / x     / 6065 U/L / x     / x      CARDIAC MARKERS ( 04 Oct 2021 03:47 )  x     / x     / 6948 U/L / x     / x                Procalcitonin, Serum: 3.48 ng/mL (10-02-21 @ 22:56)      MICROBIOLOGY: (if applicable)    RADIOLOGY & ADDITIONAL STUDIES:  EKG:   CXR:< from: Xray Chest 1 View- PORTABLE-Urgent (10.02.21 @ 14:41) >    EXAM:  XR CHEST PORTABLE URGENT 1V                            PROCEDURE DATE:  10/02/2021          INTERPRETATION:  HISTORY: Status post fall    TECHNIQUE: A single AP view of the chest was obtained.    COMPARISON: 4/14/2018    FINDINGS:  The heartsize cannot be adequately assessed on this single view. There are no focal consolidations or pleural effusions. The hilar and mediastinal structures appear unremarkable. The osseous structures are intact.    IMPRESSION: Clear lungs.    --- End of Report ---            LARRY WINSLOW MD; Attending Radiologist  This document has been electronically signed. Oct  2 2021  3:23PM    < end of copied text >    ECHO:    IMPRESSION: 66y Male PAST MEDICAL & SURGICAL HISTORY:  Diabetes  Type 2, A1c: 10.2    History of hypertension    Asthma    Lupus    Psoriatic arthritis    Former smoker    HTN (hypertension)    HLD (hyperlipidemia)    TONYA (obstructive sleep apnea)    CAD (coronary artery disease)    H/O knee surgery    Carpal tunnel syndrome    History of rotator cuff surgery    H/O umbilical hernia repair  mesh    Presence of stent in coronary artery  5 stents     p/w                 IMP: This  is 66 year old  man ,  morbidly obese with hx CAD s/p 5 stents ( last one 6 year ago )  on ASA/Plavix, HTN/HLD, DM, psoriatic arthritis, asthma presents with syncope after diarrhoea. Pt has hyperglycemia. Pt had a visit from grand kids who were having diarrhoea and pt thinks he caught same bug from kids. Pt was vomiting day prior admission 5-6 times and was not feeling well so didn't take his medications. Pt then felt dizzy and slipped to floor but never lost consciousness. Pt is morbidly obese so could not get up and spent night on floor. Pt daughter and girlfriend were calling in morning and pt crawled to phone and let them know that pt fell. EMS arrived and pt was found to have very high blood glucose. Pt usually takes insulins at home and has never bene admitted to hospital for icu. Pt had last year episode of tia in 2020. In ED, VS T: 98.2, , /80, RR 23, O2 sat 97% on 2LNC. WBC 20.29, sodium 131, K 2.9, Cr: 2.58, glucose >600. Creatine Kinase >3000, Trop .184. Patient was admitted to ICU for HHS with anion gap of 19 requiring Insulin and potassium drip.    ICU Course:    In ICU, patient was on Insulin drip and potassium drip for hyperglycemia and hypokalemia. Rhabdomyolysis was treated with gentle hydration given cardiac history. He was started on Heparin for DVT prophylaxis and Protonix for GI prophylaxis. Patient was bridged to subcutaneous insulin and was able to tolerate PO diet without any nausea or vomiting. He was treated with 50units of lantus at night and 7units admelog three times a day before meals. He continued to receive IVF for his rhabdomyolysis. His glucose and potassium levels normalized. Creatinine, creatine kinase, and troponin levels trended downward. Patient was seen by cardiology and got an echocardiogram. He is now stable for downgrade to the floors. Report was given to Dr. Maier and Floor resident Dr. Martins.        Sugg  -continue ivf   -monitor CPK daily   -if pat develope chest pain or SOB .. Rx lasix  -pat with TONYA,not on PAP  -monitor blood sugar with coverage   -dvt /gi prophy  -out pat pulmonary f/u  -diet , exercise wt loss

## 2021-10-05 NOTE — PROGRESS NOTE ADULT - PROBLEM SELECTOR PLAN 3
patient with history of CAD with 5 stents placed 6 years ago   patient had elevated troponin which have now downtrended; likely due to demand ischemia   continue with plavix   f/u ECHO  - Dr. Murdock following patient with history of CAD with 5 stents placed 6 years ago   patient had elevated troponin which have now downtrended; likely due to demand ischemia   troponin >0.184, 0.240 >0.221  continue with plavix   f/u ECHO  - Dr. Murdock following

## 2021-10-05 NOTE — PROGRESS NOTE ADULT - SUBJECTIVE AND OBJECTIVE BOX
PGY-1 Progress Note discussed with attending    PAGER #: [[phone]] TILL 5:00 PM  PLEASE CONTACT ON CALL TEAM:  - On Call Team (Please refer to Apolinar) FROM 5:00 PM - 8:30PM  - Nightfloat Team FROM 8:30 -7:30 AM    CHIEF COMPLAINT & BRIEF HOSPITAL COURSE: Patient is 66 year old  Male morbidly obese with hx CAD s/p 5 stents ( last one 6 year ago )  on ASA/Plavix, HTN/HLD, DM, psoriatic arthritis, asthma presents with syncope after diarrhoea. Pt has hyperglycemia. Pt had a visit from grand kids who were having diarrhoea and pt thinks he caught same bug from kids. Pt was vomiting day prior admission 5-6 times and was not feeling well so didn't take his medications. Pt then felt dizzy and slipped to floor but never lost consciousness. Pt is morbidly obese so could not get up and spent night on floor. Pt daughter and girlfriend were calling in morning and pt crawled to phone and let them know that pt fell. EMS arrived and pt was found to have very high blood glucose. Pt usually takes insulins at home and has never bene admitted to hospital for icu. Pt had last year episode of tia in 2020. In ED, VS T: 98.2, , /80, RR 23, O2 sat 97% on 2LNC. WBC 20.29, sodium 131, K 2.9, Cr: 2.58, glucose >600. Creatine Kinase >3000, Trop .184. Patient was admitted to ICU for HHS with anion gap of 19 requiring Insulin and potassium drip.    ICU Course:    In ICU, patient was on Insulin drip and potassium drip for hyperglycemia and hypokalemia. Rhabdomyolysis was treated with gentle hydration given cardiac history. He was started on Heparin for DVT prophylaxis and Protonix for GI prophylaxis. Patient was bridged to subcutaneous insulin and was able to tolerate PO diet without any nausea or vomiting. He was treated with 50units of lantus at night and 7units admelog three times a day before meals. He continued to receive IVF for his rhabdomyolysis. His glucose and potassium levels normalized. Creatinine, creatine kinase, and troponin levels trended downward. Patient was seen by cardiology and got an echocardiogram. He is now stable for downgrade to the floors    INTERVAL HPI/OVERNIGHT EVENTS: no acute overnight events. Pt is denying any fever, chills, nausea, vomiting chest pain, SOB, abdominal pain, or dysuria.       REVIEW OF SYSTEMS:  negative unless stated above     Vital Signs Last 24 Hrs  T(C): 36.7 (05 Oct 2021 12:54), Max: 37.1 (04 Oct 2021 21:17)  T(F): 98.1 (05 Oct 2021 12:54), Max: 98.8 (04 Oct 2021 21:17)  HR: 90 (05 Oct 2021 12:54) (88 - 108)  BP: 142/91 (05 Oct 2021 12:54) (126/92 - 174/91)  BP(mean): 100 (04 Oct 2021 20:00) (94 - 108)  RR: 18 (05 Oct 2021 12:54) (17 - 23)  SpO2: 94% (05 Oct 2021 12:54) (92% - 100%)    PHYSICAL EXAMINATION:  GENERAL: NAD, obese male   HEAD:  Atraumatic, Normocephalic  EYES:  conjunctiva and sclera clear  NECK: Supple, No JVD, Normal thyroid  CHEST/LUNG: decrease breathe sounds in all fields,  No rales, rhonchi, wheezing, or rubs  HEART: Regular rate and rhythm; No murmurs, rubs, or gallops  ABDOMEN: Soft, Nontender, Nondistended; Bowel sounds present  NERVOUS SYSTEM:  Alert & Oriented X3,    EXTREMITIES:  2+ Peripheral Pulses, No clubbing, cyanosis, +1 b/l edema  SKIN: warm dry                          13.7   9.22  )-----------( 151      ( 05 Oct 2021 08:54 )             40.4     10-05    144  |  107  |  14  ----------------------------<  88  3.2<L>   |  27  |  0.92    Ca    7.7<L>      05 Oct 2021 08:54  Phos  3.0     10-05  Mg     1.4     10-05    TPro  6.3  /  Alb  2.7<L>  /  TBili  0.5  /  DBili  x   /  AST  213<H>  /  ALT  126<H>  /  AlkPhos  52  10-05    LIVER FUNCTIONS - ( 05 Oct 2021 08:54 )  Alb: 2.7 g/dL / Pro: 6.3 g/dL / ALK PHOS: 52 U/L / ALT: 126 U/L DA / AST: 213 U/L / GGT: x           CARDIAC MARKERS ( 05 Oct 2021 08:54 )  x     / x     / 4256 U/L / x     / x      CARDIAC MARKERS ( 04 Oct 2021 14:06 )  x     / x     / 6065 U/L / x     / x      CARDIAC MARKERS ( 04 Oct 2021 03:47 )  x     / x     / 6948 U/L / x     / x              CAPILLARY BLOOD GLUCOSE      RADIOLOGY & ADDITIONAL TESTS:                   PGY-1 Progress Note discussed with attending    PAGER #: [[phone]] TILL 5:00 PM  PLEASE CONTACT ON CALL TEAM:  - On Call Team (Please refer to Apolinar) FROM 5:00 PM - 8:30PM  - Nightfloat Team FROM 8:30 -7:30 AM    CHIEF COMPLAINT & BRIEF HOSPITAL COURSE: Patient is 66 year old  Male morbidly obese with hx CAD s/p 5 stents ( last one 6 year ago )  on ASA/Plavix, HTN/HLD, DM, psoriatic arthritis, asthma presents with syncope after diarrhoea. Pt has hyperglycemia. Pt had a visit from grand kids who were having diarrhoea and pt thinks he caught same bug from kids. Pt was vomiting day prior admission 5-6 times and was not feeling well so didn't take his medications. Pt then felt dizzy and slipped to floor but never lost consciousness. Pt is morbidly obese so could not get up and spent night on floor. Pt daughter and girlfriend were calling in morning and pt crawled to phone and let them know that pt fell. EMS arrived and pt was found to have very high blood glucose. Pt usually takes insulins at home and has never bene admitted to hospital for icu. Patient was admitted to ICU for HHS with anion gap of 19 requiring Insulin and potassium drip.    ICU Course:    In ICU, patient was on Insulin drip and potassium drip for hyperglycemia and hypokalemia. Rhabdomyolysis was treated with gentle hydration given cardiac history. He was started on Heparin for DVT prophylaxis and Protonix for GI prophylaxis. Patient was bridged to subcutaneous insulin and was able to tolerate PO diet without any nausea or vomiting. He was treated with 50units of lantus at night and 7units admelog three times a day before meals. He continued to receive IVF for his rhabdomyolysis. His glucose and potassium levels normalized. Creatinine, creatine kinase, and troponin levels trended downward. Patient was seen by cardiology and got an echocardiogram. He is now stable for downgrade to the floors    INTERVAL HPI/OVERNIGHT EVENTS: no acute overnight events. Pt is denying any fever, chills, nausea, vomiting chest pain, SOB, abdominal pain, or dysuria.       REVIEW OF SYSTEMS:  negative unless stated above     Vital Signs Last 24 Hrs  T(C): 36.7 (05 Oct 2021 12:54), Max: 37.1 (04 Oct 2021 21:17)  T(F): 98.1 (05 Oct 2021 12:54), Max: 98.8 (04 Oct 2021 21:17)  HR: 90 (05 Oct 2021 12:54) (88 - 108)  BP: 142/91 (05 Oct 2021 12:54) (126/92 - 174/91)  BP(mean): 100 (04 Oct 2021 20:00) (94 - 108)  RR: 18 (05 Oct 2021 12:54) (17 - 23)  SpO2: 94% (05 Oct 2021 12:54) (92% - 100%)    PHYSICAL EXAMINATION:  GENERAL: NAD, obese male   HEAD:  Atraumatic, Normocephalic  EYES:  conjunctiva and sclera clear  NECK: Supple, No JVD, Normal thyroid  CHEST/LUNG: decrease breathe sounds in all fields,  No rales, rhonchi, wheezing, or rubs  HEART: Regular rate and rhythm; No murmurs, rubs, or gallops  ABDOMEN: Soft, Nontender, Nondistended; Bowel sounds present  NERVOUS SYSTEM:  Alert & Oriented X3,    EXTREMITIES:  2+ Peripheral Pulses, No clubbing, cyanosis, +1 b/l edema  SKIN: warm dry                          13.7   9.22  )-----------( 151      ( 05 Oct 2021 08:54 )             40.4     10-05    144  |  107  |  14  ----------------------------<  88  3.2<L>   |  27  |  0.92    Ca    7.7<L>      05 Oct 2021 08:54  Phos  3.0     10-05  Mg     1.4     10-05    TPro  6.3  /  Alb  2.7<L>  /  TBili  0.5  /  DBili  x   /  AST  213<H>  /  ALT  126<H>  /  AlkPhos  52  10-05    LIVER FUNCTIONS - ( 05 Oct 2021 08:54 )  Alb: 2.7 g/dL / Pro: 6.3 g/dL / ALK PHOS: 52 U/L / ALT: 126 U/L DA / AST: 213 U/L / GGT: x           CARDIAC MARKERS ( 05 Oct 2021 08:54 )  x     / x     / 4256 U/L / x     / x      CARDIAC MARKERS ( 04 Oct 2021 14:06 )  x     / x     / 6065 U/L / x     / x      CARDIAC MARKERS ( 04 Oct 2021 03:47 )  x     / x     / 6948 U/L / x     / x              CAPILLARY BLOOD GLUCOSE      RADIOLOGY & ADDITIONAL TESTS:

## 2021-10-05 NOTE — PROGRESS NOTE ADULT - ATTENDING COMMENTS
IMP: This is a 66 year old  Male morbidly obese with hx CAD s/p 5 stents ( last one 6 year ago )  on ASA/Plavix, HTN/HLD, DM, psoriatic arthritis, asthma presents with syncope after diarrhoea. Pt has hyperglycemia. Pt found to have blood glucose in 600s and admitted to icu for HONK requiring insulin gtt and q1h FS .       ASSESSMENT AND PLAN :     - HONK  - Asthma exacerbation  - Rhabdomyolysis  - Elevated troponin/ NSTEMI  - Hypokalemia  - Hyponatremia  - Pneumonia  - JAISON vs CKD   - Severe metabolic acidosis   - Morbid Obesity   - Electrolyte imbalance   - Viral gastro-enteritis       Plan   -insulin gtt d/c'ed   -started on lantus   -q FS with coverage   -Hypokalemia due to diarrhea   -correct K   -ivf slowly .. pat has 5 stents   -trend cpk.. trending down   -continue ivf  -Echo   -endo eval  -TSH  -diet   -cards eval   -monitor renal status   -albuterol   -BiPaP as needed   -hemodynamic monitoring .
IMP: This is a 66 year old  Male morbidly obese with hx CAD s/p 5 stents ( last one 6 year ago )  on ASA/Plavix, HTN/HLD, DM, psoriatic arthritis, asthma presents with syncope after diarrhoea. Pt has hyperglycemia. Pt found to have blood glucose in 600s and admitted to icu for HONK requiring insulin gtt and q1h FS .       ASSESSMENT AND PLAN :     - HONK  - Asthma exacerbation  - Rhabdomyolysis  - Elevated troponin/ NSTEMI  - Hypokalemia  - Hyponatremia  - Pneumonia  - JAISON vs CKD   - Severe metabolic acidosis   - Morbid Obesity   - Electrolyte imbalance   - Viral gastro-enteritis       Plan   -insulin gtt d/c'ed   -started on lantus   -q FS with coverage   -Hypokalemia due to diarrhea   -correct K   -ivf slowly .. pat has 5 stents   -trend cpk.. trending up   -continue ivf  -Echo   -endo eval  -TSH  -diet   -cards eval   -endo eval   -monitor renal status   -albuterol   -BiPaP as needed   -hemodynamic monitoring
discussed management  plan with house staff

## 2021-10-05 NOTE — PROGRESS NOTE ADULT - ASSESSMENT
Patient is 66 year old  Male morbidly obese with hx CAD s/p 5 stents ( last one 6 year ago )  on ASA/Plavix, HTN/HLD, DM, psoriatic arthritis, asthma presents with syncope after diarrhea. Patient was admitted to ICU for elevated blood glucose requiring insulin drip. Noted to have rhabdomyolysis with elevated CPK. CPK now downtrending while on IVF.

## 2021-10-05 NOTE — PROGRESS NOTE ADULT - SUBJECTIVE AND OBJECTIVE BOX
SUBJ:      MEDICATIONS  (STANDING):  acetaminophen   Tablet .. 650 milliGRAM(s) Oral every 6 hours  ALBUTerol    90 MICROgram(s) HFA Inhaler 1 Puff(s) Inhalation two times a day  clopidogrel Tablet 75 milliGRAM(s) Oral daily  heparin   Injectable 5000 Unit(s) SubCutaneous every 12 hours  insulin glargine Injectable (LANTUS) 50 Unit(s) SubCutaneous at bedtime  insulin lispro (ADMELOG) corrective regimen sliding scale   SubCutaneous Before meals and at bedtime  insulin lispro Injectable (ADMELOG) 7 Unit(s) SubCutaneous three times a day before meals  magnesium sulfate  IVPB 2 Gram(s) IV Intermittent once  metoprolol tartrate 12.5 milliGRAM(s) Oral two times a day  pantoprazole    Tablet 40 milliGRAM(s) Oral before breakfast  sodium chloride 0.9%. 1000 milliLiter(s) (150 mL/Hr) IV Continuous <Continuous>    MEDICATIONS  (PRN):  benzonatate 100 milliGRAM(s) Oral three times a day PRN Cough            Vital Signs Last 24 Hrs  T(C): 36.8 (05 Oct 2021 06:02), Max: 37.4 (04 Oct 2021 15:55)  T(F): 98.2 (05 Oct 2021 06:02), Max: 99.3 (04 Oct 2021 15:55)  HR: 91 (05 Oct 2021 06:02) (86 - 108)  BP: 174/91 (05 Oct 2021 06:02) (105/84 - 174/91)  BP(mean): 100 (04 Oct 2021 20:00) (90 - 108)  RR: 18 (05 Oct 2021 06:02) (16 - 25)  SpO2: 100% (05 Oct 2021 06:02) (92% - 100%)    REVIEW OF SYSTEMS:  CONSTITUTIONAL: No fever, weight loss, or fatigue  EYES: No eye pain, visual disturbances, or discharge  ENMT:  No difficulty hearing, tinnitus, vertigo; No sinus or throat pain  NECK: No pain or stiffness  RESPIRATORY: No cough, wheezing, chills or hemoptysis; No shortness of breath  CARDIOVASCULAR: No chest pain, palpitations, dizziness, or leg swelling  GASTROINTESTINAL: No abdominal or epigastric pain. No nausea, vomiting, or hematemesis; No diarrhea or constipation. No melena or hematochezia.  GENITOURINARY: No dysuria, frequency, hematuria, or incontinence  NEUROLOGICAL: No headaches, memory loss, loss of strength, numbness, or tremors  SKIN: No itching, burning, rashes, or lesions   LYMPH NODES: No enlarged glands  ENDOCRINE: No heat or cold intolerance; No hair loss  MUSCULOSKELETAL: No joint pain or swelling; No muscle, back, or extremity pain  PSYCHIATRIC: No depression, anxiety, mood swings, or difficulty sleeping  HEME/LYMPH: No easy bruising, or bleeding gums  ALLERY AND IMMUNOLOGIC: No hives or eczema    PHYSICAL EXAM:  · CONSTITUTIONAL:	Well-developed, well nourished    BMI-  · EYES:	EOMI; PERRL; no drainage or redness  · ENMT	No oral lesions; no gross abnormalities  · NECK:	No bruits; no thyromegaly or nodules  ·BACK:	No deformity or limitation of movement  ·RESPIRATORY:   airway patent; breath sounds equal; good air movement; respirations non-labored; clear to auscultation bilaterally; no chest wall tenderness; no intercostal retractions; no rales,rhonchi or wheeze  · CARDIOVASCULAR	regular rate and rhythm  no rub  no murmur  normal PMI  . GASTROINTESTINAL:  no distention; no masses palpable; bowel sounds normal; no rebound tenderness; no guarding; no rigidity; no organomegaly  · EXTREMITIES: No cyanosis, clubbing or edema  · VASCULAR: 	Equal and normal pulses (carotid, femoral, dorsalis pedis)  ·NEUROLOGICAL:   alert and oriented x 3; sensation intact; deep reflexes intact; cranial nerves intact; no spontaneous movement; superficial reflexes intact; normal strength  · SKIN:	No lesions; no rash  . LYMPH NODES:	No lymphadedenopathy  · MUSCULOSKELETAL:   No calf tenderness  no joint swelling	  TELEMETRY:    ECG:    TTE:    LABS:                        13.7   9.22  )-----------( 151      ( 05 Oct 2021 08:54 )             40.4     10-05    144  |  107  |  14  ----------------------------<  88  3.2<L>   |  27  |  0.92    Ca    7.7<L>      05 Oct 2021 08:54  Phos  3.0     10-05  Mg     1.4     10-05    TPro  6.3  /  Alb  2.7<L>  /  TBili  0.5  /  DBili  x   /  AST  213<H>  /  ALT  126<H>  /  AlkPhos  52  10-05    CARDIAC MARKERS ( 05 Oct 2021 08:54 )  x     / x     / 4256 U/L / x     / x      CARDIAC MARKERS ( 04 Oct 2021 14:06 )  x     / x     / 6065 U/L / x     / x      CARDIAC MARKERS ( 04 Oct 2021 03:47 )  x     / x     / 6948 U/L / x     / x      CARDIAC MARKERS ( 03 Oct 2021 12:02 )  x     / x     / 57774 U/L / x     / x            Creatinine Trend: 0.92<--, 0.92<--, 1.17<--, 1.38<--, 1.81<--, 2.58<--  I&O's Summary    04 Oct 2021 07:01  -  05 Oct 2021 07:00  --------------------------------------------------------  IN: 2400 mL / OUT: 3650 mL / NET: -1250 mL      BNP  RADIOLOGY & ADDITIONAL STUDIES:    IMPRESSION AND PLAN:         SUBJ: Patient downgraded from ICU yesterday. Patient states he is feeling much better and is ready to be discharged. CPK continues to downtrend and is in 4000s this morning. ECHO done yesterday showing grossly low-normal LV size and systolic function. Normal RV size and function. Patient currently denies any new complaints or concerns.     MEDICATIONS  (STANDING):  acetaminophen   Tablet .. 650 milliGRAM(s) Oral every 6 hours  ALBUTerol    90 MICROgram(s) HFA Inhaler 1 Puff(s) Inhalation two times a day  clopidogrel Tablet 75 milliGRAM(s) Oral daily  heparin   Injectable 5000 Unit(s) SubCutaneous every 12 hours  insulin glargine Injectable (LANTUS) 50 Unit(s) SubCutaneous at bedtime  insulin lispro (ADMELOG) corrective regimen sliding scale   SubCutaneous Before meals and at bedtime  insulin lispro Injectable (ADMELOG) 7 Unit(s) SubCutaneous three times a day before meals  magnesium sulfate  IVPB 2 Gram(s) IV Intermittent once  metoprolol tartrate 12.5 milliGRAM(s) Oral two times a day  pantoprazole    Tablet 40 milliGRAM(s) Oral before breakfast  sodium chloride 0.9%. 1000 milliLiter(s) (150 mL/Hr) IV Continuous <Continuous>    MEDICATIONS  (PRN):  benzonatate 100 milliGRAM(s) Oral three times a day PRN Cough    Vital Signs Last 24 Hrs  T(C): 36.8 (05 Oct 2021 06:02), Max: 37.4 (04 Oct 2021 15:55)  T(F): 98.2 (05 Oct 2021 06:02), Max: 99.3 (04 Oct 2021 15:55)  HR: 91 (05 Oct 2021 06:02) (86 - 108)  BP: 174/91 (05 Oct 2021 06:02) (105/84 - 174/91)  BP(mean): 100 (04 Oct 2021 20:00) (90 - 108)  RR: 18 (05 Oct 2021 06:02) (16 - 25)  SpO2: 100% (05 Oct 2021 06:02) (92% - 100%)    REVIEW OF SYSTEMS:  CONSTITUTIONAL: No fever   EYES: No eye pain or visual disturbances   ENMT: No difficulty hearing  NECK: No pain   RESPIRATORY: No cough or wheezing; No shortness of breath  CARDIOVASCULAR: No chest pain   GASTROINTESTINAL: No abdominal or epigastric pain. No nausea, vomiting, or hematemesis; No diarrhea or constipation  GENITOURINARY: No dysuria  NEUROLOGICAL: No headaches  SKIN: No itching or rashes   MUSCULOSKELETAL: No joint pain; lower extremity swelling   PSYCHIATRIC: No depression    PHYSICAL EXAM:  · CONSTITUTIONAL: Well-developed, obese male seen sitting in chair and in no acute distress   · EYES: EOMI; PERRL; no drainage or redness  · ENMT	No oral lesions; no gross abnormalities  · NECK:	supple   · BACK: No deformity  RESPIRATORY: airway patent; breath sounds equal; good air movement; respirations non-labored; clear to auscultation bilaterally  · CARDIOVASCULAR: regular rate and rhythm; no murmurs  . GASTROINTESTINAL: no masses palpable; bowel sounds normal; no tenderness to palpation   · EXTREMITIES: bilateral lower extremity edema   ·NEUROLOGICAL: alert and oriented x 3  · SKIN: No lesions; no rash  · MUSCULOSKELETAL: No calf tenderness    ECHO: < from: Transthoracic Echocardiogram (10.04.21 @ 06:58) >  CONCLUSIONS:  Technically difficult study with poor acoustic windows    1. Posterior mitral valve leaflet appears tethered.  Mild  posterior mitral annular calcification. Probable mild  mitral regurgitation.  2. Not well visualized, calcified aortic valve with  probable normal opening (Vmax 1.6 m/s) however some degree  of aortic stenosis cannot be ruled out.  Mild aortic  regurgitation.  3. Mild concentric left ventricular hypertrophy. May be  underseastimated due to inability to visualize the  endocardium.  4. Endocardium not well visualized; grossly low-normalleft  ventricular systolic function. Not all LV wall segments  were seen.  5. Right atrium not well seen.  6. Normal right ventricular size and systolic function  (TAPSE 2.7 cm).  7. Tricuspid valve not well seen.  8. Pulmonic valve not well seen.  9. Trivial pericardial effusion is seen. A prominent  epicardial fat pad is noted.  Patient refused echo-contrast.  *** Compared with echocardiogram report of 4/15/2018,  quality of current study is suboptimal and grossly no  significant changes noted.    < end of copied text >      LABS:                        13.7   9.22  )-----------( 151      ( 05 Oct 2021 08:54 )             40.4     10-05    144  |  107  |  14  ----------------------------<  88  3.2<L>   |  27  |  0.92    Ca    7.7<L>      05 Oct 2021 08:54  Phos  3.0     10-05  Mg     1.4     10-05    TPro  6.3  /  Alb  2.7<L>  /  TBili  0.5  /  DBili  x   /  AST  213<H>  /  ALT  126<H>  /  AlkPhos  52  10-05    CARDIAC MARKERS ( 05 Oct 2021 08:54 )  x     / x     / 4256 U/L / x     / x      CARDIAC MARKERS ( 04 Oct 2021 14:06 )  x     / x     / 6065 U/L / x     / x      CARDIAC MARKERS ( 04 Oct 2021 03:47 )  x     / x     / 6948 U/L / x     / x      CARDIAC MARKERS ( 03 Oct 2021 12:02 )  x     / x     / 16754 U/L / x     / x          Creatinine Trend: 0.92<--, 0.92<--, 1.17<--, 1.38<--, 1.81<--, 2.58<--  I&O's Summary    04 Oct 2021 07:01  -  05 Oct 2021 07:00  --------------------------------------------------------  IN: 2400 mL / OUT: 3650 mL / NET: -1250 mL

## 2021-10-05 NOTE — PROGRESS NOTE ADULT - PROBLEM SELECTOR PLAN 1
Pt had multiple episodes of diarrhea, and was  not able to take his insulin   - states he takes Lantus 70 units after each meal  -presented with BS >600s and was admitted to ICU for insulin drip  - bridged to subcutaneous insulin 50units of lantus at night and 7units admelog three times a day before meals  - fasting BS 89  - held Lantus and admelog  -c/w SS  - Endocrinologist Dr. De Los Santos was consulted Pt had multiple episodes of diarrhea, and was  not able to take his insulin   - states he takes Lantus 70 units after each meal and nothing at bedtime   -presented with BS >600s and was admitted to ICU for insulin drip  - was bridged to subcutaneous insulin 50units of lantus at night and 7units admelog three times a day before meals  - fasting BS 89 this morning   - held Lantus and admelog  -c/w SS  - Endocrinologist Dr. De Los Santos was consulted

## 2021-10-06 ENCOUNTER — TRANSCRIPTION ENCOUNTER (OUTPATIENT)
Age: 66
End: 2021-10-06

## 2021-10-06 VITALS
DIASTOLIC BLOOD PRESSURE: 90 MMHG | SYSTOLIC BLOOD PRESSURE: 176 MMHG | TEMPERATURE: 98 F | HEART RATE: 90 BPM | OXYGEN SATURATION: 95 % | RESPIRATION RATE: 18 BRPM

## 2021-10-06 LAB
ALBUMIN SERPL ELPH-MCNC: 2.9 G/DL — LOW (ref 3.5–5)
ALP SERPL-CCNC: 65 U/L — SIGNIFICANT CHANGE UP (ref 40–120)
ALT FLD-CCNC: 117 U/L DA — HIGH (ref 10–60)
ANION GAP SERPL CALC-SCNC: 6 MMOL/L — SIGNIFICANT CHANGE UP (ref 5–17)
AST SERPL-CCNC: 180 U/L — HIGH (ref 10–40)
BASOPHILS # BLD AUTO: 0.03 K/UL — SIGNIFICANT CHANGE UP (ref 0–0.2)
BASOPHILS NFR BLD AUTO: 0.3 % — SIGNIFICANT CHANGE UP (ref 0–2)
BILIRUB SERPL-MCNC: 0.5 MG/DL — SIGNIFICANT CHANGE UP (ref 0.2–1.2)
BUN SERPL-MCNC: 15 MG/DL — SIGNIFICANT CHANGE UP (ref 7–18)
CALCIUM SERPL-MCNC: 8 MG/DL — LOW (ref 8.4–10.5)
CHLORIDE SERPL-SCNC: 105 MMOL/L — SIGNIFICANT CHANGE UP (ref 96–108)
CK SERPL-CCNC: 3055 U/L — HIGH (ref 35–232)
CO2 SERPL-SCNC: 32 MMOL/L — HIGH (ref 22–31)
CREAT SERPL-MCNC: 0.97 MG/DL — SIGNIFICANT CHANGE UP (ref 0.5–1.3)
EOSINOPHIL # BLD AUTO: 0.11 K/UL — SIGNIFICANT CHANGE UP (ref 0–0.5)
EOSINOPHIL NFR BLD AUTO: 1.2 % — SIGNIFICANT CHANGE UP (ref 0–6)
GLUCOSE BLDC GLUCOMTR-MCNC: 141 MG/DL — HIGH (ref 70–99)
GLUCOSE BLDC GLUCOMTR-MCNC: 171 MG/DL — HIGH (ref 70–99)
GLUCOSE BLDC GLUCOMTR-MCNC: 426 MG/DL — HIGH (ref 70–99)
GLUCOSE BLDC GLUCOMTR-MCNC: 89 MG/DL — SIGNIFICANT CHANGE UP (ref 70–99)
GLUCOSE SERPL-MCNC: 84 MG/DL — SIGNIFICANT CHANGE UP (ref 70–99)
HCT VFR BLD CALC: 38.8 % — LOW (ref 39–50)
HGB BLD-MCNC: 13.7 G/DL — SIGNIFICANT CHANGE UP (ref 13–17)
IMM GRANULOCYTES NFR BLD AUTO: 1.1 % — SIGNIFICANT CHANGE UP (ref 0–1.5)
LYMPHOCYTES # BLD AUTO: 1.17 K/UL — SIGNIFICANT CHANGE UP (ref 1–3.3)
LYMPHOCYTES # BLD AUTO: 12.9 % — LOW (ref 13–44)
MAGNESIUM SERPL-MCNC: 1.7 MG/DL — SIGNIFICANT CHANGE UP (ref 1.6–2.6)
MCHC RBC-ENTMCNC: 30.1 PG — SIGNIFICANT CHANGE UP (ref 27–34)
MCHC RBC-ENTMCNC: 35.3 GM/DL — SIGNIFICANT CHANGE UP (ref 32–36)
MCV RBC AUTO: 85.3 FL — SIGNIFICANT CHANGE UP (ref 80–100)
MONOCYTES # BLD AUTO: 0.9 K/UL — SIGNIFICANT CHANGE UP (ref 0–0.9)
MONOCYTES NFR BLD AUTO: 9.9 % — SIGNIFICANT CHANGE UP (ref 2–14)
NEUTROPHILS # BLD AUTO: 6.77 K/UL — SIGNIFICANT CHANGE UP (ref 1.8–7.4)
NEUTROPHILS NFR BLD AUTO: 74.6 % — SIGNIFICANT CHANGE UP (ref 43–77)
NRBC # BLD: 0 /100 WBCS — SIGNIFICANT CHANGE UP (ref 0–0)
PHOSPHATE SERPL-MCNC: 2.8 MG/DL — SIGNIFICANT CHANGE UP (ref 2.5–4.5)
PLATELET # BLD AUTO: 165 K/UL — SIGNIFICANT CHANGE UP (ref 150–400)
POTASSIUM SERPL-MCNC: 3.2 MMOL/L — LOW (ref 3.5–5.3)
POTASSIUM SERPL-SCNC: 3.2 MMOL/L — LOW (ref 3.5–5.3)
PROT SERPL-MCNC: 6.5 G/DL — SIGNIFICANT CHANGE UP (ref 6–8.3)
RBC # BLD: 4.55 M/UL — SIGNIFICANT CHANGE UP (ref 4.2–5.8)
RBC # FLD: 13.2 % — SIGNIFICANT CHANGE UP (ref 10.3–14.5)
SODIUM SERPL-SCNC: 143 MMOL/L — SIGNIFICANT CHANGE UP (ref 135–145)
WBC # BLD: 9.08 K/UL — SIGNIFICANT CHANGE UP (ref 3.8–10.5)
WBC # FLD AUTO: 9.08 K/UL — SIGNIFICANT CHANGE UP (ref 3.8–10.5)

## 2021-10-06 RX ORDER — FEBUXOSTAT 40 MG/1
0 TABLET ORAL
Qty: 0 | Refills: 0 | DISCHARGE

## 2021-10-06 RX ORDER — TRAVOPROST 0.04 MG/ML
0 SOLUTION/ DROPS OPHTHALMIC
Qty: 0 | Refills: 6 | DISCHARGE

## 2021-10-06 RX ORDER — DEXTROMETHORPHAN HYDROBROMIDE AND PROMETHAZINE HYDROCHLORIDE 15; 6.25 MG/5ML; MG/5ML
0 SYRUP ORAL
Qty: 0 | Refills: 0 | DISCHARGE

## 2021-10-06 RX ORDER — TOBRAMYCIN AND DEXAMETHASONE 1; 3 MG/ML; MG/ML
0 SUSPENSION/ DROPS OPHTHALMIC
Qty: 0 | Refills: 1 | DISCHARGE

## 2021-10-06 RX ORDER — OLMESARTAN MEDOXOMIL 5 MG/1
0 TABLET, FILM COATED ORAL
Qty: 0 | Refills: 0 | DISCHARGE

## 2021-10-06 RX ORDER — POTASSIUM CHLORIDE 20 MEQ
40 PACKET (EA) ORAL EVERY 4 HOURS
Refills: 0 | Status: DISCONTINUED | OUTPATIENT
Start: 2021-10-06 | End: 2021-10-06

## 2021-10-06 RX ORDER — HYDROCORTISONE 1 %
0 OINTMENT (GRAM) TOPICAL
Qty: 0 | Refills: 0 | DISCHARGE

## 2021-10-06 RX ORDER — CYCLOBENZAPRINE HYDROCHLORIDE 10 MG/1
0 TABLET, FILM COATED ORAL
Qty: 0 | Refills: 0 | DISCHARGE

## 2021-10-06 RX ORDER — POTASSIUM CHLORIDE 20 MEQ
0 PACKET (EA) ORAL
Qty: 0 | Refills: 0 | DISCHARGE

## 2021-10-06 RX ORDER — SOD,AMMONIUM,POTASSIUM LACTATE
0 CREAM (GRAM) TOPICAL
Qty: 0 | Refills: 0 | DISCHARGE

## 2021-10-06 RX ORDER — ATORVASTATIN CALCIUM 80 MG/1
0 TABLET, FILM COATED ORAL
Qty: 0 | Refills: 0 | DISCHARGE

## 2021-10-06 RX ORDER — INSULIN LISPRO 100/ML
1 VIAL (ML) SUBCUTANEOUS
Qty: 90 | Refills: 0
Start: 2021-10-06 | End: 2021-11-04

## 2021-10-06 RX ORDER — METFORMIN HYDROCHLORIDE 850 MG/1
1 TABLET ORAL
Qty: 60 | Refills: 0
Start: 2021-10-06 | End: 2021-11-04

## 2021-10-06 RX ORDER — POTASSIUM CHLORIDE 20 MEQ
40 PACKET (EA) ORAL ONCE
Refills: 0 | Status: DISCONTINUED | OUTPATIENT
Start: 2021-10-06 | End: 2021-10-06

## 2021-10-06 RX ORDER — METFORMIN HYDROCHLORIDE 850 MG/1
2 TABLET ORAL
Qty: 0 | Refills: 0 | DISCHARGE

## 2021-10-06 RX ORDER — INSULIN GLULISINE 100 [IU]/ML
7 INJECTION, SOLUTION SUBCUTANEOUS
Qty: 0 | Refills: 0 | DISCHARGE

## 2021-10-06 RX ORDER — METFORMIN HYDROCHLORIDE 850 MG/1
2 TABLET ORAL
Qty: 60 | Refills: 0 | DISCHARGE
Start: 2021-10-06 | End: 2021-11-04

## 2021-10-06 RX ORDER — FLUTICASONE PROPIONATE 50 MCG
0 SPRAY, SUSPENSION NASAL
Qty: 0 | Refills: 0 | DISCHARGE

## 2021-10-06 RX ORDER — DULAGLUTIDE 4.5 MG/.5ML
0 INJECTION, SOLUTION SUBCUTANEOUS
Qty: 0 | Refills: 0 | DISCHARGE

## 2021-10-06 RX ORDER — KETOCONAZOLE 20 MG/G
0 AEROSOL, FOAM TOPICAL
Qty: 0 | Refills: 0 | DISCHARGE

## 2021-10-06 RX ADMIN — Medication 650 MILLIGRAM(S): at 11:28

## 2021-10-06 RX ADMIN — PANTOPRAZOLE SODIUM 40 MILLIGRAM(S): 20 TABLET, DELAYED RELEASE ORAL at 06:36

## 2021-10-06 RX ADMIN — ALBUTEROL 1 PUFF(S): 90 AEROSOL, METERED ORAL at 11:28

## 2021-10-06 RX ADMIN — CLOPIDOGREL BISULFATE 75 MILLIGRAM(S): 75 TABLET, FILM COATED ORAL at 11:28

## 2021-10-06 RX ADMIN — Medication 1: at 11:59

## 2021-10-06 RX ADMIN — Medication 12.5 MILLIGRAM(S): at 06:36

## 2021-10-06 RX ADMIN — Medication 650 MILLIGRAM(S): at 12:28

## 2021-10-06 RX ADMIN — Medication 650 MILLIGRAM(S): at 06:36

## 2021-10-06 RX ADMIN — Medication 40 MILLIEQUIVALENT(S): at 11:28

## 2021-10-06 RX ADMIN — HEPARIN SODIUM 5000 UNIT(S): 5000 INJECTION INTRAVENOUS; SUBCUTANEOUS at 06:36

## 2021-10-06 RX ADMIN — Medication 650 MILLIGRAM(S): at 07:10

## 2021-10-06 NOTE — PROGRESS NOTE ADULT - SUBJECTIVE AND OBJECTIVE BOX
SUBJ: Patient seen and examined at bedside. Patient is doing well this morning. Denies any complaints or concerns at this time. CPK continues to downtrend.       MEDICATIONS  (STANDING):  acetaminophen   Tablet .. 650 milliGRAM(s) Oral every 6 hours  ALBUTerol    90 MICROgram(s) HFA Inhaler 1 Puff(s) Inhalation two times a day  clopidogrel Tablet 75 milliGRAM(s) Oral daily  heparin   Injectable 5000 Unit(s) SubCutaneous every 12 hours  insulin lispro (ADMELOG) corrective regimen sliding scale   SubCutaneous Before meals and at bedtime  metoprolol tartrate 12.5 milliGRAM(s) Oral two times a day  pantoprazole    Tablet 40 milliGRAM(s) Oral before breakfast  potassium chloride   Powder 40 milliEquivalent(s) Oral every 4 hours  sodium chloride 0.9%. 1000 milliLiter(s) (150 mL/Hr) IV Continuous <Continuous>    MEDICATIONS  (PRN):  benzonatate 100 milliGRAM(s) Oral three times a day PRN Cough      Vital Signs Last 24 Hrs  T(C): 36.4 (06 Oct 2021 05:47), Max: 37 (05 Oct 2021 21:46)  T(F): 97.5 (06 Oct 2021 05:47), Max: 98.6 (05 Oct 2021 21:46)  HR: 89 (06 Oct 2021 05:47) (88 - 94)  BP: 163/95 (06 Oct 2021 05:47) (142/91 - 163/95)  RR: 18 (06 Oct 2021 05:47) (18 - 20)  SpO2: 95% (06 Oct 2021 05:47) (94% - 95%)    REVIEW OF SYSTEMS:  CONSTITUTIONAL: No fever or fatigue  EYES: No visual disturbances  ENMT: No difficulty hearing  NECK: No pain   RESPIRATORY: No cough or wheezing; No shortness of breath  CARDIOVASCULAR: No chest pain   GASTROINTESTINAL: No abdominal or epigastric pain. No nausea, vomiting; No diarrhea or constipation  GENITOURINARY: No dysuria  NEUROLOGICAL: No headaches   SKIN: No itching or burning   MUSCULOSKELETAL: No joint pain or swelling  PSYCHIATRIC: No depression or anxiety     PHYSICAL EXAM:  · CONSTITUTIONAL: Well-developed, well nourished; obese male  · EYES: EOMI; PERRL; no drainage or redness  · ENMT: No gross abnormalities  · NECK:	supple   ·BACK: No deformity   ·RESPIRATORY: clear to auscultation; no wheezing  · CARDIOVASCULAR: regular rate and rhythm; no murmurs   . GASTROINTESTINAL: no masses palpable; bowel sounds normal; no tenderness   · EXTREMITIES: mild bilateral lower extremity edema  · NEUROLOGICAL: alert and oriented x 3; no new deficits   · SKIN: no lesions; no rash  - MUSCULOSKELETAL: No calf tenderness      LABS:                        13.7   9.08  )-----------( 165      ( 06 Oct 2021 07:48 )             38.8     10-06    143  |  105  |  15  ----------------------------<  84  3.2<L>   |  32<H>  |  0.97    Ca    8.0<L>      06 Oct 2021 07:48  Phos  2.8     10-06  Mg     1.7     10-06    TPro  6.5  /  Alb  2.9<L>  /  TBili  0.5  /  DBili  x   /  AST  180<H>  /  ALT  117<H>  /  AlkPhos  65  10-06    CARDIAC MARKERS ( 06 Oct 2021 07:48 )  x     / x     / 3055 U/L / x     / x      CARDIAC MARKERS ( 05 Oct 2021 08:54 )  x     / x     / 4256 U/L / x     / x      CARDIAC MARKERS ( 04 Oct 2021 14:06 )  x     / x     / 6065 U/L / x     / x          Creatinine Trend: 0.97<--, 0.92<--, 0.92<--, 1.17<--, 1.38<--, 1.81<--  I&O's Summary

## 2021-10-06 NOTE — DISCHARGE NOTE PROVIDER - NSDCCPCAREPLAN_GEN_ALL_CORE_FT
PRINCIPAL DISCHARGE DIAGNOSIS  Diagnosis: Hyperosmolar hyperglycemic state (HHS)  Assessment and Plan of Treatment:       SECONDARY DISCHARGE DIAGNOSES  Diagnosis: Rhabdomyolysis  Assessment and Plan of Treatment:     Diagnosis: HTN (hypertension)  Assessment and Plan of Treatment:     Diagnosis: CAD (coronary artery disease)  Assessment and Plan of Treatment:      PRINCIPAL DISCHARGE DIAGNOSIS  Diagnosis: Hyperosmolar hyperglycemic state (HHS)  Assessment and Plan of Treatment: You were found to have elevated blood sugars. This is due to you recently starting predisone and because you missed your dose of insulin. You were admitted to ICU and required an insulin drip to bring your sugars down. Your blood sugar is now well controlled. You will be discharged on Metformin 500mg twice a day, and Insulin. Check your blood sugars before each meal and at bedtime and adminsiter the correct units of insulin based on the following range:   1 Unit(s) if Glucose 151 - 200  2 Unit(s) if Glucose 201 - 250  3 Unit(s) if Glucose 251 - 300  4 Unit(s) if Glucose 301 - 350  5 Unit(s) if Glucose 351 - 400  6 Unit(s) if Glucose Greater Than 400  Please follow up with your endocrinologist in one week.      SECONDARY DISCHARGE DIAGNOSES  Diagnosis: Rhabdomyolysis  Assessment and Plan of Treatment: Due to your fall, You were found to have Rhabdomyolysis. This is a condition where there is a breakdown of muscle tissue that results in a release of proteins in your blood. The levels have been decreasing with intravenous fluids. Please drink adequate amount of fluids and you will need to follow up with Dr. Milo De Los Santos in one week to repeat your CK protein levels.    Diagnosis: HTN (hypertension)  Assessment and Plan of Treatment: You have high blood pressure. Please continue to taking your medications as prescribed. Please measure your blood pressure at least once daily. Please follow up with your primary care provider within one week of your discharge.      Diagnosis: HLD (hyperlipidemia)  Assessment and Plan of Treatment: You have hyperlipidemia. You take Atorvastatin for this. This medication cause further cause muscle damage in the setting of Rhabdomyolysis. Please do not take this medication until your PCP clears you to take it.    Diagnosis: CAD (coronary artery disease)  Assessment and Plan of Treatment: You have a history of coronary artery disease. You had elevated troponins which is  a protein found in your heart muscle. There was concern for damage to the heart, but the levels started to go down. You had an echo of your heart that was unchanged frome previous studies. It showed  grossly low-normal LV size and systolic function withNormal RV size and function.   Please continue to your metoprolol and Plavix. Follow u with your PCP in one week.

## 2021-10-06 NOTE — CONSULT NOTE ADULT - ASSESSMENT
Pt Patient is 66 year old  Male morbidly obese with hx CAD s/p 5 stents ( last one 6 year ago )  on ASA/Plavix, HTN/HLD, DM, psoriatic arthritis, asthma presents with syncope after diarrhoea. Pt has hyperglycemia.. Pt admits to taking premeal insulin about 70 units tid with hypoglycemia on and off. Not on basal insulin. Pt was started on prednisone for arthritis flare up last few weeks.   
Patient is 66 year old  Male morbidly obese with hx CAD s/p 5 stents ( last one 6 year ago )  on ASA/Plavix, HTN/HLD, DM, psoriatic arthritis, asthma presents with syncope after diarrhea. Patient was admitted to ICU for elevated blood glucose requiring insulin drip. Noted to have rhabdomyolysis with elevated CPK. CPK now downtrending while on IVF.

## 2021-10-06 NOTE — DISCHARGE NOTE PROVIDER - HOSPITAL COURSE
Patient is 66 year old  Male morbidly obese with hx CAD s/p 5 stents ( last one 6 year ago )  on ASA/Plavix, HTN/HLD, DM, psoriatic arthritis, asthma presents with syncope after diarrhea. Patient was admitted to ICU for elevated blood glucose requiring insulin drip. Noted to have rhabdomyolysis with elevated CPK    Diabetes: Presented with hyperglycemia with blood sugars >600. Was recently started on prednisone for a gout flare per patient.  Patient was admitted to ICU, and was on Insulin drip and potassium drip for hyperglycemia and hypokalemia. Patient was bridged to 50units of lantus at night and 7units admelog three times a day before meals. Currently Blood sugars are ranging in the 80s. Will discharge patient on Sliding scale and metformin 500mg BID. Already has appointment to Dr. Rapp his endocrinologist next week.    Rhabdomyolysis: presented after a fall, with elevated CK levels. CK levels downtrending with IVF. Will follow up with Dr. De Los Santos in one week to repeat CK levels.     CAD: patient had elevated troponins which have now downtrended; likely due to demand ischemia. Echo showed grossly low-normal LV size and systolic function. Normal RV size and function. Continue to take Plavix    HTN: continue metoprolol    HLD: hold statin dose until CK levels improve Patient is 66 year old  Male morbidly obese with hx CAD s/p 5 stents ( last one 6 year ago )  on ASA/Plavix, HTN/HLD, DM, psoriatic arthritis, asthma presents with syncope after diarrhea. Patient was admitted to ICU for elevated blood glucose requiring insulin drip. Noted to have rhabdomyolysis with elevated CPK    Diabetes: Presented with hyperglycemia with blood sugars >600. Was recently started on prednisone for a gout flare per patient.  Patient was admitted to ICU, and was on Insulin drip and potassium drip for hyperglycemia and hypokalemia. Patient was bridged to 50units of lantus at night and 7units admelog three times a day before meals. Currently Blood sugars are ranging in the 80s. Will discharge patient on Sliding scale and metformin 500mg BID. Already has appointment to Dr. Rapp his endocrinologist next week.    Rhabdomyolysis: presented after a fall, with elevated CK levels. CK levels downtrending with IVF. Will follow up with Dr. De Los Santos in one week to repeat CK levels.     CAD: patient had elevated troponins which have now downtrended; likely due to demand ischemia. Echo showed grossly low-normal LV size and systolic function. Normal RV size and function. Continue to take Plavix    HTN: continue to take home medications     HLD: hold statin dose until CK levels improve

## 2021-10-06 NOTE — CONSULT NOTE ADULT - PROBLEM SELECTOR RECOMMENDATION 2
patient with history of CAD with 5 stents placed 6 years ago   patient had elevated troponins which have now downtrended; likely due to demand ischemia   continue with plavix   f/u ECHO
improving with ivf

## 2021-10-06 NOTE — PROGRESS NOTE ADULT - PROBLEM SELECTOR PLAN 5
continue DASH diet  resume statin once CPK levels improve
continue DASH diet  resume statin once CPK levels improve
will hold stain until CPK levels improve

## 2021-10-06 NOTE — DISCHARGE NOTE PROVIDER - PROVIDER TOKENS
PROVIDER:[TOKEN:[[id number]],FOLLOWUP:[1 week]],FREE:[LAST:[De Los Santos],FIRST:[Milo],PHONE:[phone]],FAX:[phone]    -],ADDRESS:[[phone] [place] [address]],FOLLOWUP:[1 week]]

## 2021-10-06 NOTE — PROGRESS NOTE ADULT - PROBLEM SELECTOR PLAN 4
continue lantus and SSI  monitor BS  further care as per primary team
continue lantus and SSI  monitor BS  further care as per primary team
continue metoprolol  monitor BP and adjust meds as needed  continue DASH diet.

## 2021-10-06 NOTE — DISCHARGE NOTE PROVIDER - NSDCFUSCHEDAPPT_GEN_ALL_CORE_FT
RC PERDOMO ; 10/08/2021 ; [facility] [address]  [facility] PERDOMO ; 10/26/2021 ; [facility] [address] [facility]

## 2021-10-06 NOTE — CONSULT NOTE ADULT - SUBJECTIVE AND OBJECTIVE BOX
Patient is a 66y old  Male who presents with a chief complaint of [facility] (05 Oct 2021 20:37)      HPI:  Pt Patient is 66 year old  Male morbidly obese with hx CAD s/p 5 stents ( last one 6 year ago )  on ASA/Plavix, HTN/HLD, DM, psoriatic arthritis, asthma presents with syncope after diarrhoea. Pt has hyperglycemia. Pt had a visit from grand kids who were having diarrhoea and pt thinks he cought same bug from kids. Pt was vomiting yesterday 5-6 times and was not feeling weel so didnt took his medications. Pt then felt dizzy and slipped to floor but never lost consciousness. Pt is morbidly obese so could not get up and spent night on floor. Pt daughter and girlfriend were calling in morning and pt crawled to phone and let them know that pt fell. EMS arrived and pt was found to have very high blood glucose. Pt usually takes insulins at home and has never bene admitted to hospital for icu. Pt had last year episode of tia in 2020. Pt admits to taking premeal insulin about 70 units tid with hypoglycemia on and off. Not on basal insulin. Pt was started on prednisone for arthritis flare up last few weeks.     PAST MEDICAL & SURGICAL HISTORY:  Diabetes  Type 2, A1c: 10.2    History of hypertension    Asthma    Lupus    Psoriatic arthritis    Former smoker    HTN (hypertension)    HLD (hyperlipidemia)    TONYA (obstructive sleep apnea)    CAD (coronary artery disease)    H/O knee surgery    Carpal tunnel syndrome    History of rotator cuff surgery    H/O umbilical hernia repair  mesh    Presence of stent in coronary artery  5 stents           MEDICATIONS  (STANDING):  acetaminophen   Tablet .. 650 milliGRAM(s) Oral every 6 hours  ALBUTerol    90 MICROgram(s) HFA Inhaler 1 Puff(s) Inhalation two times a day  clopidogrel Tablet 75 milliGRAM(s) Oral daily  heparin   Injectable 5000 Unit(s) SubCutaneous every 12 hours  insulin lispro (ADMELOG) corrective regimen sliding scale   SubCutaneous Before meals and at bedtime  metoprolol tartrate 12.5 milliGRAM(s) Oral two times a day  pantoprazole    Tablet 40 milliGRAM(s) Oral before breakfast  potassium chloride   Powder 40 milliEquivalent(s) Oral every 4 hours  sodium chloride 0.9%. 1000 milliLiter(s) (150 mL/Hr) IV Continuous <Continuous>    MEDICATIONS  (PRN):  benzonatate 100 milliGRAM(s) Oral three times a day PRN Cough      FAMILY HISTORY:  Family history of heart disease        SOCIAL HISTORY:      REVIEW OF SYSTEMS:  CONSTITUTIONAL: No fever, weight loss, or fatigue  EYES: No eye pain, visual disturbances, or discharge  ENT:  No difficulty hearing, tinnitus, vertigo; No sinus or throat pain  NECK: No pain or stiffness  RESPIRATORY: No cough, wheezing, chills or hemoptysis; No Shortness of Breath  CARDIOVASCULAR: No chest pain, palpitations, passing out, dizziness, or leg swelling  GASTROINTESTINAL: No abdominal or epigastric pain. No nausea, vomiting, or hematemesis; No diarrhea or constipation. No melena or hematochezia.  GENITOURINARY: No dysuria, frequency, hematuria, or incontinence  NEUROLOGICAL: No headaches, memory loss, loss of strength, numbness, or tremors  SKIN: No itching, burning, rashes, or lesions   LYMPH Nodes: No enlarged glands  ENDOCRINE: No heat or cold intolerance; No hair loss  MUSCULOSKELETAL: No joint pain or swelling; No muscle, back, or extremity pain  PSYCHIATRIC: No depression, anxiety, mood swings, or difficulty sleeping  HEME/LYMPH: No easy bruising, or bleeding gums  ALLERGY AND IMMUNOLOGIC: No hives or eczema	        Vital Signs Last 24 Hrs  T(C): 36.4 (06 Oct 2021 05:47), Max: 37 (05 Oct 2021 21:46)  T(F): 97.5 (06 Oct 2021 05:47), Max: 98.6 (05 Oct 2021 21:46)  HR: 89 (06 Oct 2021 05:47) (88 - 94)  BP: 163/95 (06 Oct 2021 05:47) (142/91 - 163/95)  BP(mean): --  RR: 18 (06 Oct 2021 05:47) (18 - 20)  SpO2: 95% (06 Oct 2021 05:47) (94% - 95%)      Constitutional:    HEENT: nad    Neck:  No JVD, bruits or thyromegaly    Respiratory:  Clear without rales or rhonchi    Cardiovascular:  RR without murmur, rub or gallop.    Gastrointestinal: Soft without hepatosplenomegaly.    Extremities: without cyanosis, clubbing or edema.    Neurological:  Oriented   x    3  . No gross sensory or motor defects.        LABS:                        13.7   9.08  )-----------( 165      ( 06 Oct 2021 07:48 )             38.8     10-06    143  |  105  |  15  ----------------------------<  84  3.2<L>   |  32<H>  |  0.97    Ca    8.0<L>      06 Oct 2021 07:48  Phos  2.8     10-06  Mg     1.7     10-06    TPro  6.5  /  Alb  2.9<L>  /  TBili  0.5  /  DBili  x   /  AST  180<H>  /  ALT  117<H>  /  AlkPhos  65  10-06    CARDIAC MARKERS ( 06 Oct 2021 07:48 )  x     / x     / 3055 U/L / x     / x      CARDIAC MARKERS ( 05 Oct 2021 08:54 )  x     / x     / 4256 U/L / x     / x      CARDIAC MARKERS ( 04 Oct 2021 14:06 )  x     / x     / 6065 U/L / x     / x              CAPILLARY BLOOD GLUCOSE      POCT Blood Glucose.: 89 mg/dL (06 Oct 2021 08:16)  POCT Blood Glucose.: 97 mg/dL (05 Oct 2021 21:59)  POCT Blood Glucose.: 88 mg/dL (05 Oct 2021 16:53)  POCT Blood Glucose.: 121 mg/dL (05 Oct 2021 11:43)      RADIOLOGY & ADDITIONAL STUDIES:

## 2021-10-06 NOTE — DISCHARGE NOTE PROVIDER - CARE PROVIDER_API CALL
Nuha Rapp)  EndocrinologyMetabDiabetes  [address], [address]  [place] [address]  Phone: [phone]  Fax: [phone]  Follow Up Time: 1 week    Milo De Los Santos  [address] [place] [address]  Phone: [phone]  Fax: (   )    -  Follow Up Time: 1 week

## 2021-10-06 NOTE — CONSULT NOTE ADULT - PROBLEM SELECTOR RECOMMENDATION 9
s/p hyperosmolar state likely due to prednisone  now fsg good control   on admelog prn only  rec metformin 500 bid upon d/c  low dose admelog prn   f/u with pvt endo as outpt  fsg ac and hs
patient noted to have elevated [place] [address]  may likely be related to fall at home  CPK now downtrending on IVF  continue IVF   recommend to obtain ECHO to evaluate LV function   monitor closely for signs of fluid overload

## 2021-10-06 NOTE — PROGRESS NOTE ADULT - REASON FOR ADMISSION
[facility]

## 2021-10-06 NOTE — DISCHARGE NOTE PROVIDER - NSDCMRMEDTOKEN_GEN_ALL_CORE_FT
alfuzosin 10 mg oral tablet, extended release: 1 tab(s) orally once a day, home  AMMONIUM LACTATE 12% CREAM:   ATORVASTATIN 40 MG TABLET:   BENZONATATE 100 MG CAPSULE:   BETAMETHASONE DP 0.05% CRM:   CLOPIDOGREL 75 MG TABLET:   CYCLOBENZAPRINE 10 MG TABLET:   FEBUXOSTAT 40 MG TABLET:   FLUTICASONE PROP 50 MCG SPRAY:   HYDROCHLOROTHIAZIDE 12.5 MG CP:   HYDROCORTISONE 2.5% CREAM:   hydroxychloroquine 200 mg oral tablet: 1 tab(s) orally 2 times a day   HOSP/home  insulin lispro 100 units/mL injectable solution:  1 Unit(s) if Glucose 151 - 200 2 Unit(s) if Glucose 201 - 250 3 Unit(s) if Glucose 251 - 300 4 Unit(s) if Glucose 301 - 350 5 Unit(s) if Glucose 351 - 400 6 Unit(s) if Glucose Greater Than 400   KETOCONAZOLE 2% SHAMPOO:   KLOR-CON M20 TABLET:   MELOXICAM 7.5MG TABLETS: TAKE 1 TABLET BY MOUTH TWICE DAILY  metFORMIN 500 mg oral tablet: 1 tab(s) orally 2 times a day   METOLAZONE 5MG TABLETS: TAKE 1 TABLET BY MOUTH EVERY DAY  metoprolol tartrate 50 mg oral tablet: 1 tab(s) orally once a day, Home/ hosp  PANTOPRAZOLE 40MG TABLETS: TAKE 1 TABLET BY MOUTH EVERY DAY  potassium chloride 20 mEq oral tablet, extended release: 1 tab(s) orally 2 times a day, home  PREDNISONE 2.5 MG TABLET:   PREGABALIN 50 MG CAPSULE:   PROMETHAZINE-DM 6.25-15 MG/5ML:   SILDENAFIL 100 MG TABLET:   TOBRADEX OPHTH OINT 3.5GM: MAHSA A SMALL AMOUNT AA OF THE LEFT EYELID QID  TRAVOPROST 0.004% DROPS 2.5ML: INSTILL 1 DROP IN LEFT EYE EVERY NIGHT AT BEDTIME   alfuzosin 10 mg oral tablet, extended release: 1 tab(s) orally once a day, home  AMMONIUM LACTATE 12% CREAM:   ATORVASTATIN 40 MG TABLET:   BETAMETHASONE DP 0.05% CRM:   CLOPIDOGREL 75 MG TABLET:   CYCLOBENZAPRINE 10 MG TABLET:   FEBUXOSTAT 40 MG TABLET:   FLUTICASONE PROP 50 MCG SPRAY:   HYDROCHLOROTHIAZIDE 12.5 MG CP:   HYDROCORTISONE 2.5% CREAM:   hydroxychloroquine 200 mg oral tablet: 1 tab(s) orally 2 times a day   HOSP/home  insulin lispro 100 units/mL injectable solution:  1 Unit(s) if Glucose 151 - 200 2 Unit(s) if Glucose 201 - 250 3 Unit(s) if Glucose 251 - 300 4 Unit(s) if Glucose 301 - 350 5 Unit(s) if Glucose 351 - 400 6 Unit(s) if Glucose Greater Than 400   KETOCONAZOLE 2% SHAMPOO:   KLOR-CON M20 TABLET:   MELOXICAM 7.5MG TABLETS: TAKE 1 TABLET BY MOUTH TWICE DAILY  metFORMIN 500 mg oral tablet: 1 tab(s) orally 2 times a day   METOLAZONE 5MG TABLETS: TAKE 1 TABLET BY MOUTH EVERY DAY  metoprolol tartrate 50 mg oral tablet: 1 tab(s) orally once a day, Home/ hosp  PANTOPRAZOLE 40MG TABLETS: TAKE 1 TABLET BY MOUTH EVERY DAY  potassium chloride 20 mEq oral tablet, extended release: 1 tab(s) orally 2 times a day, home  PREDNISONE 2.5 MG TABLET:   PREGABALIN 50 MG CAPSULE:   PROMETHAZINE-DM 6.25-15 MG/5ML:   SILDENAFIL 100 MG TABLET:   TOBRADEX OPHTH OINT 3.5GM: MAHSA A SMALL AMOUNT AA OF THE LEFT EYELID QID  TRAVOPROST 0.004% DROPS 2.5ML: INSTILL 1 DROP IN LEFT EYE EVERY NIGHT AT BEDTIME   alfuzosin 10 mg oral tablet, extended release: 1 tab(s) orally once a day, home  CLOPIDOGREL 75 MG TABLET:   HYDROCHLOROTHIAZIDE 12.5 MG CP:   insulin lispro 100 units/mL injectable solution:  1 Unit(s) if Glucose 151 - 200 2 Unit(s) if Glucose 201 - 250 3 Unit(s) if Glucose 251 - 300 4 Unit(s) if Glucose 301 - 350 5 Unit(s) if Glucose 351 - 400 6 Unit(s) if Glucose Greater Than 400   MELOXICAM 7.5MG TABLETS: TAKE 1 TABLET BY MOUTH TWICE DAILY  metFORMIN 500 mg oral tablet: 1 tab(s) orally 2 times a day   METOLAZONE 5MG TABLETS: TAKE 1 TABLET BY MOUTH EVERY DAY  metoprolol tartrate 50 mg oral tablet: 1 tab(s) orally once a day, Home/ hosp  PANTOPRAZOLE 40MG TABLETS: TAKE 1 TABLET BY MOUTH EVERY DAY  PREGABALIN 50 MG CAPSULE:

## 2021-10-06 NOTE — PROGRESS NOTE ADULT - PROBLEM SELECTOR PLAN 1
patient noted to have elevated CP[place] to [address]  may likely be related to fall at home  CPK now downtrending on IVF and is 3055 this morning   ECHO noted; grossly low-normal LV size and function    monitor closely for signs of fluid overload  ok from cardio standpoint for dc home; encourage adequate hydration   patient should follow up as outpatient with PCP to recheck CPK levels

## 2021-10-06 NOTE — DISCHARGE NOTE NURSING/CASE MANAGEMENT/SOCIAL WORK - PATIENT PORTAL LINK FT
You can access the FollowMyHealth Patient Portal offered by [facility] by registering at the following website: [email or web address]. By joining [employer]’s FollowMyHealth portal, you will also be able to view your health information using other applications (apps) compatible with our system.

## 2021-10-07 LAB
CULTURE RESULTS: SIGNIFICANT CHANGE UP
SPECIMEN SOURCE: SIGNIFICANT CHANGE UP

## 2021-10-08 ENCOUNTER — APPOINTMENT (OUTPATIENT)
Dept: PULMONOLOGY | Facility: CLINIC | Age: 66
End: 2021-10-08

## 2021-10-08 LAB
CULTURE RESULTS: SIGNIFICANT CHANGE UP
SPECIMEN SOURCE: SIGNIFICANT CHANGE UP

## 2021-10-15 ENCOUNTER — NON-APPOINTMENT (OUTPATIENT)
Age: 66
End: 2021-10-15

## 2021-10-26 ENCOUNTER — APPOINTMENT (OUTPATIENT)
Dept: RHEUMATOLOGY | Facility: CLINIC | Age: 66
End: 2021-10-26

## 2021-10-26 PROCEDURE — 84300 ASSAY OF URINE SODIUM: CPT

## 2021-10-26 PROCEDURE — 84100 ASSAY OF PHOSPHORUS: CPT

## 2021-10-26 PROCEDURE — 93306 TTE W/DOPPLER COMPLETE: CPT

## 2021-10-26 PROCEDURE — 84145 PROCALCITONIN (PCT): CPT

## 2021-10-26 PROCEDURE — 82962 GLUCOSE BLOOD TEST: CPT

## 2021-10-26 PROCEDURE — 96375 TX/PRO/DX INJ NEW DRUG ADDON: CPT

## 2021-10-26 PROCEDURE — 82570 ASSAY OF URINE CREATININE: CPT

## 2021-10-26 PROCEDURE — 96376 TX/PRO/DX INJ SAME DRUG ADON: CPT

## 2021-10-26 PROCEDURE — 71045 X-RAY EXAM CHEST 1 VIEW: CPT

## 2021-10-26 PROCEDURE — 84484 ASSAY OF TROPONIN QUANT: CPT

## 2021-10-26 PROCEDURE — 96374 THER/PROPH/DIAG INJ IV PUSH: CPT

## 2021-10-26 PROCEDURE — 97161 PT EVAL LOW COMPLEX 20 MIN: CPT

## 2021-10-26 PROCEDURE — 36415 COLL VENOUS BLD VENIPUNCTURE: CPT

## 2021-10-26 PROCEDURE — 83605 ASSAY OF LACTIC ACID: CPT

## 2021-10-26 PROCEDURE — 80048 BASIC METABOLIC PNL TOTAL CA: CPT

## 2021-10-26 PROCEDURE — 82010 KETONE BODYS QUAN: CPT

## 2021-10-26 PROCEDURE — 80053 COMPREHEN METABOLIC PANEL: CPT

## 2021-10-26 PROCEDURE — 73030 X-RAY EXAM OF SHOULDER: CPT

## 2021-10-26 PROCEDURE — 85025 COMPLETE CBC W/AUTO DIFF WBC: CPT

## 2021-10-26 PROCEDURE — 86769 SARS-COV-2 COVID-19 ANTIBODY: CPT

## 2021-10-26 PROCEDURE — 82803 BLOOD GASES ANY COMBINATION: CPT

## 2021-10-26 PROCEDURE — 83036 HEMOGLOBIN GLYCOSYLATED A1C: CPT

## 2021-10-26 PROCEDURE — 94640 AIRWAY INHALATION TREATMENT: CPT

## 2021-10-26 PROCEDURE — 83735 ASSAY OF MAGNESIUM: CPT

## 2021-10-26 PROCEDURE — 87040 BLOOD CULTURE FOR BACTERIA: CPT

## 2021-10-26 PROCEDURE — 99291 CRITICAL CARE FIRST HOUR: CPT | Mod: 25

## 2021-10-26 PROCEDURE — 85027 COMPLETE CBC AUTOMATED: CPT

## 2021-10-26 PROCEDURE — 87635 SARS-COV-2 COVID-19 AMP PRB: CPT

## 2021-10-26 PROCEDURE — 82553 CREATINE MB FRACTION: CPT

## 2021-10-26 PROCEDURE — 82550 ASSAY OF CK (CPK): CPT

## 2021-12-02 ENCOUNTER — APPOINTMENT (OUTPATIENT)
Dept: RHEUMATOLOGY | Facility: CLINIC | Age: 66
End: 2021-12-02

## 2021-12-09 ENCOUNTER — APPOINTMENT (OUTPATIENT)
Dept: RHEUMATOLOGY | Facility: CLINIC | Age: 66
End: 2021-12-09
Payer: MEDICARE

## 2021-12-09 ENCOUNTER — APPOINTMENT (OUTPATIENT)
Dept: RHEUMATOLOGY | Facility: CLINIC | Age: 66
End: 2021-12-09

## 2021-12-09 VITALS
RESPIRATION RATE: 17 BRPM | HEIGHT: 62 IN | HEART RATE: 101 BPM | OXYGEN SATURATION: 95 % | TEMPERATURE: 97 F | BODY MASS INDEX: 49.69 KG/M2 | DIASTOLIC BLOOD PRESSURE: 90 MMHG | SYSTOLIC BLOOD PRESSURE: 153 MMHG | WEIGHT: 270 LBS

## 2021-12-09 PROCEDURE — 99214 OFFICE O/P EST MOD 30 MIN: CPT

## 2021-12-09 RX ORDER — CYCLOBENZAPRINE HYDROCHLORIDE 10 MG/1
10 TABLET, FILM COATED ORAL
Qty: 10 | Refills: 0 | Status: DISCONTINUED | COMMUNITY
Start: 2021-06-25 | End: 2021-12-09

## 2021-12-09 RX ORDER — PREDNISONE 2.5 MG/1
2.5 TABLET ORAL
Qty: 70 | Refills: 0 | Status: DISCONTINUED | COMMUNITY
Start: 2021-09-20 | End: 2021-12-09

## 2021-12-10 ENCOUNTER — NON-APPOINTMENT (OUTPATIENT)
Age: 66
End: 2021-12-10

## 2021-12-10 LAB
25(OH)D3 SERPL-MCNC: 30.8 NG/ML
ALBUMIN SERPL ELPH-MCNC: 4 G/DL
ALP BLD-CCNC: 73 U/L
ALT SERPL-CCNC: 28 U/L
ANION GAP SERPL CALC-SCNC: 14 MMOL/L
AST SERPL-CCNC: 18 U/L
BASOPHILS # BLD AUTO: 0.06 K/UL
BASOPHILS NFR BLD AUTO: 0.6 %
BILIRUB SERPL-MCNC: 0.3 MG/DL
BUN SERPL-MCNC: 16 MG/DL
C3 SERPL-MCNC: 190 MG/DL
C4 SERPL-MCNC: 42 MG/DL
CALCIUM SERPL-MCNC: 9.2 MG/DL
CHLORIDE SERPL-SCNC: 100 MMOL/L
CO2 SERPL-SCNC: 26 MMOL/L
CREAT SERPL-MCNC: 1.03 MG/DL
EOSINOPHIL # BLD AUTO: 0.19 K/UL
EOSINOPHIL NFR BLD AUTO: 1.7 %
ERYTHROCYTE [SEDIMENTATION RATE] IN BLOOD BY WESTERGREN METHOD: 21 MM/HR
GLUCOSE SERPL-MCNC: 169 MG/DL
HCT VFR BLD CALC: 46 %
HGB BLD-MCNC: 15.1 G/DL
IMM GRANULOCYTES NFR BLD AUTO: 0.8 %
LYMPHOCYTES # BLD AUTO: 1.67 K/UL
LYMPHOCYTES NFR BLD AUTO: 15.4 %
MAN DIFF?: NORMAL
MCHC RBC-ENTMCNC: 29.4 PG
MCHC RBC-ENTMCNC: 32.8 GM/DL
MCV RBC AUTO: 89.7 FL
MONOCYTES # BLD AUTO: 1.06 K/UL
MONOCYTES NFR BLD AUTO: 9.8 %
NEUTROPHILS # BLD AUTO: 7.79 K/UL
NEUTROPHILS NFR BLD AUTO: 71.7 %
PLATELET # BLD AUTO: 181 K/UL
POTASSIUM SERPL-SCNC: 3.6 MMOL/L
PROT SERPL-MCNC: 6.6 G/DL
RBC # BLD: 5.13 M/UL
RBC # FLD: 13.1 %
SODIUM SERPL-SCNC: 140 MMOL/L
URATE SERPL-MCNC: 7 MG/DL
WBC # FLD AUTO: 10.86 K/UL

## 2021-12-10 NOTE — PROGRESS NOTE ADULT - PROBLEM SELECTOR PLAN 3
December 10, 2021       Elijah Boles MD  [address]  [address]  [facility] [address]  Via Fax: [phone]      Patient: Mariza Chery   YOB: 1939   Date of Visit: 12/10/2021       Dear Dr. Boles:    Thank you for referring Mariza Chery to me for evaluation. Below are my notes for this visit with her.    If you have questions, please do not hesitate to call me. I look forward to following your patient along with you.      Sincerely,        Vikas Falcon MD        CC: No Recipients  Vikas Falcon MD  12/10/2021 11:43 AM  Signed              [facility]  Lipid Clinic Visit    Mariza Chery  : 1939  PCP: Elijah Boles MD    Chief complaint:  Chief Complaint   Patient presents with   • Hyperlipidemia       History of Present Illness:  Mariza Chery is a 82 year old woman with PMHx of hypertension and coronary disease.  She denies having any angina but has had some episodes of dizziness.  She has had a couple of falls but no syncope and she is fully awake when she has the falls usually due to slight loss of balance.  She denies any presyncope.      PMHx:  Past Medical History:   Diagnosis Date   • Arthritis    • GERD (gastroesophageal reflux disease)    • Hyperlipidemia    • Hypertension    • Hypothyroidism        PSHx:  Past Surgical History:   Procedure Laterality Date   • Ptca with stent      LAD,RT posterior descending artery,and prox RCA and PTCA/Stent x8 stents total       Family Hx:  No family history of premature atherosclerosis    Social Hx:  she  reports that she has never smoked. She has never used smokeless tobacco. She reports previous alcohol use. She reports that she does not use drugs.    Allergies:  ALLERGIES:  No Known Allergies    Medications:  Current Outpatient Medications   Medication Sig Dispense Refill   • isosorbide mononitrate (IMDUR) 60 MG 24 hr tablet TAKE 1 TABLET BY MOUTH EVERY DAY 90 tablet 1   • losartan  (COZAAR) 100 MG tablet Take 1 tablet by mouth daily. 90 tablet 3   • clopidogrel (PLAVIX) 75 MG tablet Take 1 tablet by mouth daily. 90 tablet 3   • furosemide (Lasix) 40 MG tablet Take 1 tablet by mouth daily. 90 tablet 3   • atorvastatin (LIPITOR) 20 MG tablet Take 1 tablet by mouth at bedtime. 90 tablet 3   • metoPROLOL tartrate (LOPRESSOR) 50 MG tablet Take 0.5 tablets by mouth 2 times daily. 180 tablet 3   • amoxicillin (AMOXIL) 500 MG capsule Take 4 tablets 1 hour prior to each episode of dental work     • aspirin 81 MG tablet      • Cholecalciferol (VITAMIN D3) 1000 units capsule Take 1 tablet by mouth.     • folic acid (FOLATE) 1 MG tablet Take 1 mg by mouth.     • levothyroxine (SYNTHROID, LEVOTHROID) 75 MCG tablet Take 75 mcg by mouth.     • magnesium oxide 250 MG tablet Take 250 mg by mouth.     • Thiamine HCl (VITAMIN B-1) 50 MG tablet Take 100 mg by mouth.     • vitamin E (E-400) 400 UNIT capsule        No current facility-administered medications for this visit.       Review of Systems:  Review of Systems   Constitutional: Negative for chills, fever, weight gain and weight loss.   HENT: Negative for hearing loss.    Eyes:        Patient denies significant visual changes   Cardiovascular: Negative for chest pain and claudication.        Negative except for what's indicated in HPI   Respiratory: Negative for cough and hemoptysis.    Hematologic/Lymphatic: Does not bruise/bleed easily.   Skin: Negative for rash and suspicious lesions.   Musculoskeletal: Negative for arthritis.   Gastrointestinal: Negative for hematochezia and melena.   Genitourinary: Negative for hematuria.   Neurological:        No localized deficits   Allergic/Immunologic: Negative for environmental allergies.        No new food allergies       Physical Exam:  Visit Vitals  BP (!) 148/78   Pulse 70   Ht 4' 8\" (1.422 m)   Wt 68.5 kg (151 lb)   BMI 33.85 kg/m²       Gen: no acute distress , well nourished, looks appropriate for  age  Neuro: alert and oriented x 3, Reflexes normal and symmetrical. Normal motor strength  HEENT: No JVD, PERRLA, no lymphadenopathy  CV: Normal S1, S2, regular rhythm, no murmur or gallop, PMI nondisplaced. Peripheral pulses 4 +  Pulm: Normal breath sounds bilaterally, normal excursion  GI: soft, non-tender, non-distended. No masses or hepatosplenomegally  Ext: warm, well perfused, no edema  Skin: warm, dry, no rashes      Labs:  No visits with results within 1 Year(s) from this visit.   Latest known visit with results is:   Clinical Abstract on 09/11/2019   Component Date Value Ref Range Status   • CHOLESTEROL 09/06/2019 149   Final   • TRIGLYCERIDE 09/06/2019 115   Final   • HDL 09/06/2019 66   Final   • CALCULATED LDL 09/06/2019 60   Final   • CALCULATED NON HDL 09/06/2019 83   Final   • Glucose 09/06/2019 98   Final   • Sodium 09/06/2019 131   Final   • Potassium 09/06/2019 4.3   Final   • Chloride 09/06/2019 93   Final   • BUN 09/06/2019 11   Final   • Creatinine 09/06/2019 0.7   Final   • CALCIUM 09/06/2019 9.1   Final   • TOTAL BILIRUBIN 09/06/2019 0.6   Final   • AST/SGOT 09/06/2019 20   Final   • ALT/SGPT 09/06/2019 6   Final   • ALK PHOSPHATASE 09/06/2019 74   Final   • TOTAL PROTEIN 09/06/2019 6.6   Final   • Albumin 09/06/2019 4.2   Final   ]        Data:  No results found for this or any previous visit.    Problem List  Patient Active Problem List   Diagnosis   • Pure hypercholesterolemia   • History of PTCA   • Hypertension, benign   • CAD (coronary artery disease)       Visit Diagnosis:  Diagnoses and all orders for this visit:  Pure hypercholesterolemia  History of PTCA  Hypertension, benign  Coronary artery disease involving native coronary artery of native heart without angina pectoris        Assessment/Plan: 1.  Hypertension today with poor control.  Her home blood pressures are in the 140s systolic  2.  Remote history of angioplasty with no recurrent angina  3.  Dyslipidemia.  Plan: We will add  amlodipine 5 mg daily and have her to continue her home blood pressure checks.  Tentatively I will see her in 6 months.  I will see her sooner if any new issues arise.              Vikas Falcon MD [facility]  12/10/21                continue metoprolol  monitor BP and adjust meds as needed  continue DASH diet

## 2021-12-11 LAB — DSDNA AB SER-ACNC: 23 IU/ML

## 2022-03-03 ENCOUNTER — APPOINTMENT (OUTPATIENT)
Dept: PULMONOLOGY | Facility: CLINIC | Age: 67
End: 2022-03-03
Payer: MEDICARE

## 2022-03-03 VITALS — HEART RATE: 93 BPM | TEMPERATURE: 98 F | OXYGEN SATURATION: 93 %

## 2022-03-03 DIAGNOSIS — J45.41 ACUTE BRONCHITIS, UNSPECIFIED: ICD-10-CM

## 2022-03-03 DIAGNOSIS — Z91.81 HISTORY OF FALLING: ICD-10-CM

## 2022-03-03 DIAGNOSIS — J20.9 ACUTE BRONCHITIS, UNSPECIFIED: ICD-10-CM

## 2022-03-03 PROCEDURE — 71046 X-RAY EXAM CHEST 2 VIEWS: CPT

## 2022-03-03 PROCEDURE — 99214 OFFICE O/P EST MOD 30 MIN: CPT | Mod: 25

## 2022-03-03 RX ORDER — DOXYCYCLINE HYCLATE 100 MG/1
100 TABLET ORAL
Qty: 10 | Refills: 0 | Status: COMPLETED | COMMUNITY
Start: 2019-10-23 | End: 2022-03-03

## 2022-03-03 RX ORDER — PROMETHAZINE HYDROCHLORIDE 6.25 MG/5ML
6.25 SOLUTION ORAL 4 TIMES DAILY
Qty: 300 | Refills: 0 | Status: COMPLETED | COMMUNITY
Start: 2019-10-23 | End: 2022-03-03

## 2022-03-03 RX ORDER — FLUTICASONE FUROATE, UMECLIDINIUM BROMIDE AND VILANTEROL TRIFENATATE 100; 62.5; 25 UG/1; UG/1; UG/1
100-62.5-25 POWDER RESPIRATORY (INHALATION) DAILY
Qty: 1 | Refills: 5 | Status: COMPLETED | COMMUNITY
Start: 2018-09-19 | End: 2022-03-03

## 2022-03-03 RX ORDER — MIRTAZAPINE 15 MG/1
15 TABLET, FILM COATED ORAL
Qty: 30 | Refills: 1 | Status: COMPLETED | COMMUNITY
Start: 2019-12-06 | End: 2022-03-03

## 2022-03-03 RX ORDER — THIAMINE HCL 100 MG
500 TABLET ORAL
Refills: 0 | Status: COMPLETED | COMMUNITY
End: 2022-03-03

## 2022-03-03 RX ORDER — TRAMADOL HYDROCHLORIDE 50 MG/1
50 TABLET, COATED ORAL
Qty: 180 | Refills: 1 | Status: COMPLETED | COMMUNITY
Start: 2021-12-09 | End: 2022-03-03

## 2022-03-03 RX ORDER — BUDESONIDE AND FORMOTEROL FUMARATE DIHYDRATE 160; 4.5 UG/1; UG/1
160-4.5 AEROSOL RESPIRATORY (INHALATION) TWICE DAILY
Qty: 1 | Refills: 3 | Status: COMPLETED | COMMUNITY
Start: 2020-07-13 | End: 2022-03-03

## 2022-03-05 PROBLEM — J20.9 MODERATE PERSISTENT ASTHMA WITH ACUTE BRONCHITIS AND ACUTE EXACERBATION: Status: ACTIVE | Noted: 2018-09-19

## 2022-03-05 NOTE — HISTORY OF PRESENT ILLNESS
[TextBox_4] : Sleep evaluation\par \par Has trouble staying asleep; wakes up to use the restroom about 5-6 times; feels exhausted this morning; has dry mouth upon awakening; denies headaches \par \par Having increasing episodes of productive cough, chest congestion, occasional mild SOB, which has become worse in the pat 2 weeks ; denies chest tightness\par \par Uses Albuterol on occasional; Trelegy did not work for him

## 2022-03-05 NOTE — DISCUSSION/SUMMARY
[FreeTextEntry1] : Cough likely secondary to bronchitis/asthma.  Excessive daytime sleepiness likely secondary to obstructive sleep apnea.  Morbid obesity.

## 2022-03-05 NOTE — PROCEDURE
[FreeTextEntry1] : \par  Xray Chest 2 Views PA/Lat             Final\par \par No Documents Attached\par \par \par \par \par   \par Chest x-ray PA and lateral views performed in my office today showed clear lungs, no evidence of infiltrates or pleural effusions. \par \par \par  Ordered by: BRIAN WELSH       Collected/Examined: 03Mar2022 10:58AM       \par Verification Required       Stage: Final       \par  Performed at: In Office       Performed by: BRIAN WELSH       Resulted: 03Mar2022 10:58AM       Last Updated: 03Mar2022 10:58AM

## 2022-03-05 NOTE — ASSESSMENT
[FreeTextEntry1] : Start doxycycline for 5 days.\par Start albuterol via nebulizer 3 times daily\par Get home sleep study to reassess severity of obstructive sleep apnea.  Should home sleep study confirms TONYA, he will likely need inspire evaluation, since he refuses to wear APAP.\par Strongly advised him on dieting, exercise and weight loss.

## 2022-03-08 DIAGNOSIS — Z01.812 ENCOUNTER FOR PREPROCEDURAL LABORATORY EXAMINATION: ICD-10-CM

## 2022-03-13 ENCOUNTER — TRANSCRIPTION ENCOUNTER (OUTPATIENT)
Age: 67
End: 2022-03-13

## 2022-03-17 ENCOUNTER — APPOINTMENT (OUTPATIENT)
Dept: PULMONOLOGY | Facility: CLINIC | Age: 67
End: 2022-03-17

## 2022-04-28 ENCOUNTER — APPOINTMENT (OUTPATIENT)
Dept: RHEUMATOLOGY | Facility: CLINIC | Age: 67
End: 2022-04-28
Payer: MEDICARE

## 2022-04-28 VITALS
BODY MASS INDEX: 46.93 KG/M2 | HEIGHT: 62 IN | OXYGEN SATURATION: 94 % | SYSTOLIC BLOOD PRESSURE: 166 MMHG | WEIGHT: 255 LBS | DIASTOLIC BLOOD PRESSURE: 89 MMHG | HEART RATE: 103 BPM | TEMPERATURE: 97.3 F

## 2022-04-28 DIAGNOSIS — M35.9 SYSTEMIC INVOLVEMENT OF CONNECTIVE TISSUE, UNSPECIFIED: ICD-10-CM

## 2022-04-28 DIAGNOSIS — M25.512 PAIN IN LEFT SHOULDER: ICD-10-CM

## 2022-04-28 DIAGNOSIS — Z20.822 CONTACT WITH AND (SUSPECTED) EXPOSURE TO COVID-19: ICD-10-CM

## 2022-04-28 PROCEDURE — 99215 OFFICE O/P EST HI 40 MIN: CPT

## 2022-05-03 LAB
ALBUMIN SERPL ELPH-MCNC: 4.1 G/DL
ALP BLD-CCNC: 96 U/L
ALT SERPL-CCNC: 30 U/L
ANION GAP SERPL CALC-SCNC: 16 MMOL/L
APPEARANCE: CLEAR
AST SERPL-CCNC: 21 U/L
BACTERIA: NEGATIVE
BASOPHILS # BLD AUTO: 0.02 K/UL
BASOPHILS NFR BLD AUTO: 0.1 %
BILIRUB SERPL-MCNC: 0.2 MG/DL
BILIRUBIN URINE: NEGATIVE
BLOOD URINE: NEGATIVE
BUN SERPL-MCNC: 29 MG/DL
C3 SERPL-MCNC: 180 MG/DL
C4 SERPL-MCNC: 46 MG/DL
CALCIUM SERPL-MCNC: 9 MG/DL
CHLORIDE SERPL-SCNC: 100 MMOL/L
CO2 SERPL-SCNC: 21 MMOL/L
COLOR: NORMAL
COVID-19 NUCLEOCAPSID  GAM ANTIBODY INTERPRETATION: NEGATIVE
COVID-19 SPIKE DOMAIN ANTIBODY INTERPRETATION: POSITIVE
CREAT SERPL-MCNC: 1.17 MG/DL
CREAT SPEC-SCNC: 67 MG/DL
CREAT/PROT UR: 1.8 RATIO
DSDNA AB SER-ACNC: 24 IU/ML
EGFR: 68 ML/MIN/1.73M2
EOSINOPHIL # BLD AUTO: 0 K/UL
EOSINOPHIL NFR BLD AUTO: 0 %
ERYTHROCYTE [SEDIMENTATION RATE] IN BLOOD BY WESTERGREN METHOD: 27 MM/HR
GLUCOSE QUALITATIVE U: ABNORMAL
GLUCOSE SERPL-MCNC: 395 MG/DL
HCT VFR BLD CALC: 45.4 %
HGB BLD-MCNC: 14.7 G/DL
HYALINE CASTS: 0 /LPF
IMM GRANULOCYTES NFR BLD AUTO: 0.9 %
KETONES URINE: NEGATIVE
LEUKOCYTE ESTERASE URINE: NEGATIVE
LYMPHOCYTES # BLD AUTO: 0.85 K/UL
LYMPHOCYTES NFR BLD AUTO: 4.2 %
MAN DIFF?: NORMAL
MCHC RBC-ENTMCNC: 29.3 PG
MCHC RBC-ENTMCNC: 32.4 GM/DL
MCV RBC AUTO: 90.6 FL
MICROSCOPIC-UA: NORMAL
MONOCYTES # BLD AUTO: 1.14 K/UL
MONOCYTES NFR BLD AUTO: 5.6 %
NEUTROPHILS # BLD AUTO: 17.99 K/UL
NEUTROPHILS NFR BLD AUTO: 89.2 %
NITRITE URINE: NEGATIVE
PH URINE: 6
PLATELET # BLD AUTO: 256 K/UL
POTASSIUM SERPL-SCNC: 4.7 MMOL/L
PROT SERPL-MCNC: 7 G/DL
PROT UR-MCNC: 120 MG/DL
PROTEIN URINE: ABNORMAL
RBC # BLD: 5.01 M/UL
RBC # FLD: 13.2 %
RED BLOOD CELLS URINE: 1 /HPF
SARS-COV-2 AB SERPL IA-ACNC: 246 U/ML
SARS-COV-2 AB SERPL QL IA: 0.19 INDEX
SODIUM SERPL-SCNC: 137 MMOL/L
SPECIFIC GRAVITY URINE: 1.02
SQUAMOUS EPITHELIAL CELLS: 0 /HPF
URATE SERPL-MCNC: 6.2 MG/DL
UROBILINOGEN URINE: NORMAL
WBC # FLD AUTO: 20.19 K/UL
WHITE BLOOD CELLS URINE: 0 /HPF

## 2022-05-04 NOTE — DISCHARGE NOTE ADULT - NS AS ACTIVITY OBS
Showering allowed/Walking-Indoors allowed/Walking-Outdoors allowed/No Heavy lifting/straining
on phonation/Class IV (difficult) - the soft palate is not visible at all

## 2022-07-08 NOTE — PHYSICAL THERAPY INITIAL EVALUATION ADULT - PERTINENT HX OF CURRENT PROBLEM, REHAB EVAL
· Crest syndrome with pulmonary hypertension  · Continue supplemental oxygen as needed  · Sildenafil  · Would be appropriate for palliative care  · Family meeting on June 23--> continue medical directed care but DNR level established   · Will need ongoing conversations about realistic expectations and prognosis Hyperosmolar Non-Ketotic Coma (HONC); s/p fall at home - +right anterior shoulder ecchymosis, no fracture or dislocation noted on recent imaging

## 2022-07-29 ENCOUNTER — APPOINTMENT (OUTPATIENT)
Dept: RHEUMATOLOGY | Facility: CLINIC | Age: 67
End: 2022-07-29

## 2022-07-29 VITALS
HEIGHT: 62 IN | SYSTOLIC BLOOD PRESSURE: 145 MMHG | HEART RATE: 100 BPM | WEIGHT: 261 LBS | DIASTOLIC BLOOD PRESSURE: 93 MMHG | BODY MASS INDEX: 48.03 KG/M2 | TEMPERATURE: 97.3 F | OXYGEN SATURATION: 96 %

## 2022-07-29 PROCEDURE — 99215 OFFICE O/P EST HI 40 MIN: CPT

## 2022-08-01 ENCOUNTER — NON-APPOINTMENT (OUTPATIENT)
Age: 67
End: 2022-08-01

## 2022-08-01 ENCOUNTER — RX RENEWAL (OUTPATIENT)
Age: 67
End: 2022-08-01

## 2022-08-01 LAB
ALBUMIN SERPL ELPH-MCNC: 4.1 G/DL
ALP BLD-CCNC: 83 U/L
ALT SERPL-CCNC: 25 U/L
ANION GAP SERPL CALC-SCNC: 12 MMOL/L
APPEARANCE: CLEAR
AST SERPL-CCNC: 16 U/L
BACTERIA: NEGATIVE
BASOPHILS # BLD AUTO: 0.05 K/UL
BASOPHILS NFR BLD AUTO: 0.4 %
BILIRUB SERPL-MCNC: 0.2 MG/DL
BILIRUBIN URINE: NEGATIVE
BLOOD URINE: NEGATIVE
BUN SERPL-MCNC: 27 MG/DL
C3 SERPL-MCNC: 153 MG/DL
C4 SERPL-MCNC: 39 MG/DL
CALCIUM SERPL-MCNC: 9.1 MG/DL
CHLORIDE SERPL-SCNC: 103 MMOL/L
CO2 SERPL-SCNC: 26 MMOL/L
COLOR: NORMAL
CREAT SERPL-MCNC: 1.18 MG/DL
CREAT SPEC-SCNC: 98 MG/DL
CREAT/PROT UR: 1.1 RATIO
EGFR: 68 ML/MIN/1.73M2
EOSINOPHIL # BLD AUTO: 0.16 K/UL
EOSINOPHIL NFR BLD AUTO: 1.2 %
ERYTHROCYTE [SEDIMENTATION RATE] IN BLOOD BY WESTERGREN METHOD: 14 MM/HR
GLUCOSE QUALITATIVE U: NEGATIVE
GLUCOSE SERPL-MCNC: 169 MG/DL
HAV IGM SER QL: NONREACTIVE
HBV CORE IGG+IGM SER QL: NONREACTIVE
HBV CORE IGM SER QL: NONREACTIVE
HBV SURFACE AG SER QL: NONREACTIVE
HCT VFR BLD CALC: 44.4 %
HCV AB SER QL: NONREACTIVE
HCV S/CO RATIO: 0.1 S/CO
HGB BLD-MCNC: 15.1 G/DL
HYALINE CASTS: 1 /LPF
IMM GRANULOCYTES NFR BLD AUTO: 1.5 %
KETONES URINE: NEGATIVE
LEUKOCYTE ESTERASE URINE: NEGATIVE
LYMPHOCYTES # BLD AUTO: 1.74 K/UL
LYMPHOCYTES NFR BLD AUTO: 13.4 %
MAN DIFF?: NORMAL
MCHC RBC-ENTMCNC: 30.6 PG
MCHC RBC-ENTMCNC: 34 GM/DL
MCV RBC AUTO: 90.1 FL
MICROSCOPIC-UA: NORMAL
MONOCYTES # BLD AUTO: 1.26 K/UL
MONOCYTES NFR BLD AUTO: 9.7 %
NEUTROPHILS # BLD AUTO: 9.59 K/UL
NEUTROPHILS NFR BLD AUTO: 73.8 %
NITRITE URINE: NEGATIVE
PH URINE: 6
PLATELET # BLD AUTO: 215 K/UL
POTASSIUM SERPL-SCNC: 4.4 MMOL/L
PROT SERPL-MCNC: 6.4 G/DL
PROT UR-MCNC: 106 MG/DL
PROTEIN URINE: ABNORMAL
RBC # BLD: 4.93 M/UL
RBC # FLD: 13.2 %
RED BLOOD CELLS URINE: 2 /HPF
SODIUM SERPL-SCNC: 141 MMOL/L
SPECIFIC GRAVITY URINE: 1.02
SQUAMOUS EPITHELIAL CELLS: 0 /HPF
URATE SERPL-MCNC: 5.6 MG/DL
UROBILINOGEN URINE: NORMAL
WBC # FLD AUTO: 12.99 K/UL
WHITE BLOOD CELLS URINE: 1 /HPF

## 2022-08-02 ENCOUNTER — TRANSCRIPTION ENCOUNTER (OUTPATIENT)
Age: 67
End: 2022-08-02

## 2022-08-02 ENCOUNTER — NON-APPOINTMENT (OUTPATIENT)
Age: 67
End: 2022-08-02

## 2022-08-02 LAB
DSDNA AB SER-ACNC: 20 IU/ML
M TB IFN-G BLD-IMP: NEGATIVE
QUANTIFERON TB PLUS MITOGEN MINUS NIL: 1.21 IU/ML
QUANTIFERON TB PLUS NIL: 0.04 IU/ML
QUANTIFERON TB PLUS TB1 MINUS NIL: 0 IU/ML
QUANTIFERON TB PLUS TB2 MINUS NIL: 0 IU/ML

## 2022-08-03 ENCOUNTER — NON-APPOINTMENT (OUTPATIENT)
Age: 67
End: 2022-08-03

## 2022-08-04 ENCOUNTER — NON-APPOINTMENT (OUTPATIENT)
Age: 67
End: 2022-08-04

## 2022-08-08 ENCOUNTER — NON-APPOINTMENT (OUTPATIENT)
Age: 67
End: 2022-08-08

## 2022-08-09 ENCOUNTER — NON-APPOINTMENT (OUTPATIENT)
Age: 67
End: 2022-08-09

## 2022-08-14 ENCOUNTER — NON-APPOINTMENT (OUTPATIENT)
Age: 67
End: 2022-08-14

## 2022-08-15 ENCOUNTER — NON-APPOINTMENT (OUTPATIENT)
Age: 67
End: 2022-08-15

## 2022-08-22 ENCOUNTER — NON-APPOINTMENT (OUTPATIENT)
Age: 67
End: 2022-08-22

## 2022-09-05 ENCOUNTER — EMERGENCY (EMERGENCY)
Facility: HOSPITAL | Age: 67
LOS: 1 days | Discharge: HOME | End: 2022-09-05
Attending: EMERGENCY MEDICINE
Payer: MEDICARE

## 2022-09-05 VITALS
TEMPERATURE: 98 F | HEIGHT: 62 IN | OXYGEN SATURATION: 98 % | HEART RATE: 78 BPM | SYSTOLIC BLOOD PRESSURE: 156 MMHG | DIASTOLIC BLOOD PRESSURE: 78 MMHG | RESPIRATION RATE: 18 BRPM

## 2022-09-05 VITALS
DIASTOLIC BLOOD PRESSURE: 96 MMHG | OXYGEN SATURATION: 95 % | RESPIRATION RATE: 18 BRPM | HEART RATE: 94 BPM | SYSTOLIC BLOOD PRESSURE: 149 MMHG | TEMPERATURE: 98 F

## 2022-09-05 DIAGNOSIS — Z95.5 PRESENCE OF CORONARY ANGIOPLASTY IMPLANT AND GRAFT: Chronic | ICD-10-CM

## 2022-09-05 DIAGNOSIS — Z98.89 OTHER SPECIFIED POSTPROCEDURAL STATES: Chronic | ICD-10-CM

## 2022-09-05 DIAGNOSIS — G56.00 CARPAL TUNNEL SYNDROME, UNSPECIFIED UPPER LIMB: Chronic | ICD-10-CM

## 2022-09-05 LAB
ALBUMIN SERPL ELPH-MCNC: 3.3 G/DL — LOW (ref 3.5–5)
ALP SERPL-CCNC: 65 U/L — SIGNIFICANT CHANGE UP (ref 40–120)
ALT FLD-CCNC: 28 U/L DA — SIGNIFICANT CHANGE UP (ref 10–60)
ANION GAP SERPL CALC-SCNC: 9 MMOL/L — SIGNIFICANT CHANGE UP (ref 5–17)
AST SERPL-CCNC: 22 U/L — SIGNIFICANT CHANGE UP (ref 10–40)
BASOPHILS # BLD AUTO: 0.04 K/UL — SIGNIFICANT CHANGE UP (ref 0–0.2)
BASOPHILS NFR BLD AUTO: 0.3 % — SIGNIFICANT CHANGE UP (ref 0–2)
BILIRUB SERPL-MCNC: 0.6 MG/DL — SIGNIFICANT CHANGE UP (ref 0.2–1.2)
BUN SERPL-MCNC: 15 MG/DL — SIGNIFICANT CHANGE UP (ref 7–18)
CALCIUM SERPL-MCNC: 8.7 MG/DL — SIGNIFICANT CHANGE UP (ref 8.4–10.5)
CHLORIDE SERPL-SCNC: 103 MMOL/L — SIGNIFICANT CHANGE UP (ref 96–108)
CO2 SERPL-SCNC: 28 MMOL/L — SIGNIFICANT CHANGE UP (ref 22–31)
CREAT SERPL-MCNC: 1.13 MG/DL — SIGNIFICANT CHANGE UP (ref 0.5–1.3)
EGFR: 71 ML/MIN/1.73M2 — SIGNIFICANT CHANGE UP
EOSINOPHIL # BLD AUTO: 0.06 K/UL — SIGNIFICANT CHANGE UP (ref 0–0.5)
EOSINOPHIL NFR BLD AUTO: 0.5 % — SIGNIFICANT CHANGE UP (ref 0–6)
GLUCOSE SERPL-MCNC: 169 MG/DL — HIGH (ref 70–99)
HCT VFR BLD CALC: 41.3 % — SIGNIFICANT CHANGE UP (ref 39–50)
HGB BLD-MCNC: 13.8 G/DL — SIGNIFICANT CHANGE UP (ref 13–17)
IMM GRANULOCYTES NFR BLD AUTO: 0.6 % — SIGNIFICANT CHANGE UP (ref 0–1.5)
LIDOCAIN IGE QN: 48 U/L — LOW (ref 73–393)
LYMPHOCYTES # BLD AUTO: 1.1 K/UL — SIGNIFICANT CHANGE UP (ref 1–3.3)
LYMPHOCYTES # BLD AUTO: 8.9 % — LOW (ref 13–44)
MCHC RBC-ENTMCNC: 29.6 PG — SIGNIFICANT CHANGE UP (ref 27–34)
MCHC RBC-ENTMCNC: 33.4 GM/DL — SIGNIFICANT CHANGE UP (ref 32–36)
MCV RBC AUTO: 88.4 FL — SIGNIFICANT CHANGE UP (ref 80–100)
MONOCYTES # BLD AUTO: 1.35 K/UL — HIGH (ref 0–0.9)
MONOCYTES NFR BLD AUTO: 10.9 % — SIGNIFICANT CHANGE UP (ref 2–14)
NEUTROPHILS # BLD AUTO: 9.77 K/UL — HIGH (ref 1.8–7.4)
NEUTROPHILS NFR BLD AUTO: 78.8 % — HIGH (ref 43–77)
NRBC # BLD: 0 /100 WBCS — SIGNIFICANT CHANGE UP (ref 0–0)
PLATELET # BLD AUTO: 179 K/UL — SIGNIFICANT CHANGE UP (ref 150–400)
POTASSIUM SERPL-MCNC: 3.6 MMOL/L — SIGNIFICANT CHANGE UP (ref 3.5–5.3)
POTASSIUM SERPL-SCNC: 3.6 MMOL/L — SIGNIFICANT CHANGE UP (ref 3.5–5.3)
PROT SERPL-MCNC: 7 G/DL — SIGNIFICANT CHANGE UP (ref 6–8.3)
RBC # BLD: 4.67 M/UL — SIGNIFICANT CHANGE UP (ref 4.2–5.8)
RBC # FLD: 13.1 % — SIGNIFICANT CHANGE UP (ref 10.3–14.5)
SARS-COV-2 RNA SPEC QL NAA+PROBE: SIGNIFICANT CHANGE UP
SODIUM SERPL-SCNC: 140 MMOL/L — SIGNIFICANT CHANGE UP (ref 135–145)
WBC # BLD: 12.39 K/UL — HIGH (ref 3.8–10.5)
WBC # FLD AUTO: 12.39 K/UL — HIGH (ref 3.8–10.5)

## 2022-09-05 PROCEDURE — 36415 COLL VENOUS BLD VENIPUNCTURE: CPT

## 2022-09-05 PROCEDURE — 96361 HYDRATE IV INFUSION ADD-ON: CPT

## 2022-09-05 PROCEDURE — 96374 THER/PROPH/DIAG INJ IV PUSH: CPT

## 2022-09-05 PROCEDURE — 83690 ASSAY OF LIPASE: CPT

## 2022-09-05 PROCEDURE — 99284 EMERGENCY DEPT VISIT MOD MDM: CPT | Mod: 25

## 2022-09-05 PROCEDURE — 80053 COMPREHEN METABOLIC PANEL: CPT

## 2022-09-05 PROCEDURE — 87635 SARS-COV-2 COVID-19 AMP PRB: CPT

## 2022-09-05 PROCEDURE — 85025 COMPLETE CBC W/AUTO DIFF WBC: CPT

## 2022-09-05 PROCEDURE — 99284 EMERGENCY DEPT VISIT MOD MDM: CPT

## 2022-09-05 RX ORDER — COLCHICINE 0.6 MG
1.2 TABLET ORAL ONCE
Refills: 0 | Status: COMPLETED | OUTPATIENT
Start: 2022-09-05 | End: 2022-09-05

## 2022-09-05 RX ORDER — SODIUM CHLORIDE 9 MG/ML
1000 INJECTION INTRAMUSCULAR; INTRAVENOUS; SUBCUTANEOUS ONCE
Refills: 0 | Status: COMPLETED | OUTPATIENT
Start: 2022-09-05 | End: 2022-09-05

## 2022-09-05 RX ADMIN — SODIUM CHLORIDE 1000 MILLILITER(S): 9 INJECTION INTRAMUSCULAR; INTRAVENOUS; SUBCUTANEOUS at 13:57

## 2022-09-05 RX ADMIN — SODIUM CHLORIDE 1000 MILLILITER(S): 9 INJECTION INTRAMUSCULAR; INTRAVENOUS; SUBCUTANEOUS at 12:57

## 2022-09-05 RX ADMIN — Medication 1.2 MILLIGRAM(S): at 12:57

## 2022-09-05 RX ADMIN — Medication 125 MILLIGRAM(S): at 14:52

## 2022-09-05 NOTE — ED PROVIDER NOTE - NSICDXPASTMEDICALHX_GEN_ALL_CORE_FT
PAST MEDICAL HISTORY:  Asthma     CAD (coronary artery disease)     Diabetes Type 2, A1c: 10.2    Former smoker     History of hypertension     HLD (hyperlipidemia)     HTN (hypertension)     Lupus     TONYA (obstructive sleep apnea)     Psoriatic arthritis       Gout

## 2022-09-05 NOTE — ED PROVIDER NOTE - NSFOLLOWUPINSTRUCTIONS_ED_ALL_ED_FT
Gout    WHAT YOU NEED TO KNOW:    What is gout? Gout is a form of arthritis that causes severe joint pain and stiffness. Acute gout pain starts suddenly, gets worse quickly, and stops on its own. Acute gout can become chronic and cause permanent damage to your joints.    What causes gout? Gout develops when uric acid builds up in your joints. Uric acid is made when your body breaks down purines. Purines are found in some medicines and foods. Your body gets rid of most uric acid through your urine. When your body cannot get rid of enough uric acid, it can build up and form crystals in your joints. The crystals cause your joints to become swollen and painful. This is called a gout attack.    What increases my risk for gout? You may have been born with a decreased ability to break down and get rid of purines. Your body's ability to break down purines may be very slow. Gout is more common in men than in women. Any of the following can also increase your risk:  •A family history of gout      •Kidney disease or problems with how your kidneys work      •Eating foods that are high in purines, such as red meat      •Alcohol or tobacco use      •Diuretic medicine (water pills), or aspirin      •A medical condition such as diabetes, high blood pressure, or high cholesterol      •A condition such as an irregular heartbeat or a blood clot in your lungs      What are the stages of gout?   •Hyperuricemia is a high level of uric acid. Hyperuricemia is not gout, but it increases your risk for gout. You may have no symptoms at this stage, and it usually does not need treatment.      •Acute gouty arthritis starts with a sudden attack of pain and swelling, usually in 1 joint. This may be in your big toe. The attack may last from a few days to 2 weeks.      •Intercritical gout is the time between attacks. You may go months or years without another attack. You will not have joint pain or stiffness, but this does not mean your gout is cured. You will still need treatment to prevent chronic gout.      •Chronic tophaceous gout develops if gout is not treated. Large amounts of uric acid crystals, called tophi, collect around your joints. The crystals can destroy or deform the joints. Gout attacks occur more often, and last hours to weeks. More than 1 joint may be painful and swollen. At this stage, gout symptoms do not go away on their own.      How is gout diagnosed? Your healthcare provider will ask about your medicines, health problems, and allergies. Tell him or her when your joint pain and swelling started. He or she will need to know if the swelling and pain were worst within 1 day or if got worse over time. He or she will check the skin over your joints for redness. You may also need any of the following:  •Blood tests are used to check the level of uric acid. You may need to have blood tested more than 1 time.      •A synovial fluid test is used to collect a sample of fluid from around your painful joint. The fluid is sent to a lab to check for uric acid crystals. Synovial fluid surrounds and protects your joints.      •An x-ray, ultrasound, CT, or MRI may show the gout or damage to bones caused by gout. You may be given contrast liquid to help your joints show up better in the pictures. Tell the healthcare provider if you have ever had an allergic reaction to contrast liquid. Do not enter the MRI room with anything metal. Metal can cause serious injury. Tell the healthcare provider if you have any metal in or on your body.      How is gout treated? The following can make your symptoms stop sooner, prevent attacks, and decrease your risk for joint damage:  •Medicines: ?NSAIDs, such as ibuprofen, help decrease swelling, pain, and fever. This medicine is available with or without a doctor's order. NSAIDs can cause stomach bleeding or kidney problems in certain people. If you take blood thinner medicine, always ask your healthcare provider if NSAIDs are safe for you. Always read the medicine label and follow directions.      ?Gout medicine decreases joint pain and swelling. It may also be given to prevent new gout attacks.      ?Steroids reduce inflammation and can help your joint stiffness and pain during gout attacks.      ?Uric acid medicine may be given to reduce the amount of uric acid your body makes. Some medicines may help you pass more uric acid when you urinate.      •Surgery called a bone graft may be needed for tophi that are painful or infected. Bone in the joint may be replaced with bone taken from another place in your body. Ask your healthcare provider for more information about bone graft surgery.      How can I manage my symptoms?   R.I.C.E.       •Rest your painful joint so it can heal. Your healthcare provider may recommend crutches or a walker if the affected joint is in a leg.      •Apply ice to your joint. Ice decreases pain and swelling. Use an ice pack, or put crushed ice in a plastic bag. Cover the ice pack or bag with a towel before you apply it to your painful joint. Apply ice for 15 to 20 minutes every hour, or as directed.      •Elevate your joint. Elevation helps reduce swelling and pain. Raise your joint above the level of your heart as often as you can. Prop your painful joint on pillows to keep it above your heart comfortably.      •Go to physical therapy if directed. A physical therapist can teach you exercises to improve flexibility and range of motion.      How can I help prevent gout attacks?   •Do not eat high-purine foods. These foods include meats, seafood, asparagus, spinach, cauliflower, and some types of beans. Healthcare providers may tell you to eat more low-fat milk products, such as yogurt. Milk products may decrease your risk for gout attacks. Vitamin C and coffee may also help. Your healthcare provider or dietitian can help you create a meal plan.      •Drink liquids as directed. Liquids such as water help remove uric acid from your body. Ask how much liquid to drink each day and which liquids are best for you.      •Maintain a healthy weight. Weight loss may decrease the amount of uric acid in your body. Ask your healthcare provider what a healthy weight is for you. Ask him or her to help you create a weight loss plan if you are overweight.      •Control your blood sugar level if you have diabetes. Keep your blood sugar level in a normal range. This can help prevent gout attacks.      •Limit or do not drink alcohol as directed. Alcohol can trigger a gout attack. Alcohol also increases your risk for dehydration. Ask your healthcare provider if alcohol is safe for you.      When should I seek immediate care?   •You have severe joint pain that you cannot tolerate.      •You have a fever or redness that spreads beyond the joint area.      When should I call my doctor?   •You have a fever, chills, or body aches.      •You are confused or more tired than usual.      •You have new symptoms, such as a rash, after you start gout treatment.      •Your joint pain and swelling do not go away, even after treatment.      •You are not urinating as much or as often as you usually do.      •You have trouble taking your gout medicines.      CARE AGREEMENT:    You have the right to help plan your care. Learn about your health condition and how it may be treated. Discuss treatment options with your healthcare providers to decide what care you want to receive. You always have the right to refuse treatment.

## 2022-09-05 NOTE — ED PROVIDER NOTE - PATIENT PORTAL LINK FT
You can access the FollowMyHealth Patient Portal offered by [facility] by registering at the following website: [email or web address]. By joining [employer]’s FollowMyHealth portal, you will also be able to view your health information using other applications (apps) compatible with our system.

## 2022-09-05 NOTE — ED PROVIDER NOTE - OBJECTIVE STATEMENT
68 y/o male, history of CAD, hypertension, diabetes, asthma, and gout, presenting w/ right foot and ankle swelling w/ pain this morning. States he woke up w/ symptoms. Reports meal containing beef and seafood last night. Takes colchicine every other day. Did not take it yesterday. No fever or trauma. Patient is allergic to iodinated radiocontrast agents.

## 2022-09-05 NOTE — ED PROVIDER NOTE - PROGRESS NOTE DETAILS
Pt relates pain significantly improved, asking to go home. States he will check his sugar often and give additional insulin as needed. Pt also plans to see his podiatrist for possible joint injection which helped before.

## 2022-09-05 NOTE — ED ADULT TRIAGE NOTE - SPO2 (%)
pt reported coming to the hospital due to dark stools. pt reports "surgery to right leg a couple years ago and it is always bent." as per chart 69 yo male PMH RLE DVT on Xarelto, TIA, HTN, HLD, SERENITY with 1 episode of dialysis in the past, anemia of chronic disease, DM, BPH, GERD, neuropathy,  presents to ED with palpitations, fatigue, melena.
89

## 2022-09-05 NOTE — ED PROVIDER NOTE - PHYSICAL EXAMINATION
Musculoskeletal: swelling and tenderness to right ankle w/ limited ROM; there is no induration or warmth

## 2022-09-05 NOTE — ED PROVIDER NOTE - CLINICAL SUMMARY MEDICAL DECISION MAKING FREE TEXT BOX
68 y/o male w/ suspected gout exacerbation. Will send labs and administer PO colchicine and reassess.

## 2022-09-05 NOTE — ED ADULT NURSE NOTE - OBJECTIVE STATEMENT
Pt presented to ED c/o Hx of gout and last night ate seafood  Woke up this morning with Rt foot swelling and pain , difficult to ambulate

## 2022-09-14 PROBLEM — M10.9 GOUT, UNSPECIFIED: Chronic | Status: ACTIVE | Noted: 2022-09-05

## 2022-09-15 ENCOUNTER — APPOINTMENT (OUTPATIENT)
Dept: RHEUMATOLOGY | Facility: CLINIC | Age: 67
End: 2022-09-15

## 2022-09-15 PROCEDURE — 99441: CPT | Mod: 95

## 2022-09-15 RX ORDER — APREMILAST 30 MG/1
30 TABLET, FILM COATED ORAL
Qty: 180 | Refills: 0 | Status: DISCONTINUED | COMMUNITY
Start: 2022-07-29 | End: 2022-09-15

## 2022-09-28 ENCOUNTER — NON-APPOINTMENT (OUTPATIENT)
Age: 67
End: 2022-09-28

## 2022-09-29 ENCOUNTER — NON-APPOINTMENT (OUTPATIENT)
Age: 67
End: 2022-09-29

## 2022-09-29 LAB
ALBUMIN SERPL ELPH-MCNC: 4 G/DL
ALP BLD-CCNC: 81 U/L
ALT SERPL-CCNC: 22 U/L
ANION GAP SERPL CALC-SCNC: 14 MMOL/L
AST SERPL-CCNC: 20 U/L
BASOPHILS # BLD AUTO: 0.06 K/UL
BASOPHILS NFR BLD AUTO: 0.5 %
BILIRUB SERPL-MCNC: 0.2 MG/DL
BUN SERPL-MCNC: 24 MG/DL
CALCIUM SERPL-MCNC: 9.1 MG/DL
CHLORIDE SERPL-SCNC: 102 MMOL/L
CO2 SERPL-SCNC: 26 MMOL/L
CREAT SERPL-MCNC: 1.06 MG/DL
EGFR: 77 ML/MIN/1.73M2
EOSINOPHIL # BLD AUTO: 0.25 K/UL
EOSINOPHIL NFR BLD AUTO: 2.2 %
GLUCOSE SERPL-MCNC: 270 MG/DL
HCT VFR BLD CALC: 43.8 %
HGB BLD-MCNC: 14.1 G/DL
IMM GRANULOCYTES NFR BLD AUTO: 1.5 %
LYMPHOCYTES # BLD AUTO: 1.55 K/UL
LYMPHOCYTES NFR BLD AUTO: 13.4 %
MAN DIFF?: NORMAL
MCHC RBC-ENTMCNC: 29.6 PG
MCHC RBC-ENTMCNC: 32.2 GM/DL
MCV RBC AUTO: 91.8 FL
MONOCYTES # BLD AUTO: 1.04 K/UL
MONOCYTES NFR BLD AUTO: 9 %
NEUTROPHILS # BLD AUTO: 8.52 K/UL
NEUTROPHILS NFR BLD AUTO: 73.4 %
PLATELET # BLD AUTO: 184 K/UL
POTASSIUM SERPL-SCNC: 3.8 MMOL/L
PROT SERPL-MCNC: 6.1 G/DL
RBC # BLD: 4.77 M/UL
RBC # FLD: 13.5 %
SODIUM SERPL-SCNC: 142 MMOL/L
URATE SERPL-MCNC: 5.1 MG/DL
WBC # FLD AUTO: 11.59 K/UL

## 2022-09-30 ENCOUNTER — APPOINTMENT (OUTPATIENT)
Dept: RHEUMATOLOGY | Facility: CLINIC | Age: 67
End: 2022-09-30

## 2022-09-30 DIAGNOSIS — M19.90 UNSPECIFIED OSTEOARTHRITIS, UNSPECIFIED SITE: ICD-10-CM

## 2022-09-30 PROCEDURE — 99441: CPT | Mod: 95

## 2022-10-01 LAB — CK SERPL-CCNC: 158 U/L

## 2022-10-05 ENCOUNTER — NON-APPOINTMENT (OUTPATIENT)
Age: 67
End: 2022-10-05

## 2022-10-06 ENCOUNTER — NON-APPOINTMENT (OUTPATIENT)
Age: 67
End: 2022-10-06

## 2022-10-10 ENCOUNTER — APPOINTMENT (OUTPATIENT)
Dept: PULMONOLOGY | Facility: CLINIC | Age: 67
End: 2022-10-10

## 2022-10-10 VITALS
BODY MASS INDEX: 48.03 KG/M2 | OXYGEN SATURATION: 93 % | HEART RATE: 95 BPM | HEIGHT: 62 IN | DIASTOLIC BLOOD PRESSURE: 89 MMHG | WEIGHT: 261 LBS | SYSTOLIC BLOOD PRESSURE: 157 MMHG | TEMPERATURE: 97.5 F

## 2022-10-10 PROCEDURE — 71046 X-RAY EXAM CHEST 2 VIEWS: CPT

## 2022-10-10 PROCEDURE — 99214 OFFICE O/P EST MOD 30 MIN: CPT | Mod: 25

## 2022-10-10 RX ORDER — AMOXICILLIN AND CLAVULANATE POTASSIUM 875; 125 MG/1; MG/1
875-125 TABLET, COATED ORAL
Qty: 14 | Refills: 0 | Status: COMPLETED | COMMUNITY
Start: 2022-10-10 | End: 2022-10-17

## 2022-10-10 NOTE — REASON FOR VISIT
[Follow-Up] : a follow-up visit [Sleep Evaluation] : sleep evaluation [Asthma] : asthma [Hypersomnolence] : hypersomnolence [Shortness of Breath] : shortness of breath

## 2022-10-10 NOTE — PROCEDURE
[FreeTextEntry1] : \par  Xray Chest 2 Views PA/Lat             Final\par \par No Documents Attached\par \par \par \par  Xray Chest 2 Views PA/Lat             Final\par \par No Documents Attached\par \par \par \par \par   \par Chest x-ray PA and lateral views performed in my office today showed clear lungs, no evidence of infiltrates or pleural effusions. \par \par \par  Ordered by: BRIAN WELSH       Collected/Examined: 10Oct2022 11:16AM       \par Verification Required       Stage: Final       \par  Performed at: In Office       Performed by: BRIAN WELSH       Resulted: 10Oct2022 11:16AM       Last Updated: 10Oct2022 11:18AM

## 2022-10-10 NOTE — HISTORY OF PRESENT ILLNESS
[TextBox_4] : Complains of yellowish productive cough for the last 2 weeks, also has mild shortness of breath, he was started on Augmentin today.

## 2022-10-10 NOTE — ASSESSMENT
[FreeTextEntry1] : Finish Augmentin.\par Start Asmanex 200 mcg HFA, 2 puffs twice daily.\par Continue albuterol via nebulizer.\par Get home sleep study to reassess severity of obstructive sleep apnea.  Should home sleep study confirms TONYA, he will likely need [facility] evaluation, since he refuses to wear APAP.\par Strongly advised him on dieting, exercise and weight loss.

## 2022-10-10 NOTE — DISCUSSION/SUMMARY
[FreeTextEntry1] : Cough likely secondary to asthma exacerbation.  Excessive daytime sleepiness likely secondary to obstructive sleep apnea.  Morbid obesity.

## 2022-10-13 ENCOUNTER — APPOINTMENT (OUTPATIENT)
Dept: RHEUMATOLOGY | Facility: CLINIC | Age: 67
End: 2022-10-13

## 2022-10-13 VITALS
RESPIRATION RATE: 16 BRPM | HEART RATE: 96 BPM | HEIGHT: 62 IN | WEIGHT: 256 LBS | BODY MASS INDEX: 47.11 KG/M2 | SYSTOLIC BLOOD PRESSURE: 125 MMHG | OXYGEN SATURATION: 96 % | DIASTOLIC BLOOD PRESSURE: 76 MMHG | TEMPERATURE: 96.6 F

## 2022-10-13 PROCEDURE — 99215 OFFICE O/P EST HI 40 MIN: CPT

## 2022-10-17 ENCOUNTER — APPOINTMENT (OUTPATIENT)
Dept: PULMONOLOGY | Facility: CLINIC | Age: 67
End: 2022-10-17

## 2022-10-17 PROCEDURE — 95800 SLP STDY UNATTENDED: CPT | Mod: 52

## 2022-11-07 ENCOUNTER — NON-APPOINTMENT (OUTPATIENT)
Age: 67
End: 2022-11-07

## 2022-11-14 ENCOUNTER — APPOINTMENT (OUTPATIENT)
Dept: PULMONOLOGY | Facility: CLINIC | Age: 67
End: 2022-11-14

## 2022-11-14 VITALS
SYSTOLIC BLOOD PRESSURE: 162 MMHG | DIASTOLIC BLOOD PRESSURE: 80 MMHG | HEART RATE: 94 BPM | BODY MASS INDEX: 47.11 KG/M2 | OXYGEN SATURATION: 92 % | HEIGHT: 62 IN | TEMPERATURE: 97.8 F | WEIGHT: 256 LBS

## 2022-11-14 DIAGNOSIS — J06.9 ACUTE UPPER RESPIRATORY INFECTION, UNSPECIFIED: ICD-10-CM

## 2022-11-14 DIAGNOSIS — G47.19 OTHER HYPERSOMNIA: ICD-10-CM

## 2022-11-14 PROCEDURE — 71046 X-RAY EXAM CHEST 2 VIEWS: CPT

## 2022-11-14 PROCEDURE — 99214 OFFICE O/P EST MOD 30 MIN: CPT | Mod: 25

## 2022-11-14 NOTE — REASON FOR VISIT
[Acute] : an acute visit [Cough] : cough [Sleep Apnea] : sleep apnea [Hypersomnolence] : hypersomnolence

## 2022-11-14 NOTE — DISCUSSION/SUMMARY
[FreeTextEntry1] : Cough and shortness of breath likely secondary to asthmatic bronchitis/URI.  Excessive daytime sleepiness and snoring secondary to just diagnosed severe obstructive sleep apnea.

## 2022-11-14 NOTE — HISTORY OF PRESENT ILLNESS
[TextBox_4] : Complains of sick/cough for 3 days, wheezes and chills, no fever/nasal congestion\par Also has excessive daytime sleepiness and snoring

## 2022-11-14 NOTE — ASSESSMENT
[FreeTextEntry1] : Discussed with Advate regarding aforementioned home sleep study results in the office today, will get titration study BiPAP for further evaluation\par Start doxycycline for 7 days.\par Start Mucinex as an expectorant.\par Advised him on well hydration and rest.\par Return for pulmonary follow-up in 1 week.

## 2022-11-14 NOTE — PROCEDURE
[FreeTextEntry1] : Home sleep study shows evidence of very severe obstructive sleep apnea with overall AHI of 70 events per hour sleep.

## 2022-11-14 NOTE — REVIEW OF SYSTEMS
[Fatigue] : fatigue [Cough] : cough [Dyspnea] : dyspnea [Negative] : Endocrine [TextBox_3] : Excessive daytime sleepiness and snoring.

## 2022-11-23 ENCOUNTER — NON-APPOINTMENT (OUTPATIENT)
Age: 67
End: 2022-11-23

## 2022-11-28 ENCOUNTER — APPOINTMENT (OUTPATIENT)
Dept: PULMONOLOGY | Facility: CLINIC | Age: 67
End: 2022-11-28

## 2022-11-28 VITALS
BODY MASS INDEX: 47.11 KG/M2 | OXYGEN SATURATION: 90 % | HEIGHT: 62 IN | WEIGHT: 256 LBS | TEMPERATURE: 97.8 F | HEART RATE: 89 BPM | SYSTOLIC BLOOD PRESSURE: 152 MMHG | DIASTOLIC BLOOD PRESSURE: 84 MMHG

## 2022-11-28 PROCEDURE — 99214 OFFICE O/P EST MOD 30 MIN: CPT

## 2022-11-28 NOTE — REVIEW OF SYSTEMS
[Fatigue] : fatigue [Cough] : cough [Dyspnea] : dyspnea [Negative] : Endocrine [Arthralgias] : arthralgias [Myalgias] : myalgias [Chronic Pain] : chronic pain [TextBox_3] : Excessive daytime sleepiness and snoring.

## 2022-11-28 NOTE — HISTORY OF PRESENT ILLNESS
cuffed
[TextBox_4] : cough is much better, wheezes are also significantly improved, no fever/nasal congestion\par Also has excessive daytime sleepiness and snoring\par c/o diffuse joint pain, especially bilateral knee pain.

## 2022-11-28 NOTE — ASSESSMENT
[FreeTextEntry1] : Discussed with Nithin regarding aforementioned home sleep study results in the office today, in the lab, will try to start auto BiPAP at this point.\par Ortho follow-up regarding knee pain/osteoarthritis.\par Advised him on well hydration and rest.\par Return for pulmonary follow-up in 1 week.

## 2022-11-28 NOTE — REASON FOR VISIT
[Acute] : an acute visit [Sleep Apnea] : sleep apnea [Cough] : cough [Hypersomnolence] : hypersomnolence [TextBox_44] : Cough is significantly better now.

## 2022-12-01 ENCOUNTER — APPOINTMENT (OUTPATIENT)
Dept: PULMONOLOGY | Facility: CLINIC | Age: 67
End: 2022-12-01

## 2022-12-02 ENCOUNTER — APPOINTMENT (OUTPATIENT)
Dept: RHEUMATOLOGY | Facility: CLINIC | Age: 67
End: 2022-12-02

## 2022-12-08 ENCOUNTER — NON-APPOINTMENT (OUTPATIENT)
Age: 67
End: 2022-12-08

## 2022-12-08 ENCOUNTER — APPOINTMENT (OUTPATIENT)
Dept: RHEUMATOLOGY | Facility: CLINIC | Age: 67
End: 2022-12-08

## 2022-12-08 VITALS
DIASTOLIC BLOOD PRESSURE: 96 MMHG | BODY MASS INDEX: 46.01 KG/M2 | SYSTOLIC BLOOD PRESSURE: 173 MMHG | HEIGHT: 62 IN | TEMPERATURE: 97.1 F | WEIGHT: 250 LBS | OXYGEN SATURATION: 98 % | HEART RATE: 85 BPM | RESPIRATION RATE: 16 BRPM

## 2022-12-08 PROCEDURE — 99214 OFFICE O/P EST MOD 30 MIN: CPT

## 2022-12-08 RX ORDER — DOXYCYCLINE HYCLATE 100 MG/1
100 TABLET ORAL TWICE DAILY
Qty: 14 | Refills: 0 | Status: DISCONTINUED | COMMUNITY
Start: 2022-03-03 | End: 2022-12-08

## 2022-12-08 RX ORDER — HYDROCORTISONE 25 MG/G
2.5 CREAM TOPICAL
Qty: 30 | Refills: 0 | Status: COMPLETED | COMMUNITY
Start: 2022-07-07

## 2022-12-08 RX ORDER — OLMESARTAN MEDOXOMIL 40 MG/1
40 TABLET, FILM COATED ORAL
Qty: 90 | Refills: 0 | Status: COMPLETED | COMMUNITY
Start: 2022-12-07

## 2022-12-08 RX ORDER — PREDNISONE 20 MG/1
20 TABLET ORAL
Qty: 8 | Refills: 0 | Status: COMPLETED | COMMUNITY
Start: 2022-09-05

## 2022-12-08 RX ORDER — TOBRAMYCIN AND DEXAMETHASONE 3; 1 MG/G; MG/G
0.3-0.1 OINTMENT OPHTHALMIC
Qty: 4 | Refills: 0 | Status: COMPLETED | COMMUNITY
Start: 2022-11-04

## 2022-12-08 RX ORDER — INSULIN HUMAN 500 [IU]/ML
500 INJECTION, SOLUTION SUBCUTANEOUS
Qty: 42 | Refills: 0 | Status: COMPLETED | COMMUNITY
Start: 2021-11-19

## 2022-12-08 RX ORDER — PEN NEEDLE, DIABETIC 29 G X1/2"
31G X 8 MM NEEDLE, DISPOSABLE MISCELLANEOUS
Qty: 270 | Refills: 0 | Status: COMPLETED | COMMUNITY
Start: 2021-12-01

## 2022-12-08 RX ORDER — KETOCONAZOLE 20.5 MG/ML
2 SHAMPOO, SUSPENSION TOPICAL
Qty: 120 | Refills: 0 | Status: COMPLETED | COMMUNITY
Start: 2022-11-23

## 2022-12-08 RX ORDER — CYCLOSPORINE 0.5 MG/ML
0.05 EMULSION OPHTHALMIC
Qty: 60 | Refills: 0 | Status: COMPLETED | COMMUNITY
Start: 2022-11-04

## 2022-12-08 RX ORDER — PREDNISONE 5 MG/1
5 TABLET ORAL
Qty: 65 | Refills: 0 | Status: DISCONTINUED | COMMUNITY
Start: 2022-09-15 | End: 2022-12-08

## 2022-12-08 RX ORDER — CEPHALEXIN 500 MG/1
500 CAPSULE ORAL
Qty: 21 | Refills: 0 | Status: COMPLETED | COMMUNITY
Start: 2022-08-29

## 2022-12-08 NOTE — PHYSICAL EXAM
[General Appearance - Alert] : alert [General Appearance - In No Acute Distress] : in no acute distress [General Appearance - Well Nourished] : well nourished [General Appearance - Well Developed] : well developed [Sclera] : the sclera and conjunctiva were normal [Outer Ear] : the ears and nose were normal in appearance [Abnormal Walk] : normal gait [Motor Tone] : muscle strength and tone were normal [Skin Color & Pigmentation] : normal skin color and pigmentation [No Focal Deficits] : no focal deficits [Oriented To Time, Place, And Person] : oriented to person, place, and time [Impaired Insight] : insight and judgment were intact [FreeTextEntry1] : Union palsy with droop on left side (improved).

## 2022-12-08 NOTE — ASSESSMENT
[FreeTextEntry1] : RC PERDOMO is a 67 year  old male, seen for  MCTD, gout, UCTD , psoriasis , here for follow up and for \par \par 1.  Knee pain - IA and OA \par [] s/p injections from orthopedics last week and pain restarted today.  no evidence of septic joint on exam. \par [] follow up orthopedics and consider second opinion \par \par 2. MCTD/UCTD/psoriasis/psoriatic arthritis : with positive AIDEN/dsDNA/RNP mainly with features of arthritis. Currently with diffuse joint pain and muscle pain and joint swelling. Currently on Plaquenil. Improvement with Imuran but d/c due to concurrent use of uloric\par [] Will check laboratory tests to look for markers of disease activity and also to assess for medication toxicity.\par [] He is not on Imuran because on Uloric, He is taking HCQ \par [] follow up optho as planned. \par [] has not been using tramadol, doesn't need it. \par []  off all prednisone\par [] continue cbd \par [] agrees to try orencia and r/b/a d/w patient  (no history fo COPD per pulm note ) -\par [] ttm after starting orencia and hold off on orencia until 1 weeks after shingles vaccine. \par \par 3.  Neuropathy \par off lyrica for 2 months and doesn't see a difference - will hold off on restarting at this point \par [] follow neurology \par \par 4. Psoriasis - follow up with derm - consider psoriatic arthritis as concomitant diagnosis. \par [] follow up derm \par [] follow up response to orencia \par \par 5. Gout with no current flare. and last uric acid reviewed and will check again \par [] continue uloric \par [] check uric acid - add on to blood work done at endo yesterday \par \par 6. CAD/DM with prior PCI, still on DAPT and follows with cardiology. \par \par 7. OA s/p multiple hyaluronic acid injection. Recommend replacement and has seen Dr. Clayton -and will consider in the future\par \par 9. Chronic pain (due to OA and untreated MCTD) continue lyrica (daily) and tramadol (PRN) - hasn't been taking tramadol.  \par [] continue CBD cream\par \par \par More than 50% of the encounter was spent counselling  RC PERDOMO on differential, workup, disease course, and treatment/management.   Education was provided to RC PERDOMO during this encounter. All questions and concerns were answered and addressed in detail.  RC PERDOMO verbalized understanding and agreed to plan\par \par RC PERDOMO has been instructed to call for an earlier appointment if new symptoms develop \par RC PERDOMO has been instructed to make a follow up appointment 4 weeks by ttm \par \par \par \par

## 2022-12-08 NOTE — DATA REVIEWED
[FreeTextEntry1] : Laboratory and radiology studies that were personally reviewed at today's visit are summarized below and above: \par 6-24-22:  HgBA1c = 9.4\par \par 1-2021:  uric acid = 6.1\par 2-11-20:  uric - 6.2 , dsdna = 43\par \par 10-11-19:  uric acid 6.4 , dsdna = 72, protein/creatinine = 0.5\par uric acid 8.2 in july 2019. \par \par 10/15/19:  US hand - enlarged left median nerve c/w right, left ext.dig and left ext carpi ulnaris tenosynovitis.  no sonographic evidence of inflammatory arthritis. \par \par 11-4-19:  NCV/EMG - bilateral moderate sensorimotor carpal tunnel syndrome with left > right  and mild right ulnar sensory neuropathy.

## 2022-12-08 NOTE — HISTORY OF PRESENT ILLNESS
[FreeTextEntry1] : RC PERDOMO is a 67 year  old male, seen on today  for\par Psoriasis/UCTD/gout/OA \par Recent bronchitis and was not able to start orencia prescribed at his last visti \par \par Knee pain  worst today  - was good for 1 week after cortisone shots bilateral from orthopedics and pain returned today \par has seen pmd for htn and new pill added yesterday \par no fevers \par had rsv bronchitis and resolved 2 weeks ago \par pending shingles shot tomorrow \par neuro eval for possible CT and may needs surgery . \par no recent gout flares. \par wants to have knee replacements but currently not a candidate. \par ortho ([facility]) \par \par Psoriasis \par follows derm \par uses cream\par \par \par MCTD/UCTD/psoriasis: with positive AIDEN/dsDNA/RNP mainly with features of arthritis.  Also with psoriasis \par Currently on  mg BID, follows with orthopedics \par ->  can't take Imuran due to uloric \par -> otezla d/c due to gi distress \par \par Gout \par On Uloric every other day. Last uric acid was 5.1 in 9-2022\par \par \par Renal - proteinuria - seeing renal - renal does not want to do a biopsy at this time.  (recheck again today ) \par \par \par CAD/DM with prior PCI, on DAPT. On insulin, has proteinuria. Saw Ophth recently. \par  \par \par \par

## 2022-12-12 ENCOUNTER — APPOINTMENT (OUTPATIENT)
Dept: PULMONOLOGY | Facility: CLINIC | Age: 67
End: 2022-12-12

## 2022-12-12 PROCEDURE — 36600 WITHDRAWAL OF ARTERIAL BLOOD: CPT | Mod: 59

## 2022-12-12 PROCEDURE — 82803 BLOOD GASES ANY COMBINATION: CPT

## 2022-12-27 ENCOUNTER — NON-APPOINTMENT (OUTPATIENT)
Age: 67
End: 2022-12-27

## 2023-01-23 ENCOUNTER — APPOINTMENT (OUTPATIENT)
Dept: PULMONOLOGY | Facility: CLINIC | Age: 68
End: 2023-01-23
Payer: MEDICARE

## 2023-01-23 VITALS
HEART RATE: 104 BPM | WEIGHT: 250 LBS | DIASTOLIC BLOOD PRESSURE: 69 MMHG | SYSTOLIC BLOOD PRESSURE: 133 MMHG | OXYGEN SATURATION: 91 % | TEMPERATURE: 97.3 F | BODY MASS INDEX: 45.73 KG/M2

## 2023-01-23 DIAGNOSIS — R05.9 COUGH, UNSPECIFIED: ICD-10-CM

## 2023-01-23 PROCEDURE — 99214 OFFICE O/P EST MOD 30 MIN: CPT

## 2023-01-23 NOTE — REVIEW OF SYSTEMS
[Fatigue] : fatigue [Cough] : cough [Dyspnea] : dyspnea [Arthralgias] : arthralgias [Myalgias] : myalgias [Chronic Pain] : chronic pain [Negative] : Endocrine [TextBox_3] : Excessive daytime sleepiness and snoring.

## 2023-01-23 NOTE — HISTORY OF PRESENT ILLNESS
[FreeTextEntry1] : Nithin feels much improved excessive daytime sleepiness and snoring with APAP via nasal mask, started 2 weeks ago.  Mild shortness of breath [TextBox_4] : cough is much better, wheezes are also significantly improved, no fever/nasal congestion\par Also has excessive daytime sleepiness and snoring\par c/o diffuse joint pain, especially bilateral knee pain.

## 2023-01-23 NOTE — ASSESSMENT
[FreeTextEntry1] : Auto-titrating Positive Airway Pressure(APAP) compliance reviewed with patient in office today\par Patient is compliant and benefiting from APAP usage.\par Albuterol HFA as needed for shortness of breath.\par Ortho follow-up regarding knee pain/osteoarthritis.\par Advised him on well hydration and rest.\par Return for pulmonary follow-up in 1 week.

## 2023-01-23 NOTE — REASON FOR VISIT
[Follow-Up] : a follow-up visit [Acute] : an acute visit [Sleep Apnea] : sleep apnea [Hypersomnolence] : hypersomnolence [Cough] : cough [TextBox_44] : Cough is significantly better now.

## 2023-01-24 ENCOUNTER — APPOINTMENT (OUTPATIENT)
Dept: RHEUMATOLOGY | Facility: CLINIC | Age: 68
End: 2023-01-24

## 2023-01-25 ENCOUNTER — APPOINTMENT (OUTPATIENT)
Dept: DERMATOLOGY | Facility: CLINIC | Age: 68
End: 2023-01-25
Payer: MEDICARE

## 2023-01-25 PROCEDURE — 99204 OFFICE O/P NEW MOD 45 MIN: CPT

## 2023-02-08 ENCOUNTER — APPOINTMENT (OUTPATIENT)
Dept: RHEUMATOLOGY | Facility: CLINIC | Age: 68
End: 2023-02-08
Payer: MEDICARE

## 2023-02-08 PROCEDURE — 99441: CPT | Mod: 95

## 2023-02-23 ENCOUNTER — APPOINTMENT (OUTPATIENT)
Dept: PULMONOLOGY | Facility: CLINIC | Age: 68
End: 2023-02-23
Payer: MEDICARE

## 2023-02-23 VITALS — HEART RATE: 95 BPM | OXYGEN SATURATION: 93 % | SYSTOLIC BLOOD PRESSURE: 156 MMHG | DIASTOLIC BLOOD PRESSURE: 86 MMHG

## 2023-02-23 DIAGNOSIS — G47.00 INSOMNIA, UNSPECIFIED: ICD-10-CM

## 2023-02-23 PROCEDURE — 99214 OFFICE O/P EST MOD 30 MIN: CPT

## 2023-02-23 RX ORDER — ALBUTEROL SULFATE 2.5 MG/3ML
(2.5 MG/3ML) SOLUTION RESPIRATORY (INHALATION)
Qty: 6 | Refills: 5 | Status: COMPLETED | COMMUNITY
Start: 2022-03-03 | End: 2023-02-23

## 2023-02-24 PROBLEM — G47.00 INSOMNIA: Status: ACTIVE | Noted: 2019-12-06

## 2023-02-24 NOTE — HISTORY OF PRESENT ILLNESS
[TextBox_4] : TONYA f/u \par Reports no current sleep symptoms. Using PAP on a nightly basis without difficulty. Reports no symptoms of daytime sleepiness. Getting supplies regularly. \par \par Device: AirCuSowesoe 10 VAuto \par \par Vendor: [employer] \par \par Setting: EPAP 10, IPAP 15\par \par Mask: nasal mask \par \par Issues: states that "he is having trouble sleeping" and that "its him, not the machine" .  Complains of initiation insomnia.\par \par Overall feeling well; no respiratory complaints reported at this time; requesting refills of albuterol and Singulair; would also like sample of inhaler that was given during previous visit in the [facility]

## 2023-02-24 NOTE — REASON FOR VISIT
[Follow-Up] : a follow-up visit [Sleep Apnea] : sleep apnea [Asthma] : asthma [TextBox_44] : Insomnia

## 2023-02-24 NOTE — ASSESSMENT
[FreeTextEntry1] : Start Sonata 5 mg p.o. nightly as needed for initiation insomnia\par Auto-titrating Positive Airway Pressure(APAP) compliance reviewed with patient in office today\par Patient is not compliant, but benefiting from APAP usage.\par Albuterol HFA as needed for asthma.\par Return for pulmonary follow-up in 1 month.\par

## 2023-02-27 ENCOUNTER — APPOINTMENT (OUTPATIENT)
Dept: DERMATOLOGY | Facility: CLINIC | Age: 68
End: 2023-02-27
Payer: MEDICARE

## 2023-02-27 PROCEDURE — 17311 MOHS 1 STAGE H/N/HF/G: CPT

## 2023-02-28 ENCOUNTER — APPOINTMENT (OUTPATIENT)
Dept: DERMATOLOGY | Facility: CLINIC | Age: 68
End: 2023-02-28
Payer: MEDICARE

## 2023-02-28 DIAGNOSIS — C44.42 SQUAMOUS CELL CARCINOMA OF SKIN OF SCALP AND NECK: ICD-10-CM

## 2023-02-28 PROCEDURE — 99024 POSTOP FOLLOW-UP VISIT: CPT

## 2023-03-14 ENCOUNTER — APPOINTMENT (OUTPATIENT)
Dept: DERMATOLOGY | Facility: CLINIC | Age: 68
End: 2023-03-14
Payer: MEDICARE

## 2023-03-14 DIAGNOSIS — L57.0 ACTINIC KERATOSIS: ICD-10-CM

## 2023-03-14 DIAGNOSIS — Z85.828 PERSONAL HISTORY OF OTHER MALIGNANT NEOPLASM OF SKIN: ICD-10-CM

## 2023-03-14 PROCEDURE — 17000 DESTRUCT PREMALG LESION: CPT

## 2023-03-14 PROCEDURE — 17003 DESTRUCT PREMALG LES 2-14: CPT

## 2023-03-14 PROCEDURE — 99213 OFFICE O/P EST LOW 20 MIN: CPT | Mod: 25

## 2023-03-20 ENCOUNTER — APPOINTMENT (OUTPATIENT)
Dept: PULMONOLOGY | Facility: CLINIC | Age: 68
End: 2023-03-20

## 2023-04-05 ENCOUNTER — APPOINTMENT (OUTPATIENT)
Dept: RHEUMATOLOGY | Facility: CLINIC | Age: 68
End: 2023-04-05
Payer: MEDICARE

## 2023-04-05 VITALS
OXYGEN SATURATION: 97 % | HEIGHT: 62 IN | HEART RATE: 109 BPM | DIASTOLIC BLOOD PRESSURE: 75 MMHG | TEMPERATURE: 97.1 F | BODY MASS INDEX: 46.38 KG/M2 | RESPIRATION RATE: 16 BRPM | SYSTOLIC BLOOD PRESSURE: 116 MMHG | WEIGHT: 252 LBS

## 2023-04-05 PROCEDURE — 99214 OFFICE O/P EST MOD 30 MIN: CPT

## 2023-04-05 NOTE — DATA REVIEWED
[FreeTextEntry1] : Laboratory and radiology studies that were personally reviewed at today's visit are summarized below and above: \par derm note: 3-:  actinic keratosis, SCC\par 6-24-22:  HgBA1c = 9.4\par \par 1-2021:  uric acid = 6.1\par 2-11-20:  uric - 6.2 , dsdna = 43\par \par 10-11-19:  uric acid 6.4 , dsdna = 72, protein/creatinine = 0.5\par uric acid 8.2 in july 2019. \par \par 10/15/19:  US hand - enlarged left median nerve c/w right, left ext.dig and left ext carpi ulnaris tenosynovitis.  no sonographic evidence of inflammatory arthritis. \par \par 11-4-19:  NCV/EMG - bilateral moderate sensorimotor carpal tunnel syndrome with left > right  and mild right ulnar sensory neuropathy.

## 2023-04-05 NOTE — HISTORY OF PRESENT ILLNESS
[FreeTextEntry1] : RC PERDOMO is a 68 year  old male, seen on today  for\par \par Psoriasis/UCTD/gout/OA \par new cpap machine and sleeping well \par has had joint pain for the past 3 weeks and he has tried steroids at home 15mg and has had relief (took 1 day of steroid and had relief got 4-5 days but had elevated blood sugar went to 400 the day he took the steroids.  \par ortho did gel injections in the knees (3rd shot was last monday)  - no relief yet \par \par Left sided CTS - no therapy \par \par no recent gout flares. \par \par wants to have knee replacements but currently not a candidate. \par ortho ([facility]) \par \par Psoriasis \par follows derm \par uses cream\par mild seb derm on face \par had cancerous SCC taken off scalp \par \par \par MCTD/UCTD/psoriasis: with positive AIDEN/dsDNA/RNP mainly with features of arthritis.  Also with psoriasis \par Currently on  mg BID, follows \par ->  can't take Imuran due to uloric \par -> otezla d/c due to gi distress \par ->  hasn't started orencia because of fear \par \par \par Gout \par On Uloric every other day.\par \par \par \par Sees pulm for sleep apnea and asthma \par \par Renal - proteinuria - seeing renal - renal does not want to do a biopsy at this time.  (recheck again today ) \par \par CAD/DM with prior PCI, on DAPT. On insulin, has proteinuria. Saw Ophth recently. \par  \par \par \par

## 2023-04-05 NOTE — PHYSICAL EXAM
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

## 2023-04-05 NOTE — ASSESSMENT
[FreeTextEntry1] : RC PERDOMO is a 68 year  old male, seen for  MCTD, gout, UCTD , psoriasis , here for follow up and for \par \par 1.  Knee pain - IA and OA \par follow up response to hyaluronic acid \par \par 2. MCTD/UCTD/psoriasis/psoriatic arthritis : with positive AIDEN/dsDNA/RNP mainly with features of arthritis. Currently with diffuse joint pain and muscle pain and joint swelling. Currently on Plaquenil. Improvement with Imuran but d/c due to concurrent use of uloric\par [] Will check laboratory tests to look for markers of disease activity and also to assess for medication toxicity.\par [] He is not on Imuran because on Uloric, He is taking HCQ \par [] follow up optho as planned. \par [] has not been using tramadol, doesn't need it. \par []  given response to steorids in the past will try low dose steorid temporarily and with transition to biologic \par [] will confirm with pulm that he doesn't have COPD (patient wasn't sure and records do not show copd). \par [] IVIg is an option if he refuses all therapies that have infectoin risk but I have informed him that a biologic would be a better option. \par \par 5. Gout with no current flare. and last uric acid reviewed and will check again \par [] continue uloric \par [] check uric acid - \par \par 6. CAD/DM with prior PCI, still on DAPT and follows with cardiology. \par \par 7. OA s/p multiple hyaluronic acid injection. Recommend replacement and has seen Dr. Clayton -and will consider in the future\par \par 9. Chronic pain (due to OA and untreated MCTD) continue lyrica (daily) and tramadol (PRN) - hasn't been taking tramadol.  \par [] continue CBD cream \par \par \par More than 50% of the encounter was spent counselling  RC LEANNE on differential, workup, disease course, and treatment/management.   Education was provided to RC PERDOMO during this encounter. All questions and concerns were answered and addressed in detail.  RC PERDOMO verbalized understanding and agreed to plan\par \par RC PERDOMO has been instructed to call for an earlier appointment if new symptoms develop \par RC PERDOMO has been instructed to make a follow up appointment 1 month \par \par \par

## 2023-04-12 ENCOUNTER — NON-APPOINTMENT (OUTPATIENT)
Age: 68
End: 2023-04-12

## 2023-04-25 ENCOUNTER — APPOINTMENT (OUTPATIENT)
Dept: RHEUMATOLOGY | Facility: CLINIC | Age: 68
End: 2023-04-25
Payer: MEDICARE

## 2023-04-25 PROCEDURE — 99442: CPT | Mod: 95

## 2023-04-25 RX ORDER — ABATACEPT 125 MG/ML
125 INJECTION, SOLUTION SUBCUTANEOUS
Qty: 3 | Refills: 0 | Status: DISCONTINUED | COMMUNITY
Start: 2022-10-13 | End: 2023-04-25

## 2023-04-27 ENCOUNTER — RX RENEWAL (OUTPATIENT)
Age: 68
End: 2023-04-27

## 2023-05-01 ENCOUNTER — APPOINTMENT (OUTPATIENT)
Dept: PULMONOLOGY | Facility: CLINIC | Age: 68
End: 2023-05-01
Payer: MEDICARE

## 2023-05-01 VITALS
HEIGHT: 62 IN | OXYGEN SATURATION: 95 % | DIASTOLIC BLOOD PRESSURE: 95 MMHG | WEIGHT: 252 LBS | BODY MASS INDEX: 46.38 KG/M2 | HEART RATE: 96 BPM | TEMPERATURE: 97.7 F | SYSTOLIC BLOOD PRESSURE: 162 MMHG

## 2023-05-01 DIAGNOSIS — R05.9 COUGH, UNSPECIFIED: ICD-10-CM

## 2023-05-01 DIAGNOSIS — G47.33 OBSTRUCTIVE SLEEP APNEA (ADULT) (PEDIATRIC): ICD-10-CM

## 2023-05-01 DIAGNOSIS — J45.901 UNSPECIFIED ASTHMA WITH (ACUTE) EXACERBATION: ICD-10-CM

## 2023-05-01 DIAGNOSIS — R06.00 DYSPNEA, UNSPECIFIED: ICD-10-CM

## 2023-05-01 DIAGNOSIS — J45.909 UNSPECIFIED ASTHMA, UNCOMPLICATED: ICD-10-CM

## 2023-05-01 PROCEDURE — 95012 NITRIC OXIDE EXP GAS DETER: CPT

## 2023-05-01 PROCEDURE — 99214 OFFICE O/P EST MOD 30 MIN: CPT | Mod: 25

## 2023-05-01 PROCEDURE — 94010 BREATHING CAPACITY TEST: CPT

## 2023-05-01 RX ORDER — ALBUTEROL SULFATE 90 UG/1
108 (90 BASE) INHALANT RESPIRATORY (INHALATION)
Qty: 3 | Refills: 2 | Status: COMPLETED | COMMUNITY
Start: 2020-03-25 | End: 2023-05-01

## 2023-05-01 RX ORDER — PREDNISONE 2.5 MG/1
2.5 TABLET ORAL
Qty: 60 | Refills: 0 | Status: COMPLETED | COMMUNITY
Start: 2023-04-05 | End: 2023-05-01

## 2023-05-01 NOTE — DISCUSSION/SUMMARY
[FreeTextEntry1] : Cough and shortness of breath likely secondary to asthma exacerbation.  History of obstructive sleep apnea APAP.

## 2023-05-01 NOTE — PROCEDURE
[FreeTextEntry1] : Humidifier settings changed from 4 to 6\par \par IPAP changed from 15 to 16\par _________\par \par Spirometry shows severe obstructive airway disease.\par __________\par \par  Exhaled Nitric Oxide             Final\par \par No Documents Attached\par \par \par   Test   Result   Flag Reference Goal \par   Exhaled Nitric Oxide 30      \par \par  Ordered by: THOMAS KAMARA       Collected/Examined: 01May2023 10:27AM       \par Verified by: THOMAS KAMARA 01May2023 10:41AM       \par  Result Communication: No patient communication needed at this time;\par Stage: Final       \par  Performed at: In Office       Performed by: THOMAS KAMARA       Resulted: 01May2023 10:27AM       Last Updated: 01May2023 10:41AM       Accession: [id number]       \par

## 2023-05-01 NOTE — HISTORY OF PRESENT ILLNESS
[TextBox_4] : TONYA f/u \par Reports no current sleep symptoms. Reports no symptoms of daytime sleepiness. Getting supplies regularly. \par \par Device: AirClearGiste 10 VAuto \par \par Vendor: [employer] \par \par Setting: EPAP 10, IPAP 15\par \par Mask: nasal mask \par \par Issues: none \par \par Has had to use the nebulizer recently due to allergy symptoms

## 2023-05-01 NOTE — REASON FOR VISIT
[Follow-Up] : a follow-up visit [Asthma] : asthma [Sleep Apnea] : sleep apnea [Cough] : cough [Shortness of Breath] : shortness of breath [TextBox_44] : Has cough and shortness of breath.

## 2023-05-01 NOTE — ASSESSMENT
[FreeTextEntry1] : Start Z-Tiburcio and prednisone taper for asthma exacerbation.\par Start albuterol via nebulizer 3 times daily.\par Auto-titrating Positive Airway Pressure(APAP) compliance reviewed with patient in office today\par Patient is not compliant, but benefiting from APAP usage.\par Albuterol HFA as needed for asthma.\par Return for pulmonary follow-up in 1 month.\par

## 2023-05-30 ENCOUNTER — NON-APPOINTMENT (OUTPATIENT)
Age: 68
End: 2023-05-30

## 2023-06-07 ENCOUNTER — APPOINTMENT (OUTPATIENT)
Dept: RHEUMATOLOGY | Facility: CLINIC | Age: 68
End: 2023-06-07
Payer: MEDICARE

## 2023-06-07 PROCEDURE — 99442: CPT | Mod: 95

## 2023-06-07 RX ORDER — SEMAGLUTIDE 1.34 MG/ML
INJECTION, SOLUTION SUBCUTANEOUS
Refills: 0 | Status: DISCONTINUED | COMMUNITY
End: 2023-06-07

## 2023-06-19 ENCOUNTER — APPOINTMENT (OUTPATIENT)
Dept: PULMONOLOGY | Facility: CLINIC | Age: 68
End: 2023-06-19

## 2023-07-05 ENCOUNTER — APPOINTMENT (OUTPATIENT)
Dept: RHEUMATOLOGY | Facility: CLINIC | Age: 68
End: 2023-07-05
Payer: MEDICARE

## 2023-07-05 ENCOUNTER — NON-APPOINTMENT (OUTPATIENT)
Age: 68
End: 2023-07-05

## 2023-07-05 VITALS
TEMPERATURE: 98.1 F | WEIGHT: 250 LBS | HEIGHT: 62 IN | RESPIRATION RATE: 16 BRPM | OXYGEN SATURATION: 98 % | DIASTOLIC BLOOD PRESSURE: 88 MMHG | SYSTOLIC BLOOD PRESSURE: 159 MMHG | BODY MASS INDEX: 46.01 KG/M2 | HEART RATE: 86 BPM

## 2023-07-05 DIAGNOSIS — E11.9 TYPE 2 DIABETES MELLITUS W/OUT COMPLICATIONS: ICD-10-CM

## 2023-07-05 DIAGNOSIS — M1A.9XX0 CHRONIC GOUT, UNSPECIFIED, W/OUT TOPHUS (TOPHI): ICD-10-CM

## 2023-07-05 PROCEDURE — 99214 OFFICE O/P EST MOD 30 MIN: CPT

## 2023-07-05 RX ORDER — PREDNISONE 10 MG/1
10 TABLET ORAL
Qty: 9 | Refills: 0 | Status: DISCONTINUED | COMMUNITY
Start: 2023-05-01 | End: 2023-07-05

## 2023-07-05 NOTE — PHYSICAL EXAM
[General Appearance - Alert] : alert [General Appearance - In No Acute Distress] : in no acute distress [General Appearance - Well Nourished] : well nourished [General Appearance - Well Developed] : well developed [Sclera] : the sclera and conjunctiva were normal [Abnormal Walk] : normal gait [Motor Tone] : muscle strength and tone were normal [Skin Color & Pigmentation] : normal skin color and pigmentation [No Focal Deficits] : no focal deficits [Oriented To Time, Place, And Person] : oriented to person, place, and time [Impaired Insight] : insight and judgment were intact [FreeTextEntry1] : + seb derm on face

## 2023-07-05 NOTE — ASSESSMENT
[FreeTextEntry1] : RC PERDOMO is a 68 year  old male, seen for  MCTD, gout, UCTD , psoriasis , here for follow up and for \par \par 1.  Knee pain - IA and OA \par follow up response to hyaluronic acid \par follow up ortho\par \par 2. MCTD/UCTD/psoriasis/psoriatic arthritis : with positive AIDEN/dsDNA/RNP mainly with features of arthritis and myalgia Currently with diffuse joint pain and muscle pain and joint swelling. Currently on Plaquenil. Improvement with Imuran but d/c due to concurrent use of uloric\par [] Will check laboratory tests to look for markers of disease activity and also to assess for medication toxicity.\par [] He is not on Imuran because on Uloric, He is taking HCQ \par [] follow up optho as planned. \par [] has not been using tramadol, doesn't need it. \par []  given response to steorids in the past will try low dose steorid temporarily and with transition to biologic \par [] will confirm with pulm that he doesn't have COPD (patient wasn't sure and records do not show copd). \par [] IVIg started 6 weeks ago - will continue \par [] check ck given myalgia \par \par 5. Gout with no current flare. and last uric acid reviewed and will check again \par [] continue uloric \par [] check uric acid - \par \par 6. CAD/DM with prior PCI, still on DAPT and follows with cardiology. \par \par 7. OA s/p multiple hyaluronic acid injection. Recommend replacement and has seen Dr. Clayton -and will consider in the future\par [] pt scrip given \par \par 9. Chronic pain (due to OA and untreated MCTD) continue lyrica (daily) and tramadol (PRN) - hasn't been taking tramadol.  \par []  defers pain management\par \par \par More than 50% of the encounter was spent counselling  RC PERDOMO on differential, workup, disease course, and treatment/management.   Education was provided to RC LEANNE during this encounter. All questions and concerns were answered and addressed in detail.  RC LEANNE verbalized understanding and agreed to plan\par \par RC LEANNE has been instructed to call for an earlier appointment if new symptoms develop \par RC PERDOMO has been instructed to make a follow up appointment 1 month \par \par \par

## 2023-07-05 NOTE — HISTORY OF PRESENT ILLNESS
[FreeTextEntry1] : RC PERDOMO is a 68 year  old male, seen on today  for\par \par Psoriasis/UCTD/gout/OA \par TONYA - sleeps 4 hours a night with CPAP\par Left sided CTS - no therapy \par \par recentlyu one gout flare that was less than 24 hours without intervention. \par \par wants to have knee replacements but currently not a candidate. \par ortho (swapna) \par \par Psoriasis \par follows derm \par \par \par MCTD/UCTD/psoriasis: with positive AIDEN/dsDNA/RNP mainly with features of arthritis.  Also with psoriasis \par Currently on  mg BID, follows \par ->  can't take Imuran due to uloric \par -> otezla d/c due to gi distress \par ->  hasn't started orencia because of fear \par ->  on IVG and better with dex 1 instead of dex 2mg as premed \par \par \par Gout \par On Uloric every other day.\par \par Sees pulm for sleep apnea and asthma \par \par Renal - proteinuria - seeing renal - renal does not want to do a biopsy at this time.  (recheck again today ) \par \par CAD/DM with prior PCI, on DAPT. On insulin, has proteinuria. Saw Ophth recently. \par  \par \par \par

## 2023-07-06 ENCOUNTER — NON-APPOINTMENT (OUTPATIENT)
Age: 68
End: 2023-07-06

## 2023-07-06 LAB
ALBUMIN SERPL ELPH-MCNC: 3.6 G/DL
ALP BLD-CCNC: 67 U/L
ALT SERPL-CCNC: 25 U/L
ANION GAP SERPL CALC-SCNC: 13 MMOL/L
APPEARANCE: CLEAR
AST SERPL-CCNC: 27 U/L
BACTERIA: NEGATIVE /HPF
BILIRUB SERPL-MCNC: 0.2 MG/DL
BILIRUBIN URINE: NEGATIVE
BLOOD URINE: ABNORMAL
BUN SERPL-MCNC: 15 MG/DL
C3 SERPL-MCNC: 184 MG/DL
C4 SERPL-MCNC: 38 MG/DL
CALCIUM SERPL-MCNC: 8.8 MG/DL
CAST: 0 /LPF
CHLORIDE SERPL-SCNC: 101 MMOL/L
CK SERPL-CCNC: 213 U/L
CO2 SERPL-SCNC: 28 MMOL/L
COLOR: YELLOW
CREAT SERPL-MCNC: 1.18 MG/DL
CREAT SPEC-SCNC: 185 MG/DL
CREAT/PROT UR: 2.1 RATIO
EGFR: 67 ML/MIN/1.73M2
EPITHELIAL CELLS: 1 /HPF
ERYTHROCYTE [SEDIMENTATION RATE] IN BLOOD BY WESTERGREN METHOD: 65 MM/HR
ESTIMATED AVERAGE GLUCOSE: 189 MG/DL
GLUCOSE QUALITATIVE U: NEGATIVE MG/DL
GLUCOSE SERPL-MCNC: 179 MG/DL
HAV IGM SER QL: NONREACTIVE
HBA1C MFR BLD HPLC: 8.2 %
HBV CORE IGG+IGM SER QL: NONREACTIVE
HBV CORE IGM SER QL: NONREACTIVE
HBV SURFACE AG SER QL: NONREACTIVE
HCV AB SER QL: NONREACTIVE
HCV S/CO RATIO: 0.13 S/CO
KETONES URINE: NEGATIVE MG/DL
LEUKOCYTE ESTERASE URINE: NEGATIVE
MICROSCOPIC-UA: NORMAL
NITRITE URINE: NEGATIVE
PH URINE: 5.5
POTASSIUM SERPL-SCNC: 3.2 MMOL/L
PROT SERPL-MCNC: 6.8 G/DL
PROT UR-MCNC: 379 MG/DL
PROTEIN URINE: 300 MG/DL
RED BLOOD CELLS URINE: 1 /HPF
SODIUM SERPL-SCNC: 142 MMOL/L
SPECIFIC GRAVITY URINE: 1.02
URATE SERPL-MCNC: 7.3 MG/DL
UROBILINOGEN URINE: 0.2 MG/DL
WHITE BLOOD CELLS URINE: 0 /HPF

## 2023-07-07 ENCOUNTER — NON-APPOINTMENT (OUTPATIENT)
Age: 68
End: 2023-07-07

## 2023-07-09 LAB
M TB IFN-G BLD-IMP: NEGATIVE
QUANTIFERON TB PLUS MITOGEN MINUS NIL: 7.54 IU/ML
QUANTIFERON TB PLUS NIL: 0.04 IU/ML
QUANTIFERON TB PLUS TB1 MINUS NIL: 0.03 IU/ML
QUANTIFERON TB PLUS TB2 MINUS NIL: 0.04 IU/ML

## 2023-07-10 NOTE — ED PROVIDER NOTE - CLINICAL SUMMARY MEDICAL DECISION MAKING FREE TEXT BOX
Patient with dyspnea, likely asthma exacerbation, with syncope and injury of right shoulder. Will check labs, XRs, reassess. PAST MEDICAL HISTORY:  BPH (benign prostatic hyperplasia)     CAD (coronary artery disease)     DM (diabetes mellitus)     Folate deficiency     Glaucoma     HLD (hyperlipidemia)     HTN (hypertension)

## 2023-07-13 ENCOUNTER — NON-APPOINTMENT (OUTPATIENT)
Age: 68
End: 2023-07-13

## 2023-07-18 ENCOUNTER — APPOINTMENT (OUTPATIENT)
Dept: VASCULAR SURGERY | Facility: CLINIC | Age: 68
End: 2023-07-18
Payer: MEDICARE

## 2023-07-18 VITALS
TEMPERATURE: 97.6 F | SYSTOLIC BLOOD PRESSURE: 160 MMHG | HEIGHT: 61 IN | WEIGHT: 250 LBS | HEART RATE: 98 BPM | DIASTOLIC BLOOD PRESSURE: 91 MMHG | BODY MASS INDEX: 47.2 KG/M2

## 2023-07-18 DIAGNOSIS — I87.2 VENOUS INSUFFICIENCY (CHRONIC) (PERIPHERAL): ICD-10-CM

## 2023-07-18 DIAGNOSIS — I83.893 VARICOSE VEINS OF BILATERAL LOWER EXTREMITIES WITH OTHER COMPLICATIONS: ICD-10-CM

## 2023-07-18 PROCEDURE — 93970 EXTREMITY STUDY: CPT

## 2023-07-18 PROCEDURE — 99203 OFFICE O/P NEW LOW 30 MIN: CPT

## 2023-07-18 RX ORDER — TRAMADOL HYDROCHLORIDE 50 MG/1
50 TABLET, COATED ORAL DAILY
Qty: 30 | Refills: 0 | Status: DISCONTINUED | COMMUNITY
Start: 2023-04-25 | End: 2023-07-18

## 2023-07-18 RX ORDER — CETIRIZINE HYDROCHLORIDE 10 MG/1
10 CAPSULE, LIQUID FILLED ORAL
Qty: 0 | Refills: 0 | Status: DISCONTINUED | OUTPATIENT
Start: 2023-04-25 | End: 2023-07-18

## 2023-07-18 RX ORDER — ZALEPLON 5 MG/1
5 CAPSULE ORAL
Qty: 30 | Refills: 0 | Status: DISCONTINUED | COMMUNITY
Start: 2023-02-23 | End: 2023-07-18

## 2023-07-18 RX ORDER — HYDROCORTISONE SODIUM SUCCINATE 100 MG/2ML
100 INJECTION, POWDER, FOR SOLUTION INTRAMUSCULAR; INTRAVENOUS
Qty: 0 | Refills: 0 | Status: DISCONTINUED | OUTPATIENT
Start: 2023-04-25 | End: 2023-07-18

## 2023-07-18 RX ORDER — IMMUNE GLOBULIN INFUSION (HUMAN) 100 MG/ML
30 INJECTION, SOLUTION INTRAVENOUS; SUBCUTANEOUS
Qty: 100 | Refills: 3 | Status: DISCONTINUED | OUTPATIENT
Start: 2023-05-03 | End: 2023-07-18

## 2023-07-18 RX ORDER — AZITHROMYCIN 250 MG/1
250 TABLET, FILM COATED ORAL
Qty: 1 | Refills: 0 | Status: DISCONTINUED | COMMUNITY
Start: 2023-05-01 | End: 2023-07-18

## 2023-07-18 RX ORDER — ALBUTEROL SULFATE 90 UG/1
108 (90 BASE) AEROSOL, METERED RESPIRATORY (INHALATION)
Qty: 1 | Refills: 5 | Status: DISCONTINUED | COMMUNITY
Start: 2019-07-01 | End: 2023-07-18

## 2023-07-18 NOTE — PHYSICAL EXAM
[FreeTextEntry1] : LE vein findings: \par Varicosities (spider, reticular, varicose)bilateral \par Edema  bilateral \par Skin changes  dry skin, stasis dermatitis, hyperpigmentation in the gator area, lipodermatosclerosis, hyperkeratosis (skin thickening)\par Ulcer none\par CEAP classification C4a\par Palpable DP pulses bilaterally\par \par

## 2023-07-18 NOTE — ASSESSMENT
[FreeTextEntry1] : Mr. RC PERDOMO is a 68 year with persistent and worsening bilateral lower extremity venous insufficiency, CEAP classification C4a which is unresponsive to at least 3 months of compression stockings 20-30 mmHg, leg elevation, exercise and over the counter pain medication_(Ibuprofen). Patient has complaints of worsening bilateral leg discomfort with swelling, fatigue, heaviness, achiness, cramping, restlessness, and painful varicosities. The patient’s symptoms interfere with their ADL’s, such as walking, shopping and house work, and their ability to work and exercise. Patient has intact pulses in both legs without evidence of arterial insufficiency. \par Treatment is indicated not for cosmetic reasons but for symptomatic venous reflux disease with symptoms which is refractory to conservative therapy. Venous duplex study demonstrates bilateral lower extremity venous insufficiency. The need for definitive effective treatment is based on severe, interfering and worsening reflux symptoms with evidence of venous insufficiency on venous ultrasound. \par Patient is a candidate for endovenous ablation and Varithena treatment of the bilateral GSV. \par The risks and benefits of endovenous ablation treatment versus continued conservative management were discussed with the patient. Patient chooses endovenous ablation and Varithena treatments. Treatment plan to be scheduled.\par

## 2023-07-18 NOTE — HISTORY OF PRESENT ILLNESS
[FreeTextEntry1] : Mr. RC PERDOMO is a 68 year M who presents for initial evaluation of bilateral leg pain for the past several months. \par Mr. PERDOMO leg discomfort is associated with  leg swelling, fatigue, heaviness, achiness, restlessness, muscle cramping, itchiness, dry skin, and skin darkening at the ankles. \par He complains of  painful varicose veins.\par His symptoms have persisted despite conservative management with leg elevation, exercise, attempted weight loss, over-the-counter medications (ibuprofen), and compression stocking use for more than 3 months. \par His symptoms are aggravated by weight bearing, prolonged standing, prolonged sitting, extended travel, exercise\par Nothing helps to alleviate patient's symptoms. \par His symptoms interfere with the his ADLs such as work, shopping and housekeeping. \par Mr. PERDOMO risks factor for venous disease are obesity, history of varicose veins, spider veins\par Previous treatment, vein procedures and vein surgeries include: wearing compression stockings for more than 3 months with little or no relief \par PMH significant for scoriatic arthritis\par \par \par \par

## 2023-07-25 ENCOUNTER — APPOINTMENT (OUTPATIENT)
Dept: RHEUMATOLOGY | Facility: CLINIC | Age: 68
End: 2023-07-25
Payer: MEDICARE

## 2023-07-25 PROCEDURE — 99442: CPT | Mod: 95

## 2023-07-26 ENCOUNTER — RX RENEWAL (OUTPATIENT)
Age: 68
End: 2023-07-26

## 2023-07-28 NOTE — ED PROVIDER NOTE - CONSTITUTIONAL, MLM
Gallbladder normal... Well appearing, obese, awake, alert, oriented to person, place, time/situation and in no apparent distress.

## 2023-08-17 RX ORDER — LIDOCAINE HYDROCHLORIDE 10 MG/ML
1 INJECTION, SOLUTION INFILTRATION; PERINEURAL
Qty: 50 | Refills: 0 | Status: ACTIVE | COMMUNITY
Start: 2023-08-17 | End: 1900-01-01

## 2023-08-17 RX ORDER — SODIUM BICARBONATE 84 MG/ML
8.4 INJECTION, SOLUTION INTRAVENOUS
Qty: 5 | Refills: 0 | Status: ACTIVE | COMMUNITY
Start: 2023-08-17 | End: 1900-01-01

## 2023-08-24 ENCOUNTER — APPOINTMENT (OUTPATIENT)
Dept: VASCULAR SURGERY | Facility: CLINIC | Age: 68
End: 2023-08-24
Payer: MEDICARE

## 2023-08-24 PROCEDURE — 36475 ENDOVENOUS RF 1ST VEIN: CPT | Mod: LT

## 2023-08-24 NOTE — PROCEDURE
[FreeTextEntry1] : left GSV RFA [FreeTextEntry3] : Procedural safety checklist and time out completed: Confirmed patient identification (Patient Name, , and/or medical record number including when possible affirmation by patient or parent/family/other). Confirmed procedure with the patient. Consent present, accurate and signed.  Confirmed special equipment and supplies are present. Sterility confirmed. Position verified.  Site/ side is marked and visible and confirmed.  Procedure confirmed by consent. Accurate consent including side and site. Review of medical records, including venous ultrasound, noting correct procedure including site and side. MD/PA verifies presence and review of imaging studies and or written report of imaging studies. Agreement on the procedure to be performed Time out completed. All of the above has been confirmed by the team. All patient-specific concerns have been addressed.   Indication: left lower extremity varicose veins with inflammation, leg pain, leg swelling, and leg cramping.  Venous insufficiency/ reflux.  Procedure: radiofrequency ablation of the left great saphenous vein.  	 Mr. RC PERDOMO is a 68 year old M with a history of left lower extremity varicose veins previously seen in the office.  Ultrasound examination demonstrated venous insufficiency. A trial of compression stockings, exercise, elevation, and pain medication was attempted without relief and definitive treatment with radiofrequency ablation was offered.   The patient has come for radiofrequency ablation treatment of the left great saphenous vein. I have discussed the risks of the procedure at length with the patient. The risks discussed were inclusive of but not limited to infection, irritation at the site of infiltration of local anesthesia, and also rare risk of deep venous thrombosis and pulmonary emboli. The patient agrees to proceed with the procedure.  The patient was escorted into the procedure room and a time out called. The entire limb was prepped and draped in sterile fashion. The RF fiber was placed on the sterile field and connected by a sterile cable. Actuation, temperature, and impedance testing were performed to ensure that all components were connected and operating properly.  The patient was placed on the procedure table and local anesthesia was instilled in the skin overlying the access site. Under ultrasound guidance, the vein was punctured with a micropuncture needle, using the anterior wall technique. A guide wire was now introduced through the needle, and the needle was then exchanged over the guide wire for a 7F sheath. The guide wire was removed and the RF probe was then placed into the left great saphenous vein through the sheath and position confirmed using ultrasound guidance. After the RF probe position was verified by ultrasound, tumescent anesthesia consisting of normal saline, 1% lidocaine with 8.4% sodium bicarbonate was infiltrated, under ultrasound guidance, precisely into the perivenous compartment along the entire length of the vein until a halo of fluid was noted around the vein. After RF probe position was again confirmed with ultrasound imaging, RF energy was applied. The probe was gradually and carefully withdrawn at a rate of 6.5cm/20seconds.   6 cycles of RF performed using the 7 cm probe Total treatment time was 120 seconds. The total volume injected was 400cc Treatment length was 33 cm and  The probe is 3.7cm from the SFJ.  Estimated Blood Loss: minimal Repeat ultrasound of the treated vein was performed confirming successful treatment. The catheter and sheath were withdrawn and hemostasis established with direct pressure. After assuring hemostasis, a sterile 4x4 was placed on the access site and an ACE compression wrap was applied. Patient tolerated procedure well. Patient was given post-procedure instructions and follow up appointment was scheduled.

## 2023-08-24 NOTE — REASON FOR VISIT
[Procedure: _________] : a [unfilled] procedure visit
[Procedure: _________] : a [unfilled] procedure visit
verbalization

## 2023-08-28 ENCOUNTER — RX RENEWAL (OUTPATIENT)
Age: 68
End: 2023-08-28

## 2023-08-28 DIAGNOSIS — G89.29 PAIN IN UNSPECIFIED JOINT: ICD-10-CM

## 2023-08-28 DIAGNOSIS — M25.50 PAIN IN UNSPECIFIED JOINT: ICD-10-CM

## 2023-08-29 ENCOUNTER — APPOINTMENT (OUTPATIENT)
Dept: VASCULAR SURGERY | Facility: CLINIC | Age: 68
End: 2023-08-29
Payer: MEDICARE

## 2023-08-29 PROCEDURE — 93971 EXTREMITY STUDY: CPT | Mod: LT

## 2023-09-01 ENCOUNTER — APPOINTMENT (OUTPATIENT)
Dept: RHEUMATOLOGY | Facility: CLINIC | Age: 68
End: 2023-09-01
Payer: MEDICARE

## 2023-09-01 VITALS
HEIGHT: 61 IN | RESPIRATION RATE: 16 BRPM | WEIGHT: 245 LBS | DIASTOLIC BLOOD PRESSURE: 98 MMHG | SYSTOLIC BLOOD PRESSURE: 165 MMHG | TEMPERATURE: 97.1 F | HEART RATE: 81 BPM | BODY MASS INDEX: 46.26 KG/M2 | OXYGEN SATURATION: 96 %

## 2023-09-01 DIAGNOSIS — M17.0 BILATERAL PRIMARY OSTEOARTHRITIS OF KNEE: ICD-10-CM

## 2023-09-01 PROCEDURE — 99214 OFFICE O/P EST MOD 30 MIN: CPT

## 2023-09-01 RX ORDER — ALPRAZOLAM 0.25 MG/1
0.25 TABLET ORAL
Qty: 1 | Refills: 0 | Status: DISCONTINUED | COMMUNITY
Start: 2023-08-17 | End: 2023-09-01

## 2023-09-01 NOTE — HISTORY OF PRESENT ILLNESS
[FreeTextEntry1] : RC PERDOMO is a 68 year  old male, seen on today  for  Psoriasis/UCTD/gout/OA  TONYA - sleeps 4 hours a night with CPAP Left sided CTS - no therapy   5 weeks ago a jar fell on foot -> fracture/hematoma working with [facility] surgery notes review.  found to have bakers cyst that ruptured in left leg - saw orthopedist and had cortisone shots in bilteral knees anterior.  with rain/humidity has total body pain -all joints.  he uses tramadol sparingly but it only makes him tired so he doesn't feel the pain.  podiatrist gave him Percocet and it knocked him out for 12 hours and he was still tired the next day - doesn't want to take it anymore.  hip pain is on lateral hips - worst with standing up - burning.  no back pain   wants to have knee replacements but currently not a candidate (medical)  ortho (swapna)   Psoriasis  follows derm    MCTD/UCTD/psoriasis: with positive AIDEN/dsDNA/RNP mainly with features of arthritis.  Also with psoriasis  Currently on  mg BID, follows  ->  can't take Imuran due to uloric  -> otezla d/c due to gi distress  ->  hasn't started orencia because of fear    Gout  On Uloric every other day. colchicine d/c in 8-2026 due to at uric acid goal  no recnet gout flares   Sees pulm for sleep apnea and asthma   Renal - proteinuria - seeing renal - renal does not want to do a biopsy at this time.  (recheck again today )   CAD/DM with prior PCI, on DAPT. On insulin, has proteinuria. Saw Ophth recently.

## 2023-09-01 NOTE — ASSESSMENT
[FreeTextEntry1] : RC PERDOMO is a 68 year  old male, seen for  MCTD, gout, UCTD , psoriasis , here for follow up and for   1. OA Knee pain - IA and OA  follow up response to hyaluronic acid  follow up ortho ->  trial of Cymbalta for OA  ( Risks of, benefits of and alternatives to this treatment plan discussed with patient and patient expressed understanding.) ]  2. MCTD/UCTD/psoriasis/psoriatic arthritis : with positive AIDEN/dsDNA/RNP mainly with features of arthritis and myalgia Currently with diffuse joint pain and muscle pain and joint swelling. Currently on Plaquenil. Improvement with Imuran but d/c due to concurrent use of uloric [] Will check laboratory tests to look for markers of disease activity and also to assess for medication toxicity. [] He is not on Imuran because on Uloric, He is taking HCQ  [] follow up optho as planned [] will confirm with pulm that he doesn't have COPD (patient wasn't sure and records do not show copd).  [] IVIg started > 12  weeks ago and no relief - will d/c as he doesn't believe it is helping   5. Gout with no current flare. and last uric acid reviewed and will check again  [] continue uloric ot 40mg PO QD [] check uric acid  [] consider colchicine for flare/flare prophylaxis   6. CAD/DM with prior PCI, still on DAPT and follows with cardiology.   7. Chronic pain (due to OA and untreated MCTD) continue lyrica (daily) and tramadol (PRN) - hasn't been taking tramadol.   []  agrees to pain management.  [] assess response to Cymbalta.   More than 50% of the encounter was spent counselling  RC PERDOMO on differential, workup, disease course, and treatment/management.   Education was provided to RC PERDOMO during this encounter. All questions and concerns were answered and addressed in detail.  RC PERDOMO verbalized understanding and agreed to plan  RCHENOK PERDOMO has been instructed to call for an earlier appointment if new symptoms develop  RCHENOK PERDOMO has been instructed to make a follow up appointment  2 months

## 2023-09-01 NOTE — PHYSICAL EXAM
[General Appearance - Alert] : alert [General Appearance - In No Acute Distress] : in no acute distress [General Appearance - Well Nourished] : well nourished [General Appearance - Well Developed] : well developed [Sclera] : the sclera and conjunctiva were normal [Abnormal Walk] : normal gait [Motor Tone] : muscle strength and tone were normal [Skin Color & Pigmentation] : normal skin color and pigmentation [Oriented To Time, Place, And Person] : oriented to person, place, and time [Impaired Insight] : insight and judgment were intact [FreeTextEntry1] : + seb derm on face

## 2023-09-02 ENCOUNTER — LABORATORY RESULT (OUTPATIENT)
Age: 68
End: 2023-09-02

## 2023-09-05 LAB
ALBUMIN MFR SERPL ELPH: 52.5 %
ALBUMIN SERPL ELPH-MCNC: 3.9 G/DL
ALBUMIN SERPL-MCNC: 3.5 G/DL
ALBUMIN/GLOB SERPL: 1.1 RATIO
ALP BLD-CCNC: 72 U/L
ALPHA1 GLOB MFR SERPL ELPH: 5 %
ALPHA1 GLOB SERPL ELPH-MCNC: 0.3 G/DL
ALPHA2 GLOB MFR SERPL ELPH: 14.7 %
ALPHA2 GLOB SERPL ELPH-MCNC: 1 G/DL
ALT SERPL-CCNC: 22 U/L
ANA SER IF-ACNC: NEGATIVE
ANION GAP SERPL CALC-SCNC: 14 MMOL/L
APPEARANCE: CLEAR
AST SERPL-CCNC: 20 U/L
B-GLOBULIN MFR SERPL ELPH: 13.4 %
B-GLOBULIN SERPL ELPH-MCNC: 0.9 G/DL
BACTERIA: NEGATIVE /HPF
BASOPHILS # BLD AUTO: 0.03 K/UL
BASOPHILS NFR BLD AUTO: 0.3 %
BILIRUB SERPL-MCNC: 0.3 MG/DL
BILIRUBIN URINE: NEGATIVE
BLOOD URINE: NEGATIVE
BUN SERPL-MCNC: 16 MG/DL
C3 SERPL-MCNC: 193 MG/DL
C4 SERPL-MCNC: 43 MG/DL
CALCIUM SERPL-MCNC: 8.6 MG/DL
CAST: 1 /LPF
CHLORIDE SERPL-SCNC: 104 MMOL/L
CO2 SERPL-SCNC: 26 MMOL/L
COLOR: YELLOW
CREAT SERPL-MCNC: 1.11 MG/DL
CREAT SPEC-SCNC: 78 MG/DL
CREAT/PROT UR: 3.2 RATIO
DEPRECATED KAPPA LC FREE/LAMBDA SER: 1.41 RATIO
DSDNA AB SER-ACNC: 25 IU/ML
EGFR: 72 ML/MIN/1.73M2
EOSINOPHIL # BLD AUTO: 0.15 K/UL
EOSINOPHIL NFR BLD AUTO: 1.5 %
EPITHELIAL CELLS: 1 /HPF
GAMMA GLOB FLD ELPH-MCNC: 1 G/DL
GAMMA GLOB MFR SERPL ELPH: 14.4 %
GLUCOSE QUALITATIVE U: NEGATIVE MG/DL
GLUCOSE SERPL-MCNC: 138 MG/DL
HCT VFR BLD CALC: 41 %
HGB BLD-MCNC: 13.3 G/DL
IGA SER QL IEP: 246 MG/DL
IGG SER QL IEP: 945 MG/DL
IGM SER QL IEP: 88 MG/DL
IMM GRANULOCYTES NFR BLD AUTO: 0.6 %
INTERPRETATION SERPL IEP-IMP: NORMAL
KAPPA LC CSF-MCNC: 1.39 MG/DL
KAPPA LC SERPL-MCNC: 1.96 MG/DL
KETONES URINE: NEGATIVE MG/DL
LEUKOCYTE ESTERASE URINE: NEGATIVE
LYMPHOCYTES # BLD AUTO: 0.91 K/UL
LYMPHOCYTES NFR BLD AUTO: 9.4 %
M PROTEIN SPEC IFE-MCNC: NORMAL
MAN DIFF?: NORMAL
MCHC RBC-ENTMCNC: 29.6 PG
MCHC RBC-ENTMCNC: 32.4 GM/DL
MCV RBC AUTO: 91.1 FL
MICROSCOPIC-UA: NORMAL
MONOCYTES # BLD AUTO: 0.96 K/UL
MONOCYTES NFR BLD AUTO: 9.9 %
NEUTROPHILS # BLD AUTO: 7.61 K/UL
NEUTROPHILS NFR BLD AUTO: 78.3 %
NITRITE URINE: NEGATIVE
PH URINE: 6
PLATELET # BLD AUTO: 201 K/UL
POTASSIUM SERPL-SCNC: 4.1 MMOL/L
PROT SERPL-MCNC: 6.7 G/DL
PROT UR-MCNC: 248 MG/DL
PROTEIN URINE: 300 MG/DL
RBC # BLD: 4.5 M/UL
RBC # FLD: 13.6 %
RED BLOOD CELLS URINE: 0 /HPF
SODIUM SERPL-SCNC: 144 MMOL/L
SPECIFIC GRAVITY URINE: 1.02
URATE SERPL-MCNC: 6.6 MG/DL
UROBILINOGEN URINE: 0.2 MG/DL
WBC # FLD AUTO: 9.72 K/UL
WHITE BLOOD CELLS URINE: 2 /HPF

## 2023-09-05 RX ORDER — IMMUNE GLOBULIN INFUSION (HUMAN) 100 MG/ML
10 INJECTION, SOLUTION INTRAVENOUS; SUBCUTANEOUS
Qty: 6 | Refills: 4 | Status: DISCONTINUED | COMMUNITY
Start: 2023-04-25 | End: 2023-09-05

## 2023-09-06 LAB — KAPPA LC 24H UR QL: NORMAL

## 2023-09-11 RX ORDER — LIDOCAINE HYDROCHLORIDE 10 MG/ML
1 INJECTION, SOLUTION INFILTRATION; PERINEURAL
Qty: 50 | Refills: 0 | Status: ACTIVE | COMMUNITY
Start: 2023-09-11 | End: 1900-01-01

## 2023-09-11 RX ORDER — SODIUM BICARBONATE 84 MG/ML
8.4 INJECTION, SOLUTION INTRAVENOUS
Qty: 5 | Refills: 0 | Status: ACTIVE | COMMUNITY
Start: 2023-09-11 | End: 1900-01-01

## 2023-09-14 ENCOUNTER — APPOINTMENT (OUTPATIENT)
Dept: VASCULAR SURGERY | Facility: CLINIC | Age: 68
End: 2023-09-14
Payer: MEDICARE

## 2023-09-14 PROCEDURE — 36475 ENDOVENOUS RF 1ST VEIN: CPT | Mod: RT

## 2023-09-15 ENCOUNTER — APPOINTMENT (OUTPATIENT)
Dept: PAIN MANAGEMENT | Facility: CLINIC | Age: 68
End: 2023-09-15

## 2023-09-21 ENCOUNTER — APPOINTMENT (OUTPATIENT)
Dept: VASCULAR SURGERY | Facility: CLINIC | Age: 68
End: 2023-09-21
Payer: MEDICARE

## 2023-09-21 PROCEDURE — 36465Z: CUSTOM | Mod: LT

## 2023-09-21 PROCEDURE — 93971 EXTREMITY STUDY: CPT | Mod: RT

## 2023-09-26 ENCOUNTER — NON-APPOINTMENT (OUTPATIENT)
Age: 68
End: 2023-09-26

## 2023-09-28 ENCOUNTER — APPOINTMENT (OUTPATIENT)
Dept: VASCULAR SURGERY | Facility: CLINIC | Age: 68
End: 2023-09-28
Payer: MEDICARE

## 2023-09-28 DIAGNOSIS — S80.12XA CONTUSION OF LEFT LOWER LEG, INITIAL ENCOUNTER: ICD-10-CM

## 2023-09-28 PROCEDURE — 99213 OFFICE O/P EST LOW 20 MIN: CPT

## 2023-09-28 PROCEDURE — 93971 EXTREMITY STUDY: CPT | Mod: LT

## 2023-10-27 ENCOUNTER — APPOINTMENT (OUTPATIENT)
Dept: RHEUMATOLOGY | Facility: CLINIC | Age: 68
End: 2023-10-27
Payer: MEDICARE

## 2023-10-27 ENCOUNTER — NON-APPOINTMENT (OUTPATIENT)
Age: 68
End: 2023-10-27

## 2023-10-27 VITALS
RESPIRATION RATE: 16 BRPM | HEIGHT: 61 IN | OXYGEN SATURATION: 95 % | SYSTOLIC BLOOD PRESSURE: 182 MMHG | WEIGHT: 232 LBS | BODY MASS INDEX: 43.8 KG/M2 | HEART RATE: 83 BPM | TEMPERATURE: 98.1 F | DIASTOLIC BLOOD PRESSURE: 106 MMHG

## 2023-10-27 DIAGNOSIS — E66.01 MORBID (SEVERE) OBESITY DUE TO EXCESS CALORIES: ICD-10-CM

## 2023-10-27 DIAGNOSIS — M25.512 PAIN IN RIGHT SHOULDER: ICD-10-CM

## 2023-10-27 DIAGNOSIS — R19.7 DIARRHEA, UNSPECIFIED: ICD-10-CM

## 2023-10-27 DIAGNOSIS — M25.511 PAIN IN RIGHT SHOULDER: ICD-10-CM

## 2023-10-27 PROCEDURE — 99215 OFFICE O/P EST HI 40 MIN: CPT

## 2023-10-27 RX ORDER — ALPRAZOLAM 0.5 MG/1
0.5 TABLET ORAL
Qty: 1 | Refills: 0 | Status: DISCONTINUED | COMMUNITY
Start: 2023-09-14 | End: 2023-10-27

## 2023-10-27 RX ORDER — ALPRAZOLAM 0.5 MG/1
0.5 TABLET ORAL
Qty: 1 | Refills: 0 | Status: DISCONTINUED | COMMUNITY
Start: 2023-09-21 | End: 2023-10-27

## 2023-10-27 RX ORDER — TRAMADOL HYDROCHLORIDE 5 MG/ML
5 SOLUTION ORAL
Qty: 150 | Refills: 0 | Status: DISCONTINUED | COMMUNITY
Start: 2023-06-07 | End: 2023-10-27

## 2023-10-27 RX ORDER — ALPRAZOLAM 0.5 MG/1
0.5 TABLET ORAL
Qty: 1 | Refills: 0 | Status: DISCONTINUED | COMMUNITY
Start: 2023-09-11 | End: 2023-10-27

## 2023-10-30 LAB
ALBUMIN MFR SERPL ELPH: 55.5 %
ALBUMIN SERPL ELPH-MCNC: 4 G/DL
ALBUMIN SERPL-MCNC: 3.6 G/DL
ALBUMIN/GLOB SERPL: 1.3 RATIO
ALP BLD-CCNC: 68 U/L
ALPHA1 GLOB MFR SERPL ELPH: 5.4 %
ALPHA1 GLOB SERPL ELPH-MCNC: 0.3 G/DL
ALPHA2 GLOB MFR SERPL ELPH: 15.2 %
ALPHA2 GLOB SERPL ELPH-MCNC: 1 G/DL
ALT SERPL-CCNC: 18 U/L
ANION GAP SERPL CALC-SCNC: 12 MMOL/L
AST SERPL-CCNC: 17 U/L
B-GLOBULIN MFR SERPL ELPH: 12.2 %
B-GLOBULIN SERPL ELPH-MCNC: 0.8 G/DL
BASOPHILS # BLD AUTO: 0.04 K/UL
BASOPHILS NFR BLD AUTO: 0.4 %
BILIRUB SERPL-MCNC: 0.4 MG/DL
BUN SERPL-MCNC: 10 MG/DL
CALCIUM SERPL-MCNC: 9.4 MG/DL
CHLORIDE SERPL-SCNC: 104 MMOL/L
CK SERPL-CCNC: 96 U/L
CO2 SERPL-SCNC: 27 MMOL/L
CREAT SERPL-MCNC: 1.05 MG/DL
CRP SERPL-MCNC: 32 MG/L
DEPRECATED KAPPA LC FREE/LAMBDA SER: 1.47 RATIO
EGFR: 77 ML/MIN/1.73M2
EOSINOPHIL # BLD AUTO: 0.18 K/UL
EOSINOPHIL NFR BLD AUTO: 2 %
ERYTHROCYTE [SEDIMENTATION RATE] IN BLOOD BY WESTERGREN METHOD: 41 MM/HR
GAMMA GLOB FLD ELPH-MCNC: 0.7 G/DL
GAMMA GLOB MFR SERPL ELPH: 11.7 %
GLUCOSE SERPL-MCNC: 95 MG/DL
HCT VFR BLD CALC: 43.4 %
HGB BLD-MCNC: 14.1 G/DL
IGA SER QL IEP: 218 MG/DL
IGG SER QL IEP: 763 MG/DL
IGM SER QL IEP: 72 MG/DL
IMM GRANULOCYTES NFR BLD AUTO: 0.6 %
INTERPRETATION SERPL IEP-IMP: NORMAL
KAPPA LC CSF-MCNC: 1.5 MG/DL
KAPPA LC SERPL-MCNC: 2.21 MG/DL
LDH SERPL-CCNC: 217 U/L
LYMPHOCYTES # BLD AUTO: 1.24 K/UL
LYMPHOCYTES NFR BLD AUTO: 13.7 %
M PROTEIN SPEC IFE-MCNC: NORMAL
MAN DIFF?: NORMAL
MCHC RBC-ENTMCNC: 29.2 PG
MCHC RBC-ENTMCNC: 32.5 GM/DL
MCV RBC AUTO: 89.9 FL
MONOCYTES # BLD AUTO: 1.02 K/UL
MONOCYTES NFR BLD AUTO: 11.3 %
NEUTROPHILS # BLD AUTO: 6.49 K/UL
NEUTROPHILS NFR BLD AUTO: 72 %
PLATELET # BLD AUTO: 182 K/UL
POTASSIUM SERPL-SCNC: 3.7 MMOL/L
PROT SERPL-MCNC: 6.4 G/DL
PROT SERPL-MCNC: 6.4 G/DL
PROT SERPL-MCNC: 6.7 G/DL
RBC # BLD: 4.83 M/UL
RBC # FLD: 13.4 %
SODIUM SERPL-SCNC: 143 MMOL/L
URATE SERPL-MCNC: 6.4 MG/DL
WBC # FLD AUTO: 9.02 K/UL

## 2023-11-01 DIAGNOSIS — M25.512 PAIN IN RIGHT SHOULDER: ICD-10-CM

## 2023-11-01 DIAGNOSIS — M25.511 PAIN IN RIGHT SHOULDER: ICD-10-CM

## 2023-11-06 LAB — BACTERIA STL CULT: NORMAL

## 2023-11-09 ENCOUNTER — NON-APPOINTMENT (OUTPATIENT)
Age: 68
End: 2023-11-09

## 2023-11-10 LAB
CALPROTECTIN FECAL: 144 UG/G
FAT STL QN: NORMAL
FAT STL QN: NORMAL
H PYLORI AG STL QL: NEGATIVE

## 2023-11-11 LAB — PANCREATIC ELASTASE, FECAL: 82 CD:794062645

## 2023-11-17 ENCOUNTER — RESULT REVIEW (OUTPATIENT)
Age: 68
End: 2023-11-17

## 2023-11-17 ENCOUNTER — APPOINTMENT (OUTPATIENT)
Dept: ULTRASOUND IMAGING | Facility: CLINIC | Age: 68
End: 2023-11-17
Payer: MEDICARE

## 2023-11-17 ENCOUNTER — OUTPATIENT (OUTPATIENT)
Dept: OUTPATIENT SERVICES | Facility: HOSPITAL | Age: 68
LOS: 1 days | End: 2023-11-17
Payer: MEDICARE

## 2023-11-17 DIAGNOSIS — Z98.89 OTHER SPECIFIED POSTPROCEDURAL STATES: Chronic | ICD-10-CM

## 2023-11-17 DIAGNOSIS — M25.511 PAIN IN RIGHT SHOULDER: ICD-10-CM

## 2023-11-17 DIAGNOSIS — Z95.5 PRESENCE OF CORONARY ANGIOPLASTY IMPLANT AND GRAFT: Chronic | ICD-10-CM

## 2023-11-17 DIAGNOSIS — G56.00 CARPAL TUNNEL SYNDROME, UNSPECIFIED UPPER LIMB: Chronic | ICD-10-CM

## 2023-11-17 PROCEDURE — 20611 DRAIN/INJ JOINT/BURSA W/US: CPT | Mod: LT

## 2023-11-17 PROCEDURE — 20611 DRAIN/INJ JOINT/BURSA W/US: CPT | Mod: RT

## 2023-11-17 PROCEDURE — 20611 DRAIN/INJ JOINT/BURSA W/US: CPT

## 2023-11-28 ENCOUNTER — APPOINTMENT (OUTPATIENT)
Dept: RHEUMATOLOGY | Facility: CLINIC | Age: 68
End: 2023-11-28
Payer: MEDICARE

## 2023-11-28 VITALS
HEART RATE: 95 BPM | WEIGHT: 231 LBS | HEIGHT: 61 IN | TEMPERATURE: 97.1 F | SYSTOLIC BLOOD PRESSURE: 189 MMHG | OXYGEN SATURATION: 98 % | DIASTOLIC BLOOD PRESSURE: 99 MMHG | RESPIRATION RATE: 16 BRPM | BODY MASS INDEX: 43.61 KG/M2

## 2023-11-28 DIAGNOSIS — M19.90 UNSPECIFIED OSTEOARTHRITIS, UNSPECIFIED SITE: ICD-10-CM

## 2023-11-28 PROCEDURE — 99214 OFFICE O/P EST MOD 30 MIN: CPT

## 2023-11-28 RX ORDER — ALPRAZOLAM 0.25 MG/1
0.25 TABLET ORAL
Qty: 1 | Refills: 0 | Status: DISCONTINUED | COMMUNITY
Start: 2023-11-20 | End: 2023-11-28

## 2023-11-28 RX ORDER — IMMUNE GLOBULIN INFUSION (HUMAN) 100 MG/ML
30 INJECTION, SOLUTION INTRAVENOUS; SUBCUTANEOUS
Refills: 0 | Status: DISCONTINUED | COMMUNITY
Start: 2023-07-25 | End: 2023-11-28

## 2023-11-30 ENCOUNTER — APPOINTMENT (OUTPATIENT)
Dept: VASCULAR SURGERY | Facility: CLINIC | Age: 68
End: 2023-11-30

## 2023-12-01 ENCOUNTER — NON-APPOINTMENT (OUTPATIENT)
Age: 68
End: 2023-12-01

## 2023-12-06 ENCOUNTER — INPATIENT (INPATIENT)
Facility: HOSPITAL | Age: 68
LOS: 0 days | Discharge: HOME | DRG: 69 | End: 2023-12-07
Attending: HOSPITALIST | Admitting: HOSPITALIST
Payer: COMMERCIAL

## 2023-12-06 VITALS
WEIGHT: 231.93 LBS | DIASTOLIC BLOOD PRESSURE: 119 MMHG | HEART RATE: 86 BPM | OXYGEN SATURATION: 98 % | RESPIRATION RATE: 18 BRPM | TEMPERATURE: 98 F | SYSTOLIC BLOOD PRESSURE: 178 MMHG

## 2023-12-06 DIAGNOSIS — M35.1 OTHER OVERLAP SYNDROMES: ICD-10-CM

## 2023-12-06 DIAGNOSIS — Z29.9 ENCOUNTER FOR PROPHYLACTIC MEASURES, UNSPECIFIED: ICD-10-CM

## 2023-12-06 DIAGNOSIS — G56.00 CARPAL TUNNEL SYNDROME, UNSPECIFIED UPPER LIMB: Chronic | ICD-10-CM

## 2023-12-06 DIAGNOSIS — I25.10 ATHEROSCLEROTIC HEART DISEASE OF NATIVE CORONARY ARTERY WITHOUT ANGINA PECTORIS: ICD-10-CM

## 2023-12-06 DIAGNOSIS — E78.5 HYPERLIPIDEMIA, UNSPECIFIED: ICD-10-CM

## 2023-12-06 DIAGNOSIS — Z98.89 OTHER SPECIFIED POSTPROCEDURAL STATES: Chronic | ICD-10-CM

## 2023-12-06 DIAGNOSIS — E11.9 TYPE 2 DIABETES MELLITUS WITHOUT COMPLICATIONS: ICD-10-CM

## 2023-12-06 DIAGNOSIS — I63.9 CEREBRAL INFARCTION, UNSPECIFIED: ICD-10-CM

## 2023-12-06 DIAGNOSIS — Z95.5 PRESENCE OF CORONARY ANGIOPLASTY IMPLANT AND GRAFT: Chronic | ICD-10-CM

## 2023-12-06 DIAGNOSIS — I10 ESSENTIAL (PRIMARY) HYPERTENSION: ICD-10-CM

## 2023-12-06 LAB
ALBUMIN SERPL ELPH-MCNC: 2.9 G/DL — LOW (ref 3.5–5)
ALBUMIN SERPL ELPH-MCNC: 2.9 G/DL — LOW (ref 3.5–5)
ALP SERPL-CCNC: 70 U/L — SIGNIFICANT CHANGE UP (ref 40–120)
ALP SERPL-CCNC: 70 U/L — SIGNIFICANT CHANGE UP (ref 40–120)
ALT FLD-CCNC: 25 U/L DA — SIGNIFICANT CHANGE UP (ref 10–60)
ALT FLD-CCNC: 25 U/L DA — SIGNIFICANT CHANGE UP (ref 10–60)
ANION GAP SERPL CALC-SCNC: 6 MMOL/L — SIGNIFICANT CHANGE UP (ref 5–17)
ANION GAP SERPL CALC-SCNC: 6 MMOL/L — SIGNIFICANT CHANGE UP (ref 5–17)
APTT BLD: 47.9 SEC — HIGH (ref 24.5–35.6)
APTT BLD: 47.9 SEC — HIGH (ref 24.5–35.6)
AST SERPL-CCNC: 16 U/L — SIGNIFICANT CHANGE UP (ref 10–40)
AST SERPL-CCNC: 16 U/L — SIGNIFICANT CHANGE UP (ref 10–40)
BASOPHILS # BLD AUTO: 0.04 K/UL — SIGNIFICANT CHANGE UP (ref 0–0.2)
BASOPHILS # BLD AUTO: 0.04 K/UL — SIGNIFICANT CHANGE UP (ref 0–0.2)
BASOPHILS NFR BLD AUTO: 0.3 % — SIGNIFICANT CHANGE UP (ref 0–2)
BASOPHILS NFR BLD AUTO: 0.3 % — SIGNIFICANT CHANGE UP (ref 0–2)
BILIRUB SERPL-MCNC: 0.4 MG/DL — SIGNIFICANT CHANGE UP (ref 0.2–1.2)
BILIRUB SERPL-MCNC: 0.4 MG/DL — SIGNIFICANT CHANGE UP (ref 0.2–1.2)
BUN SERPL-MCNC: 21 MG/DL — HIGH (ref 7–18)
BUN SERPL-MCNC: 21 MG/DL — HIGH (ref 7–18)
CALCIUM SERPL-MCNC: 8.7 MG/DL — SIGNIFICANT CHANGE UP (ref 8.4–10.5)
CALCIUM SERPL-MCNC: 8.7 MG/DL — SIGNIFICANT CHANGE UP (ref 8.4–10.5)
CHLORIDE SERPL-SCNC: 103 MMOL/L — SIGNIFICANT CHANGE UP (ref 96–108)
CHLORIDE SERPL-SCNC: 103 MMOL/L — SIGNIFICANT CHANGE UP (ref 96–108)
CO2 SERPL-SCNC: 28 MMOL/L — SIGNIFICANT CHANGE UP (ref 22–31)
CO2 SERPL-SCNC: 28 MMOL/L — SIGNIFICANT CHANGE UP (ref 22–31)
CREAT SERPL-MCNC: 1.07 MG/DL — SIGNIFICANT CHANGE UP (ref 0.5–1.3)
CREAT SERPL-MCNC: 1.07 MG/DL — SIGNIFICANT CHANGE UP (ref 0.5–1.3)
EGFR: 76 ML/MIN/1.73M2 — SIGNIFICANT CHANGE UP
EGFR: 76 ML/MIN/1.73M2 — SIGNIFICANT CHANGE UP
EOSINOPHIL # BLD AUTO: 0.09 K/UL — SIGNIFICANT CHANGE UP (ref 0–0.5)
EOSINOPHIL # BLD AUTO: 0.09 K/UL — SIGNIFICANT CHANGE UP (ref 0–0.5)
EOSINOPHIL NFR BLD AUTO: 0.7 % — SIGNIFICANT CHANGE UP (ref 0–6)
EOSINOPHIL NFR BLD AUTO: 0.7 % — SIGNIFICANT CHANGE UP (ref 0–6)
GLUCOSE SERPL-MCNC: 145 MG/DL — HIGH (ref 70–99)
GLUCOSE SERPL-MCNC: 145 MG/DL — HIGH (ref 70–99)
HCT VFR BLD CALC: 46.1 % — SIGNIFICANT CHANGE UP (ref 39–50)
HCT VFR BLD CALC: 46.1 % — SIGNIFICANT CHANGE UP (ref 39–50)
HGB BLD-MCNC: 15.3 G/DL — SIGNIFICANT CHANGE UP (ref 13–17)
HGB BLD-MCNC: 15.3 G/DL — SIGNIFICANT CHANGE UP (ref 13–17)
IMM GRANULOCYTES NFR BLD AUTO: 0.6 % — SIGNIFICANT CHANGE UP (ref 0–0.9)
IMM GRANULOCYTES NFR BLD AUTO: 0.6 % — SIGNIFICANT CHANGE UP (ref 0–0.9)
INR BLD: 1.16 RATIO — SIGNIFICANT CHANGE UP (ref 0.85–1.18)
INR BLD: 1.16 RATIO — SIGNIFICANT CHANGE UP (ref 0.85–1.18)
LYMPHOCYTES # BLD AUTO: 1.42 K/UL — SIGNIFICANT CHANGE UP (ref 1–3.3)
LYMPHOCYTES # BLD AUTO: 1.42 K/UL — SIGNIFICANT CHANGE UP (ref 1–3.3)
LYMPHOCYTES # BLD AUTO: 11.2 % — LOW (ref 13–44)
LYMPHOCYTES # BLD AUTO: 11.2 % — LOW (ref 13–44)
MCHC RBC-ENTMCNC: 29.3 PG — SIGNIFICANT CHANGE UP (ref 27–34)
MCHC RBC-ENTMCNC: 29.3 PG — SIGNIFICANT CHANGE UP (ref 27–34)
MCHC RBC-ENTMCNC: 33.2 GM/DL — SIGNIFICANT CHANGE UP (ref 32–36)
MCHC RBC-ENTMCNC: 33.2 GM/DL — SIGNIFICANT CHANGE UP (ref 32–36)
MCV RBC AUTO: 88.3 FL — SIGNIFICANT CHANGE UP (ref 80–100)
MCV RBC AUTO: 88.3 FL — SIGNIFICANT CHANGE UP (ref 80–100)
MONOCYTES # BLD AUTO: 1.33 K/UL — HIGH (ref 0–0.9)
MONOCYTES # BLD AUTO: 1.33 K/UL — HIGH (ref 0–0.9)
MONOCYTES NFR BLD AUTO: 10.5 % — SIGNIFICANT CHANGE UP (ref 2–14)
MONOCYTES NFR BLD AUTO: 10.5 % — SIGNIFICANT CHANGE UP (ref 2–14)
NEUTROPHILS # BLD AUTO: 9.71 K/UL — HIGH (ref 1.8–7.4)
NEUTROPHILS # BLD AUTO: 9.71 K/UL — HIGH (ref 1.8–7.4)
NEUTROPHILS NFR BLD AUTO: 76.7 % — SIGNIFICANT CHANGE UP (ref 43–77)
NEUTROPHILS NFR BLD AUTO: 76.7 % — SIGNIFICANT CHANGE UP (ref 43–77)
NRBC # BLD: 0 /100 WBCS — SIGNIFICANT CHANGE UP (ref 0–0)
NRBC # BLD: 0 /100 WBCS — SIGNIFICANT CHANGE UP (ref 0–0)
PLATELET # BLD AUTO: 218 K/UL — SIGNIFICANT CHANGE UP (ref 150–400)
PLATELET # BLD AUTO: 218 K/UL — SIGNIFICANT CHANGE UP (ref 150–400)
POTASSIUM SERPL-MCNC: 3.7 MMOL/L — SIGNIFICANT CHANGE UP (ref 3.5–5.3)
POTASSIUM SERPL-MCNC: 3.7 MMOL/L — SIGNIFICANT CHANGE UP (ref 3.5–5.3)
POTASSIUM SERPL-SCNC: 3.7 MMOL/L — SIGNIFICANT CHANGE UP (ref 3.5–5.3)
POTASSIUM SERPL-SCNC: 3.7 MMOL/L — SIGNIFICANT CHANGE UP (ref 3.5–5.3)
PROT SERPL-MCNC: 6.8 G/DL — SIGNIFICANT CHANGE UP (ref 6–8.3)
PROT SERPL-MCNC: 6.8 G/DL — SIGNIFICANT CHANGE UP (ref 6–8.3)
PROTHROM AB SERPL-ACNC: 13.2 SEC — HIGH (ref 9.5–13)
PROTHROM AB SERPL-ACNC: 13.2 SEC — HIGH (ref 9.5–13)
RBC # BLD: 5.22 M/UL — SIGNIFICANT CHANGE UP (ref 4.2–5.8)
RBC # BLD: 5.22 M/UL — SIGNIFICANT CHANGE UP (ref 4.2–5.8)
RBC # FLD: 13.3 % — SIGNIFICANT CHANGE UP (ref 10.3–14.5)
RBC # FLD: 13.3 % — SIGNIFICANT CHANGE UP (ref 10.3–14.5)
SODIUM SERPL-SCNC: 137 MMOL/L — SIGNIFICANT CHANGE UP (ref 135–145)
SODIUM SERPL-SCNC: 137 MMOL/L — SIGNIFICANT CHANGE UP (ref 135–145)
TROPONIN I, HIGH SENSITIVITY RESULT: 19.7 NG/L — SIGNIFICANT CHANGE UP
TROPONIN I, HIGH SENSITIVITY RESULT: 19.7 NG/L — SIGNIFICANT CHANGE UP
WBC # BLD: 12.67 K/UL — HIGH (ref 3.8–10.5)
WBC # BLD: 12.67 K/UL — HIGH (ref 3.8–10.5)
WBC # FLD AUTO: 12.67 K/UL — HIGH (ref 3.8–10.5)
WBC # FLD AUTO: 12.67 K/UL — HIGH (ref 3.8–10.5)

## 2023-12-06 PROCEDURE — 99285 EMERGENCY DEPT VISIT HI MDM: CPT

## 2023-12-06 PROCEDURE — 70450 CT HEAD/BRAIN W/O DYE: CPT | Mod: 26,MA

## 2023-12-06 PROCEDURE — 99223 1ST HOSP IP/OBS HIGH 75: CPT

## 2023-12-06 PROCEDURE — 93880 EXTRACRANIAL BILAT STUDY: CPT | Mod: 26

## 2023-12-06 PROCEDURE — 99222 1ST HOSP IP/OBS MODERATE 55: CPT | Mod: GC,AI

## 2023-12-06 RX ORDER — HYDROCHLOROTHIAZIDE 25 MG
0 TABLET ORAL
Qty: 0 | Refills: 0 | DISCHARGE

## 2023-12-06 RX ORDER — PANTOPRAZOLE SODIUM 20 MG/1
40 TABLET, DELAYED RELEASE ORAL
Refills: 0 | Status: DISCONTINUED | OUTPATIENT
Start: 2023-12-06 | End: 2023-12-07

## 2023-12-06 RX ORDER — ASPIRIN/CALCIUM CARB/MAGNESIUM 324 MG
81 TABLET ORAL DAILY
Refills: 0 | Status: DISCONTINUED | OUTPATIENT
Start: 2023-12-07 | End: 2023-12-07

## 2023-12-06 RX ORDER — INSULIN LISPRO 100/ML
VIAL (ML) SUBCUTANEOUS AT BEDTIME
Refills: 0 | Status: DISCONTINUED | OUTPATIENT
Start: 2023-12-06 | End: 2023-12-07

## 2023-12-06 RX ORDER — INSULIN LISPRO 100/ML
VIAL (ML) SUBCUTANEOUS
Refills: 0 | Status: DISCONTINUED | OUTPATIENT
Start: 2023-12-06 | End: 2023-12-07

## 2023-12-06 RX ORDER — ATORVASTATIN CALCIUM 80 MG/1
80 TABLET, FILM COATED ORAL AT BEDTIME
Refills: 0 | Status: DISCONTINUED | OUTPATIENT
Start: 2023-12-06 | End: 2023-12-07

## 2023-12-06 RX ORDER — MELOXICAM 15 MG/1
0 TABLET ORAL
Qty: 0 | Refills: 0 | DISCHARGE

## 2023-12-06 RX ORDER — ASPIRIN/CALCIUM CARB/MAGNESIUM 324 MG
300 TABLET ORAL DAILY
Refills: 0 | Status: DISCONTINUED | OUTPATIENT
Start: 2023-12-06 | End: 2023-12-06

## 2023-12-06 RX ORDER — ASPIRIN/CALCIUM CARB/MAGNESIUM 324 MG
324 TABLET ORAL ONCE
Refills: 0 | Status: DISCONTINUED | OUTPATIENT
Start: 2023-12-06 | End: 2023-12-06

## 2023-12-06 RX ORDER — METOPROLOL TARTRATE 50 MG
25 TABLET ORAL
Refills: 0 | Status: DISCONTINUED | OUTPATIENT
Start: 2023-12-06 | End: 2023-12-07

## 2023-12-06 RX ORDER — CLOPIDOGREL BISULFATE 75 MG/1
75 TABLET, FILM COATED ORAL DAILY
Refills: 0 | Status: DISCONTINUED | OUTPATIENT
Start: 2023-12-06 | End: 2023-12-07

## 2023-12-06 RX ORDER — SPIRONOLACTONE 25 MG/1
25 TABLET, FILM COATED ORAL DAILY
Refills: 0 | Status: DISCONTINUED | OUTPATIENT
Start: 2023-12-06 | End: 2023-12-07

## 2023-12-06 RX ORDER — LOSARTAN POTASSIUM 100 MG/1
50 TABLET, FILM COATED ORAL DAILY
Refills: 0 | Status: DISCONTINUED | OUTPATIENT
Start: 2023-12-06 | End: 2023-12-07

## 2023-12-06 RX ORDER — HYDROXYCHLOROQUINE SULFATE 200 MG
200 TABLET ORAL
Refills: 0 | Status: DISCONTINUED | OUTPATIENT
Start: 2023-12-06 | End: 2023-12-07

## 2023-12-06 RX ORDER — ALFUZOSIN HYDROCHLORIDE 10 MG/1
1 TABLET, EXTENDED RELEASE ORAL
Qty: 0 | Refills: 0 | DISCHARGE

## 2023-12-06 RX ORDER — DIPHENHYDRAMINE HCL 50 MG
50 CAPSULE ORAL ONCE
Refills: 0 | Status: COMPLETED | OUTPATIENT
Start: 2023-12-06 | End: 2023-12-06

## 2023-12-06 RX ADMIN — Medication 25 MILLIGRAM(S): at 17:08

## 2023-12-06 RX ADMIN — Medication 200 MILLIGRAM(S): at 16:11

## 2023-12-06 RX ADMIN — LOSARTAN POTASSIUM 50 MILLIGRAM(S): 100 TABLET, FILM COATED ORAL at 17:08

## 2023-12-06 RX ADMIN — Medication 50 MILLIGRAM(S): at 17:08

## 2023-12-06 RX ADMIN — ATORVASTATIN CALCIUM 80 MILLIGRAM(S): 80 TABLET, FILM COATED ORAL at 21:44

## 2023-12-06 RX ADMIN — Medication 40 MILLIGRAM(S): at 08:58

## 2023-12-06 RX ADMIN — Medication 8: at 17:09

## 2023-12-06 NOTE — ED PROVIDER NOTE - PROGRESS NOTE DETAILS
MD Nichelle:   -ekg: nsr no kostas/std/twi  - labs: grossly unremarkable other than mildly elevated wbc    pt evaluated by Dr. Elizabeth (neurology) who states pt appropriate for inpatient workup for stroke, should defer CTA for now and obtain CTA if pt has sx inpatient, pt refusing mri but agrees to admission. d/w admitting attending and mar who accept pt for admission pt states he has had no new neuro sx while in ed appears in nad

## 2023-12-06 NOTE — H&P ADULT - NSHPLABSRESULTS_GEN_ALL_CORE
ACC: [id number] EXAM:  CT BRAIN STROKE PROTOCOL   ORDERED BY: CAYLA HERNANDEZ     PROCEDURE DATE:  12/06/2023          INTERPRETATION:  Clinical Indication: Code stroke    5mm axial sections of the brain were obtained from base to vertex,   without the intravenous administration of contrast material. Coronal and   sagittal computer generated reconstructed views are available.    Comparison is made with prior CT of 10/6/2020 and demonstrates no   significant interval change.      The ventricles and sulci are prominent consistent age appropriate   involutional changes. Small vessel white matter ischemic changes are   noted. There is no hemorrhage, mass, or shift of midline structures. Bone   window examination is unremarkable. There has been previous bilateral   lens replacement surgery.    IMPRESSION: Age-appropriate involutional and ischemic gliotic changes. No   hemorrhage. No change from 10/6/2020.    --- End of Report ---            LOUIE LAWSON MD; Attending Radiologist  This document has been electronically signed. Dec  6 2023  8:49AM ACC: [id number] EXAM:  CT BRAIN STROKE PROTOCOL   ORDERED BY: CAYLA HERNANDEZ     PROCEDURE DATE:  12/06/2023          INTERPRETATION:  Clinical Indication: Code stroke    5mm axial sections of the brain were obtained from base to vertex,   without the intravenous administration of contrast material. Coronal and   sagittal computer generated reconstructed views are available.    Comparison is made with prior CT of 10/6/2020 and demonstrates no   significant interval change.      The ventricles and sulci are prominent consistent age appropriate   involutional changes. Small vessel white matter ischemic changes are   noted. There is no hemorrhage, mass, or shift of midline structures. Bone   window examination is unremarkable. There has been previous bilateral   lens replacement surgery.    IMPRESSION: Age-appropriate involutional and ischemic gliotic changes. No   hemorrhage. No change from 10/6/2020.    --- End of Report ---            LOUIE LAWSON MD; Attending Radiologist  This document has been electronically signed. Dec  6 2023  8:49AM

## 2023-12-06 NOTE — ED PROVIDER NOTE - NIH STROKE SCALE: 2. BEST GAZE, QM
Pt called LVM about getting tested for HCM, I attempted to reach back out. No answer. I let her know I was getting in contact with nurses to find out what testing she needs don before we get her scheduled.     (0) Normal

## 2023-12-06 NOTE — H&P ADULT - PROBLEM SELECTOR PLAN 3
hx of HTN on Losartan [benicar], Metoprolol, Spironolactone, Alfuzosin   c/w Losartan, Spironolactone and Metoprolol for now with holding parameters   c/w permissive HTN for 24 hrs

## 2023-12-06 NOTE — ED PROVIDER NOTE - PHYSICAL EXAMINATION
GENERAL:  Awake, alert and in NAD, non-toxic appearing  ENMT: Airway patent  EYES: conjunctiva clear  CARDIAC: Regular rate, regular rhythm.  Heart sounds S1, S2, no S3, S4. No murmurs, rubs or gallops.  RESPIRATORY: Breath sounds clear and equal in bilateral anterior lung fields, no wheezes/ronchi/crackles/stridor; pt breathing and speaking comfortably with no increased WOB, no accessory mm. use, no intercostal retractions, no nasal flaring  GI: abdomen soft, non-distended, non-tender, no rebound or guarding.  SKIN: warm and dry, no rashes  PSYCH: awake, alert, calm and cooperative  EXTREMITIES: no ble edema  NEURO: gcs 15  NEURO: pupils 3 mm, PERRL, EOMI (CN III, IV, VI), facial sensation intact to light touch in all 3 divisions bilat (CN V), face: INABILITY TO RAISE L EYEBROW COMPARED TO RIGHT, face otherwise with normal eye closure, eye opening, and smile (CN VII), hearing is normal to rubbing fingers (CN VII), palate elevates symmetrically, phonation is normal (CN IX, X),  shoulder shrug intact bilat (CN XI), tongue is midline with nl movements and no atrophy (CN XII), finger to nose test nl bilat, negative pronator drift bilat,, speech is clear; 5/5 motor strength BUE and BLE: deltoids, biceps, triceps, wrist flexors/extensors, hand , hip flexors, knee flexors/extensors, plantar/dorsiflexors, hallux flexors/extensors; sensation intact to light touch BUE and BLE: C5-T1 and L3-S1, normal leg raise, pt can identify name of 3 objects   gait with mild limp, able to ambulate independently

## 2023-12-06 NOTE — ED PROVIDER NOTE - NS ED ROS FT
positive: slurred speech, L arm numbness  negative: f/c/congestion/cough/cp/sob/abdominal pain/n/v/d/dysuria/hematuria/leg edema/rash/focal weakness/ataxia

## 2023-12-06 NOTE — ED PROVIDER NOTE - CLINICAL SUMMARY MEDICAL DECISION MAKING FREE TEXT BOX
68M c PMH of DM, HTN, gout, CAD, Bell's palsy (with residual inability to raise L eyebrow), PSH of knee surgery BIBEMS L arm numbness as well as slurred, garbled speech that started at 07:30 today and resolved spontaneously after 30 minutes. On exam, /119, VS otherwise wnl, pt unable to raise L eyebrow compared to R eyebrow, has slight limp, otherwise non-focal neuro exam.    ddx: TIA vs CVA vs electrolyte abnormality    Plan:  - code stroke called - pt states his limp is normal and at baseline and is 2/2 knee pain, and that is L eyebrow not raising is residual from Bell's palsy and at baseline and denies numbness and on exam has a non-focal neuro exam other than above which is at baseline, however per hospital protocol as sx happened within 24 hours, even though resolved, code stroke called  - FS: 123  - CTH: The ventricles and sulci are prominent consistent age appropriate   involutional changes. Small vessel white matter ischemic changes are   noted. There is no hemorrhage, mass, or shift of midline structures. Bone   window examination is unremarkable. There has been previous bilateral   lens replacement surgery.    IMPRESSION: Age-appropriate involutional and ischemic gliotic changes. No   hemorrhage. No change from 10/6/2020.  - labs pending  - pt has allergic rash reaction to iv contrast, explained risks and benefits of iv contrast and that evaluation of stroke would be limited without iv contrast, however pt states he does not want IV contrast, however pt is agreeing to premedication and will consider iv contrast later  -  calling transfer center now to d/w telestroke

## 2023-12-06 NOTE — ED PROVIDER NOTE - OBJECTIVE STATEMENT
68M c PMH of DM, HTN, gout, CAD, Bell's palsy (with residual inability to raise L eyebrow), PSH of knee surgery BIBEMS L arm numbness as well as slurred, garbled speech that started at 07:30 today and resolved spontaneously after 30 minutes. no hx of similar sx. denies ataxia or focal weakness. LKN was 07:29am today. pt unsure of bloodthinner use. ems states they did not appreciate any focal deficits.  in field per ems. pt states he is now asx.

## 2023-12-06 NOTE — H&P ADULT - ATTENDING COMMENTS
A/P# TIA # Morbid Obesity # DM # CAD    -presented with left arm numbness and left side of tongue taste change and numbness- all resolved while in the ED. seen by Dr. Elizabeth- neurology  -patient refused CT w/ contrast due to allergy- will get carotid doppler and TTE , telemetry, check lipid panel and hba1c  -c/w asa./statin and plavix

## 2023-12-06 NOTE — CONSULT NOTE ADULT - ASSESSMENT
TIA unsure if he has suffered small multiple territory ischemic strokes c/w embolic stroke. He is a high risk for embolic stroke because of his habitus and prior cardiovascular risk factors. However, delores declines brain MRI scan because of claustrophobia and refuses the sedation to allow brain MRI scan in those who are claustrophobic.  He declines CT scan with contrast because of contrast allergy  He declines use of medications to pretreat contrast allergy.   Because he has a high possibility of a second stroke in the next 24 to 48 hours after MRI scan recommend admission and monitor cardiac rhythm.  Aspirin clopidogrel atorvastatin and reinforcement for use of CPAP treatment

## 2023-12-06 NOTE — H&P ADULT - NSHPREVIEWOFSYSTEMS_GEN_ALL_CORE
REVIEW OF SYSTEMS:    CONSTITUTIONAL: No weakness, fevers or chills  EYES/ENT: No visual changes;  No vertigo or throat pain   NECK: No pain or stiffness  RESPIRATORY: No cough, wheezing, hemoptysis; No shortness of breath  CARDIOVASCULAR: No chest pain or palpitations  GASTROINTESTINAL: No abdominal or epigastric pain. No nausea, vomiting, or hematemesis; No diarrhea or constipation. No melena or hematochezia.  GENITOURINARY: No dysuria, frequency or hematuria  NEUROLOGICAL: + Lt arm and Rt face numbness resolved, No weakness  SKIN: No itching, rashes

## 2023-12-06 NOTE — H&P ADULT - HISTORY OF PRESENT ILLNESS
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 Pt stated that he was driving in the morning, he noticed Lt arm numbness and rt facial numbness around the lips, he pulled over his car and called the ambulance. He stated that the numbness resolved by the time he came to the ED and no longer has any numbness or weakness on the face or extremities.   PT denied weakness or decreased sensation in the Upper or lower extremities, dizziness, headache, visual changes such as blurring of vision, chest pain, palpitation, dyspnea, urinary symptoms, abdominal pain, N/V/D. Pt denied having hx of arrhythmia symptoms. He follows up with Dr. Omer Cardiologist at [facility], with the last Echo performed at about 8 months ago.   Pt denied alcohol consumption, smoking or use of illicit drugs. He is compliant with his home medications.

## 2023-12-06 NOTE — ED PROVIDER NOTE - NSICDXPASTMEDICALHX_GEN_ALL_CORE_FT
PAST MEDICAL HISTORY:  Asthma     CAD (coronary artery disease)     Diabetes Type 2, A1c: 10.2    Former smoker     Gout     History of hypertension     HLD (hyperlipidemia)     HTN (hypertension)     Lupus     TONYA (obstructive sleep apnea)     Psoriatic arthritis

## 2023-12-06 NOTE — H&P ADULT - NSHPPHYSICALEXAM_GEN_ALL_CORE
GENERAL: NAD, lying in bed comfortably, obese   HEAD:  Atraumatic, Normocephalic  EYES: EOMI, PERRLA, conjunctiva and sclera clear  ENT: Moist mucous membranes  NECK: Supple, No JVD  CHEST/LUNG: Clear to auscultation bilaterally; No rales, rhonchi, wheezing, or rubs. Unlabored respirations  HEART: Regular rate and rhythm; No murmurs, rubs, or gallops  ABDOMEN: Bowel sounds present; Soft, Nontender, Nondistended.   EXTREMITIES:  2+ Peripheral Pulses, brisk capillary refill. No clubbing, cyanosis, or edema  NERVOUS SYSTEM:  Alert & Oriented X3, speech clear. Normal 5/5 strength in bilateral UE and LE, sensation intact in the UE, LE and face, mild weakness on orbicularis oculi muscle of the left eye, normal finger to nose, normal heel to shin, normal alternating movement of the hands, No pronator drift, Rhomberg negative [pt feels tilting to the rt side], gait deferred [difficulty ambulating due to arthritis]   MSK: FROM all 4 extremities, full and equal strength  SKIN: No rashes or lesions

## 2023-12-06 NOTE — ED ADULT TRIAGE NOTE - BEFAST FACE
Dx: Migraine with aura and without status migrainosus, not intractable (G43.109)  Vertigo (R42)         Insurance (Authorized # of Visits):  21 PPO           Authorizing Physician: Dr. Adry Huerta MD visit: 11/2/22  Fall Risk: standard         Precautions: n/a         Evaluation Date: 10/27/22  Previous Level of Function: pt was able to driving and working without difficulties    Subjective: Pt reports he is going to do the vision therapy today. Pt reports that his symptoms are low this morning. Pt reports that on Sunday he did get a Migraine that lasted 5 hours. Pt reports that after doing the 2 x's exercise last visit he was off the whole day. Pain Headache  2/10; C-spine 2/10; Dizziness: 0/10  Objective:  SEE BELOW        Assessment: Trial of airex march and abd. Pt performed at different times and got symptoms. After resting a 1 minute symptoms went away. Goals:   Goals: To be met in 4-6 weeks   1. Pt. to be independent home exercise program, post treatment instructions to allow patients safe return to return to walking in community. 2.Pt. to be able to look under bed without c/o vertigo with 3/3 trials. 3.Pt. to improve static/dynamic balance, gait, functional activities with challenges of head turns to allow for community ambulation. 4.Pt to report 50% or more dec in symptoms while waking up in the morning. 5. Pt to report ability to drive without symptoms.      Plan: Cont PT per plan of care   Date: 11/2/2022  TX#: 2/20 Date: 11/9/22                 TX#: 3/20 Date: 11/15/22               TX#: 4/20 Date: 11/23/22               TX#: 5/20 Date: 11/30/22   Tx#: 6/20   There ex Total time: 10 min  Seated C-spine retraction 2 x10 (NE)  Nustep L3 UE/LE x 5min Total time: 25 min  Seated C-spine retraction 2 x10 (NE)  Nustep L4 UE/LE x 10min (symptoms at 8 min)  Seated grounding into chair 10sec x10  Wall push ups x20  RTB rows/ext 5sec x20 Total time: 27 min  Seated C-spine retraction 2 x10 (NE)  Nustep L4 UE/LE x 10min (symptoms at 8 min)  Seated grounding into chair 10sec x10  Wall push ups x20  RTB rows/ext 5sec x20 Total time: 27 min  Seated C-spine retraction 2 x10 (NE)  Nustep L4 UE/LE x 6 min   Seated grounding into chair 10sec x10  Wall push ups x20  RTB rows/ext 5sec x20  Seated abd ball squeeze 5sec x20 Total time: 25 min  Seated C-spine retraction 2 x10 (NE)  Sci fit UE L1 3f/3b  Standing SB push down 5sec x15  Wall push ups x20  RTB rows/ext 5sec x20  Seated abd ball squeeze 5sec x20   Neuro Time: 10 min  Seated VOR cancel with ball hor/vertical x20  Pencil push ups x5  Broch string x 1 min   Time: 10 min  Seated VOR cancel with ball hor/vertical x20  Pencil push ups x5  Broch string x 1 min   Time: 8 min  Seated VOR cancel with ball hor/vertical x20  Ball push-ups x10 Time: 8 min  Seated VOR cancel with ball hor/vertical x20  Ball push-ups x10  2 x's gaze stab followed by C-ROM x10(increased symptoms) Time: 10 min  Seated VOR cancel with ball hor/vertical x20  Airex march x20 B UE support (started to have symptoms)  Airex abd x20 B UE support (started to have symptoms     Manual STM to B traps x 10 min STM to B traps x 10 min STM to B traps x 10 min STM to B traps x 10 min STM to B traps x 10 min   gait Total: x 10 min  FGA see above       HEP: 11/9/22 seated grounding  11/2/22 VOR cancellation, pencil push ups (see handout)     Charges: 2 therex, 1 man, 1 neuro       Total Timed Treatment: 45 min  Total Treatment Time: 45 min No

## 2023-12-06 NOTE — ED ADULT NURSE NOTE - NSFALLUNIVINTERV_ED_ALL_ED
Bed/Stretcher in lowest position, wheels locked, appropriate side rails in place/Call bell, personal items and telephone in reach/Instruct patient to call for assistance before getting out of bed/chair/stretcher/Non-slip footwear applied when patient is off stretcher/[place] to call system/Physically safe environment - no spills, clutter or unnecessary equipment/Purposeful proactive rounding/Room/bathroom lighting operational, light cord in reach Bed/Stretcher in lowest position, wheels locked, appropriate side rails in place/Call bell, personal items and telephone in reach/Instruct patient to call for assistance before getting out of bed/chair/stretcher/Non-slip footwear applied when patient is off stretcher/[place] to call system/Physically safe environment - no spills, clutter or unnecessary equipment/Purposeful proactive rounding/Room/bathroom lighting operational, light cord in reach

## 2023-12-06 NOTE — CONSULT NOTE ADULT - SUBJECTIVE AND OBJECTIVE BOX
Patient is a 68y old  Male who presents with a chief complaint of TIA (07 Dec 2023 11:51)      HPI:    68 yrs old M, from home ambulating independently, pmhx of DM, HTN, HLD, CAD s/p 5 stents [last done on 2018], mixed connective tissue disease with severe arthritis, Lt sided Bell's palsy w/ residual deficit, p/w left arm and rt facial numbness. Pt stated that he was driving in the morning, he noticed Lt arm numbness and rt facial numbness around the lips, he pulled over his car and called the ambulance. He stated that the numbness resolved by the time he came to the ED and no longer has any numbness or weakness on the face or extremities.   PT denied weakness or decreased sensation in the Upper or lower extremities, dizziness, headache, visual changes such as blurring of vision, chest pain, palpitation, dyspnea, urinary symptoms, abdominal pain, N/V/D. Pt denied having hx of arrhythmia symptoms. He follows up with Dr. Omer Cardiologist at [facility], with the last Echo performed at about 8 months ago.   Pt denied alcohol consumption, smoking or use of illicit drugs. He is compliant with his home medications.   He also suffers from TONYA and has stopped using CPAP forr the past few months due to intolerance  PAST MEDICAL & SURGICAL HISTORY:  Diabetes  Type 2, A1c: 10.2  History of hypertension  Asthma  Lupus    Psoriatic arthritis      Former smoker      HTN (hypertension)  HLD (hyperlipidemia)  TONYA (obstructive sleep apnea)  CAD (coronary artery disease)  Gout  H/O knee surgery  Carpal tunnel syndrome  History of rotator cuff surgery  H/O umbilical hernia repair  mesh    Presence of stent in coronary artery  5 stents    FAMILY HISTORY:  Family history of heart disease      Social Hx:  Nonsmoker, no drug or alcohol use    MEDICATIONS  (STANDING):  aspirin  chewable 81 milliGRAM(s) Oral daily  atorvastatin 80 milliGRAM(s) Oral at bedtime  clopidogrel Tablet 75 milliGRAM(s) Oral daily  hydroxychloroquine 200 milliGRAM(s) Oral two times a day  insulin lispro (ADMELOG) corrective regimen sliding scale   SubCutaneous at bedtime  insulin lispro (ADMELOG) corrective regimen sliding scale   SubCutaneous three times a day before meals  losartan 50 milliGRAM(s) Oral daily  metolazone 5 milliGRAM(s) Oral daily  metoprolol tartrate 25 milliGRAM(s) Oral two times a day  pantoprazole    Tablet 40 milliGRAM(s) Oral before breakfast  pregabalin 50 milliGRAM(s) Oral two times a day  spironolactone 25 milliGRAM(s) Oral daily       Allergies    iodinated radiocontrast agents (Rash)    Intolerances        ROS: Pertinent positives in HPI, all other ROS were reviewed and are negative.      Vital Signs Last 24 Hrs  T(C): 36.3 (07 Dec 2023 11:11), Max: 37.1 (06 Dec 2023 20:17)  T(F): 97.3 (07 Dec 2023 11:11), Max: 98.8 (06 Dec 2023 20:17)  HR: 84 (07 Dec 2023 13:21) (74 - 96)  BP: 168/98 (07 Dec 2023 13:21) (153/96 - 192/95)  BP(mean): 114 (06 Dec 2023 14:50) (114 - 114)  RR: 19 (07 Dec 2023 11:11) (18 - 20)  SpO2: 95% (07 Dec 2023 11:11) (94% - 99%)    Parameters below as of 07 Dec 2023 11:11  Patient On (Oxygen Delivery Method): room air            Constitutional: awake and alert.  HEENT: PERRLA, EOMI,   Neck: Supple.  Respiratory: Breath sounds are clear bilaterally  Cardiovascular: S1 and S2, regular rhythm  Gastrointestinal: soft, nontender  Extremities:  no edema  Vascular: Carotid Bruit - no  Musculoskeletal: no joint swelling/tenderness, no abnormal movements  Skin: No rashes    Neurological exam:  HF: A x O x 3. Appropriately interactive, normal affect. Speech fluent, No Aphasia or paraphasic errors. Naming /repetition intact   CN: MELLISA, EOMI, VFF, facial sensation normal, no NLFD, tongue midline, Palate moves equally, SCM equal bilaterally  Motor: No pronator drift, Strength 5/5 in all 4 ext, normal bulk and tone, no tremor, rigidity or bradykinesia.    Sens: Intact to light touch / PP/ VS/ JS    Reflexes: Symmetric and normal . BJ 2+, BR 2+, KJ 2+, AJ 2+, downgoing toes b/l  Coord:  No FNFA, dysmetria, ILDEFONSO intact   Gait/Balance: Normal/    NIHSS:0          Labs:                        15.3  12.67 )-----------( 218      ( 06 Dec 2023 08:54 )             46.1    12-06    137  |  103  |  21<H>  ----------------------------<  145<H>  3.7<L>   |  28  |  1.07    Ca    8.7      06 Dec 2023 08:54    TPro  6.8  /  Alb  2.9<L>  /  TBili  0.4  /  DBili  x   /  AST  16  /  ALT  25  /  AlkPhos  70  12-06      12-07 Chol 261<H> LDL -- HDL 43 Trig 167<H>  PT/INR - ( 06 Dec 2023 08:54 )   PT: 13.2 sec;   INR: 1.16 ratio         PTT - ( 06 Dec 2023 08:54 )  PTT:47.9 sec    Radiology report:  - CT head:  < from: CT Brain Stroke Protocol (12.06.23 @ 08:43) >    ACC: [id number] EXAM:  CT BRAIN STROKE PROTOCOL   ORDERED BY: CAYLA HERNANDEZ     PROCEDURE DATE:  12/06/2023          INTERPRETATION:  Clinical Indication: Code stroke    5mm axial sections of the brain were obtained from base to vertex,   without the intravenous administration of contrast material. Coronal and   sagittal computer generated reconstructed views are available.    Comparison is made with prior CT of 10/6/2020 and demonstrates no   significant interval change.      The ventricles and sulci are prominent consistent age appropriate   involutional changes. Small vessel white matter ischemic changes are   noted. There is no hemorrhage, mass, or shift of midline structures. Bone   window examination is unremarkable. There has been previous bilateral   lens replacement surgery.    IMPRESSION: Age-appropriate involutional and ischemic gliotic changes. No   hemorrhage. No change from 10/6/2020.    --- End of Report ---            LOUIE LAWSON MD; Attending Radiologist  This document has been electronically signed. 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 Pt stated that he was driving in the morning, he noticed Lt arm numbness and rt facial numbness around the lips, he pulled over his car and called the ambulance. He stated that the numbness resolved by the time he came to the ED and no longer has any numbness or weakness on the face or extremities.   PT denied weakness or decreased sensation in the Upper or lower extremities, dizziness, headache, visual changes such as blurring of vision, chest pain, palpitation, dyspnea, urinary symptoms, abdominal pain, N/V/D. Pt denied having hx of arrhythmia symptoms. He follows up with Dr. Omer Cardiologist at [facility], with the last Echo performed at about 8 months ago.   Pt denied alcohol consumption, smoking or use of illicit drugs. He is compliant with his home medications.   He also suffers from TONYA and has stopped using CPAP forr the past few months due to intolerance  PAST MEDICAL & SURGICAL HISTORY:  Diabetes  Type 2, A1c: 10.2  History of hypertension  Asthma  Lupus    Psoriatic arthritis      Former smoker      HTN (hypertension)  HLD (hyperlipidemia)  TONYA (obstructive sleep apnea)  CAD (coronary artery disease)  Gout  H/O knee surgery  Carpal tunnel syndrome  History of rotator cuff surgery  H/O umbilical hernia repair  mesh    Presence of stent in coronary artery  5 stents    FAMILY HISTORY:  Family history of heart disease      Social Hx:  Nonsmoker, no drug or alcohol use    MEDICATIONS  (STANDING):  aspirin  chewable 81 milliGRAM(s) Oral daily  atorvastatin 80 milliGRAM(s) Oral at bedtime  clopidogrel Tablet 75 milliGRAM(s) Oral daily  hydroxychloroquine 200 milliGRAM(s) Oral two times a day  insulin lispro (ADMELOG) corrective regimen sliding scale   SubCutaneous at bedtime  insulin lispro (ADMELOG) corrective regimen sliding scale   SubCutaneous three times a day before meals  losartan 50 milliGRAM(s) Oral daily  metolazone 5 milliGRAM(s) Oral daily  metoprolol tartrate 25 milliGRAM(s) Oral two times a day  pantoprazole    Tablet 40 milliGRAM(s) Oral before breakfast  pregabalin 50 milliGRAM(s) Oral two times a day  spironolactone 25 milliGRAM(s) Oral daily       Allergies    iodinated radiocontrast agents (Rash)    Intolerances        ROS: Pertinent positives in HPI, all other ROS were reviewed and are negative.      Vital Signs Last 24 Hrs  T(C): 36.3 (07 Dec 2023 11:11), Max: 37.1 (06 Dec 2023 20:17)  T(F): 97.3 (07 Dec 2023 11:11), Max: 98.8 (06 Dec 2023 20:17)  HR: 84 (07 Dec 2023 13:21) (74 - 96)  BP: 168/98 (07 Dec 2023 13:21) (153/96 - 192/95)  BP(mean): 114 (06 Dec 2023 14:50) (114 - 114)  RR: 19 (07 Dec 2023 11:11) (18 - 20)  SpO2: 95% (07 Dec 2023 11:11) (94% - 99%)    Parameters below as of 07 Dec 2023 11:11  Patient On (Oxygen Delivery Method): room air            Constitutional: awake and alert.  HEENT: PERRLA, EOMI,   Neck: Supple.  Respiratory: Breath sounds are clear bilaterally  Cardiovascular: S1 and S2, regular rhythm  Gastrointestinal: soft, nontender  Extremities:  no edema  Vascular: Carotid Bruit - no  Musculoskeletal: no joint swelling/tenderness, no abnormal movements  Skin: No rashes    Neurological exam:  HF: A x O x 3. Appropriately interactive, normal affect. Speech fluent, No Aphasia or paraphasic errors. Naming /repetition intact   CN: MELLISA, EOMI, VFF, facial sensation normal, no NLFD, tongue midline, Palate moves equally, SCM equal bilaterally  Motor: No pronator drift, Strength 5/5 in all 4 ext, normal bulk and tone, no tremor, rigidity or bradykinesia.    Sens: Intact to light touch / PP/ VS/ JS    Reflexes: Symmetric and normal . BJ 2+, BR 2+, KJ 2+, AJ 2+, downgoing toes b/l  Coord:  No FNFA, dysmetria, ILDEFONSO intact   Gait/Balance: Normal/    NIHSS:0          Labs:                        15.3  12.67 )-----------( 218      ( 06 Dec 2023 08:54 )             46.1    12-06    137  |  103  |  21<H>  ----------------------------<  145<H>  3.7<L>   |  28  |  1.07    Ca    8.7      06 Dec 2023 08:54    TPro  6.8  /  Alb  2.9<L>  /  TBili  0.4  /  DBili  x   /  AST  16  /  ALT  25  /  AlkPhos  70  12-06      12-07 Chol 261<H> LDL -- HDL 43 Trig 167<H>  PT/INR - ( 06 Dec 2023 08:54 )   PT: 13.2 sec;   INR: 1.16 ratio         PTT - ( 06 Dec 2023 08:54 )  PTT:47.9 sec    Radiology report:  - CT head:  < from: CT Brain Stroke Protocol (12.06.23 @ 08:43) >    ACC: [id number] EXAM:  CT BRAIN STROKE PROTOCOL   ORDERED BY: CAYLA HERNANDEZ     PROCEDURE DATE:  12/06/2023          INTERPRETATION:  Clinical Indication: Code stroke    5mm axial sections of the brain were obtained from base to vertex,   without the intravenous administration of contrast material. Coronal and   sagittal computer generated reconstructed views are available.    Comparison is made with prior CT of 10/6/2020 and demonstrates no   significant interval change.      The ventricles and sulci are prominent consistent age appropriate   involutional changes. Small vessel white matter ischemic changes are   noted. There is no hemorrhage, mass, or shift of midline structures. Bone   window examination is unremarkable. There has been previous bilateral   lens replacement surgery.    IMPRESSION: Age-appropriate involutional and ischemic gliotic changes. No   hemorrhage. No change from 10/6/2020.    --- End of Report ---            LOUIE LAWSON MD; Attending Radiologist  This document has been electronically signed. Dec  6 2023  8:49AM    < end of copied text >

## 2023-12-06 NOTE — ED PROVIDER NOTE - CARE PLAN
Principal Discharge DX:	Lt arm numbness   1 Principal Discharge DX:	Transient ischemic attack (TIA)

## 2023-12-06 NOTE — H&P ADULT - ASSESSMENT
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 CT head: Age-appropriate involutional and ischemic gliotic changes. No hemorrhage. Pt is admitted for TIA eval.

## 2023-12-06 NOTE — H&P ADULT - NSVTERISKREFERASSESS_GEN_ALL_CORE
Pt PRESENTED TO U/C WITH BACK PAIN. PT STATED IT SEEMED MORE SPINAL PAIN. PT PULSE WAS CHECKED THREE TIMES, READING , 132, . PT STATED SHE WAS IN ABOUT A 7 OR 8 PAIN. PT BEGAN CRYING FROM PAIN AND STATED SHE COULD NOT WAIT IN THE WAITING ROOM. MATTEO ROSADO ADVISED PT TO GO TO ER FOR FURTHER EVALUATION. PT LEFT U/C WITH SON IN STABLE CONDITION.  CHILANGO Schuster
Refer to the Assessment tab to view/cancel completed assessment.

## 2023-12-06 NOTE — H&P ADULT - PROBLEM SELECTOR PLAN 1
p/w left arm and rt facial numbness, resolved - out of window for TNK   CT head: Age-appropriate involutional and ischemic gliotic changes. No hemorrhage.  NIHSS score: 0  dysphagia screen - passed  Neuro and vital checks q4  c/w ASA, Plavix and Atorvastatin [home meds]  c/w tele  f/u Echo   f/u Carotid doppler US [pt has contrast allergy and refused MRI]  Dr. Elizabeth consulted and evaluated pt at bedside p/w left arm and rt facial numbness, resolved - out of window for TNK   CT head: Age-appropriate involutional and ischemic gliotic changes. No hemorrhage.  NIHSS score: 0  dysphagia screen - passed  Neuro and vital checks q4  c/w ASA, Plavix and Atorvastatin [home meds]  c/w tele  c/w permissive HTN for 24 hrs  goal  and 220   f/u HbA1c, Lipid panel   f/u Echo   f/u Carotid doppler US [pt has contrast allergy and refused MRI]  Dr. Elizabeth consulted and evaluated pt at bedside

## 2023-12-06 NOTE — PATIENT PROFILE ADULT - FALL HARM RISK - UNIVERSAL INTERVENTIONS
Bed in lowest position, wheels locked, appropriate side rails in place/Call bell, personal items and telephone in reach/Instruct patient to call for assistance before getting out of bed or chair/Non-slip footwear when patient is out of bed/[place] to call system/Physically safe environment - no spills, clutter or unnecessary equipment/Purposeful Proactive Rounding/Room/bathroom lighting operational, light cord in reach Bed in lowest position, wheels locked, appropriate side rails in place/Call bell, personal items and telephone in reach/Instruct patient to call for assistance before getting out of bed or chair/Non-slip footwear when patient is out of bed/[place] to call system/Physically safe environment - no spills, clutter or unnecessary equipment/Purposeful Proactive Rounding/Room/bathroom lighting operational, light cord in reach

## 2023-12-06 NOTE — H&P ADULT - PROBLEM SELECTOR PLAN 2
hx of DM on Humulin R500 20 units premeals hx of DM on Humulin R500 20 units premeals and at bedtime  Blood glucose 145   c/w ISS  f/u HbA1C

## 2023-12-06 NOTE — H&P ADULT - PROBLEM SELECTOR PLAN 6
hx of mixed connective tissue ds with severe arthritis   on Hydroxychloroquine 200 mg BID   c/w home meds

## 2023-12-07 ENCOUNTER — TRANSCRIPTION ENCOUNTER (OUTPATIENT)
Age: 68
End: 2023-12-07

## 2023-12-07 VITALS — HEART RATE: 84 BPM | DIASTOLIC BLOOD PRESSURE: 98 MMHG | SYSTOLIC BLOOD PRESSURE: 168 MMHG

## 2023-12-07 DIAGNOSIS — G45.9 TRANSIENT CEREBRAL ISCHEMIC ATTACK, UNSPECIFIED: ICD-10-CM

## 2023-12-07 LAB
A1C WITH ESTIMATED AVERAGE GLUCOSE RESULT: 6.9 % — HIGH (ref 4–5.6)
A1C WITH ESTIMATED AVERAGE GLUCOSE RESULT: 6.9 % — HIGH (ref 4–5.6)
ANION GAP SERPL CALC-SCNC: 9 MMOL/L — SIGNIFICANT CHANGE UP (ref 5–17)
ANION GAP SERPL CALC-SCNC: 9 MMOL/L — SIGNIFICANT CHANGE UP (ref 5–17)
BUN SERPL-MCNC: 28 MG/DL — HIGH (ref 7–18)
BUN SERPL-MCNC: 28 MG/DL — HIGH (ref 7–18)
CALCIUM SERPL-MCNC: 8.7 MG/DL — SIGNIFICANT CHANGE UP (ref 8.4–10.5)
CALCIUM SERPL-MCNC: 8.7 MG/DL — SIGNIFICANT CHANGE UP (ref 8.4–10.5)
CHLORIDE SERPL-SCNC: 105 MMOL/L — SIGNIFICANT CHANGE UP (ref 96–108)
CHLORIDE SERPL-SCNC: 105 MMOL/L — SIGNIFICANT CHANGE UP (ref 96–108)
CHOLEST SERPL-MCNC: 261 MG/DL — HIGH
CHOLEST SERPL-MCNC: 261 MG/DL — HIGH
CO2 SERPL-SCNC: 26 MMOL/L — SIGNIFICANT CHANGE UP (ref 22–31)
CO2 SERPL-SCNC: 26 MMOL/L — SIGNIFICANT CHANGE UP (ref 22–31)
CREAT SERPL-MCNC: 1.17 MG/DL — SIGNIFICANT CHANGE UP (ref 0.5–1.3)
CREAT SERPL-MCNC: 1.17 MG/DL — SIGNIFICANT CHANGE UP (ref 0.5–1.3)
EGFR: 68 ML/MIN/1.73M2 — SIGNIFICANT CHANGE UP
EGFR: 68 ML/MIN/1.73M2 — SIGNIFICANT CHANGE UP
ESTIMATED AVERAGE GLUCOSE: 151 MG/DL — HIGH (ref 68–114)
ESTIMATED AVERAGE GLUCOSE: 151 MG/DL — HIGH (ref 68–114)
GLUCOSE BLDC GLUCOMTR-MCNC: 145 MG/DL — HIGH (ref 70–99)
GLUCOSE BLDC GLUCOMTR-MCNC: 145 MG/DL — HIGH (ref 70–99)
GLUCOSE SERPL-MCNC: 202 MG/DL — HIGH (ref 70–99)
GLUCOSE SERPL-MCNC: 202 MG/DL — HIGH (ref 70–99)
HCT VFR BLD CALC: 46.3 % — SIGNIFICANT CHANGE UP (ref 39–50)
HCT VFR BLD CALC: 46.3 % — SIGNIFICANT CHANGE UP (ref 39–50)
HDLC SERPL-MCNC: 43 MG/DL — SIGNIFICANT CHANGE UP
HDLC SERPL-MCNC: 43 MG/DL — SIGNIFICANT CHANGE UP
HGB BLD-MCNC: 15.1 G/DL — SIGNIFICANT CHANGE UP (ref 13–17)
HGB BLD-MCNC: 15.1 G/DL — SIGNIFICANT CHANGE UP (ref 13–17)
LIPID PNL WITH DIRECT LDL SERPL: 185 MG/DL — HIGH
LIPID PNL WITH DIRECT LDL SERPL: 185 MG/DL — HIGH
MCHC RBC-ENTMCNC: 29 PG — SIGNIFICANT CHANGE UP (ref 27–34)
MCHC RBC-ENTMCNC: 29 PG — SIGNIFICANT CHANGE UP (ref 27–34)
MCHC RBC-ENTMCNC: 32.6 GM/DL — SIGNIFICANT CHANGE UP (ref 32–36)
MCHC RBC-ENTMCNC: 32.6 GM/DL — SIGNIFICANT CHANGE UP (ref 32–36)
MCV RBC AUTO: 89 FL — SIGNIFICANT CHANGE UP (ref 80–100)
MCV RBC AUTO: 89 FL — SIGNIFICANT CHANGE UP (ref 80–100)
NON HDL CHOLESTEROL: 218 MG/DL — HIGH
NON HDL CHOLESTEROL: 218 MG/DL — HIGH
NRBC # BLD: 0 /100 WBCS — SIGNIFICANT CHANGE UP (ref 0–0)
NRBC # BLD: 0 /100 WBCS — SIGNIFICANT CHANGE UP (ref 0–0)
PLATELET # BLD AUTO: 266 K/UL — SIGNIFICANT CHANGE UP (ref 150–400)
PLATELET # BLD AUTO: 266 K/UL — SIGNIFICANT CHANGE UP (ref 150–400)
POTASSIUM SERPL-MCNC: 3.3 MMOL/L — LOW (ref 3.5–5.3)
POTASSIUM SERPL-MCNC: 3.3 MMOL/L — LOW (ref 3.5–5.3)
POTASSIUM SERPL-SCNC: 3.3 MMOL/L — LOW (ref 3.5–5.3)
POTASSIUM SERPL-SCNC: 3.3 MMOL/L — LOW (ref 3.5–5.3)
RBC # BLD: 5.2 M/UL — SIGNIFICANT CHANGE UP (ref 4.2–5.8)
RBC # BLD: 5.2 M/UL — SIGNIFICANT CHANGE UP (ref 4.2–5.8)
RBC # FLD: 13.4 % — SIGNIFICANT CHANGE UP (ref 10.3–14.5)
RBC # FLD: 13.4 % — SIGNIFICANT CHANGE UP (ref 10.3–14.5)
SODIUM SERPL-SCNC: 140 MMOL/L — SIGNIFICANT CHANGE UP (ref 135–145)
SODIUM SERPL-SCNC: 140 MMOL/L — SIGNIFICANT CHANGE UP (ref 135–145)
TRIGL SERPL-MCNC: 167 MG/DL — HIGH
TRIGL SERPL-MCNC: 167 MG/DL — HIGH
WBC # BLD: 15.43 K/UL — HIGH (ref 3.8–10.5)
WBC # BLD: 15.43 K/UL — HIGH (ref 3.8–10.5)
WBC # FLD AUTO: 15.43 K/UL — HIGH (ref 3.8–10.5)
WBC # FLD AUTO: 15.43 K/UL — HIGH (ref 3.8–10.5)

## 2023-12-07 PROCEDURE — 80061 LIPID PANEL: CPT

## 2023-12-07 PROCEDURE — 36415 COLL VENOUS BLD VENIPUNCTURE: CPT

## 2023-12-07 PROCEDURE — 99239 HOSP IP/OBS DSCHRG MGMT >30: CPT

## 2023-12-07 PROCEDURE — 96374 THER/PROPH/DIAG INJ IV PUSH: CPT

## 2023-12-07 PROCEDURE — 99285 EMERGENCY DEPT VISIT HI MDM: CPT

## 2023-12-07 PROCEDURE — 93306 TTE W/DOPPLER COMPLETE: CPT

## 2023-12-07 PROCEDURE — 80053 COMPREHEN METABOLIC PANEL: CPT

## 2023-12-07 PROCEDURE — 84484 ASSAY OF TROPONIN QUANT: CPT

## 2023-12-07 PROCEDURE — 85027 COMPLETE CBC AUTOMATED: CPT

## 2023-12-07 PROCEDURE — 93005 ELECTROCARDIOGRAM TRACING: CPT

## 2023-12-07 PROCEDURE — 80048 BASIC METABOLIC PNL TOTAL CA: CPT

## 2023-12-07 PROCEDURE — 85025 COMPLETE CBC W/AUTO DIFF WBC: CPT

## 2023-12-07 PROCEDURE — 93880 EXTRACRANIAL BILAT STUDY: CPT

## 2023-12-07 PROCEDURE — 85610 PROTHROMBIN TIME: CPT

## 2023-12-07 PROCEDURE — 82962 GLUCOSE BLOOD TEST: CPT

## 2023-12-07 PROCEDURE — 85730 THROMBOPLASTIN TIME PARTIAL: CPT

## 2023-12-07 PROCEDURE — 70450 CT HEAD/BRAIN W/O DYE: CPT | Mod: MA

## 2023-12-07 PROCEDURE — 83036 HEMOGLOBIN GLYCOSYLATED A1C: CPT

## 2023-12-07 RX ORDER — POTASSIUM CHLORIDE 20 MEQ
2 PACKET (EA) ORAL
Refills: 0 | DISCHARGE

## 2023-12-07 RX ORDER — METOLAZONE 5 MG/1
1 TABLET ORAL
Qty: 0 | Refills: 0 | DISCHARGE

## 2023-12-07 RX ORDER — ATORVASTATIN CALCIUM 80 MG/1
1 TABLET, FILM COATED ORAL
Refills: 0 | DISCHARGE

## 2023-12-07 RX ORDER — METOPROLOL TARTRATE 50 MG
1 TABLET ORAL
Refills: 0 | DISCHARGE

## 2023-12-07 RX ORDER — FEBUXOSTAT 40 MG/1
1 TABLET ORAL
Refills: 0 | DISCHARGE

## 2023-12-07 RX ORDER — ASPIRIN/CALCIUM CARB/MAGNESIUM 324 MG
1 TABLET ORAL
Refills: 0 | DISCHARGE

## 2023-12-07 RX ORDER — INSULIN HUMAN 100 [IU]/ML
20 INJECTION, SOLUTION SUBCUTANEOUS
Refills: 0 | DISCHARGE

## 2023-12-07 RX ORDER — POTASSIUM CHLORIDE 20 MEQ
40 PACKET (EA) ORAL ONCE
Refills: 0 | Status: COMPLETED | OUTPATIENT
Start: 2023-12-07 | End: 2023-12-07

## 2023-12-07 RX ORDER — METOPROLOL TARTRATE 50 MG
25 TABLET ORAL ONCE
Refills: 0 | Status: COMPLETED | OUTPATIENT
Start: 2023-12-07 | End: 2023-12-07

## 2023-12-07 RX ORDER — ATORVASTATIN CALCIUM 80 MG/1
1 TABLET, FILM COATED ORAL
Qty: 0 | Refills: 0 | DISCHARGE
Start: 2023-12-07

## 2023-12-07 RX ORDER — CLOPIDOGREL BISULFATE 75 MG/1
1 TABLET, FILM COATED ORAL
Qty: 0 | Refills: 0 | DISCHARGE

## 2023-12-07 RX ORDER — ALFUZOSIN HYDROCHLORIDE 10 MG/1
1 TABLET, EXTENDED RELEASE ORAL
Refills: 0 | DISCHARGE

## 2023-12-07 RX ORDER — HYDROXYCHLOROQUINE SULFATE 200 MG
1 TABLET ORAL
Refills: 0 | DISCHARGE

## 2023-12-07 RX ORDER — METHYLPREDNISOLONE 4 MG
2 TABLET ORAL
Refills: 0 | DISCHARGE

## 2023-12-07 RX ORDER — SPIRONOLACTONE 25 MG/1
1 TABLET, FILM COATED ORAL
Refills: 0 | DISCHARGE

## 2023-12-07 RX ORDER — PANTOPRAZOLE SODIUM 20 MG/1
40 TABLET, DELAYED RELEASE ORAL
Qty: 0 | Refills: 3 | DISCHARGE

## 2023-12-07 RX ORDER — ERGOCALCIFEROL 1.25 MG/1
2 CAPSULE ORAL
Refills: 0 | DISCHARGE

## 2023-12-07 RX ORDER — OLMESARTAN MEDOXOMIL 5 MG/1
1 TABLET, FILM COATED ORAL
Refills: 0 | DISCHARGE

## 2023-12-07 RX ORDER — OLMESARTAN MEDOXOMIL 5 MG/1
1 TABLET, FILM COATED ORAL
Qty: 30 | Refills: 0
Start: 2023-12-07 | End: 2024-01-05

## 2023-12-07 RX ORDER — ATORVASTATIN CALCIUM 80 MG/1
1 TABLET, FILM COATED ORAL
Qty: 30 | Refills: 0
Start: 2023-12-07

## 2023-12-07 RX ADMIN — Medication 25 MILLIGRAM(S): at 12:12

## 2023-12-07 RX ADMIN — CLOPIDOGREL BISULFATE 75 MILLIGRAM(S): 75 TABLET, FILM COATED ORAL at 12:04

## 2023-12-07 RX ADMIN — LOSARTAN POTASSIUM 50 MILLIGRAM(S): 100 TABLET, FILM COATED ORAL at 05:23

## 2023-12-07 RX ADMIN — Medication 81 MILLIGRAM(S): at 12:05

## 2023-12-07 RX ADMIN — Medication 40 MILLIEQUIVALENT(S): at 12:04

## 2023-12-07 RX ADMIN — Medication 25 MILLIGRAM(S): at 05:23

## 2023-12-07 RX ADMIN — Medication 2: at 08:02

## 2023-12-07 RX ADMIN — Medication 200 MILLIGRAM(S): at 05:23

## 2023-12-07 RX ADMIN — PANTOPRAZOLE SODIUM 40 MILLIGRAM(S): 20 TABLET, DELAYED RELEASE ORAL at 06:16

## 2023-12-07 RX ADMIN — SPIRONOLACTONE 25 MILLIGRAM(S): 25 TABLET, FILM COATED ORAL at 05:23

## 2023-12-07 NOTE — DISCHARGE NOTE NURSING/CASE MANAGEMENT/SOCIAL WORK - PATIENT PORTAL LINK FT
You can access the FollowMyHealth Patient Portal offered by [facility] by registering at the following website: [email or web address]. By joining [employer]’s FollowMyHealth portal, you will also be able to view your health information using other applications (apps) compatible with our system. You can access the FollowMyHealth Patient Portal offered by [facility] by registering at the following website: [email or web address]. By joining [employer]’s FollowMyHealth portal, you will also be able to view your health information using other applications (apps) compatible with our system.

## 2023-12-07 NOTE — DISCHARGE NOTE PROVIDER - NSDCQMSTROKERISK_NEU_ALL_CORE
Diabetes/High blood pressure/High cholesterol/History of a stroke or TIA Diabetes/High blood pressure/High cholesterol/History of a stroke or TIA/Obesity

## 2023-12-07 NOTE — DISCHARGE NOTE PROVIDER - PROVIDER TOKENS
PROVIDER:[TOKEN:[[id number]],ESTABLISHEDPATIENT:[T]] PROVIDER:[TOKEN:[[id number]],ESTABLISHEDPATIENT:[T]]

## 2023-12-07 NOTE — DISCHARGE NOTE PROVIDER - NSDCFUSCHEDAPPT_GEN_ALL_CORE_FT
Caleb Roy  [facility] Physician [facility]  VASCULAR [address]  Scheduled Appointment: 12/14/2023    [facility]  VASCULAR [address]  Scheduled Appointment: 12/21/2023    Caleb Roy  [facility]  VASCULAR [address]  Scheduled Appointment: 01/10/2024    Alicia Barroso  [address]  Scheduled Appointment: 01/18/2024     Caleb Roy  [facility] Physician [facility]  VASCULAR [address]  Scheduled Appointment: 12/14/2023    [facility]  VASCULAR [address]  Scheduled Appointment: 12/21/2023    Caleb Roy  [facility]  VASCULAR [address]  Scheduled Appointment: 01/10/2024    Alicia Barroso  [address]  Scheduled Appointment: 01/18/2024

## 2023-12-07 NOTE — PROGRESS NOTE ADULT - PROBLEM SELECTOR PLAN 3
patient with history of CAD with 5 stents placed 6 years ago   patient had elevated troponin which have now downtrended; likely due to demand ischemia   troponin >0.184, 0.240 >0.221  continue with plavix   f/u ECHO  - Dr. Murdock following hx of HTN on Losartan (benicar), metoprolol, spironolactone and alfuzosin  c/w losartan, spironolactone and metoprolol for now with holding parameters  c/w permissive HTN for 24h

## 2023-12-07 NOTE — DISCHARGE NOTE PROVIDER - CARE PROVIDER_API CALL
Nissenbaum, Michael A.  Neurology  [address], [address]  [place] [address]  Phone: [phone]  Fax: [phone]  Established Patient  Follow Up Time:    Nissenbaum, Michael A.  Neurology  [address], [address]  [place] [address]  Phone: [phone]  Fax: [phone]  Established Patient  Follow Up Time:

## 2023-12-07 NOTE — DISCHARGE NOTE PROVIDER - HOSPITAL COURSE
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 Patient brought in by EMS after noticing left arm numbness, right facial numbness around lips while driving. Patient's symptoms self resolved in the ED. CT head showing no acute ischemic changes or hemorrhage. Patient allergic to contrast refused MR brain. Carotid doppler showing patent right ICA, Left ICA 50-69% stenosis. Neurology was consulted. Patient's symptoms likely transient ischemic attack. Patient already on DAPT, will be sent nikolay on high-dose statin.     He follows up with Dr. Omer Cardiologist at [facility], with the last Echo performed at about 8 months ago.     Patient is able to ambulate and tolerate diet prior to discharge. Patient is stable for discharge per attending.   Patient has appointment with his cardiologist 12/13 and Neurologist 12/11.   Please refer to patient's complete medical chart with documents for a full hospital course, for this is only a brief summary.   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 Patient brought in by EMS after noticing left arm numbness, right facial numbness around lips while driving. Patient's symptoms self resolved in the ED. CT head showing no acute ischemic changes or hemorrhage. Patient allergic to contrast refused MR brain. Carotid doppler showing patent right ICA, Left ICA 50-69% stenosis. Neurology was consulted. Patient's symptoms likely transient ischemic attack. Patient already on DAPT, will be sent nikolay on high-dose statin.     He follows up with Dr. Omer Cardiologist at [facility], with the last Echo performed at about 8 months ago.     Patient is able to ambulate and tolerate diet prior to discharge. Patient is stable for discharge per attending.   Patient has appointment with his cardiologist 12/13 and Neurologist 12/11.   Please refer to patient's complete medical chart with documents for a full hospital course, for this is only a brief summary.   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 Patient brought in by EMS after noticing left arm numbness, right facial numbness around lips while driving. Patient's symptoms self resolved in the ED. CT head showing no acute ischemic changes or hemorrhage. Patient allergic to contrast refused MR brain. Carotid doppler showing patent right ICA, Left ICA 50-69% stenosis. Neurology was consulted. Patient's symptoms likely transient ischemic attack. Patient already on DAPT, will be sent nikolay on high-dose statin.     He follows up with Dr. Omer Cardiologist at [facility], with the last Echo performed at about 8 months ago.     Patient is able to ambulate and tolerate diet prior to discharge. Patient is stable for discharge per attending.   Spoke with patient's cardiologist team ([phone]), updated them regarding diagnosis, confirmed his appointment for 12/11.   Please refer to patient's complete medical chart with documents for a full hospital course, for this is only a brief summary.   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 Patient brought in by EMS after noticing left arm numbness, right facial numbness around lips while driving. Patient's symptoms self resolved in the ED. CT head showing no acute ischemic changes or hemorrhage. Patient allergic to contrast refused MR brain. Carotid doppler showing patent right ICA, Left ICA 50-69% stenosis. Neurology was consulted. Patient's symptoms likely transient ischemic attack. Patient already on DAPT, will be sent nikolay on high-dose statin.     He follows up with Dr. Omer Cardiologist at [facility], with the last Echo performed at about 8 months ago.     Patient is able to ambulate and tolerate diet prior to discharge. Patient is stable for discharge per attending.   Spoke with patient's cardiologist team [phone]), updated them regarding diagnosis, confirmed his appointment for 12/11.   Please refer to patient's complete medical chart with documents for a full hospital course, for this is only a brief summary.   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 Patient brought in by EMS after noticing left arm numbness, right facial numbness around lips while driving. Patient's symptoms self resolved in the ED. CT head showing no acute ischemic changes or hemorrhage. Patient allergic to contrast refused MR brain. Carotid doppler showing patent right ICA, Left ICA 50-69% stenosis. Neurology was consulted. Patient's symptoms likely transient ischemic attack. Patient already on DAPT, will be sent nikolay on high-dose statin.     He follows up with Dr. Omer Cardiologist at [facility], with the last Echo performed at about 8 months ago.     Patient is able to ambulate and tolerate diet prior to discharge. Patient is stable for discharge per attending.   Spoke with patient's cardiologist team ([phone]), updated them regarding diagnosis, confirmed his appointment for 12/11.   Please refer to patient's complete medical chart with documents for a full hospital course, for this is only a brief summary. 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 Patient brought in by EMS after noticing left arm numbness, right facial numbness around lips while driving. Patient's symptoms self resolved in the ED. CT head showing no acute ischemic changes or hemorrhage. Patient allergic to contrast refused MR brain. Carotid doppler showing patent right ICA, Left ICA 50-69% stenosis. Neurology was consulted. Patient's symptoms likely transient ischemic attack. Patient already on DAPT, will be sent nikolay on high-dose statin.     He follows up with Dr. Omer Cardiologist at [facility], with the last Echo performed at about 8 months ago.     Patient is able to ambulate and tolerate diet prior to discharge. Patient is stable for discharge per attending.   Spoke with patient's cardiologist team ([phone]), updated them regarding diagnosis, confirmed his appointment for 12/11.   Please refer to patient's complete medical chart with documents for a full hospital course, for this is only a brief summary.

## 2023-12-07 NOTE — PROGRESS NOTE ADULT - PROBLEM SELECTOR PLAN 6
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 Barroso, rheumatologist at [place]

## 2023-12-07 NOTE — PROGRESS NOTE ADULT - SUBJECTIVE AND OBJECTIVE BOX
Chief complaint: Patient is a 68y old  Male who presents with a chief complaint of TIA (07 Dec 2023 10:06)      RC PERDOMO is a 68y year old Male     INTERVAL HPI/OVERNIGHT EVENTS:      REVIEW OF SYSTEMS:  CONSTITUTIONAL: No fever, weight loss, or fatigue  EYES: No eye pain, visual disturbances, or discharge  ENMT:  No difficulty hearing, tinnitus, vertigo; No sinus or throat pain  NECK: No pain or stiffness  RESPIRATORY: No cough, wheezing, chills or hemoptysis; No shortness of breath  CARDIOVASCULAR: No chest pain, palpitations, dizziness, or leg swelling  GASTROINTESTINAL: No abdominal or epigastric pain. No nausea, vomiting, or hematemesis; No diarrhea or constipation. No melena or hematochezia.  GENITOURINARY: No dysuria, frequency, hematuria, or incontinence  NEUROLOGICAL: No headaches, memory loss, loss of strength, numbness, or tremors  SKIN: No itching, burning, rashes, or lesions   ENDOCRINE: No heat or cold intolerance  MUSCULOSKELETAL: No joint pain or swelling; No muscle, back, or extremity pain  PSYCHIATRIC: No depression, anxiety, mood swings, or difficulty sleeping  HEME/LYMPH: No easy bruising, or bleeding gums  ALLERY AND IMMUNOLOGIC: No hives or eczema    FAMILY HISTORY:  Family history of heart disease        T(C): 36.3 (12-07-23 @ 11:11), Max: 37.1 (12-06-23 @ 20:17)  HR: 81 (12-07-23 @ 11:11) (74 - 97)  BP: 176/93 (12-07-23 @ 11:11) (153/96 - 192/95)  RR: 19 (12-07-23 @ 11:11) (18 - 20)  SpO2: 95% (12-07-23 @ 11:11) (94% - 99%)  Wt(kg): --Vital Signs Last 24 Hrs  T(C): 36.3 (07 Dec 2023 11:11), Max: 37.1 (06 Dec 2023 20:17)  T(F): 97.3 (07 Dec 2023 11:11), Max: 98.8 (06 Dec 2023 20:17)  HR: 81 (07 Dec 2023 11:11) (74 - 97)  BP: 176/93 (07 Dec 2023 11:11) (153/96 - 192/95)  BP(mean): 114 (06 Dec 2023 14:50) (114 - 114)  RR: 19 (07 Dec 2023 11:11) (18 - 20)  SpO2: 95% (07 Dec 2023 11:11) (94% - 99%)    Parameters below as of 07 Dec 2023 11:11  Patient On (Oxygen Delivery Method): room air        PHYSICAL EXAM:  GENERAL: NAD, well-groomed, well-developed  HEAD:  Atraumatic, Normocephalic  EYES: EOMI, PERRLA, conjunctiva and sclera clear  ENMT: No tonsillar erythema, exudates, or enlargement; Moist mucous membranes.   NECK: Supple, No JVD  NERVOUS SYSTEM:  Alert & Oriented X3, Good concentration; Motor Strength 5/5 B/L upper and lower extremities  CHEST/LUNG: Clear to percussion bilaterally; No rales, rhonchi, wheezing, or rubs  HEART: Regular rate and rhythm; No murmurs, rubs, or gallops  ABDOMEN: Soft, Nontender, Nondistended; Bowel sounds present  EXTREMITIES: No clubbing, cyanosis, or edema  SKIN: No rashes or lesions    Consultant(s) Notes Reviewed:  [x ] YES  [ ] NO  Care Discussed with Consultants/Other Providers [ x] YES  [ ] NO    LABS:                        15.1   15.43 )-----------( 266      ( 07 Dec 2023 06:41 )             46.3       12-07    140  |  105  |  28<H>  ----------------------------<  202<H>  3.3<L>   |  26  |  1.17    Ca    8.7      07 Dec 2023 06:41    TPro  6.8  /  Alb  2.9<L>  /  TBili  0.4  /  DBili  x   /  AST  16  /  ALT  25  /  AlkPhos  70  12-06        RADIOLOGY & ADDITIONAL TESTS:    Imaging Personally Reviewed:  [ ] YES  [ ] NO  aspirin  chewable 81 milliGRAM(s) Oral daily  atorvastatin 80 milliGRAM(s) Oral at bedtime  clopidogrel Tablet 75 milliGRAM(s) Oral daily  hydroxychloroquine 200 milliGRAM(s) Oral two times a day  insulin lispro (ADMELOG) corrective regimen sliding scale   SubCutaneous at bedtime  insulin lispro (ADMELOG) corrective regimen sliding scale   SubCutaneous three times a day before meals  losartan 50 milliGRAM(s) Oral daily  metolazone 5 milliGRAM(s) Oral daily  metoprolol tartrate 25 milliGRAM(s) Oral two times a day  pantoprazole    Tablet 40 milliGRAM(s) Oral before breakfast  potassium chloride    Tablet ER 40 milliEquivalent(s) Oral once  pregabalin 50 milliGRAM(s) Oral two times a day  spironolactone 25 milliGRAM(s) Oral daily      HEALTH ISSUES - PROBLEM Dx:  CVA (cerebrovascular accident)    DM (diabetes mellitus)    HTN (hypertension)    Prophylactic measure    MCTD (mixed connective tissue disease)    CAD S/P percutaneous coronary angioplasty    HLD (hyperlipidemia)           Chief complaint: Patient is a 68y old  Male who presents with a chief complaint of TIA (07 Dec 2023 10:06)      RC PERDOMO is a 68y year old Male with PMH of CAD s/p 5 coronary stents on ASA and plavix; HTN on metoprolol losartan, spironolactone and alfuzosin; HLD on atorvastatin; DM on 20u Humalin premeals and at bedtime; MCTD and psoriatic arthritis on hydroxychloroquine; Bell's palsy 3y ago with residual left facial deficits. Pt p/w left arm numbness and right facial numbness and mildly slurred speech. Symptoms resolved within 30m, CT head was ordered and patient was admitted to medicine for CVA vs. TIA workup.     INTERVAL HPI/OVERNIGHT EVENTS: No acute overnight events. Patient lives alone and ambulates independently. His daughter and girlfriend are worried about his hospitalization. He notes he has been under more stress lately, regarding family dynamics with his 39y son with intellectual disability. Patient also states that he had b/l shoulder joint corticosteroid injections last month - which may alter his blood work. He proudly states that he has lost 30+ lb this year and reported his last HgA1c was down to 7%  He follows his PCP, Dr. Lacy from [facility]; his cardiologist Dr. Omer at [facility]; his endocrinologist Dr. Rapp at [place]; his rheumatologist Dr. Barroso at [facility]; and his neurologist Dr. Nissenbaum at [place]. He has a standing appointment with cariologist, Dr. Omer this upcoming Monday 12/11. Dr. Kemp, PGY-1, spoke with Dr. Omer to up date him on the patient's condition and hospital course.       REVIEW OF SYSTEMS:  CONSTITUTIONAL: No fever, weight loss, or fatigue  EYES: No eye pain, visual disturbances, or discharge  ENMT:  No difficulty hearing, tinnitus, vertigo; No sinus or throat pain  NECK: No pain or stiffness  RESPIRATORY: No cough, wheezing, chills or hemoptysis; No shortness of breath  CARDIOVASCULAR: No chest pain, palpitations, dizziness, or leg swelling  GASTROINTESTINAL: No abdominal or epigastric pain. No nausea, vomiting, or hematemesis; No diarrhea or constipation. No melena or hematochezia.  GENITOURINARY: No dysuria, frequency, hematuria, or incontinence  NEUROLOGICAL: No headaches, memory loss, loss of strength, numbness, or tremors  SKIN: No itching, burning, rashes, or lesions   ENDOCRINE: No heat or cold intolerance  MUSCULOSKELETAL: No joint pain or swelling; No muscle, back, or extremity pain  PSYCHIATRIC: No depression, anxiety, mood swings, or difficulty sleeping  HEME/LYMPH: No easy bruising, or bleeding gums  ALLERY AND IMMUNOLOGIC: No hives or eczema    FAMILY HISTORY:  Family history of heart disease        T(C): 36.3 (12-07-23 @ 11:11), Max: 37.1 (12-06-23 @ 20:17)  HR: 81 (12-07-23 @ 11:11) (74 - 97)  BP: 176/93 (12-07-23 @ 11:11) (153/96 - 192/95)  RR: 19 (12-07-23 @ 11:11) (18 - 20)  SpO2: 95% (12-07-23 @ 11:11) (94% - 99%)  Wt(kg): --Vital Signs Last 24 Hrs  T(C): 36.3 (07 Dec 2023 11:11), Max: 37.1 (06 Dec 2023 20:17)  T(F): 97.3 (07 Dec 2023 11:11), Max: 98.8 (06 Dec 2023 20:17)  HR: 81 (07 Dec 2023 11:11) (74 - 97)  BP: 176/93 (07 Dec 2023 11:11) (153/96 - 192/95)  BP(mean): 114 (06 Dec 2023 14:50) (114 - 114)  RR: 19 (07 Dec 2023 11:11) (18 - 20)  SpO2: 95% (07 Dec 2023 11:11) (94% - 99%)    Parameters below as of 07 Dec 2023 11:11  Patient On (Oxygen Delivery Method): room air        PHYSICAL EXAM:  GENERAL: NAD, well-groomed, well-developed  HEAD:  Atraumatic, Normocephalic  EYES: EOMI, PERRLA, conjunctiva and sclera clear  ENMT: No tonsillar erythema, exudates, or enlargement; Moist mucous membranes.   NECK: Supple, No JVD  NERVOUS SYSTEM:  Alert & Oriented X3, Good concentration; Motor Strength 5/5 B/L upper and lower extremities  CHEST/LUNG: Clear to percussion bilaterally; No rales, rhonchi, wheezing, or rubs  HEART: Regular rate and rhythm; No murmurs, rubs, or gallops  ABDOMEN: Soft, Nontender, Nondistended; Bowel sounds present  EXTREMITIES: No clubbing, cyanosis, or edema  SKIN: No rashes or lesions. Some petechiae noted along left upper trapezoid/neckline    Consultant(s) Notes Reviewed:  [x ] YES  [ ] NO  Care Discussed with Consultants/Other Providers [ x] YES  [ ] NO    LABS:                        15.1   15.43 )-----------( 266      ( 07 Dec 2023 06:41 )             46.3       12-07    140  |  105  |  28<H>  ----------------------------<  202<H>  3.3<L>   |  26  |  1.17    Ca    8.7      07 Dec 2023 06:41    TPro  6.8  /  Alb  2.9<L>  /  TBili  0.4  /  DBili  x   /  AST  16  /  ALT  25  /  AlkPhos  70  12-06        RADIOLOGY & ADDITIONAL TESTS:    Imaging Personally Reviewed:  [ ] YES  [ ] NO  aspirin  chewable 81 milliGRAM(s) Oral daily  atorvastatin 80 milliGRAM(s) Oral at bedtime  clopidogrel Tablet 75 milliGRAM(s) Oral daily  hydroxychloroquine 200 milliGRAM(s) Oral two times a day  insulin lispro (ADMELOG) corrective regimen sliding scale   SubCutaneous at bedtime  insulin lispro (ADMELOG) corrective regimen sliding scale   SubCutaneous three times a day before meals  losartan 50 milliGRAM(s) Oral daily  metolazone 5 milliGRAM(s) Oral daily  metoprolol tartrate 25 milliGRAM(s) Oral two times a day  pantoprazole    Tablet 40 milliGRAM(s) Oral before breakfast  potassium chloride    Tablet ER 40 milliEquivalent(s) Oral once  pregabalin 50 milliGRAM(s) Oral two times a day  spironolactone 25 milliGRAM(s) Oral daily      HEALTH ISSUES - PROBLEM Dx:  CVA (cerebrovascular accident)    DM (diabetes mellitus)    HTN (hypertension)    Prophylactic measure    MCTD (mixed connective tissue disease)    CAD S/P percutaneous coronary angioplasty    HLD (hyperlipidemia)           Chief complaint: Patient is a 68y old  Male who presents with a chief complaint of TIA (07 Dec 2023 10:06)      RC PERDOMO is a 68y year old Male with PMH of CAD s/p 5 coronary stents on ASA and plavix; HTN on metoprolol losartan, spironolactone and alfuzosin; HLD on atorvastatin; DM on 20u Humalin premeals and at bedtime; MCTD and psoriatic arthritis on hydroxychloroquine; Bell's palsy 3y ago with residual left facial deficits. Pt p/w left arm numbness and right facial numbness and mildly slurred speech. Symptoms resolved within 30m, CT head was ordered and patient was admitted to medicine for CVA vs. TIA workup.     INTERVAL HPI/OVERNIGHT EVENTS: No acute overnight events. Patient lives alone and ambulates independently. His daughter and girlfriend are worried about his hospitalization. He notes he has been under more stress lately, regarding family dynamics with his 39y son with intellectual disability. Patient also states that he had b/l shoulder joint corticosteroid injections last month - which may alter his blood work. He proudly states that he has lost 30+ lb this year and reported his last HgA1c was down to 7%  He follows his PCP, Dr. Lacy from [facility]; his cardiologist Dr. Omer at [facility]; his endocrinologist Dr. Rapp at [place]; his rheumatologist Dr. Barroso at [facility]; and his neurologist Dr. Nissenbaum at [place]. He has a standing appointment with cariologist, Dr. Omer this upcoming Monday 12/11. Dr. Kemp, PGY-1, spoke with Dr. Omer to up date him on the patient's condition and hospital course.       REVIEW OF SYSTEMS:  CONSTITUTIONAL: No fever, weight loss, or fatigue  EYES: No eye pain, visual disturbances, or discharge  ENMT:  No difficulty hearing, tinnitus, vertigo; No sinus or throat pain  NECK: No pain or stiffness  RESPIRATORY: No cough, wheezing, chills or hemoptysis; No shortness of breath  CARDIOVASCULAR: No chest pain, palpitations, dizziness, or leg swelling  GASTROINTESTINAL: No abdominal or epigastric pain. No nausea, vomiting, or hematemesis; No diarrhea or constipation. No melena or hematochezia.  GENITOURINARY: No dysuria, frequency, hematuria, or incontinence  NEUROLOGICAL: No headaches, memory loss, loss of strength, numbness, or tremors  SKIN: No itching, burning, rashes, or lesions   ENDOCRINE: No heat or cold intolerance  MUSCULOSKELETAL: No joint pain or swelling; No muscle, back, or extremity pain  PSYCHIATRIC: No depression, anxiety, mood swings, or difficulty sleeping  HEME/LYMPH: No easy bruising, or bleeding gums  ALLERY AND IMMUNOLOGIC: No hives or eczema    FAMILY HISTORY:  Family history of heart disease        T(C): 36.3 (12-07-23 @ 11:11), Max: 37.1 (12-06-23 @ 20:17)  HR: 81 (12-07-23 @ 11:11) (74 - 97)  BP: 176/93 (12-07-23 @ 11:11) (153/96 - 192/95)  RR: 19 (12-07-23 @ 11:11) (18 - 20)  SpO2: 95% (12-07-23 @ 11:11) (94% - 99%)  Wt(kg): --Vital Signs Last 24 Hrs  T(C): 36.3 (07 Dec 2023 11:11), Max: 37.1 (06 Dec 2023 20:17)  T(F): 97.3 (07 Dec 2023 11:11), Max: 98.8 (06 Dec 2023 20:17)  HR: 81 (07 Dec 2023 11:11) (74 - 97)  BP: 176/93 (07 Dec 2023 11:11) (153/96 - 192/95)  BP(mean): 114 (06 Dec 2023 14:50) (114 - 114)  RR: 19 (07 Dec 2023 11:11) (18 - 20)  SpO2: 95% (07 Dec 2023 11:11) (94% - 99%)    Parameters below as of 07 Dec 2023 11:11  Patient On (Oxygen Delivery Method): room air        PHYSICAL EXAM:  GENERAL: NAD, well-groomed, well-developed  HEAD:  Atraumatic, Normocephalic  EYES: EOMI, PERRLA, conjunctiva and sclera clear  ENMT: No tonsillar erythema, exudates, or enlargement; Moist mucous membranes.   NECK: Supple, No JVD  NERVOUS SYSTEM:  Alert & Oriented X3, Good concentration; Motor Strength 5/5 B/L upper and lower extremities  CHEST/LUNG: Clear to percussion bilaterally; No rales, rhonchi, wheezing, or rubs  HEART: Regular rate and rhythm; No murmurs, rubs, or gallops  ABDOMEN: Soft, Nontender, Nondistended; Bowel sounds present  EXTREMITIES: No clubbing, cyanosis, or edema  SKIN: No rashes or lesions. Some petechiae noted along left upper trapezoid/neckline    Consultant(s) Notes Reviewed:  [x ] YES  [ ] NO  Care Discussed with Consultants/Other Providers [ x] YES  [ ] NO    LABS:                        15.1   15.43 )-----------( 266      ( 07 Dec 2023 06:41 )             46.3       12-07    140  |  105  |  28<H>  ----------------------------<  202<H>  3.3<L>   |  26  |  1.17    Ca    8.7      07 Dec 2023 06:41    TPro  6.8  /  Alb  2.9<L>  /  TBili  0.4  /  DBili  x   /  AST  16  /  ALT  25  /  AlkPhos  70  12-06        RADIOLOGY & ADDITIONAL TESTS:    Imaging Personally Reviewed:  [ ] YES  [ ] NO  aspirin  chewable 81 milliGRAM(s) Oral daily  atorvastatin 80 milliGRAM(s) Oral at bedtime  clopidogrel Tablet 75 milliGRAM(s) Oral daily  hydroxychloroquine 200 milliGRAM(s) Oral two times a day  insulin lispro (ADMELOG) corrective regimen sliding scale   SubCutaneous at bedtime  insulin lispro (ADMELOG) corrective regimen sliding scale   SubCutaneous three times a day before meals  losartan 50 milliGRAM(s) Oral daily  metolazone 5 milliGRAM(s) Oral daily  metoprolol tartrate 25 milliGRAM(s) Oral two times a day  pantoprazole    Tablet 40 milliGRAM(s) Oral before breakfast  potassium chloride    Tablet ER 40 milliEquivalent(s) Oral once  pregabalin 50 milliGRAM(s) Oral two times a day  spironolactone 25 milliGRAM(s) Oral daily      HEALTH ISSUES - PROBLEM Dx:  CVA (cerebrovascular accident)    DM (diabetes mellitus)    HTN (hypertension)    Prophylactic measure    MCTD (mixed connective tissue disease)    CAD S/P percutaneous coronary angioplasty    HLD (hyperlipidemia)           Chief complaint: Patient is a 68y old  Male who presents with a chief complaint of TIA (07 Dec 2023 10:06)      RC PERDOMO is a 68y year old Male with PMH of CAD s/p 5 coronary stents on ASA and plavix; HTN on metoprolol losartan, spironolactone and alfuzosin; HLD on atorvastatin; DM on 20u Humalin premeals and at bedtime; MCTD and psoriatic arthritis on hydroxychloroquine; Bell's palsy 3y ago with residual left facial deficits. Pt p/w left arm numbness and right facial numbness and mildly slurred speech. Symptoms resolved within 30m, CT head was ordered and patient was admitted to medicine for CVA vs. TIA workup.     INTERVAL HPI/OVERNIGHT EVENTS: No acute overnight events. Patient lives alone and ambulates independently. His daughter and girlfriend are worried about his hospitalization. He notes he has been under more stress lately, regarding family dynamics with his 39y son with intellectual disability. Patient also states that he had b/l shoulder joint corticosteroid injections last month - which may alter his blood work. He proudly states that he has lost 30+ lb this year and reported his last HgA1c was down to 7%  He follows his PCP, Dr. Lacy from [facility]; his cardiologist Dr. Omer at [facility]; his endocrinologist Dr. Rapp at [place]; his rheumatologist Dr. Barroso at [facility]; and his neurologist Dr. Nissenbaum at [place]. He has a standing appointment with cariologist, Dr. Omer this upcoming Monday 12/11. Dr. Kemp, PGY-1, spoke with Dr. Omer to up date him on the patient's condition and hospital course.       REVIEW OF SYSTEMS:  CONSTITUTIONAL: No fever, weight loss, or fatigue  EYES: No eye pain, visual disturbances, or discharge  ENMT:  No difficulty hearing, tinnitus, vertigo; No sinus or throat pain  NECK: No pain or stiffness  RESPIRATORY: No cough, wheezing, chills or hemoptysis; No shortness of breath  CARDIOVASCULAR: No chest pain, palpitations, dizziness, or leg swelling  GASTROINTESTINAL: No abdominal or epigastric pain. No nausea, vomiting, or hematemesis; No diarrhea or constipation. No melena or hematochezia.  GENITOURINARY: No dysuria, frequency, hematuria, or incontinence  NEUROLOGICAL: No headaches, memory loss, loss of strength, numbness, or tremors  SKIN: No itching, burning, rashes, or lesions   ENDOCRINE: No heat or cold intolerance  MUSCULOSKELETAL: No joint pain or swelling; No muscle, back, or extremity pain  PSYCHIATRIC: No depression, anxiety, mood swings, or difficulty sleeping  HEME/LYMPH: No easy bruising, or bleeding gums  ALLERY AND IMMUNOLOGIC: No hives or eczema    FAMILY HISTORY:  Family history of heart disease        T(C): 36.3 (12-07-23 @ 11:11), Max: 37.1 (12-06-23 @ 20:17)  HR: 81 (12-07-23 @ 11:11) (74 - 97)  BP: 176/93 (12-07-23 @ 11:11) (153/96 - 192/95)  RR: 19 (12-07-23 @ 11:11) (18 - 20)  SpO2: 95% (12-07-23 @ 11:11) (94% - 99%)  Wt(kg): --Vital Signs Last 24 Hrs  T(C): 36.3 (07 Dec 2023 11:11), Max: 37.1 (06 Dec 2023 20:17)  T(F): 97.3 (07 Dec 2023 11:11), Max: 98.8 (06 Dec 2023 20:17)  HR: 81 (07 Dec 2023 11:11) (74 - 97)  BP: 176/93 (07 Dec 2023 11:11) (153/96 - 192/95)  BP(mean): 114 (06 Dec 2023 14:50) (114 - 114)  RR: 19 (07 Dec 2023 11:11) (18 - 20)  SpO2: 95% (07 Dec 2023 11:11) (94% - 99%)    Parameters below as of 07 Dec 2023 11:11  Patient On (Oxygen Delivery Method): room air        PHYSICAL EXAM:  GENERAL: NAD, well-groomed, well-developed  HEAD:  Atraumatic, Normocephalic  EYES: EOMI, PERRLA, conjunctiva and sclera clear  ENMT: No tonsillar erythema, exudates, or enlargement; Moist mucous membranes.   NECK: Supple, No JVD  NERVOUS SYSTEM:  Alert & Oriented X3, Good concentration; Motor Strength 5/5 B/L upper and lower extremities; no focal deficits other than baseline LT orbicularis oculi muscle weakness  CHEST/LUNG: Clear to percussion bilaterally; No rales, rhonchi, wheezing, or rubs  HEART: Regular rate and rhythm; No murmurs, rubs, or gallops  ABDOMEN: Soft, Nontender, Nondistended; Bowel sounds present  EXTREMITIES: No clubbing, cyanosis, or edema  SKIN: No rashes or lesions. Some petechiae noted along left upper trapezoid/neckline    Consultant(s) Notes Reviewed:  [x ] YES  [ ] NO  Care Discussed with Consultants/Other Providers [ x] YES  [ ] NO    LABS:                        15.1   15.43 )-----------( 266      ( 07 Dec 2023 06:41 )             46.3       12-07    140  |  105  |  28<H>  ----------------------------<  202<H>  3.3<L>   |  26  |  1.17    Ca    8.7      07 Dec 2023 06:41    TPro  6.8  /  Alb  2.9<L>  /  TBili  0.4  /  DBili  x   /  AST  16  /  ALT  25  /  AlkPhos  70  12-06        RADIOLOGY & ADDITIONAL TESTS:    Imaging Personally Reviewed:  [ ] YES  [ ] NO  aspirin  chewable 81 milliGRAM(s) Oral daily  atorvastatin 80 milliGRAM(s) Oral at bedtime  clopidogrel Tablet 75 milliGRAM(s) Oral daily  hydroxychloroquine 200 milliGRAM(s) Oral two times a day  insulin lispro (ADMELOG) corrective regimen sliding scale   SubCutaneous at bedtime  insulin lispro (ADMELOG) corrective regimen sliding scale   SubCutaneous three times a day before meals  losartan 50 milliGRAM(s) Oral daily  metolazone 5 milliGRAM(s) Oral daily  metoprolol tartrate 25 milliGRAM(s) Oral two times a day  pantoprazole    Tablet 40 milliGRAM(s) Oral before breakfast  potassium chloride    Tablet ER 40 milliEquivalent(s) Oral once  pregabalin 50 milliGRAM(s) Oral two times a day  spironolactone 25 milliGRAM(s) Oral daily      HEALTH ISSUES - PROBLEM Dx:  CVA (cerebrovascular accident)    DM (diabetes mellitus)    HTN (hypertension)    Prophylactic measure    MCTD (mixed connective tissue disease)    CAD S/P percutaneous coronary angioplasty    HLD (hyperlipidemia)           Chief complaint: Patient is a 68y old  Male who presents with a chief complaint of TIA (07 Dec 2023 10:06)      RC PERDOMO is a 68y year old Male with PMH of CAD s/p 5 coronary stents on ASA and plavix; HTN on metoprolol losartan, spironolactone and alfuzosin; HLD on atorvastatin; DM on 20u Humalin premeals and at bedtime; MCTD and psoriatic arthritis on hydroxychloroquine; Bell's palsy 3y ago with residual left facial deficits. Pt p/w left arm numbness and right facial numbness and mildly slurred speech. Symptoms resolved within 30m, CT head was ordered and patient was admitted to medicine for CVA vs. TIA workup.     INTERVAL HPI/OVERNIGHT EVENTS: No acute overnight events. Patient lives alone and ambulates independently. His daughter and girlfriend are worried about his hospitalization. He notes he has been under more stress lately, regarding family dynamics with his 39y son with intellectual disability. Patient also states that he had b/l shoulder joint corticosteroid injections last month - which may alter his blood work. He proudly states that he has lost 30+ lb this year and reported his last HgA1c was down to 7%  He follows his PCP, Dr. Lacy from [facility]; his cardiologist Dr. Omer at [facility]; his endocrinologist Dr. Rapp at [place]; his rheumatologist Dr. Barroso at [facility]; and his neurologist Dr. Nissenbaum at [place]. He has a standing appointment with cariologist, Dr. Omer this upcoming Monday 12/11. Dr. Kemp, PGY-1, spoke with Dr. Omer to up date him on the patient's condition and hospital course.       REVIEW OF SYSTEMS:  CONSTITUTIONAL: No fever, weight loss, or fatigue  EYES: No eye pain, visual disturbances, or discharge  ENMT:  No difficulty hearing, tinnitus, vertigo; No sinus or throat pain  NECK: No pain or stiffness  RESPIRATORY: No cough, wheezing, chills or hemoptysis; No shortness of breath  CARDIOVASCULAR: No chest pain, palpitations, dizziness, or leg swelling  GASTROINTESTINAL: No abdominal or epigastric pain. No nausea, vomiting, or hematemesis; No diarrhea or constipation. No melena or hematochezia.  GENITOURINARY: No dysuria, frequency, hematuria, or incontinence  NEUROLOGICAL: No headaches, memory loss, loss of strength, numbness, or tremors  SKIN: No itching, burning, rashes, or lesions   ENDOCRINE: No heat or cold intolerance  MUSCULOSKELETAL: No joint pain or swelling; No muscle, back, or extremity pain  PSYCHIATRIC: No depression, anxiety, mood swings, or difficulty sleeping  HEME/LYMPH: No easy bruising, or bleeding gums  ALLERY AND IMMUNOLOGIC: No hives or eczema    FAMILY HISTORY:  Family history of heart disease        T(C): 36.3 (12-07-23 @ 11:11), Max: 37.1 (12-06-23 @ 20:17)  HR: 81 (12-07-23 @ 11:11) (74 - 97)  BP: 176/93 (12-07-23 @ 11:11) (153/96 - 192/95)  RR: 19 (12-07-23 @ 11:11) (18 - 20)  SpO2: 95% (12-07-23 @ 11:11) (94% - 99%)  Wt(kg): --Vital Signs Last 24 Hrs  T(C): 36.3 (07 Dec 2023 11:11), Max: 37.1 (06 Dec 2023 20:17)  T(F): 97.3 (07 Dec 2023 11:11), Max: 98.8 (06 Dec 2023 20:17)  HR: 81 (07 Dec 2023 11:11) (74 - 97)  BP: 176/93 (07 Dec 2023 11:11) (153/96 - 192/95)  BP(mean): 114 (06 Dec 2023 14:50) (114 - 114)  RR: 19 (07 Dec 2023 11:11) (18 - 20)  SpO2: 95% (07 Dec 2023 11:11) (94% - 99%)    Parameters below as of 07 Dec 2023 11:11  Patient On (Oxygen Delivery Method): room air        PHYSICAL EXAM:  GENERAL: NAD, well-groomed, well-developed  HEAD:  Atraumatic, Normocephalic  EYES: EOMI, PERRLA, conjunctiva and sclera clear  ENMT: No tonsillar erythema, exudates, or enlargement; Moist mucous membranes.   NECK: Supple, No JVD  NERVOUS SYSTEM:  Alert & Oriented X3, Good concentration; Motor Strength 5/5 B/L upper and lower extremities; no focal deficits other than baseline LT orbicularis oculi muscle weakness  CHEST/LUNG: Clear to percussion bilaterally; No rales, rhonchi, wheezing, or rubs  HEART: Regular rate and rhythm; No murmurs, rubs, or gallops  ABDOMEN: Soft, Nontender, Nondistended; Bowel sounds present  EXTREMITIES: No clubbing, cyanosis, or edema  SKIN: No rashes or lesions. Some petechiae noted along left upper trapezoid/neckline    Consultant(s) Notes Reviewed:  [x ] YES  [ ] NO  Care Discussed with Consultants/Other Providers [ x] YES  [ ] NO    LABS:                        15.1   15.43 )-----------( 266      ( 07 Dec 2023 06:41 )             46.3       12-07    140  |  105  |  28<H>  ----------------------------<  202<H>  3.3<L>   |  26  |  1.17    Ca    8.7      07 Dec 2023 06:41    TPro  6.8  /  Alb  2.9<L>  /  TBili  0.4  /  DBili  x   /  AST  16  /  ALT  25  /  AlkPhos  70  12-06        RADIOLOGY & ADDITIONAL TESTS:    Imaging Personally Reviewed:  [ ] YES  [ ] NO  aspirin  chewable 81 milliGRAM(s) Oral daily  atorvastatin 80 milliGRAM(s) Oral at bedtime  clopidogrel Tablet 75 milliGRAM(s) Oral daily  hydroxychloroquine 200 milliGRAM(s) Oral two times a day  insulin lispro (ADMELOG) corrective regimen sliding scale   SubCutaneous at bedtime  insulin lispro (ADMELOG) corrective regimen sliding scale   SubCutaneous three times a day before meals  losartan 50 milliGRAM(s) Oral daily  metolazone 5 milliGRAM(s) Oral daily  metoprolol tartrate 25 milliGRAM(s) Oral two times a day  pantoprazole    Tablet 40 milliGRAM(s) Oral before breakfast  potassium chloride    Tablet ER 40 milliEquivalent(s) Oral once  pregabalin 50 milliGRAM(s) Oral two times a day  spironolactone 25 milliGRAM(s) Oral daily      HEALTH ISSUES - PROBLEM Dx:  CVA (cerebrovascular accident)    DM (diabetes mellitus)    HTN (hypertension)    Prophylactic measure    MCTD (mixed connective tissue disease)    CAD S/P percutaneous coronary angioplasty    HLD (hyperlipidemia)

## 2023-12-07 NOTE — DISCHARGE NOTE PROVIDER - NSDCCPCAREPLAN_GEN_ALL_CORE_FT
PRINCIPAL DISCHARGE DIAGNOSIS  Diagnosis: Transient ischemic attack (TIA)  Assessment and Plan of Treatment: You were diagnosed with transient ischemic attack (TIA) which means your brain had a temporary loss of oxygen perfusion but resolved on its own without any permanent brain damage. TIAs may present with transient neurologic symptoms such as facial droop, extremity weakness and difficulty speaking.  TIAs are often precursors to strokes and could serve as warning to prevent future stroke occurence.  CT head was unremarkable for any acute events. As you are allergic to contrast, you opted to do a doppler study of your neck arteries. The right artery showed no narrowing but the left artery showed moderate narrowing.  You were seen by Neurology and their recommendations were followed. You were treated with high dose aspirin and clopidogrel. Your cholesterol was high, which can increase the risk of repeat TIA and stroke. You will be discharged on an increased dose of statin.   PLEASE CONTINUE TO TAKE ASPIRIN 81MG DAILY, ATORVASTATIN 80MG DAILY, PLAVIX 75MG DAILY. Please take medications as prescribed and follow up with your cardiologist and neurologist upon discharge for further recommendations.      SECONDARY DISCHARGE DIAGNOSES  Diagnosis: HLD (hyperlipidemia)  Assessment and Plan of Treatment:     Diagnosis: CAD S/P percutaneous coronary angioplasty  Assessment and Plan of Treatment: You have history of coronary artery disease, for which you had stents placed. You were continued on your home medication of ASPRIN 81MG AND CLOPIDOGREL 75MG DAILY at the hospital, which are also the treatment for TIA. Please continue to take the medication as prescribed and follow up with your cardiologist upon discharge.     PRINCIPAL DISCHARGE DIAGNOSIS  Diagnosis: Transient ischemic attack (TIA)  Assessment and Plan of Treatment: You were diagnosed with transient ischemic attack (TIA) which means your brain had a temporary loss of oxygen perfusion but resolved on its own without any permanent brain damage. TIAs may present with transient neurologic symptoms such as facial droop, extremity weakness and difficulty speaking.  TIAs are often precursors to strokes and could serve as warning to prevent future stroke occurence.  CT head was unremarkable for any acute events. As you are allergic to contrast, you opted to do a doppler study of your neck arteries. The right artery showed no narrowing but the left artery showed moderate narrowing.  You were seen by Neurology and their recommendations were followed. You were treated with high dose aspirin and clopidogrel. Your cholesterol was high, which can increase the risk of repeat TIA and stroke. You will be discharged on an increased dose of statin. We called Dr. Henry, your cardiologist, regarding your hospital stay. PLEASE CONTINUE TO TAKE ASPIRIN 81MG DAILY, ATORVASTATIN 80MG DAILY, PLAVIX 75MG DAILY. Please take medications as prescribed and follow up with your cardiologist and neurologist upon discharge for further recommendations.      SECONDARY DISCHARGE DIAGNOSES  Diagnosis: HLD (hyperlipidemia)  Assessment and Plan of Treatment: You have history of Hyperlipidemia. On this admission you were found to have abnormal high lipid profile. Given the abnormal reading and event of transient ischemic attack, your cholesterol-lowering drug - statin dose was increased. PLEASE CONTINUE ATORVASTATIN 80MG AS PRESCRIBED. Maintain healthy lifestyle, low fat diet, exercise regularly and check your lipid levels routinely. Please follow up with your primary care provider and cardiologist upon discharge.       Diagnosis: HTN (hypertension)  Assessment and Plan of Treatment: You have a history of Hypertension. You were continued on your home medication of METOPROLOL, LOSARTAN, SPIRONOLACTONE, ALFUZOSIN. It is important that your blood pressure is well controlled, as uncontrolled blood pressure can increase the risk of transient ishcemic attacks and stroke. Your blood pressure target is 120-140/80-90, maintain healthy lifestyle, low salt diet, avoid fatty food, weight loss, exercise regularly or stay active as tolerated 30 mins X 3 times per week. Please continue taking your home medications and follow-up with your cardiologist to adjust medcation as needed.       Diagnosis: CAD S/P percutaneous coronary angioplasty  Assessment and Plan of Treatment: You have history of coronary artery disease, for which you had stents placed. You were continued on your home medication of ASPRIN 81MG AND CLOPIDOGREL 75MG DAILY at the hospital, which are also the treatment for TIA. Please continue to take the medication as prescribed and follow up with your cardiologist upon discharge.     PRINCIPAL DISCHARGE DIAGNOSIS  Diagnosis: Transient ischemic attack (TIA)  Assessment and Plan of Treatment: You were diagnosed with transient ischemic attack (TIA) which means your brain had a temporary loss of oxygen perfusion but resolved on its own without any permanent brain damage. TIAs may present with transient neurologic symptoms such as facial droop, extremity weakness and difficulty speaking.  TIAs are often precursors to strokes and could serve as warning to prevent future stroke occurence.  CT head was unremarkable for any acute events. As you are allergic to contrast, you opted to do a doppler study of your neck arteries. The right artery showed no narrowing but the left artery showed moderate narrowing.  You were seen by Neurology and their recommendations were followed. You were treated with high dose aspirin and clopidogrel. Your cholesterol was high, which can increase the risk of repeat TIA and stroke. You will be discharged on an increased dose of statin. We called Dr. Henry, your cardiologist, regarding your hospital stay. PLEASE CONTINUE TO TAKE ASPIRIN 81MG DAILY, ATORVASTATIN 80MG DAILY, PLAVIX 75MG DAILY. Please take medications as prescribed and follow up with your cardiologist and neurologist upon discharge (within 2 weeks from today) for further recommendations.      SECONDARY DISCHARGE DIAGNOSES  Diagnosis: CAD S/P percutaneous coronary angioplasty  Assessment and Plan of Treatment: You have history of coronary artery disease, for which you had stents placed. You were continued on your home medication of ASPRIN 81MG AND CLOPIDOGREL 75MG DAILY at the hospital, which are also the treatment for TIA. Please continue to take the medication as prescribed and follow up with your cardiologist upon discharge.    Diagnosis: HLD (hyperlipidemia)  Assessment and Plan of Treatment: You have history of Hyperlipidemia. On this admission you were found to have abnormal high lipid profile. Given the abnormal reading and event of transient ischemic attack, your cholesterol-lowering drug - statin dose was increased. PLEASE CONTINUE ATORVASTATIN 80MG AS PRESCRIBED. Maintain healthy lifestyle, low fat diet, exercise regularly and check your lipid levels routinely. Please follow up with your primary care provider and cardiologist upon discharge.       Diagnosis: HTN (hypertension)  Assessment and Plan of Treatment: You have a history of Hypertension. You were continued on your home medication of METOPROLOL, LOSARTAN, SPIRONOLACTONE, ALFUZOSIN. It is important that your blood pressure is well controlled, as uncontrolled blood pressure can increase the risk of transient ishcemic attacks and stroke. Your blood pressure target is 120-140/80-90, maintain healthy lifestyle, low salt diet, avoid fatty food, weight loss, exercise regularly or stay active as tolerated 30 mins X 3 times per week. Please continue taking your home medications and follow-up with your cardiologist to adjust medcation as needed.        PRINCIPAL DISCHARGE DIAGNOSIS  Diagnosis: Transient ischemic attack (TIA)  Assessment and Plan of Treatment: You were diagnosed with transient ischemic attack (TIA) which means your brain had a temporary loss of oxygen perfusion but resolved on its own without any permanent brain damage. TIAs may present with transient neurologic symptoms such as facial droop, extremity weakness and difficulty speaking.  TIAs are often precursors to strokes and could serve as warning to prevent future stroke occurence.  CT head was unremarkable for any acute events. As you are allergic to contrast, you opted to do a doppler study of your neck arteries. The right artery showed no narrowing but the left artery showed moderate narrowing.  You were seen by Neurology and their recommendations were followed. You were treated with high dose aspirin and clopidogrel. Your cholesterol was high, which can increase the risk of repeat TIA and stroke. You will be discharged on an increased dose of statin. We called Dr. Henry, your cardiologist, regarding your hospital stay. PLEASE CONTINUE TO TAKE ASPIRIN 81MG DAILY, ATORVASTATIN 80MG DAILY, PLAVIX 75MG DAILY. Please take medications as prescribed and follow up with your cardiologist and neurologist upon discharge (within 2 weeks from today) for further recommendations.      SECONDARY DISCHARGE DIAGNOSES  Diagnosis: HLD (hyperlipidemia)  Assessment and Plan of Treatment: You have history of Hyperlipidemia. On this admission you were found to have abnormal high lipid profile. Given the abnormal reading and event of transient ischemic attack, your cholesterol-lowering drug - statin dose was increased. PLEASE CONTINUE ATORVASTATIN 80MG AS PRESCRIBED. Maintain healthy lifestyle, low fat diet, exercise regularly and check your lipid levels routinely. Please follow up with your primary care provider and cardiologist upon discharge.       Diagnosis: HTN (hypertension)  Assessment and Plan of Treatment: You have a history of Hypertension. You were continued on your home medication of METOPROLOL, LOSARTAN, SPIRONOLACTONE, ALFUZOSIN. It is important that your blood pressure is well controlled, as uncontrolled blood pressure can increase the risk of transient ishcemic attacks and stroke. Your blood pressure was elevated during the hospital stay. We are increasing your blood pressure medication benicar from once a day to twice a day. Continue to take the new regiment until you follow up with your cardiologist. Your blood pressure target is 120-140/80-90, maintain healthy lifestyle, low salt diet, avoid fatty food, weight loss, exercise regularly or stay active as tolerated 30 mins X 3 times per week. Please continue taking your home medications and follow-up with your cardiologist to adjust medcation as needed.       Diagnosis: CAD S/P percutaneous coronary angioplasty  Assessment and Plan of Treatment: You have history of coronary artery disease, for which you had stents placed. You were continued on your home medication of ASPRIN 81MG AND CLOPIDOGREL 75MG DAILY at the hospital, which are also the treatment for TIA. Please continue to take the medication as prescribed and follow up with your cardiologist upon discharge.

## 2023-12-07 NOTE — DISCHARGE NOTE NURSING/CASE MANAGEMENT/SOCIAL WORK - NSDCPEFALRISK_GEN_ALL_CORE
For information on Fall & Injury Prevention, visit: [email or web address] OR  [email or web address] OR  https://www.cdc.gov/steadi/patient.html For information on Fall & Injury Prevention, visit: [email or web address] OR  [email or web address] OR  https://www.cdc.gov/steadi/patient.html

## 2023-12-07 NOTE — PROGRESS NOTE ADULT - PROBLEM SELECTOR PLAN 4
continue metoprolol  monitor BP and adjust meds as needed  continue DASH diet. hx of HLD on atorvastatin 40mg  c/w atorvastatin 80 mg  lipid panel showed    - cholesterol 261   -    - HDL-C 43   - LDL-C 185

## 2023-12-07 NOTE — DISCHARGE NOTE PROVIDER - NSDCMRMEDTOKEN_GEN_ALL_CORE_FT
alfuzosin 10 mg oral tablet, extended release: 1 tab(s) orally once a day  atorvastatin 40 mg oral tablet: 1 tab(s) orally once a day (at bedtime)  Benicar 20 mg oral tablet: 1 tab(s) orally once a day  CLOPIDOGREL 75 MG TABLET:   HumuLIN R (Concentrated) 500 units/mL subcutaneous solution: 20 unit(s) subcutaneous 3 times a day (after meals)  hydroxychloroquine 200 mg oral tablet: 1 tab(s) orally 2 times a day  Lyrica 50 mg oral capsule: 1 cap(s) orally 2 times a day  metFORMIN 500 mg oral tablet: 1 tab(s) orally 2 times a day   METOLAZONE 5MG TABLETS: TAKE 1 TABLET BY MOUTH EVERY DAY  Metoprolol Tartrate 25 mg oral tablet: 1 tab(s) orally 2 times a day  PANTOPRAZOLE 40MG TABLETS: TAKE 1 TABLET BY MOUTH EVERY DAY  spironolactone 25 mg oral tablet: 1 tab(s) orally once a day   alfuzosin 10 mg oral tablet, extended release: 1 tab(s) orally once a day  aspirin 81 mg oral capsule: 1 cap(s) orally once a day  atorvastatin 40 mg oral tablet: 1 tab(s) orally once a day (at bedtime)  Benicar 20 mg oral tablet: 1 tab(s) orally once a day  CLOPIDOGREL 75 MG TABLET:   HumuLIN R (Concentrated) 500 units/mL subcutaneous solution: 20 unit(s) subcutaneous 3 times a day (after meals)  hydroxychloroquine 200 mg oral tablet: 1 tab(s) orally 2 times a day  magnesium gluconate 500 mg oral tablet: 2 tab(s) orally 2 times a day  metFORMIN 500 mg oral tablet: 2 tab(s) orally 2 times a day  METOLAZONE 5MG TABLETS: TAKE 1 TABLET BY MOUTH EVERY DAY  Metoprolol Tartrate 25 mg oral tablet: 1 tab(s) orally 2 times a day  PANTOPRAZOLE 40MG TABLETS: TAKE 1 TABLET BY MOUTH EVERY DAY  potassium chloride 20 mEq oral tablet, extended release: 2 tab(s) orally 2 times a day  spironolactone 25 mg oral tablet: 1 tab(s) orally once a day  Uloric 40 mg oral tablet: 1 tab(s) orally every 48 hours  Vitamin D2 50 mcg (2000 intl units) oral capsule: 2 tab(s) orally once a day   alfuzosin 10 mg oral tablet, extended release: 1 tab(s) orally once a day  aspirin 81 mg oral capsule: 1 cap(s) orally once a day  Benicar 20 mg oral tablet: 1 tab(s) orally once a day  CLOPIDOGREL 75 MG TABLET: 1 tab(s) orally once a day  HumuLIN R (Concentrated) 500 units/mL subcutaneous solution: 20 unit(s) subcutaneous 3 times a day (after meals)  hydroxychloroquine 200 mg oral tablet: 1 tab(s) orally 2 times a day  Lipitor 80 mg oral tablet: 1 tab(s) orally once a day (at bedtime)  magnesium gluconate 500 mg oral tablet: 2 tab(s) orally 2 times a day  metFORMIN 500 mg oral tablet: 2 tab(s) orally 2 times a day  METOLAZONE 5MG TABLETS: 1 tab(s) orally once a day TAKE 1 TABLET BY MOUTH EVERY DAY  Metoprolol Tartrate 25 mg oral tablet: 1 tab(s) orally 2 times a day  PANTOPRAZOLE 40MG TABLETS: 40 milligram(s) orally once a day TAKE 1 TABLET BY MOUTH EVERY DAY BEFORE BREAKFAST  potassium chloride 20 mEq oral tablet, extended release: 2 tab(s) orally 2 times a day  spironolactone 25 mg oral tablet: 1 tab(s) orally once a day  Uloric 40 mg oral tablet: 1 tab(s) orally every 48 hours  Vitamin D2 50 mcg (2000 intl units) oral capsule: 2 tab(s) orally once a day   alfuzosin 10 mg oral tablet, extended release: 1 tab(s) orally once a day  aspirin 81 mg oral capsule: 1 cap(s) orally once a day  CLOPIDOGREL 75 MG TABLET: 1 tab(s) orally once a day  HumuLIN R (Concentrated) 500 units/mL subcutaneous solution: 20 unit(s) subcutaneous 3 times a day (after meals)  hydroxychloroquine 200 mg oral tablet: 1 tab(s) orally 2 times a day  Lipitor 80 mg oral tablet: 1 tab(s) orally once a day (at bedtime)  magnesium gluconate 500 mg oral tablet: 2 tab(s) orally 2 times a day  metFORMIN 500 mg oral tablet: 2 tab(s) orally 2 times a day  METOLAZONE 5MG TABLETS: 1 tab(s) orally once a day TAKE 1 TABLET BY MOUTH EVERY DAY  Metoprolol Tartrate 25 mg oral tablet: 1 tab(s) orally 2 times a day  olmesartan 40 mg oral tablet: 1 tab(s) orally once a day  PANTOPRAZOLE 40MG TABLETS: 40 milligram(s) orally once a day TAKE 1 TABLET BY MOUTH EVERY DAY BEFORE BREAKFAST  potassium chloride 20 mEq oral tablet, extended release: 2 tab(s) orally 2 times a day  spironolactone 25 mg oral tablet: 1 tab(s) orally once a day  Uloric 40 mg oral tablet: 1 tab(s) orally every 48 hours  Vitamin D2 50 mcg (2000 intl units) oral capsule: 2 tab(s) orally once a day

## 2023-12-07 NOTE — PROGRESS NOTE ADULT - PROBLEM SELECTOR PLAN 2
Pt had multiple episodes of diarrhea, felt weak and fell. Was alone at home and had difficulty getting up for a few hours  - CK downtrending 6,000 to 4200  with IVF   - c/w IVF hx of DM on Humulin R500 20 units premeals and at bedtime  Blood glucose 145-306  HbA1c = 6.9  Estimated blood glucose = 151  c/w ISS

## 2023-12-07 NOTE — PROGRESS NOTE ADULT - PROBLEM SELECTOR PLAN 1
Pt had multiple episodes of diarrhea, and was  not able to take his insulin   - states he takes Lantus 70 units after each meal and nothing at bedtime   -presented with BS >600s and was admitted to ICU for insulin drip  - was bridged to subcutaneous insulin 50units of lantus at night and 7units admelog three times a day before meals  - fasting BS 89 this morning   - held Lantus and admelog  -c/w SS  - Endocrinologist Dr. De Los Santos was consulted p/w left arm and right face numbness, has since resolved - out of window for TNK  CT head: age-appropriate involutional and ischemic gliotic changes. No hemorrhage.   NIHSS score: 0  carotid doppler US: moderate 50-69% stenosis of left internal carotid artery. No flow-limiting stenosis on the right.  dysphagia screen - passed  Neuro and vital checks q4  c/w ASA, Plavix and atorvastatin [home meds]  c/w tele  c/w permissive HTN for 24h; goal of  and 220  f/u Echo (done 12/7 AM)  Dr. Elizabeth consulted and evaluated patient at bedside

## 2023-12-07 NOTE — PROGRESS NOTE ADULT - PROBLEM SELECTOR PLAN 5
will hold stain until CPK levels improve hx of CAD s/p 5 stents  on ASA, Plavix  c/w home meds  follows with Dr. Mathews, cardiologist at [place] hx of CAD s/p 5 stents  on ASA, Plavix  c/w home meds  follows with Dr. Mathews, cardiologist at [place]

## 2023-12-21 ENCOUNTER — APPOINTMENT (OUTPATIENT)
Dept: VASCULAR SURGERY | Facility: CLINIC | Age: 68
End: 2023-12-21
Payer: MEDICARE

## 2023-12-21 PROCEDURE — 36465Z: CUSTOM | Mod: RT

## 2023-12-21 NOTE — PROCEDURE
[FreeTextEntry1] : ultrasound guided microfoam chemical ablation with Varithena of the right distal great saphenous vein [FreeTextEntry3] : Procedural safety checklist and time out completed: Confirmed patient identification (Patient Name, , and/or medical record number including when possible affirmation by patient or parent/family/other). Confirmed procedure with the patient. Consent present, accurate and signed. Confirmed special equipment and supplies are present. Sterility confirmed. Position verified. Site/ side is marked and visible and confirmed. Procedure confirmed by consent. Accurate consent including side and site. Review of medical records, including venous ultrasound, noting correct procedure including site and side. MD/PA verifies presence and review of imaging studies and or written report of imaging studies. Agreement on the procedure to be performed Time out completed. All of the above has been confirmed by the team. All patient-specific concerns have been addressed.   Indication: right lower extremity varicose veins with inflammation, leg pain, leg swelling, and leg cramping.  Venous insufficiency/ reflux.   Procedure: Ultrasound guided microfoam chemical ablation with Varithena of the right great saphenous vein   Mr. RC PERDOMO is a 68 year old M with a history of right lower extremity varicose veins and venous insufficiency previously seen in the office.  Ultrasound examination demonstrated venous insufficiency. A trial of compression stockings, exercise, elevation, and pain medication was attempted without relief and definitive treatment with radiofrequency ablation was offered.   The patient has come for ultrasound guided microfoam chemical ablation with Varithena of the right distal great saphenous vein I have discussed the risks of the procedure at length with the patient. The risks discussed were inclusive of but not limited to infection, irritation at the site of infiltration of local anesthesia, and also rare risk of deep venous thrombosis and pulmonary emboli. The patient agrees to proceed with the procedure. The patient was escorted into the procedure room and a time out called.   The patient was placed on the procedure room table and was evaluated in reverse Trendelenburg position. The leg was prepped and sterile drapes applied. 1.0% lidocaine was administered at the chosen access site for local anesthesia. The vein was accessed using a 4F micro-puncture needle followed by a guide wire through the punctured needle and dilator sheath, standard IV catheters, or butterfly needle under advanced ultrasound guidance. Vein access was established and confirmed by aspiration of dark low pressure blood. After gaining access at right distal right GSV, the leg was positioned at 45 degrees of elevation in relationship to the torso. The Varithena canister was primed and purged as required by the instructions. A 1mL aliquot was drawn into a sterile syringe then attached to the access device.   Varithena was administered at 1.0 cc per second with close observation under ultrasound in its course of the GSV. The vein was observed for spasm under ultrasound, once vein was in full spasm the administration of Varithena was stopped   After the administration of Varithena the patient was asked to dorsiflex the ankle to limit flow of foam into perforating veins. Once appropriate spasm had been confirmed in the treated veins, the access device was removed from the leg and light pressure was applied to the puncture site for hemostasis.   The common femoral and deep superficial veins were then evaluated for flow and compressibility prior to dressing placement. The lower extremity was kept elevated at 45 degree angle and a multilayer dressing was applied including an under layer, compression pad, and thigh high 20-30 mmHg compression stocking to the patient. The leg was lowered only after compression had been applied.   The patient ambulated 10 minutes under supervision and was without concerns at time of release. The patient was instructed to take an anti-inflammatory medicine as needed and to follow up for duplex scan of treated vein.   Patient tolerated procedure, was given post-procedure instructions and a follow-up appt scheduled. Post-care instructions include walking, wearing compression during the day, taking off only to shower and then reapplying for two weeks, advising patient to keep post-treatment bandages in place and dry for 48 hours, avoid extended periods of inactivity, avoid heavy exercise for one week, to walk daily for 10 minutes over the next month. The patient was instructed to take an anti-inflammatory medicine as needed. LOT#  [id number] EXP 2024

## 2023-12-28 ENCOUNTER — APPOINTMENT (OUTPATIENT)
Dept: VASCULAR SURGERY | Facility: CLINIC | Age: 68
End: 2023-12-28
Payer: MEDICARE

## 2023-12-28 PROCEDURE — 93971 EXTREMITY STUDY: CPT | Mod: RT

## 2024-01-17 ENCOUNTER — APPOINTMENT (OUTPATIENT)
Dept: VASCULAR SURGERY | Facility: CLINIC | Age: 69
End: 2024-01-17

## 2024-01-18 ENCOUNTER — APPOINTMENT (OUTPATIENT)
Dept: RHEUMATOLOGY | Facility: CLINIC | Age: 69
End: 2024-01-18

## 2024-01-23 ENCOUNTER — APPOINTMENT (OUTPATIENT)
Dept: RHEUMATOLOGY | Facility: CLINIC | Age: 69
End: 2024-01-23
Payer: MEDICARE

## 2024-01-23 VITALS
BODY MASS INDEX: 42.1 KG/M2 | OXYGEN SATURATION: 98 % | WEIGHT: 223 LBS | SYSTOLIC BLOOD PRESSURE: 138 MMHG | HEIGHT: 61 IN | DIASTOLIC BLOOD PRESSURE: 71 MMHG | HEART RATE: 92 BPM | TEMPERATURE: 98.1 F | RESPIRATION RATE: 16 BRPM

## 2024-01-23 PROCEDURE — 99214 OFFICE O/P EST MOD 30 MIN: CPT

## 2024-01-23 RX ORDER — ALPRAZOLAM 0.25 MG/1
0.25 TABLET ORAL
Qty: 1 | Refills: 0 | Status: DISCONTINUED | COMMUNITY
Start: 2023-12-08 | End: 2024-01-23

## 2024-01-23 RX ORDER — CYCLOBENZAPRINE HYDROCHLORIDE 5 MG/1
5 TABLET, FILM COATED ORAL
Qty: 30 | Refills: 0 | Status: DISCONTINUED | COMMUNITY
Start: 2023-11-28 | End: 2024-01-23

## 2024-01-23 NOTE — REVIEW OF SYSTEMS
show [As noted in HPI] : as noted in HPI [As Noted in HPI] : as noted in HPI [Negative] : Psychiatric

## 2024-01-23 NOTE — PHYSICAL EXAM
[General Appearance - Alert] : alert [General Appearance - In No Acute Distress] : in no acute distress [General Appearance - Well Nourished] : well nourished [General Appearance - Well Developed] : well developed [Sclera] : the sclera and conjunctiva were normal [Abnormal Walk] : normal gait [Motor Tone] : muscle strength and tone were normal [Skin Color & Pigmentation] : normal skin color and pigmentation [Oriented To Time, Place, And Person] : oriented to person, place, and time [Impaired Insight] : insight and judgment were intact [FreeTextEntry1] : diffuse TTP and 4+/5 UE prox and 5/5 UE distal

## 2024-01-23 NOTE — HISTORY OF PRESENT ILLNESS
[FreeTextEntry1] : RC PERDOMO is a 68 year  old male, seen on today  for Psoriasis/UCTD/gout/OA  TONYA - sleeps 4 hours a night with CPAP Left sided CTS - no therapy   Reently with COVID in dec 2023 and coudn't eat for a month (lost 30 lbs) and was very tired toe has been painful since last night  all over muscle pain  skin - mild psoriaisis on face.   Psoriasis  follows derm and uses creams from derm on his face - currently well controlled.   MCTD/UCTD/psoriasis: with positive AIDEN/dsDNA/RNP mainly with features of arthritis.  Also with psoriasis  Currently on  mg BID, follows  ->  can't take Imuran due to uloric  -> otezla d/c due to gi distress   Gout  On Uloric every other day. colchicine d/c in 8-2026 due to at uric acid goal  no recent gout flares   Sees pulm for sleep apnea and asthma   Renal - proteinuria - seeing renal - renal does not want to do a biopsy at this time.  (recheck again today )   CAD/DM with prior PCI, on DAPT. On insulin, has proteinuria. Saw Ophth recently.

## 2024-01-23 NOTE — DATA REVIEWED
[FreeTextEntry1] : Laboratory and radiology studies that were personally reviewed at today's visit are summarized below and above:  Labs 1-2-2024 - ck WNL  derm note: 3-:  actinic keratosis, SCC 6-24-22:  HgBA1c = 9.4  1-2021:  uric acid = 6.1 2-11-20:  uric - 6.2 , dsdna = 43  10-11-19:  uric acid 6.4 , dsdna = 72, protein/creatinine = 0.5 uric acid 8.2 in july 2019.   10/15/19:  US hand - enlarged left median nerve c/w right, left ext.dig and left ext carpi ulnaris tenosynovitis.  no sonographic evidence of inflammatory arthritis.   11-4-19:  NCV/EMG - bilateral moderate sensorimotor carpal tunnel syndrome with left > right  and mild right ulnar sensory neuropathy.

## 2024-01-23 NOTE — ASSESSMENT
[FreeTextEntry1] : RC PERDOMO is a 68 year old male, seen for MCTD, gout, UCTD , psoriasis , here for follow up and for  1. OA Knee pain - IA and OA follow up response to hyaluronic acid follow up ortho and now is at weight for knee replacement - to consider  ->  continue cymbalta 60    2. MCTD/UCTD/psoriasis/psoriatic arthritis : with positive AIDEN/dsDNA/RNP mainly with features of arthritis and myalgia Currently with diffuse joint pain and muscle pain and joint swelling. Currently on Plaquenil. Improvement with Imuran but d/c due to concurrent use of uloric [] Will check laboratory tests to look for markers of disease activity and also to assess for medication toxicity. [] He is not on Imuran because on Uloric, He is taking HCQ [] follow up optho as planned [] IVIg started stopped as did not help  [] myalgias - inflammatory vs post covid -> steroid trial as below   5. Gout with no current flare. and last uric acid reviewed and will check again [] continue uloric ot 40mg PO QD [] check uric acid at next visit (currently at goal from )   6. CAD/DM with prior PCI, still on DAPT and follows with cardiology. -> AVOID  nsaids  7. Chronic pain (due to OA and untreated MCTD) continue lyrica (daily) and tramadol (PRN) - hasn't been taking tramadol. [] agrees to pain management. [] assess response to Cymbalta.  More than 50% of the encounter was spent counseling RC PERDOMO on the differential, workup, disease course, and treatment/management.   Education was provided to RC LEANNE during this encounter. All questions and concerns were answered and addressed in detail.  RC PERDOMO verbalized understanding and agreed to the plan.   RC PERDOMO has been instructed to call for an earlier appointment if new symptoms develop in the interim. RC PERDOMO has been instructed to make a follow-up appointment in 6 weeks.

## 2024-03-14 ENCOUNTER — APPOINTMENT (OUTPATIENT)
Dept: RHEUMATOLOGY | Facility: CLINIC | Age: 69
End: 2024-03-14
Payer: MEDICARE

## 2024-03-14 VITALS
BODY MASS INDEX: 40.4 KG/M2 | HEART RATE: 97 BPM | HEIGHT: 61 IN | DIASTOLIC BLOOD PRESSURE: 72 MMHG | OXYGEN SATURATION: 96 % | SYSTOLIC BLOOD PRESSURE: 136 MMHG | WEIGHT: 214 LBS

## 2024-03-14 DIAGNOSIS — R63.4 ABNORMAL WEIGHT LOSS: ICD-10-CM

## 2024-03-14 DIAGNOSIS — M19.90 UNSPECIFIED OSTEOARTHRITIS, UNSPECIFIED SITE: ICD-10-CM

## 2024-03-14 DIAGNOSIS — M79.643 PAIN IN UNSPECIFIED HAND: ICD-10-CM

## 2024-03-14 PROCEDURE — 99215 OFFICE O/P EST HI 40 MIN: CPT

## 2024-03-14 RX ORDER — GABAPENTIN 300 MG/1
300 CAPSULE ORAL
Qty: 90 | Refills: 3 | Status: DISCONTINUED | COMMUNITY
Start: 2024-03-14 | End: 2024-03-14

## 2024-03-14 RX ORDER — METHYLPREDNISOLONE 4 MG/1
4 TABLET ORAL
Qty: 50 | Refills: 0 | Status: DISCONTINUED | COMMUNITY
Start: 2024-01-23 | End: 2024-03-14

## 2024-03-14 NOTE — PHYSICAL EXAM
[General Appearance - Alert] : alert [General Appearance - In No Acute Distress] : in no acute distress [General Appearance - Well Nourished] : well nourished [General Appearance - Well Developed] : well developed [Sclera] : the sclera and conjunctiva were normal [Abnormal Walk] : normal gait [Motor Tone] : muscle strength and tone were normal [Skin Color & Pigmentation] : normal skin color and pigmentation [Oriented To Time, Place, And Person] : oriented to person, place, and time [Impaired Insight] : insight and judgment were intact [FreeTextEntry1] : diffuse TTP, no swelling

## 2024-03-14 NOTE — ASSESSMENT
[FreeTextEntry1] : RC PERDOMO is a 69 year old male, seen for MCTD, gout, UCTD , psoriasis , here for follow up and for  1. OA Knee pain - IA and OA follow up ortho and now is at weight for knee replacement - to consider  ->  continue Cymbalta but increase to 90mg daily  -> add gabapentin as below    2. MCTD/UCTD/psoriasis/psoriatic arthritis : with positive AIDEN/dsDNA/RNP mainly with features of arthritis and myalgia Currently with diffuse joint pain and muscle pain and joint swelling. Currently on Plaquenil. Improvement with Imuran but d/c due to concurrent use of uloric [] Will check laboratory tests to look for markers of disease activity and also to assess for medication toxicity. [] He is not on Imuran because on Uloric, He is taking HCQ [] follow up optho as planned [] IVIg started stopped as did not help    5. Gout with no current flare. and last uric acid reviewed and will check again [] continue uloric  40mg PO QD [] check uric acid today    6. CAD/DM with prior PCI, still on DAPT and follows with cardiology. -> AVOID  nsaids  7. Chronic pain (due to OA and untreated MCTD) tramadol (PRN) - hasn't been taking tramadol. [] agrees to pain management. [] assess response to Cymbalta at increased dose    8.  Abnormal weight loss -> ct's as below  -> blood work as below  -> pmd follow up (martine)   More than 50% of the encounter was spent counseling RC PERDOMO on the differential, workup, disease course, and treatment/management.   Education was provided to RC PERDOMO during this encounter. All questions and concerns were answered and addressed in detail.  RC PERDOMO verbalized understanding and agreed to the plan.   RC AGUIRREE has been instructed to call for an earlier appointment if new symptoms develop in the interim. RC PERDOMO has been instructed to make a follow-up appointment in 6 weeks.

## 2024-03-14 NOTE — HISTORY OF PRESENT ILLNESS
[FreeTextEntry1] : RC PERDOMO is a 68 year  old male, seen on today  for Psoriasis/UCTD/gout/OA  TONYA - sleeps 4 hours a night with CPAP Left sided CTS - no therapy   throbbing pain in the shoulders, knees, hands, feet  stabbing pain the hands  decreased range of motion of the shoulders.  has shotting stabbing pains through the hands.    has gone from 264-214 since November (when he had covid) - lost all interest in food and when he eats its a lot less than he used to. he doesn't feel like eating.  -> no night sweats  -> no fevers  -> stopped using insulin due to low sugars.   skin - mild psoriasis on face.   Psoriasis  follows derm and uses creams from derm on his face - currently well controlled.   MCTD/UCTD/psoriasis: with positive AIDEN/dsDNA/RNP mainly with features of arthritis.  Also with psoriasis  Currently on  mg BID, follows  ->  can't take Imuran due to uloric  -> otezla d/c due to gi distress   Gout  On Uloric every other day. colchicine d/c in 8-2023 due to at uric acid goal  no recent gout flares   Sees pulm for sleep apnea and asthma   Renal - proteinuria - seeing renal - renal does not want to do a biopsy at this time.  (recheck again today )   CAD/DM with prior PCI, on DAPT. On insulin, has proteinuria. Saw Ophth recently.

## 2024-03-15 DIAGNOSIS — E83.42 HYPOMAGNESEMIA: ICD-10-CM

## 2024-03-15 LAB
25(OH)D3 SERPL-MCNC: 27.3 NG/ML
ALBUMIN SERPL ELPH-MCNC: 4 G/DL
ALP BLD-CCNC: 75 U/L
ALT SERPL-CCNC: 13 U/L
ANION GAP SERPL CALC-SCNC: 15 MMOL/L
APPEARANCE: CLEAR
AST SERPL-CCNC: 15 U/L
BACTERIA: NEGATIVE /HPF
BASOPHILS # BLD AUTO: 0.05 K/UL
BASOPHILS NFR BLD AUTO: 0.6 %
BILIRUB SERPL-MCNC: 0.3 MG/DL
BILIRUBIN URINE: NEGATIVE
BLOOD URINE: NEGATIVE
BUN SERPL-MCNC: 17 MG/DL
C3 SERPL-MCNC: 162 MG/DL
C4 SERPL-MCNC: 41 MG/DL
CALCIUM SERPL-MCNC: 8.9 MG/DL
CAST: 1 /LPF
CHLORIDE SERPL-SCNC: 100 MMOL/L
CK SERPL-CCNC: 103 U/L
CO2 SERPL-SCNC: 25 MMOL/L
COLOR: YELLOW
CREAT SERPL-MCNC: 1.09 MG/DL
CREAT SPEC-SCNC: 184 MG/DL
CREAT/PROT UR: 0.7 RATIO
EGFR: 73 ML/MIN/1.73M2
EOSINOPHIL # BLD AUTO: 0.19 K/UL
EOSINOPHIL NFR BLD AUTO: 2.2 %
EPITHELIAL CELLS: 0 /HPF
ERYTHROCYTE [SEDIMENTATION RATE] IN BLOOD BY WESTERGREN METHOD: 25 MM/HR
GLUCOSE QUALITATIVE U: NEGATIVE MG/DL
GLUCOSE SERPL-MCNC: 206 MG/DL
HCT VFR BLD CALC: 40.8 %
HGB BLD-MCNC: 13.3 G/DL
IMM GRANULOCYTES NFR BLD AUTO: 0.5 %
KETONES URINE: NEGATIVE MG/DL
LEUKOCYTE ESTERASE URINE: NEGATIVE
LYMPHOCYTES # BLD AUTO: 1.47 K/UL
LYMPHOCYTES NFR BLD AUTO: 17.3 %
MAGNESIUM SERPL-MCNC: 1.2 MG/DL
MAN DIFF?: NORMAL
MCHC RBC-ENTMCNC: 29.6 PG
MCHC RBC-ENTMCNC: 32.6 GM/DL
MCV RBC AUTO: 90.9 FL
MICROSCOPIC-UA: NORMAL
MONOCYTES # BLD AUTO: 0.91 K/UL
MONOCYTES NFR BLD AUTO: 10.7 %
NEUTROPHILS # BLD AUTO: 5.86 K/UL
NEUTROPHILS NFR BLD AUTO: 68.7 %
NITRITE URINE: NEGATIVE
PH URINE: 5.5
PHOSPHATE SERPL-MCNC: 3.1 MG/DL
PLATELET # BLD AUTO: 192 K/UL
POTASSIUM SERPL-SCNC: 3.5 MMOL/L
PROT SERPL-MCNC: 6.4 G/DL
PROT UR-MCNC: 120 MG/DL
PROTEIN URINE: 100 MG/DL
PSA SERPL-MCNC: 0.29 NG/ML
RBC # BLD: 4.49 M/UL
RBC # FLD: 13.3 %
RED BLOOD CELLS URINE: 1 /HPF
SODIUM SERPL-SCNC: 140 MMOL/L
SPECIFIC GRAVITY URINE: 1.02
TSH SERPL-ACNC: 1.98 UIU/ML
UROBILINOGEN URINE: 0.2 MG/DL
WBC # FLD AUTO: 8.52 K/UL
WHITE BLOOD CELLS URINE: 0 /HPF

## 2024-03-16 ENCOUNTER — NON-APPOINTMENT (OUTPATIENT)
Age: 69
End: 2024-03-16

## 2024-03-17 ENCOUNTER — NON-APPOINTMENT (OUTPATIENT)
Age: 69
End: 2024-03-17

## 2024-03-19 LAB — DSDNA AB SER-ACNC: 26 IU/ML

## 2024-03-21 LAB
ALBUMIN MFR SERPL ELPH: 57.4 %
ALBUMIN SERPL-MCNC: 3.7 G/DL
ALBUMIN/GLOB SERPL: 1.4 RATIO
ALPHA1 GLOB MFR SERPL ELPH: 4.6 %
ALPHA1 GLOB SERPL ELPH-MCNC: 0.3 G/DL
ALPHA2 GLOB MFR SERPL ELPH: 15 %
ALPHA2 GLOB SERPL ELPH-MCNC: 1 G/DL
B-GLOBULIN MFR SERPL ELPH: 12.4 %
B-GLOBULIN SERPL ELPH-MCNC: 0.8 G/DL
DEPRECATED KAPPA LC FREE/LAMBDA SER: 0.95 RATIO
GAMMA GLOB FLD ELPH-MCNC: 0.7 G/DL
GAMMA GLOB MFR SERPL ELPH: 10.6 %
IGA SER QL IEP: 207 MG/DL
IGG SER QL IEP: 641 MG/DL
IGM SER QL IEP: 63 MG/DL
INTERPRETATION SERPL IEP-IMP: NORMAL
KAPPA LC CSF-MCNC: 2.63 MG/DL
KAPPA LC SERPL-MCNC: 2.5 MG/DL
M PROTEIN SPEC IFE-MCNC: NORMAL
PROT SERPL-MCNC: 6.4 G/DL
PROT SERPL-MCNC: 6.4 G/DL

## 2024-04-05 DIAGNOSIS — N28.89 OTHER SPECIFIED DISORDERS OF KIDNEY AND URETER: ICD-10-CM

## 2024-04-07 ENCOUNTER — NON-APPOINTMENT (OUTPATIENT)
Age: 69
End: 2024-04-07

## 2024-05-09 ENCOUNTER — APPOINTMENT (OUTPATIENT)
Dept: RHEUMATOLOGY | Facility: CLINIC | Age: 69
End: 2024-05-09
Payer: MEDICARE

## 2024-05-09 VITALS — DIASTOLIC BLOOD PRESSURE: 87 MMHG | SYSTOLIC BLOOD PRESSURE: 150 MMHG

## 2024-05-09 VITALS
SYSTOLIC BLOOD PRESSURE: 169 MMHG | OXYGEN SATURATION: 95 % | WEIGHT: 220 LBS | BODY MASS INDEX: 41.54 KG/M2 | HEART RATE: 84 BPM | DIASTOLIC BLOOD PRESSURE: 93 MMHG | HEIGHT: 61 IN

## 2024-05-09 DIAGNOSIS — R25.2 CRAMP AND SPASM: ICD-10-CM

## 2024-05-09 PROCEDURE — 99214 OFFICE O/P EST MOD 30 MIN: CPT

## 2024-05-09 PROCEDURE — G2211 COMPLEX E/M VISIT ADD ON: CPT

## 2024-05-09 RX ORDER — MAGNESIUM 200 MG
200 TABLET ORAL
Qty: 30 | Refills: 0 | Status: ACTIVE | COMMUNITY
Start: 2024-03-15

## 2024-05-09 RX ORDER — GABAPENTIN 300 MG/1
300 CAPSULE ORAL
Qty: 90 | Refills: 3 | Status: DISCONTINUED | COMMUNITY
Start: 2024-03-14 | End: 2024-05-09

## 2024-05-09 RX ORDER — CYCLOBENZAPRINE HYDROCHLORIDE 5 MG/1
5 TABLET, FILM COATED ORAL
Qty: 30 | Refills: 3 | Status: ACTIVE | COMMUNITY
Start: 2024-05-09 | End: 1900-01-01

## 2024-05-09 NOTE — PHYSICAL EXAM
[General Appearance - Alert] : alert [General Appearance - In No Acute Distress] : in no acute distress [General Appearance - Well Nourished] : well nourished [General Appearance - Well Developed] : well developed [Sclera] : the sclera and conjunctiva were normal [Skin Color & Pigmentation] : normal skin color and pigmentation [Oriented To Time, Place, And Person] : oriented to person, place, and time [Impaired Insight] : insight and judgment were intact [FreeTextEntry1] : + psoriasis on face and neck

## 2024-05-09 NOTE — HISTORY OF PRESENT ILLNESS
[FreeTextEntry1] : Psoriasis/UCTD/gout/OA  TONYA - sleeps 4 hours a night with CPAP Left sided CTS - no therapy   throbbing pain in the shoulders, knees, hands, feet  and worse in the past few weeks shoulder pain is the worst and has a muscle spasm quality with pain  working with ENT for dizziness  and some hearing loss - pending and MRI  renal lesion on CT ->  spoke to Dr. Bowen and saran to MRI and pending MRI   skin - mild psoriasis on face.   Psoriasis  follows derm and uses creams from derm on his face - currently well controlled.   MCTD/UCTD/psoriasis: with positive AIDEN/dsDNA/RNP mainly with features of arthritis.  Also with psoriasis  Currently on  mg BID, follows  ->  can't take Imuran due to uloric  -> otezla d/c due to gi distress   Gout  On Uloric every other day. colchicine d/c in 8-2023 due to at uric acid goal  no recent gout flares   Sees pulm for sleep apnea and asthma   CAD/DM with prior PCI, on DAPT. On insulin, has proteinuria. Saw Ophth recently.

## 2024-05-09 NOTE — ASSESSMENT
[FreeTextEntry1] : MCTD, gout, UCTD , psoriasis , here for follow up and for  1. OA Knee pain - IA and OA follow up ortho and now is at weight for knee replacement - to consider  ->  continue Cymbalta but increase to 90mg daily  -> stopped gabapentin - made him too drowsy  ->  could not tolerate tramdol - made him too tired and too drugged  ->  trial of cyclobenzaprine   2. MCTD/UCTD/psoriasis/psoriatic arthritis : with positive AIDEN/dsDNA/RNP mainly with features of arthritis and myalgia Currently with diffuse joint pain and muscle pain and joint swelling. Currently on Plaquenil. Improvement with Imuran but d/c due to concurrent use of uloric [] Will check laboratory tests to look for markers of disease activity and also to assess for medication toxicity. [] He is not on Imuran because on Uloric, He is taking HCQ [] follow up optho as planned [] IVIg started stopped as did not help  [] trial of medrol as below   3.  Right renal lesion seen on CT abd/pelvis in april 2024  imaging pending and ordered by renal (martine)    4. Gout with no current flare. and last uric acid reviewed and will check again [] restart uloric  40mg PO QD (he stopped it so will renew it today)  [] check uric acid today    5. CAD/DM with prior PCI, still on DAPT and follows with cardiology. -> AVOID  nsaids  6.  Chronic pain (due to OA and untreated MCTD) tramadol (PRN) - hasn't been taking tramadol. [] agrees to pain management. [] assess response to Cymbalta at increased dose     More than 50% of the encounter was spent counseling RC PERDOMO on the differential, workup, disease course, and treatment/management.   Education was provided to RC PERDOMO during this encounter. All questions and concerns were answered and addressed in detail.  RC PERDOMO verbalized understanding and agreed to the plan.   RC PERDOMO has been instructed to call for an earlier appointment if new symptoms develop in the interim. RC PERDOMO has been instructed to make a follow-up appointment in 8 weeks.

## 2024-05-17 ENCOUNTER — RX RENEWAL (OUTPATIENT)
Age: 69
End: 2024-05-17

## 2024-05-17 RX ORDER — ALBUTEROL SULFATE 2.5 MG/3ML
(2.5 MG/3ML) SOLUTION RESPIRATORY (INHALATION)
Qty: 8 | Refills: 2 | Status: ACTIVE | COMMUNITY
Start: 2022-10-10 | End: 1900-01-01

## 2024-06-25 ENCOUNTER — NON-APPOINTMENT (OUTPATIENT)
Age: 69
End: 2024-06-25

## 2024-06-26 ENCOUNTER — NON-APPOINTMENT (OUTPATIENT)
Age: 69
End: 2024-06-26

## 2024-06-27 ENCOUNTER — NON-APPOINTMENT (OUTPATIENT)
Age: 69
End: 2024-06-27

## 2024-06-27 ENCOUNTER — APPOINTMENT (OUTPATIENT)
Dept: RHEUMATOLOGY | Facility: CLINIC | Age: 69
End: 2024-06-27
Payer: MEDICARE

## 2024-06-27 VITALS
DIASTOLIC BLOOD PRESSURE: 77 MMHG | OXYGEN SATURATION: 95 % | BODY MASS INDEX: 41.54 KG/M2 | WEIGHT: 220 LBS | SYSTOLIC BLOOD PRESSURE: 131 MMHG | HEART RATE: 91 BPM | HEIGHT: 61 IN

## 2024-06-27 DIAGNOSIS — L40.9 PSORIASIS, UNSPECIFIED: ICD-10-CM

## 2024-06-27 DIAGNOSIS — R76.8 OTHER SPECIFIED ABNORMAL IMMUNOLOGICAL FINDINGS IN SERUM: ICD-10-CM

## 2024-06-27 DIAGNOSIS — M10.9 GOUT, UNSPECIFIED: ICD-10-CM

## 2024-06-27 DIAGNOSIS — R80.9 PROTEINURIA, UNSPECIFIED: ICD-10-CM

## 2024-06-27 DIAGNOSIS — M19.90 UNSPECIFIED OSTEOARTHRITIS, UNSPECIFIED SITE: ICD-10-CM

## 2024-06-27 DIAGNOSIS — R10.31 RIGHT LOWER QUADRANT PAIN: ICD-10-CM

## 2024-06-27 PROCEDURE — G2211 COMPLEX E/M VISIT ADD ON: CPT

## 2024-06-27 PROCEDURE — 99214 OFFICE O/P EST MOD 30 MIN: CPT

## 2024-06-28 DIAGNOSIS — L40.50 ARTHROPATHIC PSORIASIS, UNSPECIFIED: ICD-10-CM

## 2024-06-28 LAB
ALBUMIN SERPL ELPH-MCNC: 4.1 G/DL
ALP BLD-CCNC: 76 U/L
ALT SERPL-CCNC: 22 U/L
ANION GAP SERPL CALC-SCNC: 15 MMOL/L
AST SERPL-CCNC: 19 U/L
BASOPHILS # BLD AUTO: 0.03 K/UL
BASOPHILS NFR BLD AUTO: 0.2 %
BILIRUB SERPL-MCNC: 0.4 MG/DL
BUN SERPL-MCNC: 28 MG/DL
CALCIUM SERPL-MCNC: 8.8 MG/DL
CHLORIDE SERPL-SCNC: 103 MMOL/L
CK SERPL-CCNC: 135 U/L
CO2 SERPL-SCNC: 22 MMOL/L
CREAT SERPL-MCNC: 1.22 MG/DL
CRP SERPL-MCNC: 7 MG/L
EGFR: 64 ML/MIN/1.73M2
EOSINOPHIL # BLD AUTO: 0.04 K/UL
EOSINOPHIL NFR BLD AUTO: 0.3 %
ERYTHROCYTE [SEDIMENTATION RATE] IN BLOOD BY WESTERGREN METHOD: 16 MM/HR
GLUCOSE SERPL-MCNC: 184 MG/DL
HCT VFR BLD CALC: 42.8 %
HGB BLD-MCNC: 14.4 G/DL
IMM GRANULOCYTES NFR BLD AUTO: 1.1 %
LYMPHOCYTES # BLD AUTO: 0.92 K/UL
LYMPHOCYTES NFR BLD AUTO: 7 %
MAGNESIUM SERPL-MCNC: 1.1 MG/DL
MAN DIFF?: NORMAL
MCHC RBC-ENTMCNC: 30.3 PG
MCHC RBC-ENTMCNC: 33.6 GM/DL
MCV RBC AUTO: 89.9 FL
MONOCYTES # BLD AUTO: 0.85 K/UL
MONOCYTES NFR BLD AUTO: 6.5 %
NEUTROPHILS # BLD AUTO: 11.19 K/UL
NEUTROPHILS NFR BLD AUTO: 84.9 %
PLATELET # BLD AUTO: 187 K/UL
POTASSIUM SERPL-SCNC: 4.4 MMOL/L
PROT SERPL-MCNC: 6.4 G/DL
RBC # BLD: 4.76 M/UL
RBC # FLD: 13.2 %
SODIUM SERPL-SCNC: 141 MMOL/L
URATE SERPL-MCNC: 4.4 MG/DL
WBC # FLD AUTO: 13.17 K/UL

## 2024-07-01 ENCOUNTER — NON-APPOINTMENT (OUTPATIENT)
Age: 69
End: 2024-07-01

## 2024-07-01 ENCOUNTER — RX RENEWAL (OUTPATIENT)
Age: 69
End: 2024-07-01

## 2024-08-02 ENCOUNTER — NON-APPOINTMENT (OUTPATIENT)
Age: 69
End: 2024-08-02

## 2024-08-02 ENCOUNTER — APPOINTMENT (OUTPATIENT)
Dept: RHEUMATOLOGY | Facility: CLINIC | Age: 69
End: 2024-08-02
Payer: MEDICARE

## 2024-08-02 VITALS
RESPIRATION RATE: 16 BRPM | OXYGEN SATURATION: 95 % | WEIGHT: 215 LBS | HEIGHT: 61 IN | DIASTOLIC BLOOD PRESSURE: 80 MMHG | BODY MASS INDEX: 40.59 KG/M2 | HEART RATE: 94 BPM | SYSTOLIC BLOOD PRESSURE: 147 MMHG

## 2024-08-02 DIAGNOSIS — G89.29 PAIN IN UNSPECIFIED JOINT: ICD-10-CM

## 2024-08-02 DIAGNOSIS — M25.50 PAIN IN UNSPECIFIED JOINT: ICD-10-CM

## 2024-08-02 DIAGNOSIS — M25.561 PAIN IN RIGHT KNEE: ICD-10-CM

## 2024-08-02 DIAGNOSIS — M25.562 PAIN IN LEFT KNEE: ICD-10-CM

## 2024-08-02 DIAGNOSIS — M25.511 PAIN IN RIGHT SHOULDER: ICD-10-CM

## 2024-08-02 DIAGNOSIS — M10.9 GOUT, UNSPECIFIED: ICD-10-CM

## 2024-08-02 DIAGNOSIS — M1A.9XX0 CHRONIC GOUT, UNSPECIFIED, W/OUT TOPHUS (TOPHI): ICD-10-CM

## 2024-08-02 PROCEDURE — 20610 DRAIN/INJ JOINT/BURSA W/O US: CPT | Mod: RT,59

## 2024-08-02 PROCEDURE — 20611 DRAIN/INJ JOINT/BURSA W/US: CPT | Mod: 50

## 2024-08-02 PROCEDURE — 99214 OFFICE O/P EST MOD 30 MIN: CPT | Mod: 25

## 2024-08-02 RX ORDER — LIDOCAINE HYDROCHLORIDE 10 MG/ML
1 INJECTION, SOLUTION INFILTRATION; PERINEURAL
Refills: 0 | Status: COMPLETED | OUTPATIENT
Start: 2024-08-02

## 2024-08-02 RX ORDER — METHYLPRED ACET/NACL,ISO-OS/PF 40 MG/ML
40 VIAL (ML) INJECTION
Refills: 0 | Status: COMPLETED | OUTPATIENT
Start: 2024-08-02

## 2024-08-02 RX ADMIN — METHYLPREDNISOLONE ACETATE MG/ML: 40 INJECTION, SUSPENSION INTRA-ARTICULAR; INTRALESIONAL; INTRAMUSCULAR; SOFT TISSUE at 00:00

## 2024-08-02 RX ADMIN — LIDOCAINE HYDROCHLORIDE %: 10 INJECTION, SOLUTION INFILTRATION; PERINEURAL at 00:00

## 2024-08-02 NOTE — PROCEDURE
[Today's Date:] : Date: [unfilled] [Arthrocentesis] : arthrocentesis was performed [Risks] : risks [Benefits] : benefits [Alternatives] : alternatives [Patient] : Prior to the start of the procedure a time out was taken and the identity of the patient was confirmed via name and date of birth with the patient. The correct site and the procedure to be performed were confirmed. The correct side was confirmed if applicable. The availability of the correct equipment was verified [Therapeutic] : therapeutic [#1 Site: ______] : #1 site identified in the [unfilled] [1%] : 1%  [#2 Site: ___] : # 2 site identified in the [unfilled] [___ ml Inj] : [unfilled] ~Uml [Crystal Identification] : crystal identification [Cell Count] : cell count [#3 Site: ______] : # 3 site identified in the [unfilled] [Ethyl Chloride] : ethyl chloride [Chlorhexidine] : chlorhexidine [Depomedrol ___ mg] : Depomedrol [unfilled] mg [Tolerated Well] : the patient tolerated the procedure well [No Complications] : there were no complications [Instructions Given] : handouts/patient instructions were given to patient [Patient Instructed to Call] : patient was instructed to call if redness at site, a decrease in range of motion or an increase in pain is noted after procedure. [___ml of fluid] : [unfilled] ml of fluid was aspirated [de-identified] : verbal consent obtained  [de-identified] : with ultrasound guidance, with permanent recording and reporting  [de-identified] : 20 g 2 inches

## 2024-08-02 NOTE — PROCEDURE
[Today's Date:] : Date: [unfilled] [Arthrocentesis] : arthrocentesis was performed [Risks] : risks [Benefits] : benefits [Alternatives] : alternatives [Patient] : Prior to the start of the procedure a time out was taken and the identity of the patient was confirmed via name and date of birth with the patient. The correct site and the procedure to be performed were confirmed. The correct side was confirmed if applicable. The availability of the correct equipment was verified [Therapeutic] : therapeutic [#1 Site: ______] : #1 site identified in the [unfilled] [1%] : 1%  [#2 Site: ___] : # 2 site identified in the [unfilled] [___ ml Inj] : [unfilled] ~Uml [Crystal Identification] : crystal identification [Cell Count] : cell count [#3 Site: ______] : # 3 site identified in the [unfilled] [Ethyl Chloride] : ethyl chloride [Chlorhexidine] : chlorhexidine [Depomedrol ___ mg] : Depomedrol [unfilled] mg [Tolerated Well] : the patient tolerated the procedure well [No Complications] : there were no complications [Instructions Given] : handouts/patient instructions were given to patient [Patient Instructed to Call] : patient was instructed to call if redness at site, a decrease in range of motion or an increase in pain is noted after procedure. [___ml of fluid] : [unfilled] ml of fluid was aspirated [de-identified] : verbal consent obtained  [de-identified] : with ultrasound guidance, with permanent recording and reporting  [de-identified] : 20 g 2 inches

## 2024-08-02 NOTE — ASSESSMENT
[FreeTextEntry1] : MCTD, gout, UCTD , psoriasis , here for follow up and for  1. OA Knee pain - IA and OA follow up ortho and now is at weight for knee replacement - to consider  ->  continue Cymbalta but increase to 90mg daily  -> stopped gabapentin - made him too drowsy  ->  could not tolerate tramdol - made him too tired and too drugged  ->  trial of cyclobenzaprine with some relief ->  aspirate bilateral knees (will not inject given left knee pending replacement and right knee had injection at oro 6 weeks ago) send samples for cyrstals nad cell count.   ->  subdeltid bursa injection right shoulder  2. MCTD/UCTD/psoriasis/psoriatic arthritis : with positive AIDEN/dsDNA/RNP mainly with features of arthritis and myalgia Currently with diffuse joint pain and muscle pain and joint swelling. Currently on Plaquenil. Improvement with Imuran but d/c due to concurrent use of uloric [] Will check laboratory tests to look for markers of disease activity and also to assess for medication toxicity. [] He is not on Imuran because on Uloric, He is taking HCQ [] follow up optho as planned [] IVIg started stopped as did not help  [] off medrol and rfuses cellcept   3.  Right renal lesion seen on CT abd/pelvis in april 2024  follows martine    4. Gout with no current flare. and last uric acid reviewed and will check again [] restart uloric  40mg PO QD (he stopped it so will renew it today)  [] check uric acid today    5. CAD/DM with prior PCI, still on DAPT and follows with cardiology. -> AVOID  nsaids  6.  Chronic pain (due to OA and untreated MCTD) tramadol (PRN) - hasn't been taking tramadol. [] agrees to pain management. [] assess response to Cymbalta at increased dose     More than 50% of the encounter was spent counseling RC PERDOMO on the differential, workup, disease course, and treatment/management.   Education was provided to RC PERDOMO during this encounter. All questions and concerns were answered and addressed in detail.  RC PERDOMO verbalized understanding and agreed to the plan.   RC PERDOMO has been instructed to call for an earlier appointment if new symptoms develop in the interim. RC PERDOMO has been instructed to make a follow-up appointment in 12 weeks.

## 2024-08-02 NOTE — HISTORY OF PRESENT ILLNESS
[FreeTextEntry1] : Psoriasis/UCTD/gout/OA  TONYA - sleeps 4 hours a night with CPAP Left sided CTS - no therapy  doesn't want to take cellcept - never tried it  throbbing pain in the shoulders, knees, hands, feet  and worse in the past few weeks shoulder pain is the worst and has a muscle spasm quality with pain  working with ENT for dizziness  and some hearing loss - pending and MRI   skin - mild psoriasis on face.   Psoriasis  follows derm and uses creams from derm on his face - currently well controlled.   MCTD/UCTD/psoriasis: with positive AIDEN/dsDNA/RNP mainly with features of arthritis.  Also with psoriasis  Currently on  mg BID, follows  ->  can't take Imuran due to uloric  -> otezla d/c due to gi distress   Gout  On Uloric every other day. colchicine d/c in 8-2023 due to at uric acid goal  no recent gout flares   Sees pulm for sleep apnea and asthma   CAD/DM with prior PCI, on DAPT. On insulin, has proteinuria. Saw Ophth recently.

## 2024-08-05 ENCOUNTER — NON-APPOINTMENT (OUTPATIENT)
Age: 69
End: 2024-08-05

## 2024-08-07 LAB
ALBUMIN SERPL ELPH-MCNC: 4 G/DL
ALP BLD-CCNC: 78 U/L
ALT SERPL-CCNC: 14 U/L
ANION GAP SERPL CALC-SCNC: 14 MMOL/L
APPEARANCE: CLEAR
AST SERPL-CCNC: 18 U/L
B PERT IGG+IGM PNL SER: ABNORMAL
BACTERIA: NEGATIVE /HPF
BASOPHILS # BLD AUTO: 0.04 K/UL
BASOPHILS NFR BLD AUTO: 0.4 %
BILIRUB SERPL-MCNC: 0.3 MG/DL
BILIRUBIN URINE: NEGATIVE
BLOOD URINE: NEGATIVE
BUN SERPL-MCNC: 16 MG/DL
C3 SERPL-MCNC: 169 MG/DL
C4 SERPL-MCNC: 40 MG/DL
CALCIUM SERPL-MCNC: 9.1 MG/DL
CAST: 1 /LPF
CHLORIDE SERPL-SCNC: 101 MMOL/L
CO2 SERPL-SCNC: 24 MMOL/L
COLOR FLD: NORMAL
COLOR: YELLOW
CREAT SERPL-MCNC: 1.05 MG/DL
CREAT SPEC-SCNC: 91 MG/DL
CREAT/PROT UR: 1.3 RATIO
DSDNA AB SER-ACNC: <1 IU/ML
EGFR: 77 ML/MIN/1.73M2
EOSINOPHIL # BLD AUTO: 0.12 K/UL
EOSINOPHIL # FLD MANUAL: 1 %
EOSINOPHIL NFR BLD AUTO: 1.2 %
EPITHELIAL CELLS: 0 /HPF
ERYTHROCYTE [SEDIMENTATION RATE] IN BLOOD BY WESTERGREN METHOD: 22 MM/HR
FLUID INTAKE SUBSTANCE CLASS: NORMAL
GLUCOSE QUALITATIVE U: 100 MG/DL
GLUCOSE SERPL-MCNC: 251 MG/DL
HCT VFR BLD CALC: 42 %
HGB BLD-MCNC: 14 G/DL
IMM GRANULOCYTES NFR BLD AUTO: 0.9 %
KETONES URINE: NEGATIVE MG/DL
LEUKOCYTE ESTERASE URINE: NEGATIVE
LYMPHOCYTES # BLD AUTO: 1.5 K/UL
LYMPHOCYTES # FLD MANUAL: 22 %
LYMPHOCYTES NFR BLD AUTO: 15.1 %
MAN DIFF?: NORMAL
MCHC RBC-ENTMCNC: 30.1 PG
MCHC RBC-ENTMCNC: 33.3 GM/DL
MCV RBC AUTO: 90.3 FL
MESOTHL CELL NFR FLD: 0 %
MICROSCOPIC-UA: NORMAL
MONOCYTES # BLD AUTO: 1 K/UL
MONOCYTES NFR BLD AUTO: 10.1 %
MONOS+MACROS NFR FLD MANUAL: 49 %
NEUTROPHILS # BLD AUTO: 7.16 K/UL
NEUTROPHILS NFR BLD AUTO: 72.3 %
NEUTS SEG # FLD MANUAL: 28 %
NITRITE URINE: NEGATIVE
NRBC # FLD: 0 %
PH URINE: 6
PLATELET # BLD AUTO: 197 K/UL
POTASSIUM SERPL-SCNC: 4.1 MMOL/L
PROT SERPL-MCNC: 6 G/DL
PROT UR-MCNC: 117 MG/DL
PROTEIN URINE: 100 MG/DL
RBC # BLD: 4.65 M/UL
RBC # FLD MANUAL: ABNORMAL /UL
RBC # FLD: 13.2 %
RED BLOOD CELLS URINE: 2 /HPF
SODIUM SERPL-SCNC: 140 MMOL/L
SPECIFIC GRAVITY URINE: 1.02
SYCRY CLARITY: ABNORMAL
SYCRY COLOR: ABNORMAL
SYCRY ID: ABNORMAL
SYCRY TUBE: NORMAL
TOTAL CELLS COUNTED FLD: 558 /UL
TUBE TYPE: NORMAL
UNIDENT CELLS NFR FLD MANUAL: 0 %
URATE SERPL-MCNC: 4.4 MG/DL
UROBILINOGEN URINE: 0.2 MG/DL
VARIANT LYMPHS # FLD MANUAL: 0 %
WBC # FLD AUTO: 9.91 K/UL
WHITE BLOOD CELLS URINE: 0 /HPF

## 2024-09-04 NOTE — H&P ADULT - ASSESSMENT
Statement Selected
63 year old morbidly obese M from home with PMH of  Asthma, HTN, DM, TONYA-no CPAP, SLE, CAD s/p stents, BPH, HLD, psoriatic arthritis presented to ED with c/o chest pain since yesterday night.     In ED, patient had stable vitals on admission. EKG on admission- NSR @96 BPM , no ST T wave changes, EKG 2 sinus rhythm @ 99BPM, no ST T wave changes. cardiac enzymes x1- negative, T2- 0.2, T3- 0.5 , CXR s/o  no evidence of consolidation or effusion. Labs pertinent for WBC-11.3, platelets of 135 , bun/ creat - 28/1.45, s/p 1 dose of 325mg of aspirin in ED.     will admit to telemetry for evaluation of chest pain r/o ACS

## 2024-09-06 ENCOUNTER — NON-APPOINTMENT (OUTPATIENT)
Age: 69
End: 2024-09-06

## 2024-09-06 ENCOUNTER — APPOINTMENT (OUTPATIENT)
Dept: RHEUMATOLOGY | Facility: CLINIC | Age: 69
End: 2024-09-06
Payer: COMMERCIAL

## 2024-09-06 VITALS
WEIGHT: 213 LBS | DIASTOLIC BLOOD PRESSURE: 81 MMHG | RESPIRATION RATE: 16 BRPM | SYSTOLIC BLOOD PRESSURE: 121 MMHG | HEART RATE: 103 BPM | OXYGEN SATURATION: 96 % | BODY MASS INDEX: 40.22 KG/M2 | HEIGHT: 61 IN

## 2024-09-06 DIAGNOSIS — I50.9 HEART FAILURE, UNSPECIFIED: ICD-10-CM

## 2024-09-06 DIAGNOSIS — M19.90 UNSPECIFIED OSTEOARTHRITIS, UNSPECIFIED SITE: ICD-10-CM

## 2024-09-06 PROCEDURE — G2211 COMPLEX E/M VISIT ADD ON: CPT | Mod: NC

## 2024-09-06 PROCEDURE — 99204 OFFICE O/P NEW MOD 45 MIN: CPT

## 2024-09-06 PROCEDURE — 99214 OFFICE O/P EST MOD 30 MIN: CPT

## 2024-09-06 RX ORDER — METHYLPREDNISOLONE 4 MG/1
4 TABLET ORAL
Qty: 30 | Refills: 1 | Status: ACTIVE | COMMUNITY
Start: 2024-09-06 | End: 1900-01-01

## 2024-09-06 RX ORDER — EZETIMIBE 10 MG/1
10 TABLET ORAL
Refills: 0 | Status: ACTIVE | COMMUNITY
Start: 2024-09-06

## 2024-09-06 NOTE — PROCEDURE
[Today's Date:] : Date: [unfilled] [Arthrocentesis] : arthrocentesis was performed [Risks] : risks [Benefits] : benefits [Alternatives] : alternatives [Patient] : Prior to the start of the procedure a time out was taken and the identity of the patient was confirmed via name and date of birth with the patient. The correct site and the procedure to be performed were confirmed. The correct side was confirmed if applicable. The availability of the correct equipment was verified [Therapeutic] : therapeutic [#1 Site: ______] : #1 site identified in the [unfilled] [1%] : 1%  [#2 Site: ___] : # 2 site identified in the [unfilled] [___ ml Inj] : [unfilled] ~Uml [___ml of fluid] : [unfilled] ml of fluid was aspirated [Crystal Identification] : crystal identification [Cell Count] : cell count [#3 Site: ______] : # 3 site identified in the [unfilled] [Ethyl Chloride] : ethyl chloride [Chlorhexidine] : chlorhexidine [Depomedrol ___ mg] : Depomedrol [unfilled] mg [Tolerated Well] : the patient tolerated the procedure well [No Complications] : there were no complications [Instructions Given] : handouts/patient instructions were given to patient [Patient Instructed to Call] : patient was instructed to call if redness at site, a decrease in range of motion or an increase in pain is noted after procedure. [de-identified] : verbal consent obtained  [de-identified] : with ultrasound guidance, with permanent recording and reporting  [de-identified] : 20 g 2 inches

## 2024-09-06 NOTE — PROCEDURE
[Today's Date:] : Date: [unfilled] [Arthrocentesis] : arthrocentesis was performed [Risks] : risks [Benefits] : benefits [Alternatives] : alternatives [Patient] : Prior to the start of the procedure a time out was taken and the identity of the patient was confirmed via name and date of birth with the patient. The correct site and the procedure to be performed were confirmed. The correct side was confirmed if applicable. The availability of the correct equipment was verified [Therapeutic] : therapeutic [#1 Site: ______] : #1 site identified in the [unfilled] [1%] : 1%  [#2 Site: ___] : # 2 site identified in the [unfilled] [___ ml Inj] : [unfilled] ~Uml [___ml of fluid] : [unfilled] ml of fluid was aspirated [Crystal Identification] : crystal identification [Cell Count] : cell count [#3 Site: ______] : # 3 site identified in the [unfilled] [Ethyl Chloride] : ethyl chloride [Chlorhexidine] : chlorhexidine [Depomedrol ___ mg] : Depomedrol [unfilled] mg [Tolerated Well] : the patient tolerated the procedure well [No Complications] : there were no complications [Instructions Given] : handouts/patient instructions were given to patient [Patient Instructed to Call] : patient was instructed to call if redness at site, a decrease in range of motion or an increase in pain is noted after procedure. [de-identified] : verbal consent obtained  [de-identified] : with ultrasound guidance, with permanent recording and reporting  [de-identified] : 20 g 2 inches

## 2024-09-06 NOTE — ASSESSMENT
[FreeTextEntry1] : MCTD, gout, UCTD , psoriasis , here for follow up and for  1. OA Knee pain - IA and OA knee replacement pending 9- ->  Cymbalta but increase to 90mg daily  -> stopped gabapentin - made him too drowsy   -> lyrica - he said it didn't work well for him  ->  re-try tramadol and Risks of, benefits of, and alternatives to this treatment plan were discussed with the patient and the patient expressed understanding.   ->  trial of cyclobenzaprine with some relief but wasn't helpful during the days   -> periop letter - will await cervical spine xray, will incclude stress dose steroid recommendations given use of steroids over the past 6 months.    2. MCTD/UCTD/psoriasis/psoriatic arthritis : with positive AIDEN/dsDNA/RNP mainly with features of arthritis and myalgia Currently with diffuse joint pain and muscle pain and joint swelling. Currently on Plaquenil. Improvement with Imuran but d/c due to concurrent use of uloric [] Will check laboratory tests to look for markers of disease activity and also to assess for medication toxicity. [] He is not on Imuran because on Uloric, He is taking HCQ [] follow up optho as planned [] IVIg started stopped as did not help  [] refuses cellcept  [] trial of medrol 4mg daily and will include this with instructions in rheum optimization letter before surgery   3.  Right renal lesion seen on CT abd/pelvis in april 2024  follows martine and last CT from 4-2024 reviewed    4. Gout with no current flare. and last uric acid reviewed and will check again [] restart uloric  40mg PO QD (he stopped it so will renew it today)  [] check uric in 3 months (last check t goal)   5. CAD/DM with prior PCI, still on DAPT and follows with cardiology. -> AVOID  nsaids  6.  Chronic pain (due to OA and untreated MCTD) tramadol (PRN) - hasn't been taking tramadol. [] cymbalta 90mg daily  [] cautuious use of tramadol (he has at home and doesn't currently need refills)  [] ok to use medical MJ (not smoked form, just oral form)     More than 50% of the encounter was spent counseling RC PERDOMO on the differential, workup, disease course, and treatment/management.   Education was provided to RC PERDOMO during this encounter. All questions and concerns were answered and addressed in detail.  RC PERDOMO verbalized understanding and agreed to the plan.   RC PERDOMO has been instructed to call for an earlier appointment if new symptoms develop in the interim. RC PERDOMO has been instructed to make a follow-up appointment in 12 weeks.

## 2024-09-06 NOTE — HISTORY OF PRESENT ILLNESS
[FreeTextEntry1] : Psoriasis/UCTD/gout/OA   TONYA - sleeps up to 4 hours a night with CPAP, feels his sleep is terrible.    Left sided CTS - no therapy   doesn't want to take cellcept - never tried it  knee surgery planned (left)  depression screening + earlier today but he attributes it to being told his knee surgery was cancelled but now feeling better since he heard back from the surgeon that he can have the surgery (was inital concern with A1C of 8).  pending stress test and pre-op testing before knee surgery   doesn't want narcotics but wants medicaiton for his joint pain    -> doesn't want narcotics but wants medicaiton for his joint pain   -> would be willing to try tramadol that he has at home    throbbing pain in the shoulders, knees, hands, feet  and worse in the past few weeks shoulder pain is the worst and has a muscle spasm quality with pain   skin - mild psoriasis on face.   Psoriasis  follows derm and uses creams from derm on his face - currently well controlled.   MCTD/UCTD/psoriasis: with positive AIDEN/dsDNA/RNP mainly with features of arthritis.  Also with psoriasis  Currently on  mg BID ->  can't take Imuran due to uloric  -> otezla d/c due to gi distress   Gout  On Uloric 40 day. colchicine d/c in 8-2023 due to at uric acid goal  no recent gout flares  once in a while feels like he has the begining of a flare but doesnt turn into a flare  last uric acid 4.4   Sees pulm for sleep apnea and asthma   CAD/DM with prior PCI, on DAPT. On insulin, has proteinuria. Saw Ophth recently.

## 2024-09-09 ENCOUNTER — NON-APPOINTMENT (OUTPATIENT)
Age: 69
End: 2024-09-09

## 2024-09-20 NOTE — DISCHARGE NOTE ADULT - PROVIDER RX CONTACT NUMBER
Decrease symptoms/Assessment/Improve social/vocational/coping skills/Psychoeducation Decrease symptoms/Assessment/Improve social/vocational/coping skills/Psychoeducation [phone]

## 2024-11-01 ENCOUNTER — APPOINTMENT (OUTPATIENT)
Dept: RHEUMATOLOGY | Facility: CLINIC | Age: 69
End: 2024-11-01
Payer: MEDICARE

## 2024-11-01 ENCOUNTER — NON-APPOINTMENT (OUTPATIENT)
Age: 69
End: 2024-11-01

## 2024-11-01 VITALS
BODY MASS INDEX: 39.3 KG/M2 | DIASTOLIC BLOOD PRESSURE: 77 MMHG | OXYGEN SATURATION: 96 % | SYSTOLIC BLOOD PRESSURE: 133 MMHG | RESPIRATION RATE: 16 BRPM | HEART RATE: 93 BPM | WEIGHT: 208.13 LBS | HEIGHT: 61 IN

## 2024-11-01 DIAGNOSIS — M19.90 UNSPECIFIED OSTEOARTHRITIS, UNSPECIFIED SITE: ICD-10-CM

## 2024-11-01 DIAGNOSIS — D64.9 ANEMIA, UNSPECIFIED: ICD-10-CM

## 2024-11-01 PROCEDURE — 99215 OFFICE O/P EST HI 40 MIN: CPT

## 2024-11-01 PROCEDURE — G2211 COMPLEX E/M VISIT ADD ON: CPT

## 2024-11-01 RX ORDER — DICLOFENAC SODIUM 20 MG/G
2 SOLUTION TOPICAL
Qty: 2 | Refills: 11 | Status: ACTIVE | COMMUNITY
Start: 2024-11-01 | End: 1900-01-01

## 2024-11-01 RX ORDER — CYCLOBENZAPRINE HYDROCHLORIDE 5 MG/1
5 TABLET, FILM COATED ORAL
Qty: 30 | Refills: 0 | Status: ACTIVE | COMMUNITY
Start: 2024-11-01 | End: 1900-01-01

## 2024-11-04 LAB
25(OH)D3 SERPL-MCNC: 24.7 NG/ML
ALBUMIN SERPL ELPH-MCNC: 4.3 G/DL
ALP BLD-CCNC: 74 U/L
ALT SERPL-CCNC: 10 U/L
ANION GAP SERPL CALC-SCNC: 15 MMOL/L
APPEARANCE: CLEAR
AST SERPL-CCNC: 14 U/L
BACTERIA: NEGATIVE /HPF
BASOPHILS # BLD AUTO: 0.05 K/UL
BASOPHILS NFR BLD AUTO: 0.6 %
BILIRUB SERPL-MCNC: 0.3 MG/DL
BILIRUBIN URINE: NEGATIVE
BLOOD URINE: NEGATIVE
BUN SERPL-MCNC: 33 MG/DL
C3 SERPL-MCNC: 144 MG/DL
C4 SERPL-MCNC: 37 MG/DL
CALCIUM SERPL-MCNC: 8.8 MG/DL
CAST: 6 /LPF
CHLORIDE SERPL-SCNC: 106 MMOL/L
CO2 SERPL-SCNC: 22 MMOL/L
COLOR: YELLOW
CREAT SERPL-MCNC: 1.61 MG/DL
CREAT SPEC-SCNC: 185 MG/DL
CREAT/PROT UR: 0.3 RATIO
CRP SERPL-MCNC: 8 MG/L
EGFR: 46 ML/MIN/1.73M2
EOSINOPHIL # BLD AUTO: 0.13 K/UL
EOSINOPHIL NFR BLD AUTO: 1.5 %
EPITHELIAL CELLS: 1 /HPF
ERYTHROCYTE [SEDIMENTATION RATE] IN BLOOD BY WESTERGREN METHOD: 27 MM/HR
FERRITIN SERPL-MCNC: 245 NG/ML
GLUCOSE QUALITATIVE U: NEGATIVE MG/DL
GLUCOSE SERPL-MCNC: 156 MG/DL
HAPTOGLOB SERPL-MCNC: 255 MG/DL
HCT VFR BLD CALC: 36 %
HGB BLD-MCNC: 11.3 G/DL
HYALINE CASTS: PRESENT
IMM GRANULOCYTES NFR BLD AUTO: 0.4 %
IRON SATN MFR SERPL: 16 %
IRON SERPL-MCNC: 43 UG/DL
KETONES URINE: NEGATIVE MG/DL
LDH SERPL-CCNC: 162 U/L
LEUKOCYTE ESTERASE URINE: NEGATIVE
LYMPHOCYTES # BLD AUTO: 1.57 K/UL
LYMPHOCYTES NFR BLD AUTO: 17.5 %
MAN DIFF?: NORMAL
MCHC RBC-ENTMCNC: 29.4 PG
MCHC RBC-ENTMCNC: 31.4 G/DL
MCV RBC AUTO: 93.8 FL
MICROSCOPIC-UA: NORMAL
MONOCYTES # BLD AUTO: 0.87 K/UL
MONOCYTES NFR BLD AUTO: 9.7 %
NEUTROPHILS # BLD AUTO: 6.3 K/UL
NEUTROPHILS NFR BLD AUTO: 70.3 %
NITRITE URINE: NEGATIVE
PH URINE: 5
PLATELET # BLD AUTO: 228 K/UL
POTASSIUM SERPL-SCNC: 4.4 MMOL/L
PROT SERPL-MCNC: 6.6 G/DL
PROT UR-MCNC: 51 MG/DL
PROTEIN URINE: 100 MG/DL
RBC # BLD: 3.84 M/UL
RBC # FLD: 13.2 %
RED BLOOD CELLS URINE: 1 /HPF
REVIEW: NORMAL
SODIUM SERPL-SCNC: 144 MMOL/L
SPECIFIC GRAVITY URINE: 1.02
STFR SERPL-MCNC: 24.7 NMOL/L
TIBC SERPL-MCNC: 271 UG/DL
TRANSFERRIN SERPL-MCNC: 213 MG/DL
UIBC SERPL-MCNC: 228 UG/DL
URATE SERPL-MCNC: 5.5 MG/DL
UROBILINOGEN URINE: 0.2 MG/DL
WBC # FLD AUTO: 8.96 K/UL
WHITE BLOOD CELLS URINE: 0 /HPF

## 2024-11-06 LAB
ALBUMIN MFR SERPL ELPH: 56.3 %
ALBUMIN SERPL-MCNC: 3.5 G/DL
ALBUMIN/GLOB SERPL: 1.3 RATIO
ALPHA1 GLOB MFR SERPL ELPH: 4.9 %
ALPHA1 GLOB SERPL ELPH-MCNC: 0.3 G/DL
ALPHA2 GLOB MFR SERPL ELPH: 14.9 %
ALPHA2 GLOB SERPL ELPH-MCNC: 0.9 G/DL
B-GLOBULIN MFR SERPL ELPH: 12.2 %
B-GLOBULIN SERPL ELPH-MCNC: 0.8 G/DL
DEPRECATED KAPPA LC FREE/LAMBDA SER: 1.4 RATIO
GAMMA GLOB FLD ELPH-MCNC: 0.7 G/DL
GAMMA GLOB MFR SERPL ELPH: 11.7 %
IGA SER QL IEP: 204 MG/DL
IGG SER QL IEP: 714 MG/DL
IGM SER QL IEP: 60 MG/DL
INTERPRETATION SERPL IEP-IMP: NORMAL
KAPPA LC CSF-MCNC: 1.94 MG/DL
KAPPA LC SERPL-MCNC: 2.72 MG/DL
M PROTEIN SPEC IFE-MCNC: NORMAL
PROT SERPL-MCNC: 6.2 G/DL
PROT SERPL-MCNC: 6.2 G/DL

## 2024-11-25 NOTE — ED ADULT NURSE NOTE - CODE STROKE ACTIVATED DT
Physical Therapy Daily Treatment Note      Patient: Campbell Alex   : 1938  Referring practitioner: Rocky Lozoya DO  Date of Initial Visit: Type: THERAPY  Noted: 2024  Today's Date: 2024  Patient seen for 50 sessions       Visit Diagnoses:    ICD-10-CM ICD-9-CM   1. Low back pain, unspecified back pain laterality, unspecified chronicity, unspecified whether sciatica present  M54.50 724.2   2. Bilateral leg weakness  R29.898 729.89   3. Mobility impaired  Z74.09 799.89   4. History of lumbar laminectomy  Z98.890 V45.89       Subjective Again trying to climb stairs again. HEP going well.    Objective   See Exercise, Manual, and Modality Logs for complete treatment.       Assessment/Plan Good response to rx.      Timed:         Manual Therapy:         mins  36647;     Therapeutic Exercise:    15     mins  59212;     Neuromuscular Agustina:    15    mins  31116;    Therapeutic Activity:        10  mins  04894;     Gait Training:           mins  26387;     Ultrasound:          mins  91894;    Ionto                                   mins   52875  Self Care                            mins   47464      Un-Timed:  Electrical Stimulation:         mins  06947 ( );  Dry Needling          mins self-pay  Traction          mins 64852  Canalith Repos         mins 39049    Timed Treatment:   40   mins   Total Treatment:     40   mins      Daniel Joyner PT, PT, DPT, OCS  [place] license: [id number]  
06-Dec-2023 08:33

## 2024-11-27 DIAGNOSIS — M19.90 UNSPECIFIED OSTEOARTHRITIS, UNSPECIFIED SITE: ICD-10-CM

## 2024-12-26 ENCOUNTER — APPOINTMENT (OUTPATIENT)
Dept: RHEUMATOLOGY | Facility: CLINIC | Age: 69
End: 2024-12-26
Payer: MEDICARE

## 2024-12-26 VITALS
SYSTOLIC BLOOD PRESSURE: 183 MMHG | OXYGEN SATURATION: 95 % | HEART RATE: 87 BPM | DIASTOLIC BLOOD PRESSURE: 96 MMHG | HEIGHT: 61 IN | WEIGHT: 206 LBS | BODY MASS INDEX: 38.89 KG/M2

## 2024-12-26 DIAGNOSIS — M10.9 GOUT, UNSPECIFIED: ICD-10-CM

## 2024-12-26 DIAGNOSIS — L40.50 ARTHROPATHIC PSORIASIS, UNSPECIFIED: ICD-10-CM

## 2024-12-26 DIAGNOSIS — E11.9 TYPE 2 DIABETES MELLITUS W/OUT COMPLICATIONS: ICD-10-CM

## 2024-12-26 PROCEDURE — 99214 OFFICE O/P EST MOD 30 MIN: CPT

## 2024-12-26 PROCEDURE — G2211 COMPLEX E/M VISIT ADD ON: CPT

## 2024-12-26 RX ORDER — ABATACEPT 250 MG/15ML
250 INJECTION, POWDER, LYOPHILIZED, FOR SOLUTION INTRAVENOUS
Refills: 0 | Status: ACTIVE | OUTPATIENT
Start: 2024-12-26

## 2024-12-26 RX ORDER — HYDROCORTISONE SODIUM SUCCINATE 100 MG/2ML
100 INJECTION INTRAMUSCULAR; INTRAVENOUS
Refills: 0 | Status: ACTIVE | OUTPATIENT
Start: 2024-12-26

## 2024-12-26 RX ORDER — METFORMIN HYDROCHLORIDE 500 MG/1
500 TABLET, COATED ORAL DAILY
Refills: 0 | Status: ACTIVE | COMMUNITY
Start: 2024-12-26

## 2024-12-26 RX ORDER — CETIRIZINE HYDROCHLORIDE 10 MG/1
10 CAPSULE, LIQUID FILLED ORAL
Refills: 0 | Status: ACTIVE | OUTPATIENT
Start: 2024-12-26

## 2024-12-26 RX ORDER — ABATACEPT 125 MG/ML
125 INJECTION, SOLUTION SUBCUTANEOUS
Qty: 12 | Refills: 0 | Status: ACTIVE | COMMUNITY
Start: 2024-12-26 | End: 1900-01-01

## 2024-12-26 RX ADMIN — HYDROCORTISONE SODIUM SUCCINATE 0 MG: 100 INJECTION, POWDER, FOR SOLUTION INTRAMUSCULAR; INTRAVENOUS at 00:00

## 2024-12-26 RX ADMIN — CETIRIZINE HYDROCHLORIDE 0 MG: 10 CAPSULE, LIQUID FILLED ORAL at 00:00

## 2024-12-26 RX ADMIN — Medication 0 MG: at 00:00

## 2024-12-26 RX ADMIN — ABATACEPT 0 MG: 250 INJECTION, POWDER, LYOPHILIZED, FOR SOLUTION INTRAVENOUS at 00:00

## 2024-12-26 NOTE — ED ADULT TRIAGE NOTE - SPO2 (%)
Discharge instructions Shock Wave Lithotripsy (ESWL):  You may experience flank pain from the procedure.  This should improve over the next couple days.    You may experience hematuria (blood in the urine), which should improve over the next couple days.    Pt ok to discharge home in good condition  No heavy lifting, >10 lbs for today  Pt should avoid strenuous activity for today  Pt should walk moderately at home  Pt ok to shower   Pt may resume diet as tolerated  Rx in chart  Please call attending physician or hospital  with questions  Call or Present to ED if fever (> 101F), intractable nausea vomiting or pain.    Pt should follow up with Dr. Jeffery Ureña MD, in 4 weeks, call to confirm appointment    
98

## 2024-12-27 DIAGNOSIS — M17.10 UNILATERAL PRIMARY OSTEOARTHRITIS, UNSPECIFIED KNEE: ICD-10-CM

## 2024-12-27 DIAGNOSIS — M19.90 UNSPECIFIED OSTEOARTHRITIS, UNSPECIFIED SITE: ICD-10-CM

## 2024-12-27 LAB
25(OH)D3 SERPL-MCNC: 27.6 NG/ML
ALBUMIN SERPL ELPH-MCNC: 4 G/DL
ALP BLD-CCNC: 89 U/L
ALT SERPL-CCNC: 10 U/L
ANION GAP SERPL CALC-SCNC: 16 MMOL/L
AST SERPL-CCNC: 13 U/L
BASOPHILS # BLD AUTO: 0.06 K/UL
BASOPHILS NFR BLD AUTO: 0.6 %
BILIRUB SERPL-MCNC: 0.2 MG/DL
BUN SERPL-MCNC: 27 MG/DL
CALCIUM SERPL-MCNC: 9.1 MG/DL
CHLORIDE SERPL-SCNC: 100 MMOL/L
CK SERPL-CCNC: 166 U/L
CO2 SERPL-SCNC: 26 MMOL/L
CREAT SERPL-MCNC: 1.12 MG/DL
CRP SERPL-MCNC: 21 MG/L
EGFR: 71 ML/MIN/1.73M2
EOSINOPHIL # BLD AUTO: 0.18 K/UL
EOSINOPHIL NFR BLD AUTO: 1.8 %
ERYTHROCYTE [SEDIMENTATION RATE] IN BLOOD BY WESTERGREN METHOD: 29 MM/HR
GLUCOSE SERPL-MCNC: 132 MG/DL
HAV IGM SER QL: NONREACTIVE
HBV CORE IGG+IGM SER QL: REACTIVE
HBV CORE IGM SER QL: NONREACTIVE
HBV SURFACE AG SER QL: NONREACTIVE
HCT VFR BLD CALC: 39 %
HCV AB SER QL: NONREACTIVE
HCV S/CO RATIO: 0.08 S/CO
HGB BLD-MCNC: 12.7 G/DL
IMM GRANULOCYTES NFR BLD AUTO: 0.4 %
LYMPHOCYTES # BLD AUTO: 1.54 K/UL
LYMPHOCYTES NFR BLD AUTO: 15.8 %
MAN DIFF?: NORMAL
MCHC RBC-ENTMCNC: 29.1 PG
MCHC RBC-ENTMCNC: 32.6 G/DL
MCV RBC AUTO: 89.4 FL
MONOCYTES # BLD AUTO: 1.14 K/UL
MONOCYTES NFR BLD AUTO: 11.7 %
NEUTROPHILS # BLD AUTO: 6.8 K/UL
NEUTROPHILS NFR BLD AUTO: 69.7 %
PLATELET # BLD AUTO: 179 K/UL
POTASSIUM SERPL-SCNC: 4 MMOL/L
PROT SERPL-MCNC: 6.4 G/DL
RBC # BLD: 4.36 M/UL
RBC # FLD: 13.8 %
SODIUM SERPL-SCNC: 141 MMOL/L
URATE SERPL-MCNC: 5.4 MG/DL
WBC # FLD AUTO: 9.76 K/UL

## 2024-12-30 LAB
M TB IFN-G BLD-IMP: NEGATIVE
QUANTIFERON TB PLUS MITOGEN MINUS NIL: 7.9 IU/ML
QUANTIFERON TB PLUS NIL: 0.19 IU/ML
QUANTIFERON TB PLUS TB1 MINUS NIL: 0.07 IU/ML
QUANTIFERON TB PLUS TB2 MINUS NIL: 0.08 IU/ML

## 2025-01-10 DIAGNOSIS — M19.90 UNSPECIFIED OSTEOARTHRITIS, UNSPECIFIED SITE: ICD-10-CM

## 2025-01-10 RX ORDER — METHYLPREDNISOLONE 4 MG/1
4 TABLET ORAL DAILY
Qty: 30 | Refills: 1 | Status: ACTIVE | COMMUNITY
Start: 2025-01-10 | End: 1900-01-01

## 2025-01-22 ENCOUNTER — APPOINTMENT (OUTPATIENT)
Dept: RHEUMATOLOGY | Facility: CLINIC | Age: 70
End: 2025-01-22

## 2025-01-22 ENCOUNTER — APPOINTMENT (OUTPATIENT)
Dept: RHEUMATOLOGY | Facility: CLINIC | Age: 70
End: 2025-01-22
Payer: MEDICARE

## 2025-01-22 VITALS
HEART RATE: 96 BPM | BODY MASS INDEX: 38.55 KG/M2 | SYSTOLIC BLOOD PRESSURE: 152 MMHG | WEIGHT: 204 LBS | OXYGEN SATURATION: 96 % | DIASTOLIC BLOOD PRESSURE: 88 MMHG | TEMPERATURE: 98 F | RESPIRATION RATE: 16 BRPM

## 2025-01-22 VITALS
OXYGEN SATURATION: 96 % | DIASTOLIC BLOOD PRESSURE: 86 MMHG | SYSTOLIC BLOOD PRESSURE: 152 MMHG | HEART RATE: 91 BPM | RESPIRATION RATE: 16 BRPM

## 2025-01-22 PROCEDURE — 96365 THER/PROPH/DIAG IV INF INIT: CPT

## 2025-01-22 PROCEDURE — 96374 THER/PROPH/DIAG INJ IV PUSH: CPT | Mod: 59

## 2025-01-22 RX ORDER — CETIRIZINE HYDROCHLORIDE 10 MG/1
10 CAPSULE, LIQUID FILLED ORAL
Qty: 0 | Refills: 0 | Status: COMPLETED
Start: 2024-12-26

## 2025-01-22 RX ORDER — HYDROCORTISONE SODIUM SUCCINATE 100 MG/2ML
100 INJECTION INTRAMUSCULAR; INTRAVENOUS
Qty: 0 | Refills: 0 | Status: COMPLETED
Start: 2024-12-26

## 2025-01-22 RX ORDER — ABATACEPT 250 MG/15ML
250 INJECTION, POWDER, LYOPHILIZED, FOR SOLUTION INTRAVENOUS
Qty: 0 | Refills: 0 | Status: COMPLETED
Start: 2024-12-26

## 2025-02-04 ENCOUNTER — APPOINTMENT (OUTPATIENT)
Dept: RHEUMATOLOGY | Facility: CLINIC | Age: 70
End: 2025-02-04
Payer: MEDICARE

## 2025-02-04 VITALS
HEART RATE: 89 BPM | DIASTOLIC BLOOD PRESSURE: 92 MMHG | HEIGHT: 61 IN | WEIGHT: 204 LBS | OXYGEN SATURATION: 95 % | BODY MASS INDEX: 38.51 KG/M2 | SYSTOLIC BLOOD PRESSURE: 157 MMHG

## 2025-02-04 VITALS
DIASTOLIC BLOOD PRESSURE: 92 MMHG | SYSTOLIC BLOOD PRESSURE: 157 MMHG | OXYGEN SATURATION: 95 % | RESPIRATION RATE: 16 BRPM | HEART RATE: 89 BPM

## 2025-02-04 VITALS
SYSTOLIC BLOOD PRESSURE: 161 MMHG | TEMPERATURE: 97.8 F | OXYGEN SATURATION: 96 % | HEART RATE: 90 BPM | RESPIRATION RATE: 16 BRPM | DIASTOLIC BLOOD PRESSURE: 95 MMHG

## 2025-02-04 DIAGNOSIS — L40.50 ARTHROPATHIC PSORIASIS, UNSPECIFIED: ICD-10-CM

## 2025-02-04 DIAGNOSIS — M10.9 GOUT, UNSPECIFIED: ICD-10-CM

## 2025-02-04 DIAGNOSIS — M75.22 BICIPITAL TENDINITIS, LEFT SHOULDER: ICD-10-CM

## 2025-02-04 DIAGNOSIS — L40.9 PSORIASIS, UNSPECIFIED: ICD-10-CM

## 2025-02-04 PROCEDURE — 96365 THER/PROPH/DIAG IV INF INIT: CPT | Mod: 59

## 2025-02-04 PROCEDURE — 99214 OFFICE O/P EST MOD 30 MIN: CPT | Mod: 25

## 2025-02-04 PROCEDURE — 36415 COLL VENOUS BLD VENIPUNCTURE: CPT

## 2025-02-04 PROCEDURE — 20611 DRAIN/INJ JOINT/BURSA W/US: CPT | Mod: LT

## 2025-02-04 PROCEDURE — ZZZZZ: CPT

## 2025-02-04 RX ORDER — ABATACEPT 250 MG/15ML
250 INJECTION, POWDER, LYOPHILIZED, FOR SOLUTION INTRAVENOUS
Qty: 0 | Refills: 0 | Status: COMPLETED
Start: 2024-12-26

## 2025-02-04 RX ORDER — ABATACEPT 125 MG/ML
125 INJECTION, SOLUTION SUBCUTANEOUS
Qty: 3 | Refills: 0 | Status: ACTIVE | COMMUNITY
Start: 2025-02-04 | End: 1900-01-01

## 2025-02-04 RX ORDER — CETIRIZINE HYDROCHLORIDE 10 MG/1
10 CAPSULE, LIQUID FILLED ORAL
Qty: 0 | Refills: 0 | Status: COMPLETED
Start: 2024-12-26

## 2025-02-05 ENCOUNTER — APPOINTMENT (OUTPATIENT)
Dept: RHEUMATOLOGY | Facility: CLINIC | Age: 70
End: 2025-02-05

## 2025-02-05 PROBLEM — M75.22 TENDINITIS OF UPPER BICEPS TENDON OF LEFT SHOULDER: Status: ACTIVE | Noted: 2025-02-05

## 2025-02-05 LAB
ALBUMIN SERPL ELPH-MCNC: 4 G/DL
ALP BLD-CCNC: 96 U/L
ALT SERPL-CCNC: 9 U/L
ANION GAP SERPL CALC-SCNC: 16 MMOL/L
AST SERPL-CCNC: 13 U/L
BASOPHILS # BLD AUTO: 0.05 K/UL
BASOPHILS NFR BLD AUTO: 0.4 %
BILIRUB SERPL-MCNC: 0.3 MG/DL
BUN SERPL-MCNC: 22 MG/DL
CALCIUM SERPL-MCNC: 8.3 MG/DL
CHLORIDE SERPL-SCNC: 101 MMOL/L
CO2 SERPL-SCNC: 26 MMOL/L
CREAT SERPL-MCNC: 1.03 MG/DL
CRP SERPL-MCNC: <3 MG/L
EGFR: 79 ML/MIN/1.73M2
EOSINOPHIL # BLD AUTO: 0.19 K/UL
EOSINOPHIL NFR BLD AUTO: 1.6 %
ERYTHROCYTE [SEDIMENTATION RATE] IN BLOOD BY WESTERGREN METHOD: 5 MM/HR
GLUCOSE SERPL-MCNC: 184 MG/DL
HCT VFR BLD CALC: 39.3 %
HGB BLD-MCNC: 12.9 G/DL
IMM GRANULOCYTES NFR BLD AUTO: 0.8 %
LYMPHOCYTES # BLD AUTO: 1.85 K/UL
LYMPHOCYTES NFR BLD AUTO: 15.3 %
MAN DIFF?: NORMAL
MCHC RBC-ENTMCNC: 28.6 PG
MCHC RBC-ENTMCNC: 32.8 G/DL
MCV RBC AUTO: 87.1 FL
MONOCYTES # BLD AUTO: 1.17 K/UL
MONOCYTES NFR BLD AUTO: 9.7 %
NEUTROPHILS # BLD AUTO: 8.75 K/UL
NEUTROPHILS NFR BLD AUTO: 72.2 %
PLATELET # BLD AUTO: 174 K/UL
POTASSIUM SERPL-SCNC: 3.6 MMOL/L
PROT SERPL-MCNC: 6.3 G/DL
RBC # BLD: 4.51 M/UL
RBC # FLD: 13.8 %
SODIUM SERPL-SCNC: 142 MMOL/L
URATE SERPL-MCNC: 6.2 MG/DL
WBC # FLD AUTO: 12.11 K/UL

## 2025-02-05 RX ORDER — METHYLPRED ACET/NACL,ISO-OS/PF 40 MG/ML
40 VIAL (ML) INJECTION
Refills: 0 | Status: COMPLETED | OUTPATIENT
Start: 2025-02-05

## 2025-02-05 RX ORDER — LIDOCAINE HYDROCHLORIDE 10 MG/ML
1 INJECTION, SOLUTION INFILTRATION; PERINEURAL
Refills: 0 | Status: COMPLETED | OUTPATIENT
Start: 2025-02-05

## 2025-02-05 RX ADMIN — METHYLPREDNISOLONE ACETATE MG/ML: 40 INJECTION, SUSPENSION INTRA-ARTICULAR; INTRALESIONAL; INTRAMUSCULAR; SOFT TISSUE at 00:00

## 2025-02-05 RX ADMIN — LIDOCAINE HYDROCHLORIDE %: 10 INJECTION, SOLUTION INFILTRATION; PERINEURAL at 00:00

## 2025-02-19 ENCOUNTER — APPOINTMENT (OUTPATIENT)
Dept: RHEUMATOLOGY | Facility: CLINIC | Age: 70
End: 2025-02-19

## 2025-03-25 ENCOUNTER — APPOINTMENT (OUTPATIENT)
Dept: RHEUMATOLOGY | Facility: CLINIC | Age: 70
End: 2025-03-25
Payer: MEDICARE

## 2025-03-25 VITALS
DIASTOLIC BLOOD PRESSURE: 80 MMHG | RESPIRATION RATE: 18 BRPM | SYSTOLIC BLOOD PRESSURE: 135 MMHG | BODY MASS INDEX: 39.65 KG/M2 | OXYGEN SATURATION: 96 % | WEIGHT: 210 LBS | HEART RATE: 88 BPM | HEIGHT: 61 IN

## 2025-03-25 DIAGNOSIS — M10.9 GOUT, UNSPECIFIED: ICD-10-CM

## 2025-03-25 DIAGNOSIS — M19.90 UNSPECIFIED OSTEOARTHRITIS, UNSPECIFIED SITE: ICD-10-CM

## 2025-03-25 PROCEDURE — G2211 COMPLEX E/M VISIT ADD ON: CPT

## 2025-03-25 PROCEDURE — 99214 OFFICE O/P EST MOD 30 MIN: CPT

## 2025-03-26 ENCOUNTER — APPOINTMENT (OUTPATIENT)
Dept: PULMONOLOGY | Facility: CLINIC | Age: 70
End: 2025-03-26
Payer: MEDICARE

## 2025-03-26 ENCOUNTER — NON-APPOINTMENT (OUTPATIENT)
Age: 70
End: 2025-03-26

## 2025-03-26 VITALS — SYSTOLIC BLOOD PRESSURE: 120 MMHG | HEART RATE: 96 BPM | DIASTOLIC BLOOD PRESSURE: 69 MMHG | OXYGEN SATURATION: 95 %

## 2025-03-26 DIAGNOSIS — J45.909 UNSPECIFIED ASTHMA, UNCOMPLICATED: ICD-10-CM

## 2025-03-26 DIAGNOSIS — R06.00 DYSPNEA, UNSPECIFIED: ICD-10-CM

## 2025-03-26 DIAGNOSIS — G47.33 OBSTRUCTIVE SLEEP APNEA (ADULT) (PEDIATRIC): ICD-10-CM

## 2025-03-26 PROCEDURE — 94010 BREATHING CAPACITY TEST: CPT

## 2025-03-26 PROCEDURE — 94727 GAS DIL/WSHOT DETER LNG VOL: CPT

## 2025-03-26 PROCEDURE — ZZZZZ: CPT

## 2025-03-26 PROCEDURE — 95012 NITRIC OXIDE EXP GAS DETER: CPT

## 2025-03-26 PROCEDURE — 99214 OFFICE O/P EST MOD 30 MIN: CPT | Mod: 25

## 2025-03-26 PROCEDURE — 94729 DIFFUSING CAPACITY: CPT

## 2025-04-04 ENCOUNTER — APPOINTMENT (OUTPATIENT)
Dept: PULMONOLOGY | Facility: CLINIC | Age: 70
End: 2025-04-04

## 2025-04-10 PROCEDURE — 95800 SLP STDY UNATTENDED: CPT

## 2025-04-21 ENCOUNTER — APPOINTMENT (OUTPATIENT)
Dept: PULMONOLOGY | Facility: CLINIC | Age: 70
End: 2025-04-21
Payer: MEDICARE

## 2025-04-21 VITALS
HEART RATE: 93 BPM | RESPIRATION RATE: 18 BRPM | HEIGHT: 61 IN | BODY MASS INDEX: 39.65 KG/M2 | TEMPERATURE: 97.9 F | SYSTOLIC BLOOD PRESSURE: 114 MMHG | WEIGHT: 210 LBS | DIASTOLIC BLOOD PRESSURE: 74 MMHG | OXYGEN SATURATION: 98 %

## 2025-04-21 DIAGNOSIS — G47.33 OBSTRUCTIVE SLEEP APNEA (ADULT) (PEDIATRIC): ICD-10-CM

## 2025-04-21 DIAGNOSIS — G47.19 OTHER HYPERSOMNIA: ICD-10-CM

## 2025-04-21 PROCEDURE — 99213 OFFICE O/P EST LOW 20 MIN: CPT

## 2025-04-21 PROCEDURE — G2211 COMPLEX E/M VISIT ADD ON: CPT

## 2025-04-21 NOTE — ED ADULT NURSE NOTE - IN THE PAST 12 MONTHS HAVE YOU USED DRUGS OTHER THAN THOSE REQUIRED FOR MEDICAL REASON?
Decrease citalopram to 20 mg (1/2 tab) daily for 1 week  Start sertraline 1/2 tab daily at the same time for 1 week  Then stop the citalopram and increase sertraline to 50 mg daily (1 full tablet).    
No

## 2025-04-30 NOTE — REASON FOR VISIT
[Follow-Up: _____] : a [unfilled] follow-up visit [FreeTextEntry1] : OA and MCTD and CTS and trigger finger 1

## 2025-05-09 ENCOUNTER — APPOINTMENT (OUTPATIENT)
Dept: RHEUMATOLOGY | Facility: CLINIC | Age: 70
End: 2025-05-09
Payer: MEDICARE

## 2025-05-09 VITALS
DIASTOLIC BLOOD PRESSURE: 85 MMHG | WEIGHT: 230.13 LBS | RESPIRATION RATE: 16 BRPM | BODY MASS INDEX: 43.45 KG/M2 | OXYGEN SATURATION: 98 % | SYSTOLIC BLOOD PRESSURE: 148 MMHG | HEART RATE: 82 BPM | HEIGHT: 61 IN

## 2025-05-09 DIAGNOSIS — I10 ESSENTIAL (PRIMARY) HYPERTENSION: ICD-10-CM

## 2025-05-09 DIAGNOSIS — M19.90 UNSPECIFIED OSTEOARTHRITIS, UNSPECIFIED SITE: ICD-10-CM

## 2025-05-09 DIAGNOSIS — L40.50 ARTHROPATHIC PSORIASIS, UNSPECIFIED: ICD-10-CM

## 2025-05-09 DIAGNOSIS — M17.10 UNILATERAL PRIMARY OSTEOARTHRITIS, UNSPECIFIED KNEE: ICD-10-CM

## 2025-05-09 PROCEDURE — G2211 COMPLEX E/M VISIT ADD ON: CPT

## 2025-05-09 PROCEDURE — 99215 OFFICE O/P EST HI 40 MIN: CPT

## 2025-05-09 RX ORDER — DICLOFENAC SODIUM 1 %
1 KIT TOPICAL
Qty: 1 | Refills: 0 | Status: ACTIVE | COMMUNITY
Start: 2025-05-09 | End: 1900-01-01

## 2025-05-09 RX ORDER — AMLODIPINE BESYLATE 2.5 MG/1
2.5 TABLET ORAL
Refills: 0 | Status: ACTIVE | COMMUNITY
Start: 2025-05-09

## 2025-05-09 RX ORDER — DICLOFENAC SODIUM 10 MG/G
1 GEL TOPICAL 4 TIMES DAILY
Qty: 2 | Refills: 0 | Status: ACTIVE | COMMUNITY
Start: 2025-05-09 | End: 1900-01-01

## 2025-05-09 RX ORDER — COLCHICINE 0.6 MG/1
0.6 CAPSULE ORAL
Qty: 30 | Refills: 0 | Status: ACTIVE | COMMUNITY
Start: 2025-05-09 | End: 1900-01-01

## 2025-05-12 LAB
25(OH)D3 SERPL-MCNC: 22 NG/ML
ALBUMIN SERPL ELPH-MCNC: 4.3 G/DL
ALP BLD-CCNC: 99 U/L
ALT SERPL-CCNC: 24 U/L
ANION GAP SERPL CALC-SCNC: 17 MMOL/L
APPEARANCE: CLEAR
AST SERPL-CCNC: 21 U/L
BACTERIA: NEGATIVE /HPF
BASOPHILS # BLD AUTO: 0.07 K/UL
BASOPHILS NFR BLD AUTO: 0.5 %
BILIRUB SERPL-MCNC: 0.2 MG/DL
BILIRUBIN URINE: NEGATIVE
BLOOD URINE: NEGATIVE
BUN SERPL-MCNC: 29 MG/DL
C3 SERPL-MCNC: 171 MG/DL
C4 SERPL-MCNC: 35 MG/DL
CALCIUM SERPL-MCNC: 8.9 MG/DL
CAST: 0 /LPF
CHLORIDE SERPL-SCNC: 101 MMOL/L
CO2 SERPL-SCNC: 23 MMOL/L
COLOR: YELLOW
CREAT SERPL-MCNC: 1.21 MG/DL
CREAT SPEC-SCNC: 67 MG/DL
CREAT/PROT UR: 1.5 RATIO
CRP SERPL-MCNC: <3 MG/L
DSDNA AB SER-ACNC: <1 IU/ML
EGFRCR SERPLBLD CKD-EPI 2021: 64 ML/MIN/1.73M2
EOSINOPHIL # BLD AUTO: 0.27 K/UL
EOSINOPHIL NFR BLD AUTO: 2.1 %
EPITHELIAL CELLS: 0 /HPF
ERYTHROCYTE [SEDIMENTATION RATE] IN BLOOD BY WESTERGREN METHOD: 13 MM/HR
GLUCOSE QUALITATIVE U: 500 MG/DL
GLUCOSE SERPL-MCNC: 279 MG/DL
HCT VFR BLD CALC: 41.4 %
HGB BLD-MCNC: 13.6 G/DL
IMM GRANULOCYTES NFR BLD AUTO: 1.4 %
KETONES URINE: NEGATIVE MG/DL
LEUKOCYTE ESTERASE URINE: NEGATIVE
LYMPHOCYTES # BLD AUTO: 2.6 K/UL
LYMPHOCYTES NFR BLD AUTO: 20.3 %
MAGNESIUM SERPL-MCNC: 1.3 MG/DL
MAN DIFF?: NORMAL
MCHC RBC-ENTMCNC: 29.7 PG
MCHC RBC-ENTMCNC: 32.9 G/DL
MCV RBC AUTO: 90.4 FL
MICROSCOPIC-UA: NORMAL
MONOCYTES # BLD AUTO: 1.28 K/UL
MONOCYTES NFR BLD AUTO: 10 %
NEUTROPHILS # BLD AUTO: 8.41 K/UL
NEUTROPHILS NFR BLD AUTO: 65.7 %
NITRITE URINE: NEGATIVE
PH URINE: 5.5
PHOSPHATE SERPL-MCNC: 3.3 MG/DL
PLATELET # BLD AUTO: 203 K/UL
POTASSIUM SERPL-SCNC: 4.3 MMOL/L
PROT SERPL-MCNC: 6.7 G/DL
PROT UR-MCNC: 100 MG/DL
PROTEIN URINE: 100 MG/DL
RBC # BLD: 4.58 M/UL
RBC # FLD: 14.4 %
RED BLOOD CELLS URINE: 0 /HPF
SODIUM SERPL-SCNC: 141 MMOL/L
SPECIFIC GRAVITY URINE: 1.02
UROBILINOGEN URINE: 0.2 MG/DL
WBC # FLD AUTO: 12.81 K/UL
WHITE BLOOD CELLS URINE: 0 /HPF

## 2025-06-02 ENCOUNTER — INPATIENT (INPATIENT)
Facility: HOSPITAL | Age: 70
LOS: 4 days | Discharge: HOME | DRG: 872 | End: 2025-06-07
Attending: STUDENT IN AN ORGANIZED HEALTH CARE EDUCATION/TRAINING PROGRAM | Admitting: STUDENT IN AN ORGANIZED HEALTH CARE EDUCATION/TRAINING PROGRAM
Payer: MEDICARE

## 2025-06-02 ENCOUNTER — APPOINTMENT (OUTPATIENT)
Dept: PULMONOLOGY | Facility: CLINIC | Age: 70
End: 2025-06-02

## 2025-06-02 VITALS
OXYGEN SATURATION: 95 % | DIASTOLIC BLOOD PRESSURE: 76 MMHG | WEIGHT: 220.02 LBS | TEMPERATURE: 102 F | RESPIRATION RATE: 18 BRPM | HEART RATE: 107 BPM | SYSTOLIC BLOOD PRESSURE: 129 MMHG | HEIGHT: 62 IN

## 2025-06-02 DIAGNOSIS — Z95.5 PRESENCE OF CORONARY ANGIOPLASTY IMPLANT AND GRAFT: Chronic | ICD-10-CM

## 2025-06-02 DIAGNOSIS — Z98.89 OTHER SPECIFIED POSTPROCEDURAL STATES: Chronic | ICD-10-CM

## 2025-06-02 DIAGNOSIS — G56.00 CARPAL TUNNEL SYNDROME, UNSPECIFIED UPPER LIMB: Chronic | ICD-10-CM

## 2025-06-02 LAB
ALBUMIN SERPL ELPH-MCNC: 2.3 G/DL — LOW (ref 3.5–5)
ALP SERPL-CCNC: 125 U/L — HIGH (ref 40–120)
ALT FLD-CCNC: 38 U/L DA — SIGNIFICANT CHANGE UP (ref 10–60)
ANION GAP SERPL CALC-SCNC: 8 MMOL/L — SIGNIFICANT CHANGE UP (ref 5–17)
APTT BLD: 33.6 SEC — SIGNIFICANT CHANGE UP (ref 26.1–36.8)
AST SERPL-CCNC: 54 U/L — HIGH (ref 10–40)
BASOPHILS # BLD AUTO: 0.06 K/UL — SIGNIFICANT CHANGE UP (ref 0–0.2)
BASOPHILS NFR BLD AUTO: 0.4 % — SIGNIFICANT CHANGE UP (ref 0–2)
BILIRUB SERPL-MCNC: 0.5 MG/DL — SIGNIFICANT CHANGE UP (ref 0.2–1.2)
BUN SERPL-MCNC: 30 MG/DL — HIGH (ref 7–18)
CALCIUM SERPL-MCNC: 8.5 MG/DL — SIGNIFICANT CHANGE UP (ref 8.4–10.5)
CHLORIDE SERPL-SCNC: 100 MMOL/L — SIGNIFICANT CHANGE UP (ref 96–108)
CO2 SERPL-SCNC: 26 MMOL/L — SIGNIFICANT CHANGE UP (ref 22–31)
CREAT SERPL-MCNC: 1.73 MG/DL — HIGH (ref 0.5–1.3)
EGFR: 42 ML/MIN/1.73M2 — LOW
EGFR: 42 ML/MIN/1.73M2 — LOW
EOSINOPHIL # BLD AUTO: 0.15 K/UL — SIGNIFICANT CHANGE UP (ref 0–0.5)
EOSINOPHIL NFR BLD AUTO: 0.9 % — SIGNIFICANT CHANGE UP (ref 0–6)
FLUAV AG NPH QL: SIGNIFICANT CHANGE UP
FLUBV AG NPH QL: SIGNIFICANT CHANGE UP
GLUCOSE SERPL-MCNC: 186 MG/DL — HIGH (ref 70–99)
HCT VFR BLD CALC: 32.6 % — LOW (ref 39–50)
HGB BLD-MCNC: 11.2 G/DL — LOW (ref 13–17)
IMM GRANULOCYTES NFR BLD AUTO: 1.6 % — HIGH (ref 0–0.9)
INR BLD: 1.29 RATIO — HIGH (ref 0.85–1.16)
LACTATE SERPL-SCNC: 1.9 MMOL/L — SIGNIFICANT CHANGE UP (ref 0.7–2)
LYMPHOCYTES # BLD AUTO: 1.22 K/UL — SIGNIFICANT CHANGE UP (ref 1–3.3)
LYMPHOCYTES # BLD AUTO: 7.2 % — LOW (ref 13–44)
MCHC RBC-ENTMCNC: 30.4 PG — SIGNIFICANT CHANGE UP (ref 27–34)
MCHC RBC-ENTMCNC: 34.4 G/DL — SIGNIFICANT CHANGE UP (ref 32–36)
MCV RBC AUTO: 88.3 FL — SIGNIFICANT CHANGE UP (ref 80–100)
MONOCYTES # BLD AUTO: 2.31 K/UL — HIGH (ref 0–0.9)
MONOCYTES NFR BLD AUTO: 13.5 % — SIGNIFICANT CHANGE UP (ref 2–14)
NEUTROPHILS # BLD AUTO: 13.03 K/UL — HIGH (ref 1.8–7.4)
NEUTROPHILS NFR BLD AUTO: 76.4 % — SIGNIFICANT CHANGE UP (ref 43–77)
NRBC BLD AUTO-RTO: 0 /100 WBCS — SIGNIFICANT CHANGE UP (ref 0–0)
PLATELET # BLD AUTO: 219 K/UL — SIGNIFICANT CHANGE UP (ref 150–400)
POTASSIUM SERPL-MCNC: 3.7 MMOL/L — SIGNIFICANT CHANGE UP (ref 3.5–5.3)
POTASSIUM SERPL-SCNC: 3.7 MMOL/L — SIGNIFICANT CHANGE UP (ref 3.5–5.3)
PROT SERPL-MCNC: 6.9 G/DL — SIGNIFICANT CHANGE UP (ref 6–8.3)
PROTHROM AB SERPL-ACNC: 15.1 SEC — HIGH (ref 9.9–13.4)
RBC # BLD: 3.69 M/UL — LOW (ref 4.2–5.8)
RBC # FLD: 12.6 % — SIGNIFICANT CHANGE UP (ref 10.3–14.5)
RSV RNA NPH QL NAA+NON-PROBE: SIGNIFICANT CHANGE UP
SARS-COV-2 RNA SPEC QL NAA+PROBE: SIGNIFICANT CHANGE UP
SODIUM SERPL-SCNC: 134 MMOL/L — LOW (ref 135–145)
SOURCE RESPIRATORY: SIGNIFICANT CHANGE UP
WBC # BLD: 17.05 K/UL — HIGH (ref 3.8–10.5)
WBC # FLD AUTO: 17.05 K/UL — HIGH (ref 3.8–10.5)

## 2025-06-02 PROCEDURE — 74176 CT ABD & PELVIS W/O CONTRAST: CPT | Mod: 26

## 2025-06-02 PROCEDURE — 93010 ELECTROCARDIOGRAM REPORT: CPT

## 2025-06-02 PROCEDURE — 71045 X-RAY EXAM CHEST 1 VIEW: CPT | Mod: 26

## 2025-06-02 PROCEDURE — 99285 EMERGENCY DEPT VISIT HI MDM: CPT

## 2025-06-02 RX ORDER — ACETAMINOPHEN 500 MG/5ML
650 LIQUID (ML) ORAL ONCE
Refills: 0 | Status: COMPLETED | OUTPATIENT
Start: 2025-06-02 | End: 2025-06-02

## 2025-06-02 RX ORDER — CEFEPIME 2 G/20ML
1000 INJECTION, POWDER, FOR SOLUTION INTRAVENOUS ONCE
Refills: 0 | Status: COMPLETED | OUTPATIENT
Start: 2025-06-02 | End: 2025-06-02

## 2025-06-02 RX ORDER — VANCOMYCIN HCL IN 5 % DEXTROSE 1.5G/250ML
1000 PLASTIC BAG, INJECTION (ML) INTRAVENOUS ONCE
Refills: 0 | Status: COMPLETED | OUTPATIENT
Start: 2025-06-02 | End: 2025-06-02

## 2025-06-02 RX ADMIN — CEFEPIME 100 MILLIGRAM(S): 2 INJECTION, POWDER, FOR SOLUTION INTRAVENOUS at 20:56

## 2025-06-02 RX ADMIN — Medication 1000 MILLILITER(S): at 18:42

## 2025-06-02 RX ADMIN — Medication 250 MILLIGRAM(S): at 19:11

## 2025-06-02 RX ADMIN — CEFEPIME 100 MILLIGRAM(S): 2 INJECTION, POWDER, FOR SOLUTION INTRAVENOUS at 18:42

## 2025-06-02 RX ADMIN — Medication 650 MILLIGRAM(S): at 17:42

## 2025-06-03 ENCOUNTER — RESULT REVIEW (OUTPATIENT)
Age: 70
End: 2025-06-03

## 2025-06-03 DIAGNOSIS — N17.9 ACUTE KIDNEY FAILURE, UNSPECIFIED: ICD-10-CM

## 2025-06-03 DIAGNOSIS — Z01.818 ENCOUNTER FOR OTHER PREPROCEDURAL EXAMINATION: ICD-10-CM

## 2025-06-03 DIAGNOSIS — Z29.9 ENCOUNTER FOR PROPHYLACTIC MEASURES, UNSPECIFIED: ICD-10-CM

## 2025-06-03 DIAGNOSIS — E11.9 TYPE 2 DIABETES MELLITUS WITHOUT COMPLICATIONS: ICD-10-CM

## 2025-06-03 DIAGNOSIS — J45.909 UNSPECIFIED ASTHMA, UNCOMPLICATED: ICD-10-CM
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
## 2025-06-03 DIAGNOSIS — Z96.653 PRESENCE OF ARTIFICIAL KNEE JOINT, BILATERAL: Chronic | ICD-10-CM

## 2025-06-03 DIAGNOSIS — A41.9 SEPSIS, UNSPECIFIED ORGANISM: ICD-10-CM

## 2025-06-03 DIAGNOSIS — M35.1 OTHER OVERLAP SYNDROMES: ICD-10-CM

## 2025-06-03 DIAGNOSIS — Z95.5 PRESENCE OF CORONARY ANGIOPLASTY IMPLANT AND GRAFT: Chronic | ICD-10-CM

## 2025-06-03 DIAGNOSIS — I50.9 HEART FAILURE, UNSPECIFIED: ICD-10-CM

## 2025-06-03 DIAGNOSIS — I10 ESSENTIAL (PRIMARY) HYPERTENSION: ICD-10-CM

## 2025-06-03 DIAGNOSIS — M10.9 GOUT, UNSPECIFIED: ICD-10-CM

## 2025-06-03 DIAGNOSIS — I25.10 ATHEROSCLEROTIC HEART DISEASE OF NATIVE CORONARY ARTERY WITHOUT ANGINA PECTORIS: ICD-10-CM

## 2025-06-03 DIAGNOSIS — K81.9 CHOLECYSTITIS, UNSPECIFIED: ICD-10-CM

## 2025-06-03 DIAGNOSIS — G47.33 OBSTRUCTIVE SLEEP APNEA (ADULT) (PEDIATRIC): ICD-10-CM

## 2025-06-03 LAB
ALBUMIN SERPL ELPH-MCNC: 2.4 G/DL — LOW (ref 3.5–5)
ALP SERPL-CCNC: 119 U/L — SIGNIFICANT CHANGE UP (ref 40–120)
ALT FLD-CCNC: 36 U/L DA — SIGNIFICANT CHANGE UP (ref 10–60)
ANION GAP SERPL CALC-SCNC: 11 MMOL/L — SIGNIFICANT CHANGE UP (ref 5–17)
APPEARANCE UR: CLEAR — SIGNIFICANT CHANGE UP
APTT BLD: 36.2 SEC — SIGNIFICANT CHANGE UP (ref 26.1–36.8)
AST SERPL-CCNC: 29 U/L — SIGNIFICANT CHANGE UP (ref 10–40)
BACTERIA # UR AUTO: ABNORMAL /HPF
BASOPHILS # BLD AUTO: 0.06 K/UL — SIGNIFICANT CHANGE UP (ref 0–0.2)
BASOPHILS NFR BLD AUTO: 0.4 % — SIGNIFICANT CHANGE UP (ref 0–2)
BILIRUB SERPL-MCNC: 0.5 MG/DL — SIGNIFICANT CHANGE UP (ref 0.2–1.2)
BILIRUB UR-MCNC: NEGATIVE — SIGNIFICANT CHANGE UP
BLD GP AB SCN SERPL QL: SIGNIFICANT CHANGE UP
BUN SERPL-MCNC: 27 MG/DL — HIGH (ref 7–18)
CALCIUM SERPL-MCNC: 8.3 MG/DL — LOW (ref 8.4–10.5)
CHLORIDE SERPL-SCNC: 99 MMOL/L — SIGNIFICANT CHANGE UP (ref 96–108)
CO2 SERPL-SCNC: 26 MMOL/L — SIGNIFICANT CHANGE UP (ref 22–31)
COLOR SPEC: YELLOW — SIGNIFICANT CHANGE UP
CREAT SERPL-MCNC: 1.55 MG/DL — HIGH (ref 0.5–1.3)
DIFF PNL FLD: NEGATIVE — SIGNIFICANT CHANGE UP
EGFR: 48 ML/MIN/1.73M2 — LOW
EGFR: 48 ML/MIN/1.73M2 — LOW
EOSINOPHIL # BLD AUTO: 0.24 K/UL — SIGNIFICANT CHANGE UP (ref 0–0.5)
EOSINOPHIL NFR BLD AUTO: 1.6 % — SIGNIFICANT CHANGE UP (ref 0–6)
EPI CELLS # UR: PRESENT
GLUCOSE BLDC GLUCOMTR-MCNC: 145 MG/DL — HIGH (ref 70–99)
GLUCOSE BLDC GLUCOMTR-MCNC: 185 MG/DL — HIGH (ref 70–99)
GLUCOSE BLDC GLUCOMTR-MCNC: 199 MG/DL — HIGH (ref 70–99)
GLUCOSE SERPL-MCNC: 182 MG/DL — HIGH (ref 70–99)
GLUCOSE UR QL: NEGATIVE MG/DL — SIGNIFICANT CHANGE UP
HCT VFR BLD CALC: 32.9 % — LOW (ref 39–50)
HGB BLD-MCNC: 11.3 G/DL — LOW (ref 13–17)
IMM GRANULOCYTES NFR BLD AUTO: 1.7 % — HIGH (ref 0–0.9)
INR BLD: 1.34 RATIO — HIGH (ref 0.85–1.16)
KETONES UR QL: NEGATIVE MG/DL — SIGNIFICANT CHANGE UP
LEUKOCYTE ESTERASE UR-ACNC: NEGATIVE — SIGNIFICANT CHANGE UP
LYMPHOCYTES # BLD AUTO: 1.19 K/UL — SIGNIFICANT CHANGE UP (ref 1–3.3)
LYMPHOCYTES # BLD AUTO: 7.7 % — LOW (ref 13–44)
MAGNESIUM SERPL-MCNC: 1.5 MG/DL — LOW (ref 1.6–2.6)
MCHC RBC-ENTMCNC: 30.1 PG — SIGNIFICANT CHANGE UP (ref 27–34)
MCHC RBC-ENTMCNC: 34.3 G/DL — SIGNIFICANT CHANGE UP (ref 32–36)
MCV RBC AUTO: 87.7 FL — SIGNIFICANT CHANGE UP (ref 80–100)
MONOCYTES # BLD AUTO: 2.06 K/UL — HIGH (ref 0–0.9)
MONOCYTES NFR BLD AUTO: 13.3 % — SIGNIFICANT CHANGE UP (ref 2–14)
NEUTROPHILS # BLD AUTO: 11.65 K/UL — HIGH (ref 1.8–7.4)
NEUTROPHILS NFR BLD AUTO: 75.3 % — SIGNIFICANT CHANGE UP (ref 43–77)
NITRITE UR-MCNC: NEGATIVE — SIGNIFICANT CHANGE UP
NRBC BLD AUTO-RTO: 0 /100 WBCS — SIGNIFICANT CHANGE UP (ref 0–0)
PH UR: 5.5 — SIGNIFICANT CHANGE UP (ref 5–8)
PHOSPHATE SERPL-MCNC: 3.1 MG/DL — SIGNIFICANT CHANGE UP (ref 2.5–4.5)
PLATELET # BLD AUTO: 235 K/UL — SIGNIFICANT CHANGE UP (ref 150–400)
POTASSIUM SERPL-MCNC: 3 MMOL/L — LOW (ref 3.5–5.3)
POTASSIUM SERPL-SCNC: 3 MMOL/L — LOW (ref 3.5–5.3)
PROT SERPL-MCNC: 6.9 G/DL — SIGNIFICANT CHANGE UP (ref 6–8.3)
PROT UR-MCNC: 100 MG/DL
PROTHROM AB SERPL-ACNC: 15.6 SEC — HIGH (ref 9.9–13.4)
RBC # BLD: 3.75 M/UL — LOW (ref 4.2–5.8)
RBC # FLD: 12.8 % — SIGNIFICANT CHANGE UP (ref 10.3–14.5)
RBC CASTS # UR COMP ASSIST: 0 /HPF — SIGNIFICANT CHANGE UP (ref 0–4)
SODIUM SERPL-SCNC: 136 MMOL/L — SIGNIFICANT CHANGE UP (ref 135–145)
SP GR SPEC: 1.01 — SIGNIFICANT CHANGE UP (ref 1–1.03)
UROBILINOGEN FLD QL: 0.2 MG/DL — SIGNIFICANT CHANGE UP (ref 0.2–1)
WBC # BLD: 15.46 K/UL — HIGH (ref 3.8–10.5)
WBC # FLD AUTO: 15.46 K/UL — HIGH (ref 3.8–10.5)
WBC UR QL: 0 /HPF — SIGNIFICANT CHANGE UP (ref 0–5)

## 2025-06-03 PROCEDURE — 99223 1ST HOSP IP/OBS HIGH 75: CPT | Mod: GC

## 2025-06-03 PROCEDURE — 99222 1ST HOSP IP/OBS MODERATE 55: CPT

## 2025-06-03 RX ORDER — GLUCAGON 3 MG/1
1 POWDER NASAL ONCE
Refills: 0 | Status: DISCONTINUED | OUTPATIENT
Start: 2025-06-03 | End: 2025-06-07

## 2025-06-03 RX ORDER — SODIUM CHLORIDE 9 G/1000ML
1000 INJECTION, SOLUTION INTRAVENOUS
Refills: 0 | Status: DISCONTINUED | OUTPATIENT
Start: 2025-06-03 | End: 2025-06-04

## 2025-06-03 RX ORDER — INSULIN LISPRO 100 U/ML
INJECTION, SOLUTION INTRAVENOUS; SUBCUTANEOUS EVERY 6 HOURS
Refills: 0 | Status: DISCONTINUED | OUTPATIENT
Start: 2025-06-03 | End: 2025-06-05

## 2025-06-03 RX ORDER — DEXTROSE 50 % IN WATER 50 %
12.5 SYRINGE (ML) INTRAVENOUS ONCE
Refills: 0 | Status: DISCONTINUED | OUTPATIENT
Start: 2025-06-03 | End: 2025-06-05

## 2025-06-03 RX ORDER — DEXTROSE 50 % IN WATER 50 %
15 SYRINGE (ML) INTRAVENOUS ONCE
Refills: 0 | Status: DISCONTINUED | OUTPATIENT
Start: 2025-06-03 | End: 2025-06-05

## 2025-06-03 RX ORDER — MAGNESIUM SULFATE 500 MG/ML
1 SYRINGE (ML) INJECTION ONCE
Refills: 0 | Status: COMPLETED | OUTPATIENT
Start: 2025-06-03 | End: 2025-06-03

## 2025-06-03 RX ORDER — DEXTROSE 50 % IN WATER 50 %
25 SYRINGE (ML) INTRAVENOUS ONCE
Refills: 0 | Status: DISCONTINUED | OUTPATIENT
Start: 2025-06-03 | End: 2025-06-05

## 2025-06-03 RX ORDER — PIPERACILLIN-TAZO-DEXTROSE,ISO 3.375G/5
3.38 IV SOLUTION, PIGGYBACK PREMIX FROZEN(ML) INTRAVENOUS EVERY 8 HOURS
Refills: 0 | Status: DISCONTINUED | OUTPATIENT
Start: 2025-06-03 | End: 2025-06-07

## 2025-06-03 RX ORDER — ONDANSETRON HCL/PF 4 MG/2 ML
4 VIAL (ML) INJECTION EVERY 8 HOURS
Refills: 0 | Status: DISCONTINUED | OUTPATIENT
Start: 2025-06-03 | End: 2025-06-07

## 2025-06-03 RX ORDER — MELATONIN 5 MG
3 TABLET ORAL AT BEDTIME
Refills: 0 | Status: DISCONTINUED | OUTPATIENT
Start: 2025-06-03 | End: 2025-06-07

## 2025-06-03 RX ADMIN — Medication 100 MILLIEQUIVALENT(S): at 19:01

## 2025-06-03 RX ADMIN — Medication 25 GRAM(S): at 16:35

## 2025-06-03 RX ADMIN — Medication 100 MILLIEQUIVALENT(S): at 12:02

## 2025-06-03 RX ADMIN — Medication 25 GRAM(S): at 05:48

## 2025-06-03 RX ADMIN — INSULIN LISPRO 1: 100 INJECTION, SOLUTION INTRAVENOUS; SUBCUTANEOUS at 17:23

## 2025-06-03 RX ADMIN — Medication 3 MILLIGRAM(S): at 22:14

## 2025-06-03 RX ADMIN — Medication 100 GRAM(S): at 20:14

## 2025-06-03 RX ADMIN — Medication 100 MILLIEQUIVALENT(S): at 11:02

## 2025-06-03 RX ADMIN — Medication 25 GRAM(S): at 22:14

## 2025-06-04 DIAGNOSIS — K80.20 CALCULUS OF GALLBLADDER WITHOUT CHOLECYSTITIS WITHOUT OBSTRUCTION: ICD-10-CM

## 2025-06-04 DIAGNOSIS — E66.9 OBESITY, UNSPECIFIED: ICD-10-CM

## 2025-06-04 LAB
A1C WITH ESTIMATED AVERAGE GLUCOSE RESULT: 8.8 % — HIGH (ref 4–5.6)
ANION GAP SERPL CALC-SCNC: 8 MMOL/L — SIGNIFICANT CHANGE UP (ref 5–17)
BUN SERPL-MCNC: 24 MG/DL — HIGH (ref 7–18)
CALCIUM SERPL-MCNC: 8.5 MG/DL — SIGNIFICANT CHANGE UP (ref 8.4–10.5)
CHLORIDE SERPL-SCNC: 101 MMOL/L — SIGNIFICANT CHANGE UP (ref 96–108)
CO2 SERPL-SCNC: 26 MMOL/L — SIGNIFICANT CHANGE UP (ref 22–31)
CREAT SERPL-MCNC: 1.56 MG/DL — HIGH (ref 0.5–1.3)
EGFR: 47 ML/MIN/1.73M2 — LOW
EGFR: 47 ML/MIN/1.73M2 — LOW
ESTIMATED AVERAGE GLUCOSE: 206 MG/DL — HIGH (ref 68–114)
GLUCOSE BLDC GLUCOMTR-MCNC: 159 MG/DL — HIGH (ref 70–99)
GLUCOSE BLDC GLUCOMTR-MCNC: 181 MG/DL — HIGH (ref 70–99)
GLUCOSE BLDC GLUCOMTR-MCNC: 202 MG/DL — HIGH (ref 70–99)
GLUCOSE BLDC GLUCOMTR-MCNC: 210 MG/DL — HIGH (ref 70–99)
GLUCOSE SERPL-MCNC: 129 MG/DL — HIGH (ref 70–99)
HCT VFR BLD CALC: 33.4 % — LOW (ref 39–50)
HGB BLD-MCNC: 11.4 G/DL — LOW (ref 13–17)
MAGNESIUM SERPL-MCNC: 1.8 MG/DL — SIGNIFICANT CHANGE UP (ref 1.6–2.6)
MCHC RBC-ENTMCNC: 30.2 PG — SIGNIFICANT CHANGE UP (ref 27–34)
MCHC RBC-ENTMCNC: 34.1 G/DL — SIGNIFICANT CHANGE UP (ref 32–36)
MCV RBC AUTO: 88.6 FL — SIGNIFICANT CHANGE UP (ref 80–100)
NRBC BLD AUTO-RTO: 0 /100 WBCS — SIGNIFICANT CHANGE UP (ref 0–0)
PHOSPHATE SERPL-MCNC: 3.6 MG/DL — SIGNIFICANT CHANGE UP (ref 2.5–4.5)
PLATELET # BLD AUTO: 253 K/UL — SIGNIFICANT CHANGE UP (ref 150–400)
POTASSIUM SERPL-MCNC: 3.1 MMOL/L — LOW (ref 3.5–5.3)
POTASSIUM SERPL-SCNC: 3.1 MMOL/L — LOW (ref 3.5–5.3)
RBC # BLD: 3.77 M/UL — LOW (ref 4.2–5.8)
RBC # FLD: 12.7 % — SIGNIFICANT CHANGE UP (ref 10.3–14.5)
SODIUM SERPL-SCNC: 135 MMOL/L — SIGNIFICANT CHANGE UP (ref 135–145)
WBC # BLD: 11.16 K/UL — HIGH (ref 3.8–10.5)
WBC # FLD AUTO: 11.16 K/UL — HIGH (ref 3.8–10.5)

## 2025-06-04 PROCEDURE — G0545: CPT

## 2025-06-04 PROCEDURE — 99223 1ST HOSP IP/OBS HIGH 75: CPT

## 2025-06-04 PROCEDURE — 99233 SBSQ HOSP IP/OBS HIGH 50: CPT | Mod: GC

## 2025-06-04 RX ORDER — ATORVASTATIN CALCIUM 80 MG/1
40 TABLET, FILM COATED ORAL AT BEDTIME
Refills: 0 | Status: DISCONTINUED | OUTPATIENT
Start: 2025-06-04 | End: 2025-06-07

## 2025-06-04 RX ORDER — BISACODYL 5 MG
5 TABLET, DELAYED RELEASE (ENTERIC COATED) ORAL DAILY
Refills: 0 | Status: DISCONTINUED | OUTPATIENT
Start: 2025-06-04 | End: 2025-06-07

## 2025-06-04 RX ORDER — IPRATROPIUM BROMIDE AND ALBUTEROL SULFATE .5; 2.5 MG/3ML; MG/3ML
3 SOLUTION RESPIRATORY (INHALATION) EVERY 6 HOURS
Refills: 0 | Status: DISCONTINUED | OUTPATIENT
Start: 2025-06-04 | End: 2025-06-07

## 2025-06-04 RX ORDER — POLYETHYLENE GLYCOL 3350 17 G/17G
17 POWDER, FOR SOLUTION ORAL DAILY
Refills: 0 | Status: DISCONTINUED | OUTPATIENT
Start: 2025-06-04 | End: 2025-06-07

## 2025-06-04 RX ORDER — ACETAMINOPHEN 500 MG/5ML
1000 LIQUID (ML) ORAL EVERY 8 HOURS
Refills: 0 | Status: DISCONTINUED | OUTPATIENT
Start: 2025-06-04 | End: 2025-06-07

## 2025-06-04 RX ORDER — OXYCODONE HYDROCHLORIDE 30 MG/1
5 TABLET ORAL EVERY 4 HOURS
Refills: 0 | Status: DISCONTINUED | OUTPATIENT
Start: 2025-06-04 | End: 2025-06-07

## 2025-06-04 RX ORDER — DULOXETINE 20 MG/1
60 CAPSULE, DELAYED RELEASE ORAL DAILY
Refills: 0 | Status: DISCONTINUED | OUTPATIENT
Start: 2025-06-04 | End: 2025-06-07

## 2025-06-04 RX ORDER — TAMSULOSIN HYDROCHLORIDE 0.4 MG/1
0.4 CAPSULE ORAL AT BEDTIME
Refills: 0 | Status: DISCONTINUED | OUTPATIENT
Start: 2025-06-04 | End: 2025-06-07

## 2025-06-04 RX ORDER — SENNA 187 MG
2 TABLET ORAL AT BEDTIME
Refills: 0 | Status: DISCONTINUED | OUTPATIENT
Start: 2025-06-04 | End: 2025-06-07

## 2025-06-04 RX ORDER — NALOXONE HYDROCHLORIDE 0.4 MG/ML
0.4 INJECTION, SOLUTION INTRAMUSCULAR; INTRAVENOUS; SUBCUTANEOUS ONCE
Refills: 0 | Status: DISCONTINUED | OUTPATIENT
Start: 2025-06-04 | End: 2025-06-07

## 2025-06-04 RX ORDER — OXYCODONE HYDROCHLORIDE 30 MG/1
2.5 TABLET ORAL EVERY 4 HOURS
Refills: 0 | Status: DISCONTINUED | OUTPATIENT
Start: 2025-06-04 | End: 2025-06-07

## 2025-06-04 RX ORDER — METOPROLOL SUCCINATE 50 MG/1
50 TABLET, EXTENDED RELEASE ORAL DAILY
Refills: 0 | Status: DISCONTINUED | OUTPATIENT
Start: 2025-06-04 | End: 2025-06-07

## 2025-06-04 RX ADMIN — IPRATROPIUM BROMIDE AND ALBUTEROL SULFATE 3 MILLILITER(S): .5; 2.5 SOLUTION RESPIRATORY (INHALATION) at 20:09

## 2025-06-04 RX ADMIN — IPRATROPIUM BROMIDE AND ALBUTEROL SULFATE 3 MILLILITER(S): .5; 2.5 SOLUTION RESPIRATORY (INHALATION) at 09:40

## 2025-06-04 RX ADMIN — Medication 1000 MILLIGRAM(S): at 21:21

## 2025-06-04 RX ADMIN — Medication 3 MILLIGRAM(S): at 21:15

## 2025-06-04 RX ADMIN — Medication 25 GRAM(S): at 21:15

## 2025-06-04 RX ADMIN — INSULIN LISPRO 1: 100 INJECTION, SOLUTION INTRAVENOUS; SUBCUTANEOUS at 17:09

## 2025-06-04 RX ADMIN — Medication 40 MILLIEQUIVALENT(S): at 09:57

## 2025-06-04 RX ADMIN — INSULIN LISPRO 2: 100 INJECTION, SOLUTION INTRAVENOUS; SUBCUTANEOUS at 06:16

## 2025-06-04 RX ADMIN — IPRATROPIUM BROMIDE AND ALBUTEROL SULFATE 3 MILLILITER(S): .5; 2.5 SOLUTION RESPIRATORY (INHALATION) at 15:23

## 2025-06-04 RX ADMIN — Medication 25 GRAM(S): at 13:34

## 2025-06-04 RX ADMIN — INSULIN LISPRO 1: 100 INJECTION, SOLUTION INTRAVENOUS; SUBCUTANEOUS at 12:09

## 2025-06-04 RX ADMIN — ATORVASTATIN CALCIUM 40 MILLIGRAM(S): 80 TABLET, FILM COATED ORAL at 21:15

## 2025-06-04 RX ADMIN — Medication 25 GRAM(S): at 06:03

## 2025-06-04 RX ADMIN — Medication 40 MILLIEQUIVALENT(S): at 13:34

## 2025-06-04 RX ADMIN — TAMSULOSIN HYDROCHLORIDE 0.4 MILLIGRAM(S): 0.4 CAPSULE ORAL at 21:15

## 2025-06-04 RX ADMIN — METOPROLOL SUCCINATE 50 MILLIGRAM(S): 50 TABLET, EXTENDED RELEASE ORAL at 07:58

## 2025-06-04 RX ADMIN — Medication 2 TABLET(S): at 21:14

## 2025-06-04 RX ADMIN — INSULIN LISPRO 2: 100 INJECTION, SOLUTION INTRAVENOUS; SUBCUTANEOUS at 00:11

## 2025-06-04 RX ADMIN — Medication 1000 MILLIGRAM(S): at 21:14

## 2025-06-05 ENCOUNTER — TRANSCRIPTION ENCOUNTER (OUTPATIENT)
Age: 70
End: 2025-06-05

## 2025-06-05 LAB
ALBUMIN SERPL ELPH-MCNC: 2.3 G/DL — LOW (ref 3.5–5)
ALP SERPL-CCNC: 127 U/L — HIGH (ref 40–120)
ALT FLD-CCNC: 33 U/L DA — SIGNIFICANT CHANGE UP (ref 10–60)
ANION GAP SERPL CALC-SCNC: 7 MMOL/L — SIGNIFICANT CHANGE UP (ref 5–17)
APTT BLD: 38.4 SEC — HIGH (ref 26.1–36.8)
AST SERPL-CCNC: 30 U/L — SIGNIFICANT CHANGE UP (ref 10–40)
BASOPHILS # BLD AUTO: 0.09 K/UL — SIGNIFICANT CHANGE UP (ref 0–0.2)
BASOPHILS NFR BLD AUTO: 1 % — SIGNIFICANT CHANGE UP (ref 0–2)
BILIRUB SERPL-MCNC: 0.4 MG/DL — SIGNIFICANT CHANGE UP (ref 0.2–1.2)
BLD GP AB SCN SERPL QL: SIGNIFICANT CHANGE UP
BUN SERPL-MCNC: 23 MG/DL — HIGH (ref 7–18)
CALCIUM SERPL-MCNC: 8.1 MG/DL — LOW (ref 8.4–10.5)
CHLORIDE SERPL-SCNC: 103 MMOL/L — SIGNIFICANT CHANGE UP (ref 96–108)
CO2 SERPL-SCNC: 27 MMOL/L — SIGNIFICANT CHANGE UP (ref 22–31)
CREAT SERPL-MCNC: 1.43 MG/DL — HIGH (ref 0.5–1.3)
EGFR: 53 ML/MIN/1.73M2 — LOW
EGFR: 53 ML/MIN/1.73M2 — LOW
EOSINOPHIL # BLD AUTO: 0.17 K/UL — SIGNIFICANT CHANGE UP (ref 0–0.5)
EOSINOPHIL NFR BLD AUTO: 2 % — SIGNIFICANT CHANGE UP (ref 0–6)
GLUCOSE BLDC GLUCOMTR-MCNC: 143 MG/DL — HIGH (ref 70–99)
GLUCOSE BLDC GLUCOMTR-MCNC: 146 MG/DL — HIGH (ref 70–99)
GLUCOSE BLDC GLUCOMTR-MCNC: 149 MG/DL — HIGH (ref 70–99)
GLUCOSE BLDC GLUCOMTR-MCNC: 163 MG/DL — HIGH (ref 70–99)
GLUCOSE BLDC GLUCOMTR-MCNC: 190 MG/DL — HIGH (ref 70–99)
GLUCOSE SERPL-MCNC: 159 MG/DL — HIGH (ref 70–99)
HCT VFR BLD CALC: 31.8 % — LOW (ref 39–50)
HGB BLD-MCNC: 10.6 G/DL — LOW (ref 13–17)
INR BLD: 1.37 RATIO — HIGH (ref 0.85–1.16)
LYMPHOCYTES # BLD AUTO: 1.13 K/UL — SIGNIFICANT CHANGE UP (ref 1–3.3)
LYMPHOCYTES # BLD AUTO: 13 % — SIGNIFICANT CHANGE UP (ref 13–44)
MAGNESIUM SERPL-MCNC: 1.5 MG/DL — LOW (ref 1.6–2.6)
MANUAL SMEAR VERIFICATION: SIGNIFICANT CHANGE UP
MCHC RBC-ENTMCNC: 29.9 PG — SIGNIFICANT CHANGE UP (ref 27–34)
MCHC RBC-ENTMCNC: 33.3 G/DL — SIGNIFICANT CHANGE UP (ref 32–36)
MCV RBC AUTO: 89.8 FL — SIGNIFICANT CHANGE UP (ref 80–100)
METAMYELOCYTES # FLD: 3 % — HIGH (ref 0–0)
METAMYELOCYTES NFR BLD: 3 % — HIGH (ref 0–0)
MONOCYTES # BLD AUTO: 1.47 K/UL — HIGH (ref 0–0.9)
MONOCYTES NFR BLD AUTO: 17 % — HIGH (ref 2–14)
MYELOCYTES NFR BLD: 1 % — HIGH (ref 0–0)
NEUTROPHILS # BLD AUTO: 5.46 K/UL — SIGNIFICANT CHANGE UP (ref 1.8–7.4)
NEUTROPHILS NFR BLD AUTO: 63 % — SIGNIFICANT CHANGE UP (ref 43–77)
NRBC # BLD: 0 /100 WBCS — SIGNIFICANT CHANGE UP (ref 0–0)
NRBC BLD-RTO: 0 /100 WBCS — SIGNIFICANT CHANGE UP (ref 0–0)
PHOSPHATE SERPL-MCNC: 3.5 MG/DL — SIGNIFICANT CHANGE UP (ref 2.5–4.5)
PLAT MORPH BLD: NORMAL — SIGNIFICANT CHANGE UP
PLATELET # BLD AUTO: 240 K/UL — SIGNIFICANT CHANGE UP (ref 150–400)
POTASSIUM SERPL-MCNC: 3.1 MMOL/L — LOW (ref 3.5–5.3)
POTASSIUM SERPL-SCNC: 3.1 MMOL/L — LOW (ref 3.5–5.3)
PROT SERPL-MCNC: 6.5 G/DL — SIGNIFICANT CHANGE UP (ref 6–8.3)
PROTHROM AB SERPL-ACNC: 15.8 SEC — HIGH (ref 9.9–13.4)
RBC # BLD: 3.54 M/UL — LOW (ref 4.2–5.8)
RBC # FLD: 12.7 % — SIGNIFICANT CHANGE UP (ref 10.3–14.5)
RBC BLD AUTO: NORMAL — SIGNIFICANT CHANGE UP
SODIUM SERPL-SCNC: 137 MMOL/L — SIGNIFICANT CHANGE UP (ref 135–145)
WBC # BLD: 8.67 K/UL — SIGNIFICANT CHANGE UP (ref 3.8–10.5)
WBC # FLD AUTO: 8.67 K/UL — SIGNIFICANT CHANGE UP (ref 3.8–10.5)

## 2025-06-05 PROCEDURE — 88304 TISSUE EXAM BY PATHOLOGIST: CPT | Mod: 26

## 2025-06-05 PROCEDURE — 99233 SBSQ HOSP IP/OBS HIGH 50: CPT | Mod: GC

## 2025-06-05 PROCEDURE — 47562 LAPAROSCOPIC CHOLECYSTECTOMY: CPT

## 2025-06-05 RX ORDER — SODIUM CHLORIDE 9 G/1000ML
1000 INJECTION, SOLUTION INTRAVENOUS
Refills: 0 | Status: DISCONTINUED | OUTPATIENT
Start: 2025-06-05 | End: 2025-06-05

## 2025-06-05 RX ORDER — INDOCYANINE GREEN AND WATER 25 MG
2.5 KIT INJECTION ONCE
Refills: 0 | Status: DISCONTINUED | OUTPATIENT
Start: 2025-06-05 | End: 2025-06-07

## 2025-06-05 RX ORDER — INDOCYANINE GREEN AND WATER 25 MG
25 KIT INJECTION ONCE
Refills: 0 | Status: DISCONTINUED | OUTPATIENT
Start: 2025-06-05 | End: 2025-06-05

## 2025-06-05 RX ORDER — MAGNESIUM SULFATE 500 MG/ML
1 SYRINGE (ML) INJECTION ONCE
Refills: 0 | Status: COMPLETED | OUTPATIENT
Start: 2025-06-05 | End: 2025-06-05

## 2025-06-05 RX ORDER — DEXTROSE 50 % IN WATER 50 %
25 SYRINGE (ML) INTRAVENOUS ONCE
Refills: 0 | Status: DISCONTINUED | OUTPATIENT
Start: 2025-06-05 | End: 2025-06-07

## 2025-06-05 RX ORDER — SODIUM CHLORIDE 9 G/1000ML
1000 INJECTION, SOLUTION INTRAVENOUS
Refills: 0 | Status: DISCONTINUED | OUTPATIENT
Start: 2025-06-05 | End: 2025-06-06

## 2025-06-05 RX ORDER — FENTANYL CITRATE-0.9 % NACL/PF 100MCG/2ML
25 SYRINGE (ML) INTRAVENOUS
Refills: 0 | Status: DISCONTINUED | OUTPATIENT
Start: 2025-06-05 | End: 2025-06-05

## 2025-06-05 RX ORDER — FENTANYL CITRATE-0.9 % NACL/PF 100MCG/2ML
50 SYRINGE (ML) INTRAVENOUS
Refills: 0 | Status: DISCONTINUED | OUTPATIENT
Start: 2025-06-05 | End: 2025-06-05

## 2025-06-05 RX ORDER — INSULIN LISPRO 100 U/ML
INJECTION, SOLUTION INTRAVENOUS; SUBCUTANEOUS AT BEDTIME
Refills: 0 | Status: DISCONTINUED | OUTPATIENT
Start: 2025-06-05 | End: 2025-06-07

## 2025-06-05 RX ORDER — INSULIN LISPRO 100 U/ML
INJECTION, SOLUTION INTRAVENOUS; SUBCUTANEOUS
Refills: 0 | Status: DISCONTINUED | OUTPATIENT
Start: 2025-06-05 | End: 2025-06-07

## 2025-06-05 RX ADMIN — SODIUM CHLORIDE 60 MILLILITER(S): 9 INJECTION, SOLUTION INTRAVENOUS at 17:45

## 2025-06-05 RX ADMIN — TAMSULOSIN HYDROCHLORIDE 0.4 MILLIGRAM(S): 0.4 CAPSULE ORAL at 21:34

## 2025-06-05 RX ADMIN — Medication 2 TABLET(S): at 21:34

## 2025-06-05 RX ADMIN — SODIUM CHLORIDE 60 MILLILITER(S): 9 INJECTION, SOLUTION INTRAVENOUS at 21:38

## 2025-06-05 RX ADMIN — IPRATROPIUM BROMIDE AND ALBUTEROL SULFATE 3 MILLILITER(S): .5; 2.5 SOLUTION RESPIRATORY (INHALATION) at 09:04

## 2025-06-05 RX ADMIN — OXYCODONE HYDROCHLORIDE 5 MILLIGRAM(S): 30 TABLET ORAL at 19:30

## 2025-06-05 RX ADMIN — IPRATROPIUM BROMIDE AND ALBUTEROL SULFATE 3 MILLILITER(S): .5; 2.5 SOLUTION RESPIRATORY (INHALATION) at 20:06

## 2025-06-05 RX ADMIN — Medication 1000 MILLIGRAM(S): at 22:30

## 2025-06-05 RX ADMIN — METOPROLOL SUCCINATE 50 MILLIGRAM(S): 50 TABLET, EXTENDED RELEASE ORAL at 06:18

## 2025-06-05 RX ADMIN — Medication 1000 MILLIGRAM(S): at 06:17

## 2025-06-05 RX ADMIN — Medication 1 APPLICATION(S): at 06:23

## 2025-06-05 RX ADMIN — OXYCODONE HYDROCHLORIDE 5 MILLIGRAM(S): 30 TABLET ORAL at 18:34

## 2025-06-05 RX ADMIN — Medication 25 GRAM(S): at 06:18

## 2025-06-05 RX ADMIN — Medication 1000 MILLIGRAM(S): at 07:49

## 2025-06-05 RX ADMIN — Medication 1000 MILLIGRAM(S): at 21:33

## 2025-06-05 RX ADMIN — Medication 25 GRAM(S): at 21:35

## 2025-06-05 RX ADMIN — SODIUM CHLORIDE 60 MILLILITER(S): 9 INJECTION, SOLUTION INTRAVENOUS at 09:43

## 2025-06-05 RX ADMIN — Medication 25 MICROGRAM(S): at 16:09

## 2025-06-05 RX ADMIN — Medication 50 MICROGRAM(S): at 16:50

## 2025-06-05 RX ADMIN — Medication 40 MILLIEQUIVALENT(S): at 08:35

## 2025-06-05 RX ADMIN — ATORVASTATIN CALCIUM 40 MILLIGRAM(S): 80 TABLET, FILM COATED ORAL at 21:33

## 2025-06-05 RX ADMIN — Medication 40 MILLIGRAM(S): at 06:18

## 2025-06-05 RX ADMIN — Medication 50 MICROGRAM(S): at 16:19

## 2025-06-05 RX ADMIN — Medication 100 GRAM(S): at 08:13

## 2025-06-05 RX ADMIN — Medication 25 MICROGRAM(S): at 15:45

## 2025-06-06 LAB
ALBUMIN SERPL ELPH-MCNC: 2.4 G/DL — LOW (ref 3.5–5)
ALP SERPL-CCNC: 118 U/L — SIGNIFICANT CHANGE UP (ref 40–120)
ALT FLD-CCNC: 45 U/L DA — SIGNIFICANT CHANGE UP (ref 10–60)
ANION GAP SERPL CALC-SCNC: 5 MMOL/L — SIGNIFICANT CHANGE UP (ref 5–17)
AST SERPL-CCNC: 49 U/L — HIGH (ref 10–40)
BILIRUB SERPL-MCNC: 0.4 MG/DL — SIGNIFICANT CHANGE UP (ref 0.2–1.2)
BUN SERPL-MCNC: 18 MG/DL — SIGNIFICANT CHANGE UP (ref 7–18)
CALCIUM SERPL-MCNC: 8.4 MG/DL — SIGNIFICANT CHANGE UP (ref 8.4–10.5)
CHLORIDE SERPL-SCNC: 103 MMOL/L — SIGNIFICANT CHANGE UP (ref 96–108)
CO2 SERPL-SCNC: 28 MMOL/L — SIGNIFICANT CHANGE UP (ref 22–31)
CREAT SERPL-MCNC: 1.34 MG/DL — HIGH (ref 0.5–1.3)
EGFR: 57 ML/MIN/1.73M2 — LOW
EGFR: 57 ML/MIN/1.73M2 — LOW
GLUCOSE BLDC GLUCOMTR-MCNC: 167 MG/DL — HIGH (ref 70–99)
GLUCOSE BLDC GLUCOMTR-MCNC: 184 MG/DL — HIGH (ref 70–99)
GLUCOSE BLDC GLUCOMTR-MCNC: 197 MG/DL — HIGH (ref 70–99)
GLUCOSE BLDC GLUCOMTR-MCNC: 225 MG/DL — HIGH (ref 70–99)
GLUCOSE SERPL-MCNC: 155 MG/DL — HIGH (ref 70–99)
HCT VFR BLD CALC: 34 % — LOW (ref 39–50)
HGB BLD-MCNC: 11.2 G/DL — LOW (ref 13–17)
MAGNESIUM SERPL-MCNC: 1.6 MG/DL — SIGNIFICANT CHANGE UP (ref 1.6–2.6)
MCHC RBC-ENTMCNC: 30.3 PG — SIGNIFICANT CHANGE UP (ref 27–34)
MCHC RBC-ENTMCNC: 32.9 G/DL — SIGNIFICANT CHANGE UP (ref 32–36)
MCV RBC AUTO: 91.9 FL — SIGNIFICANT CHANGE UP (ref 80–100)
NRBC BLD AUTO-RTO: 0 /100 WBCS — SIGNIFICANT CHANGE UP (ref 0–0)
PHOSPHATE SERPL-MCNC: 2.9 MG/DL — SIGNIFICANT CHANGE UP (ref 2.5–4.5)
PLATELET # BLD AUTO: 270 K/UL — SIGNIFICANT CHANGE UP (ref 150–400)
POTASSIUM SERPL-MCNC: 3.6 MMOL/L — SIGNIFICANT CHANGE UP (ref 3.5–5.3)
POTASSIUM SERPL-SCNC: 3.6 MMOL/L — SIGNIFICANT CHANGE UP (ref 3.5–5.3)
PROT SERPL-MCNC: 6.5 G/DL — SIGNIFICANT CHANGE UP (ref 6–8.3)
RBC # BLD: 3.7 M/UL — LOW (ref 4.2–5.8)
RBC # FLD: 12.8 % — SIGNIFICANT CHANGE UP (ref 10.3–14.5)
SODIUM SERPL-SCNC: 136 MMOL/L — SIGNIFICANT CHANGE UP (ref 135–145)
WBC # BLD: 12.86 K/UL — HIGH (ref 3.8–10.5)
WBC # FLD AUTO: 12.86 K/UL — HIGH (ref 3.8–10.5)

## 2025-06-06 PROCEDURE — 99232 SBSQ HOSP IP/OBS MODERATE 35: CPT

## 2025-06-06 PROCEDURE — 99233 SBSQ HOSP IP/OBS HIGH 50: CPT | Mod: FS

## 2025-06-06 PROCEDURE — G0545: CPT

## 2025-06-06 RX ORDER — MAGNESIUM OXIDE 400 MG
800 TABLET ORAL ONCE
Refills: 0 | Status: COMPLETED | OUTPATIENT
Start: 2025-06-06 | End: 2025-06-07

## 2025-06-06 RX ORDER — CLOPIDOGREL BISULFATE 75 MG/1
75 TABLET, FILM COATED ORAL DAILY
Refills: 0 | Status: DISCONTINUED | OUTPATIENT
Start: 2025-06-06 | End: 2025-06-07

## 2025-06-06 RX ORDER — ASPIRIN 325 MG
81 TABLET ORAL DAILY
Refills: 0 | Status: DISCONTINUED | OUTPATIENT
Start: 2025-06-06 | End: 2025-06-07

## 2025-06-06 RX ORDER — AMLODIPINE BESYLATE 10 MG/1
2.5 TABLET ORAL DAILY
Refills: 0 | Status: DISCONTINUED | OUTPATIENT
Start: 2025-06-06 | End: 2025-06-07

## 2025-06-06 RX ADMIN — SODIUM CHLORIDE 60 MILLILITER(S): 9 INJECTION, SOLUTION INTRAVENOUS at 05:30

## 2025-06-06 RX ADMIN — Medication 2 TABLET(S): at 21:22

## 2025-06-06 RX ADMIN — ATORVASTATIN CALCIUM 40 MILLIGRAM(S): 80 TABLET, FILM COATED ORAL at 21:22

## 2025-06-06 RX ADMIN — DULOXETINE 60 MILLIGRAM(S): 20 CAPSULE, DELAYED RELEASE ORAL at 11:25

## 2025-06-06 RX ADMIN — Medication 25 GRAM(S): at 05:30

## 2025-06-06 RX ADMIN — Medication 25 GRAM(S): at 13:19

## 2025-06-06 RX ADMIN — METOPROLOL SUCCINATE 50 MILLIGRAM(S): 50 TABLET, EXTENDED RELEASE ORAL at 05:26

## 2025-06-06 RX ADMIN — Medication 1000 MILLIGRAM(S): at 05:26

## 2025-06-06 RX ADMIN — OXYCODONE HYDROCHLORIDE 5 MILLIGRAM(S): 30 TABLET ORAL at 12:31

## 2025-06-06 RX ADMIN — INSULIN LISPRO 1: 100 INJECTION, SOLUTION INTRAVENOUS; SUBCUTANEOUS at 07:50

## 2025-06-06 RX ADMIN — Medication 40 MILLIEQUIVALENT(S): at 11:26

## 2025-06-06 RX ADMIN — Medication 25 GRAM(S): at 21:23

## 2025-06-06 RX ADMIN — INSULIN LISPRO 1: 100 INJECTION, SOLUTION INTRAVENOUS; SUBCUTANEOUS at 16:36

## 2025-06-06 RX ADMIN — Medication 1000 MILLIGRAM(S): at 22:15

## 2025-06-06 RX ADMIN — TAMSULOSIN HYDROCHLORIDE 0.4 MILLIGRAM(S): 0.4 CAPSULE ORAL at 21:22

## 2025-06-06 RX ADMIN — Medication 1000 MILLIGRAM(S): at 14:27

## 2025-06-06 RX ADMIN — IPRATROPIUM BROMIDE AND ALBUTEROL SULFATE 3 MILLILITER(S): .5; 2.5 SOLUTION RESPIRATORY (INHALATION) at 02:18

## 2025-06-06 RX ADMIN — Medication 81 MILLIGRAM(S): at 13:27

## 2025-06-06 RX ADMIN — IPRATROPIUM BROMIDE AND ALBUTEROL SULFATE 3 MILLILITER(S): .5; 2.5 SOLUTION RESPIRATORY (INHALATION) at 14:31

## 2025-06-06 RX ADMIN — Medication 40 MILLIGRAM(S): at 05:27

## 2025-06-06 RX ADMIN — OXYCODONE HYDROCHLORIDE 5 MILLIGRAM(S): 30 TABLET ORAL at 11:31

## 2025-06-06 RX ADMIN — Medication 1000 MILLIGRAM(S): at 21:22

## 2025-06-06 RX ADMIN — IPRATROPIUM BROMIDE AND ALBUTEROL SULFATE 3 MILLILITER(S): .5; 2.5 SOLUTION RESPIRATORY (INHALATION) at 20:38

## 2025-06-06 RX ADMIN — IPRATROPIUM BROMIDE AND ALBUTEROL SULFATE 3 MILLILITER(S): .5; 2.5 SOLUTION RESPIRATORY (INHALATION) at 09:02

## 2025-06-06 RX ADMIN — INSULIN LISPRO 2: 100 INJECTION, SOLUTION INTRAVENOUS; SUBCUTANEOUS at 11:27

## 2025-06-06 RX ADMIN — CLOPIDOGREL BISULFATE 75 MILLIGRAM(S): 75 TABLET, FILM COATED ORAL at 13:28

## 2025-06-06 RX ADMIN — Medication 1000 MILLIGRAM(S): at 13:27

## 2025-06-06 RX ADMIN — Medication 1000 MILLIGRAM(S): at 06:00

## 2025-06-07 ENCOUNTER — TRANSCRIPTION ENCOUNTER (OUTPATIENT)
Age: 70
End: 2025-06-07

## 2025-06-07 VITALS
SYSTOLIC BLOOD PRESSURE: 168 MMHG | RESPIRATION RATE: 20 BRPM | OXYGEN SATURATION: 96 % | TEMPERATURE: 98 F | HEART RATE: 90 BPM | DIASTOLIC BLOOD PRESSURE: 80 MMHG

## 2025-06-07 LAB
ALBUMIN SERPL ELPH-MCNC: 2.4 G/DL — LOW (ref 3.5–5)
ALP SERPL-CCNC: 109 U/L — SIGNIFICANT CHANGE UP (ref 40–120)
ALT FLD-CCNC: 36 U/L DA — SIGNIFICANT CHANGE UP (ref 10–60)
ANION GAP SERPL CALC-SCNC: 6 MMOL/L — SIGNIFICANT CHANGE UP (ref 5–17)
AST SERPL-CCNC: 33 U/L — SIGNIFICANT CHANGE UP (ref 10–40)
BILIRUB SERPL-MCNC: 0.4 MG/DL — SIGNIFICANT CHANGE UP (ref 0.2–1.2)
BUN SERPL-MCNC: 14 MG/DL — SIGNIFICANT CHANGE UP (ref 7–18)
CALCIUM SERPL-MCNC: 8.4 MG/DL — SIGNIFICANT CHANGE UP (ref 8.4–10.5)
CHLORIDE SERPL-SCNC: 104 MMOL/L — SIGNIFICANT CHANGE UP (ref 96–108)
CO2 SERPL-SCNC: 27 MMOL/L — SIGNIFICANT CHANGE UP (ref 22–31)
CREAT SERPL-MCNC: 1.22 MG/DL — SIGNIFICANT CHANGE UP (ref 0.5–1.3)
CULTURE RESULTS: SIGNIFICANT CHANGE UP
CULTURE RESULTS: SIGNIFICANT CHANGE UP
EGFR: 64 ML/MIN/1.73M2 — SIGNIFICANT CHANGE UP
EGFR: 64 ML/MIN/1.73M2 — SIGNIFICANT CHANGE UP
GLUCOSE BLDC GLUCOMTR-MCNC: 147 MG/DL — HIGH (ref 70–99)
GLUCOSE SERPL-MCNC: 144 MG/DL — HIGH (ref 70–99)
HCT VFR BLD CALC: 32 % — LOW (ref 39–50)
HGB BLD-MCNC: 10.5 G/DL — LOW (ref 13–17)
MAGNESIUM SERPL-MCNC: 1.5 MG/DL — LOW (ref 1.6–2.6)
MCHC RBC-ENTMCNC: 30.1 PG — SIGNIFICANT CHANGE UP (ref 27–34)
MCHC RBC-ENTMCNC: 32.8 G/DL — SIGNIFICANT CHANGE UP (ref 32–36)
MCV RBC AUTO: 91.7 FL — SIGNIFICANT CHANGE UP (ref 80–100)
NRBC BLD AUTO-RTO: 0 /100 WBCS — SIGNIFICANT CHANGE UP (ref 0–0)
PHOSPHATE SERPL-MCNC: 2.8 MG/DL — SIGNIFICANT CHANGE UP (ref 2.5–4.5)
PLATELET # BLD AUTO: 260 K/UL — SIGNIFICANT CHANGE UP (ref 150–400)
POTASSIUM SERPL-MCNC: 3.7 MMOL/L — SIGNIFICANT CHANGE UP (ref 3.5–5.3)
POTASSIUM SERPL-SCNC: 3.7 MMOL/L — SIGNIFICANT CHANGE UP (ref 3.5–5.3)
PROT SERPL-MCNC: 6.5 G/DL — SIGNIFICANT CHANGE UP (ref 6–8.3)
RBC # BLD: 3.49 M/UL — LOW (ref 4.2–5.8)
RBC # FLD: 13 % — SIGNIFICANT CHANGE UP (ref 10.3–14.5)
SODIUM SERPL-SCNC: 137 MMOL/L — SIGNIFICANT CHANGE UP (ref 135–145)
SPECIMEN SOURCE: SIGNIFICANT CHANGE UP
SPECIMEN SOURCE: SIGNIFICANT CHANGE UP
WBC # BLD: 11.95 K/UL — HIGH (ref 3.8–10.5)
WBC # FLD AUTO: 11.95 K/UL — HIGH (ref 3.8–10.5)

## 2025-06-07 PROCEDURE — 99232 SBSQ HOSP IP/OBS MODERATE 35: CPT | Mod: FS

## 2025-06-07 RX ORDER — METRONIDAZOLE 250 MG
1 TABLET ORAL
Qty: 6 | Refills: 0
Start: 2025-06-07 | End: 2025-06-09

## 2025-06-07 RX ORDER — MAGNESIUM SULFATE 500 MG/ML
1 SYRINGE (ML) INJECTION ONCE
Refills: 0 | Status: DISCONTINUED | OUTPATIENT
Start: 2025-06-07 | End: 2025-06-07

## 2025-06-07 RX ORDER — CEFUROXIME SODIUM 1.5 G
1 VIAL (EA) INJECTION
Qty: 6 | Refills: 0
Start: 2025-06-07 | End: 2025-06-09

## 2025-06-07 RX ADMIN — METOPROLOL SUCCINATE 50 MILLIGRAM(S): 50 TABLET, EXTENDED RELEASE ORAL at 05:41

## 2025-06-07 RX ADMIN — IPRATROPIUM BROMIDE AND ALBUTEROL SULFATE 3 MILLILITER(S): .5; 2.5 SOLUTION RESPIRATORY (INHALATION) at 08:24

## 2025-06-07 RX ADMIN — Medication 1000 MILLIGRAM(S): at 06:30

## 2025-06-07 RX ADMIN — Medication 800 MILLIGRAM(S): at 11:16

## 2025-06-07 RX ADMIN — Medication 25 GRAM(S): at 05:40

## 2025-06-07 RX ADMIN — AMLODIPINE BESYLATE 2.5 MILLIGRAM(S): 10 TABLET ORAL at 05:40

## 2025-06-07 RX ADMIN — Medication 81 MILLIGRAM(S): at 11:16

## 2025-06-07 RX ADMIN — Medication 40 MILLIEQUIVALENT(S): at 11:17

## 2025-06-07 RX ADMIN — Medication 40 MILLIGRAM(S): at 05:41

## 2025-06-07 RX ADMIN — Medication 1000 MILLIGRAM(S): at 05:41

## 2025-06-13 ENCOUNTER — APPOINTMENT (OUTPATIENT)
Dept: SURGERY | Facility: CLINIC | Age: 70
End: 2025-06-13
Payer: MEDICARE

## 2025-06-13 VITALS
TEMPERATURE: 97.9 F | BODY MASS INDEX: 43.43 KG/M2 | WEIGHT: 230 LBS | OXYGEN SATURATION: 94 % | DIASTOLIC BLOOD PRESSURE: 79 MMHG | HEIGHT: 61 IN | SYSTOLIC BLOOD PRESSURE: 126 MMHG | HEART RATE: 99 BPM

## 2025-06-13 PROBLEM — Z96.652 HISTORY OF LEFT KNEE REPLACEMENT: Status: RESOLVED | Noted: 2025-06-13 | Resolved: 2025-06-13

## 2025-06-13 PROCEDURE — 99024 POSTOP FOLLOW-UP VISIT: CPT

## 2025-06-15 LAB — SURGICAL PATHOLOGY STUDY: SIGNIFICANT CHANGE UP

## 2025-06-18 PROBLEM — Z90.49 HISTORY OF CHOLECYSTECTOMY: Status: RESOLVED | Noted: 2025-06-13 | Resolved: 2025-06-18

## 2025-06-22 NOTE — DIETITIAN INITIAL EVALUATION ADULT. - FACTORS AFF FOOD INTAKE
Reason for follow-up visit: AF    Impression:   Paroxysmal atrial fibrillation  Very symptomatic with palpitations that awakened her from sleep, shakiness/hand tremors, fatigue.  Dx 8/23/17  Spontaneously converted to sinus rhythm.  Seen by EP- discussed AAD medications, patient elected to monitor and see EP on a PRN basis  Started on flecainide by cardiology in 2021, managed up to 2023, seen by cardiology and re-referred back to EP for management of AAD medication  OV 1/1/23: SR, continue to monitor   Flecainide discontinued in February 2025 given coronary artery disease  OV 6/23/25: SB  CHADSVASC 5 (HTN, Fe, age, CAD)  Anticoagulation on Xarelto 20 mg  Initiated 8/2917   CrCl: 66 mL/min using creat 1.24 (12/19/24)  Watchman discussed and has seen Dr. Mendoza in past  Aortic valve stenosis  CABG with bioprosthetic aortic valve replacement and VERN Atricure Clip on 2/13/25   Performed by Dr. Be  Coronary artery disease status post PCI 2005 and PTCA with MERCEDES to mid LAD on 12/30/24  Carcinoma of the stomach   [facility]/Hematology   Cardiac Imaging   EDGAR 6/18/25: VERN atricure clip with small remnant pouch of VERN with proximal exposed pectinate. No VERN thrombus, EF 50-55%, chronic biatrial enlargement  Echo 2/14/2025: EF 53%, grade 2 diastolic dysfunction, bioprosthetic valve in aortic position  Coronary angio 12/30/24: 95% stenosis, stenting completed, benefit from SAVR and possible grafting to right posterior descending   Echo 12/10/24: EF 58%  Echo 3/9/23: EF 63%  Stress test 4/12/21: no ischemia      Given her history of symptomatic paroxysmal atrial fibrillation, an attempt to maintain sinus rhythm would be advised    EKG today demonstrates sinus bradycardia    At this time, it would be reasonable to continue to monitor with rate control strategy.  We discussed that she develop recurrent atrial fibrillation and/or worsening symptoms, alternative antiarrhythmic drug therapy and/or catheter ablation can be  considered    We discussed the above treatment plan in detail for which the patient is agreeable      The pathophysiology of AF was discussed with the patient including that it is a chronic condition without a cure as well as potential risk for developing heart failure/cardiac remodeling. We also discussed the stroke risk associated with AF including guideline recommendations that he be maintained on long-term anticoagulation unless contraindicated from a stroke risk prevention perspective. Overall goal for management of atrial fibrillation is to reduce overall burden or amount of atrial fibrillation that occurs as well as improve overall symptoms/quality of life.     The patient underwent VERN atricure clip at the time of her cardiac surgery in February 2025.  Recent EDGAR on 6/18/2025 reveals a small remnant pouch of VERN with proximal exposed pectinate.  Given these findings, would favor that she remain on anticoagulation for stroke prevention.  She states this was discussed with her after her procedure as well and she elects to remain on anticoagulation at this time.  She does mention that Xarelto can be costly but is affordable.  We discussed alternative options including warfarin for which the patient declines.  Advise she can look into the cost of Eliquis but likely if Xarelto is costly, Eliquis will also be.  Pradaxa can also be considered as it is generic but there are potential GI side effects and generally not recommended with patients >74 yo.  At this time, she elects to remain on Xarelto      Recommendations:   Continue current medications including Xarelto for stroke prevention, no changes today from an EP perspective  We also talked about lifestyle approaches including healthy diet, regular exercise, weight loss, DEWAYNE diagnosis and treatment, minimizing alcohol, nicotine and caffeine intake.  Continue to follow with cardiology for management of coronary artery and valvular disease  The patient was advised  to get in the habit of routinely monitoring his/her blood pressure and heart rate. He/She was instructed to contact our office if he/she should note persistently elevated heart rates or irregular heart rhythm for which at that time we can obtain an EKG to assess rhythm or cardiac monitoring to assess for arrhythmia burden  I have asked the patient to contact our office if he should develop any worsening symptoms or have any questions  Office follow-up with Dr. ePñaloza in 6 months or sooner if warranted    HPI:  Pooja Lockhart is a 75-year-old female seen in clinic today for follow-up.  She carries a history of symptomatic paroxysmal atrial fibrillation.  Previously maintained on flecainide which was discontinued in January January 2025 given CAD and structural heart disease.  She remains on rate control.  She has been anticoagulated on Xarelto for stroke prevention.    The patient is unaccompanied today.  She reports she has been doing relatively well since her last office visit and cardiac surgery from earlier this year.  With atrial fibrillation in the past she reports palpitations.  She believes her atrial fibrillation was caused by the acute amount of stress she was undergoing when her  was quite sick, she states he had severe COPD and has since passed about 2 years ago.  She is unaware of any clinical or symptomatic recurrence.  The only time she feels palpitations is when she is lying down when at rest or at nighttime in bed.  These palpitations feel like an extra beat or 2 and do not happen every time she lies down.  They are short-lived and tolerable.  She denies any sustained palpitations.  She denies exertional chest pain, worsening shortness of breath at rest with exertion, orthopnea, sudden weight gain, or lower extremity swelling.  She denies dizziness, lightheadedness, falls, or syncope.  She has remained compliant with her current medications, denies any significant bleeding issues on her  blood thinner.  She participates in cardiac rehab 3 times a week.  She monitors her blood pressure and heart rate at home, denies any alerts for elevated heart rate readings.  She does not have obstructive sleep apnea was tested in the past.  She has been working on weight loss.  Overall, does feel well.    PAST MEDICAL HX:    Degenerative arthritis of spine                                 Comment: severe with chronic LBP    Morbid obesity  (CMD)                                         CAD (coronary artery disease)                                 Depression                                                    Essential (primary) hypertension                              RAD (reactive airway disease) (CMD)                           Esophageal reflux                                             Lumbar spondylosis                              08/31/2014    Lumbar radiculitis                              08/31/2014    Leg weakness                                    08/31/2014    Corneal ulcer of left eye                       1970s         Macular degeneration                                          PONV (postoperative nausea and vomiting)                        Comment: years ago just nausea no vomiting    Sleep apnea                                                     Comment: mild, uses no devices    Atrial fibrillation  (CMD)                      2017          Asthma (CMD)                                    1966          Allergies                                                     Allergy                                         early 195*    Anemia                                                        Anxiety                                                       Arthritis                                       8/31/2014     Seizures  (CMD)                                 as a child      Comment: sulfa allergies    Heart murmur                                                  Malignant neoplasm  (CMD)                        2017          Cataract                                                      Delayed emergence from anesthesia                             Allergies and Medications were reviewed    ROS:  Pertinent items noted in history of present illness, all remaining items were reviewed and are negative.    PHYSICAL EXAM:  Visit Vitals  /54 (BP Location: LUE - Left upper extremity, Patient Position: Sitting, Cuff Size: Large Adult)   Pulse (!) 58   Ht 5' 6\" (1.676 m)   Wt 102.2 kg (225 lb 4.8 oz)   SpO2 93%   BMI 36.36 kg/m²     GENERAL:  Cooperative, sitting comfortably, in no acute distress.  HEAD: Normocephalic, Non-traumatic  NECK: No masses  CARDIOVASCULAR:   Regular rate and rhythm  RESPIRATORY:  Clear to auscultation bilaterally. No wheezes, rale or rhonchi. No respiratory distress  EXTREMITIES:  No pitting edema.  NEURO: No obvious motor deficits  PSYCHIATRIC:   Alert and oriented to person, place, and time. Affect normal   INTEGUMENTARY:  Warm, no rashes or nodules     I have personally reviewed and interpreted EKG/rhythm strip.  I have reviewed and summarized old records as noted in the impression section.    EKG:  Sinus bradycardia with low amplitude p waves  QRS 94 ms, Qtc 420 ms        Lab Results   Component Value Date    WBC 5.9 05/12/2025    WBC 8.0 11/18/2019    HGB 11.1 (L) 05/12/2025    HGB 11.4 (L) 11/18/2019    HCT 35.6 (L) 05/12/2025    HCT 37.3 11/18/2019     05/12/2025     11/18/2019    INR 1.6 05/12/2025    INR 1.7 04/28/2025    POTASSIUM 4.8 05/21/2025    POTASSIUM 3.6 11/18/2019    BUN 27 (H) 05/21/2025    BUN 20 11/18/2019    CREATININE 1.25 (H) 05/21/2025    CREATININE 0.99 (H) 11/18/2019    BNP 66 08/23/2017    TSH 2.971 01/16/2025    TSH 3.510 11/25/2019    MG 2.3 02/21/2025    MG 1.9 12/19/2018    AST 16 05/21/2025    AST 19 11/18/2019         Less than 30 minutes was involved discussing the treatment plan with the patient.       REYNA Thakkar  Electrophysiology  none   [facility] [place] [facility] [place]

## 2025-06-25 ENCOUNTER — NON-APPOINTMENT (OUTPATIENT)
Age: 70
End: 2025-06-25

## 2025-07-10 ENCOUNTER — APPOINTMENT (OUTPATIENT)
Dept: RHEUMATOLOGY | Facility: CLINIC | Age: 70
End: 2025-07-10
Payer: MEDICARE

## 2025-07-10 VITALS
BODY MASS INDEX: 41.16 KG/M2 | SYSTOLIC BLOOD PRESSURE: 126 MMHG | HEART RATE: 88 BPM | DIASTOLIC BLOOD PRESSURE: 70 MMHG | WEIGHT: 218 LBS | HEIGHT: 61 IN | OXYGEN SATURATION: 97 %

## 2025-07-10 PROCEDURE — G2211 COMPLEX E/M VISIT ADD ON: CPT

## 2025-07-10 PROCEDURE — 99214 OFFICE O/P EST MOD 30 MIN: CPT

## 2025-07-10 RX ORDER — CETIRIZINE HYDROCHLORIDE 10 MG/1
10 CAPSULE, LIQUID FILLED ORAL
Refills: 0 | Status: ACTIVE | OUTPATIENT
Start: 2025-07-10

## 2025-07-10 RX ORDER — ABATACEPT 250 MG/15ML
250 INJECTION, POWDER, LYOPHILIZED, FOR SOLUTION INTRAVENOUS
Refills: 0 | Status: ACTIVE | OUTPATIENT
Start: 2025-07-10

## 2025-07-10 RX ORDER — HYDROCORTISONE SODIUM SUCCINATE 100 MG/2ML
100 INJECTION INTRAMUSCULAR; INTRAVENOUS
Refills: 0 | Status: ACTIVE | OUTPATIENT
Start: 2025-07-10

## 2025-07-10 RX ADMIN — ABATACEPT 0 MG: 250 INJECTION, POWDER, LYOPHILIZED, FOR SOLUTION INTRAVENOUS at 00:00

## 2025-07-10 RX ADMIN — HYDROCORTISONE SODIUM SUCCINATE 0 MG: 100 INJECTION, POWDER, FOR SOLUTION INTRAMUSCULAR; INTRAVENOUS at 00:00

## 2025-07-10 RX ADMIN — Medication 0 MG: at 00:00

## 2025-07-10 RX ADMIN — CETIRIZINE HYDROCHLORIDE 0 MG: 10 CAPSULE, LIQUID FILLED ORAL at 00:00

## 2025-07-14 RX ORDER — BUDESONIDE 0.25 MG/2ML
2 SUSPENSION RESPIRATORY (INHALATION)
Refills: 0 | DISCHARGE

## 2025-07-14 RX ORDER — ABATACEPT 125 MG/ML
125 INJECTION, SOLUTION SUBCUTANEOUS
Refills: 0 | DISCHARGE

## 2025-07-14 RX ORDER — CYCLOBENZAPRINE HYDROCHLORIDE 15 MG/1
1 CAPSULE, EXTENDED RELEASE ORAL
Refills: 0 | DISCHARGE

## 2025-07-14 RX ORDER — DULOXETINE 20 MG/1
2 CAPSULE, DELAYED RELEASE ORAL
Refills: 0 | DISCHARGE

## 2025-07-14 RX ORDER — CLOPIDOGREL BISULFATE 75 MG/1
1 TABLET, FILM COATED ORAL
Refills: 0 | DISCHARGE

## 2025-07-14 RX ORDER — COLCHICINE 0.6 MG/1
1 TABLET, FILM COATED ORAL
Refills: 0 | DISCHARGE

## 2025-07-14 RX ORDER — OLMESARTAN MEDOXOMIL 5 MG/1
1 TABLET, FILM COATED ORAL
Refills: 0 | DISCHARGE

## 2025-07-14 RX ORDER — MONTELUKAST SODIUM 10 MG/1
1 TABLET ORAL
Refills: 0 | DISCHARGE

## 2025-07-14 RX ORDER — FLUTICASONE PROPIONATE 50 UG/1
2 SPRAY, METERED NASAL
Refills: 0 | DISCHARGE

## 2025-07-14 RX ORDER — EZETIMIBE 10 MG/1
1 TABLET ORAL
Refills: 0 | DISCHARGE

## 2025-07-14 RX ORDER — METOPROLOL SUCCINATE 50 MG/1
1 TABLET, EXTENDED RELEASE ORAL
Refills: 0 | DISCHARGE

## 2025-07-14 RX ORDER — HYDROXYCHLOROQUINE SULFATE 200 MG/1
1 TABLET, FILM COATED ORAL
Refills: 0 | DISCHARGE

## 2025-07-14 RX ORDER — TAMSULOSIN HYDROCHLORIDE 0.4 MG/1
1 CAPSULE ORAL
Refills: 0 | DISCHARGE

## 2025-07-14 RX ORDER — FEBUXOSTAT 40 MG/1
1 TABLET, FILM COATED ORAL
Refills: 0 | DISCHARGE

## 2025-07-14 RX ORDER — AMLODIPINE BESYLATE 10 MG/1
1 TABLET ORAL
Refills: 0 | DISCHARGE

## 2025-07-14 RX ORDER — ALFUZOSIN HYDROCHLORIDE 10 MG/1
1 TABLET, EXTENDED RELEASE ORAL
Refills: 0 | DISCHARGE

## 2025-07-14 RX ORDER — SPIRONOLACTONE 25 MG
1 TABLET ORAL
Refills: 0 | DISCHARGE

## 2025-07-14 RX ORDER — ASPIRIN 325 MG
1 TABLET ORAL
Refills: 0 | DISCHARGE

## 2025-07-14 RX ORDER — ATORVASTATIN CALCIUM 80 MG/1
1 TABLET, FILM COATED ORAL
Refills: 0 | DISCHARGE

## 2025-07-14 RX ORDER — METFORMIN HYDROCHLORIDE 850 MG/1
2 TABLET ORAL
Refills: 0 | DISCHARGE

## 2025-07-22 PROBLEM — I25.10 ATHEROSCLEROTIC HEART DISEASE OF NATIVE CORONARY ARTERY WITHOUT ANGINA PECTORIS: Chronic | Status: ACTIVE | Noted: 2025-06-03

## 2025-07-22 PROBLEM — N40.0 BENIGN PROSTATIC HYPERPLASIA WITHOUT LOWER URINARY TRACT SYMPTOMS: Chronic | Status: ACTIVE | Noted: 2025-06-03

## 2025-07-22 PROBLEM — E11.9 TYPE 2 DIABETES MELLITUS WITHOUT COMPLICATIONS: Chronic | Status: ACTIVE | Noted: 2025-06-03

## 2025-07-22 PROBLEM — E78.5 HYPERLIPIDEMIA, UNSPECIFIED: Chronic | Status: ACTIVE | Noted: 2025-06-03

## 2025-07-22 PROBLEM — I50.9 HEART FAILURE, UNSPECIFIED: Chronic | Status: ACTIVE | Noted: 2025-06-03

## 2025-07-22 PROBLEM — I10 ESSENTIAL (PRIMARY) HYPERTENSION: Chronic | Status: ACTIVE | Noted: 2025-06-03

## 2025-07-22 PROBLEM — M35.1 OTHER OVERLAP SYNDROMES: Chronic | Status: ACTIVE | Noted: 2025-06-03

## 2025-07-22 PROBLEM — G47.33 OBSTRUCTIVE SLEEP APNEA (ADULT) (PEDIATRIC): Chronic | Status: ACTIVE | Noted: 2025-06-03

## 2025-07-22 PROBLEM — G45.9 TRANSIENT CEREBRAL ISCHEMIC ATTACK, UNSPECIFIED: Chronic | Status: ACTIVE | Noted: 2025-06-03

## 2025-07-23 ENCOUNTER — APPOINTMENT (OUTPATIENT)
Dept: ORTHOPEDIC SURGERY | Facility: CLINIC | Age: 70
End: 2025-07-23

## 2025-07-23 ENCOUNTER — NON-APPOINTMENT (OUTPATIENT)
Age: 70
End: 2025-07-23

## 2025-07-23 DIAGNOSIS — M79.643 PAIN IN UNSPECIFIED HAND: ICD-10-CM

## 2025-07-23 DIAGNOSIS — M25.539 PAIN IN UNSPECIFIED WRIST: ICD-10-CM

## 2025-07-23 PROCEDURE — 99203 OFFICE O/P NEW LOW 30 MIN: CPT | Mod: 25

## 2025-07-23 PROCEDURE — 73130 X-RAY EXAM OF HAND: CPT | Mod: 50

## 2025-07-23 PROCEDURE — 20550 NJX 1 TENDON SHEATH/LIGAMENT: CPT | Mod: LT

## 2025-07-23 RX ORDER — LIDOCAINE HCL/PF 10 MG/ML
1 VIAL (ML) INJECTION
Refills: 0 | Status: COMPLETED | OUTPATIENT
Start: 2025-07-23

## 2025-07-23 RX ORDER — BETAMETHA AC,SOD PHOS/WATER/PF 6 MG/ML
6 (3-3) VIAL (ML) INJECTION
Qty: 2 | Refills: 0 | Status: COMPLETED | OUTPATIENT
Start: 2025-07-23

## 2025-07-23 RX ADMIN — BETAMETHASONE ACETATE AND BETAMETHASONE SODIUM PHOSPHATE MG/ML: 3; 3 INJECTION, SUSPENSION INTRA-ARTICULAR; INTRALESIONAL; INTRAMUSCULAR; SOFT TISSUE at 00:00

## 2025-07-23 RX ADMIN — LIDOCAINE HYDROCHLORIDE 0 %: 10 INJECTION, SOLUTION INFILTRATION; PERINEURAL at 00:00

## 2025-08-06 ENCOUNTER — APPOINTMENT (OUTPATIENT)
Dept: ORTHOPEDIC SURGERY | Facility: CLINIC | Age: 70
End: 2025-08-06
Payer: MEDICARE

## 2025-08-06 DIAGNOSIS — M65.341 TRIGGER FINGER, RIGHT RING FINGER: ICD-10-CM

## 2025-08-06 DIAGNOSIS — L40.50 ARTHROPATHIC PSORIASIS, UNSPECIFIED: ICD-10-CM

## 2025-08-06 PROCEDURE — 20550 NJX 1 TENDON SHEATH/LIGAMENT: CPT | Mod: F7

## 2025-08-06 PROCEDURE — 99214 OFFICE O/P EST MOD 30 MIN: CPT | Mod: 25

## 2025-08-06 RX ORDER — LIDOCAINE HCL/PF 10 MG/ML
1 VIAL (ML) INJECTION
Refills: 0 | Status: COMPLETED | OUTPATIENT
Start: 2025-08-06

## 2025-08-06 RX ORDER — BETAMETHA AC,SOD PHOS/WATER/PF 6 MG/ML
6 (3-3) VIAL (ML) INJECTION
Qty: 2 | Refills: 0 | Status: COMPLETED | OUTPATIENT
Start: 2025-08-06

## 2025-08-06 RX ADMIN — Medication 1 %: at 00:00

## 2025-08-06 RX ADMIN — BETAMETHASONE ACETATE AND BETAMETHASONE SODIUM PHOSPHATE 1 MG/ML: 3; 3 INJECTION, SUSPENSION INTRA-ARTICULAR; INTRALESIONAL; INTRAMUSCULAR; SOFT TISSUE at 00:00

## 2025-08-08 ENCOUNTER — APPOINTMENT (OUTPATIENT)
Dept: RHEUMATOLOGY | Facility: CLINIC | Age: 70
End: 2025-08-08

## 2025-08-14 ENCOUNTER — APPOINTMENT (OUTPATIENT)
Dept: RHEUMATOLOGY | Facility: CLINIC | Age: 70
End: 2025-08-14
Payer: MEDICARE

## 2025-08-14 VITALS — OXYGEN SATURATION: 96 % | HEART RATE: 83 BPM | DIASTOLIC BLOOD PRESSURE: 89 MMHG | SYSTOLIC BLOOD PRESSURE: 145 MMHG

## 2025-08-14 VITALS
OXYGEN SATURATION: 97 % | RESPIRATION RATE: 16 BRPM | SYSTOLIC BLOOD PRESSURE: 154 MMHG | DIASTOLIC BLOOD PRESSURE: 91 MMHG | HEART RATE: 94 BPM | TEMPERATURE: 97.4 F

## 2025-08-14 PROCEDURE — 36415 COLL VENOUS BLD VENIPUNCTURE: CPT

## 2025-08-14 PROCEDURE — 96365 THER/PROPH/DIAG IV INF INIT: CPT

## 2025-08-14 RX ORDER — ABATACEPT 250 MG/15ML
250 INJECTION, POWDER, LYOPHILIZED, FOR SOLUTION INTRAVENOUS
Qty: 0 | Refills: 0 | Status: COMPLETED
Start: 2025-07-10

## 2025-08-15 LAB
ALBUMIN SERPL ELPH-MCNC: 3.8 G/DL
ALP BLD-CCNC: 114 U/L
ALT SERPL-CCNC: 40 U/L
ANION GAP SERPL CALC-SCNC: 16 MMOL/L
AST SERPL-CCNC: 24 U/L
BASOPHILS # BLD AUTO: 0.04 K/UL
BASOPHILS NFR BLD AUTO: 0.4 %
BILIRUB SERPL-MCNC: 0.2 MG/DL
BUN SERPL-MCNC: 25 MG/DL
CALCIUM SERPL-MCNC: 9.2 MG/DL
CHLORIDE SERPL-SCNC: 99 MMOL/L
CO2 SERPL-SCNC: 22 MMOL/L
CREAT SERPL-MCNC: 1.14 MG/DL
CRP SERPL-MCNC: 4 MG/L
EGFRCR SERPLBLD CKD-EPI 2021: 69 ML/MIN/1.73M2
EOSINOPHIL # BLD AUTO: 0.17 K/UL
EOSINOPHIL NFR BLD AUTO: 1.5 %
HCT VFR BLD CALC: 40.2 %
HGB BLD-MCNC: 13.1 G/DL
IMM GRANULOCYTES NFR BLD AUTO: 0.7 %
LYMPHOCYTES # BLD AUTO: 1.77 K/UL
LYMPHOCYTES NFR BLD AUTO: 16.1 %
MAN DIFF?: NORMAL
MCHC RBC-ENTMCNC: 29.1 PG
MCHC RBC-ENTMCNC: 32.6 G/DL
MCV RBC AUTO: 89.3 FL
MONOCYTES # BLD AUTO: 1.13 K/UL
MONOCYTES NFR BLD AUTO: 10.3 %
NEUTROPHILS # BLD AUTO: 7.82 K/UL
NEUTROPHILS NFR BLD AUTO: 71 %
PLATELET # BLD AUTO: 179 K/UL
POTASSIUM SERPL-SCNC: 4.9 MMOL/L
PROT SERPL-MCNC: 6.3 G/DL
RBC # BLD: 4.5 M/UL
RBC # FLD: 13.3 %
SODIUM SERPL-SCNC: 138 MMOL/L
WBC # FLD AUTO: 11.01 K/UL

## 2025-09-02 ENCOUNTER — APPOINTMENT (OUTPATIENT)
Dept: RHEUMATOLOGY | Facility: CLINIC | Age: 70
End: 2025-09-02

## 2025-09-20 ENCOUNTER — APPOINTMENT (OUTPATIENT)
Dept: RHEUMATOLOGY | Facility: CLINIC | Age: 70
End: 2025-09-20
Payer: MEDICARE

## 2025-09-20 VITALS
HEART RATE: 94 BPM | DIASTOLIC BLOOD PRESSURE: 83 MMHG | OXYGEN SATURATION: 98 % | RESPIRATION RATE: 16 BRPM | TEMPERATURE: 98.1 F | SYSTOLIC BLOOD PRESSURE: 164 MMHG

## 2025-09-20 VITALS
HEART RATE: 77 BPM | DIASTOLIC BLOOD PRESSURE: 76 MMHG | OXYGEN SATURATION: 97 % | RESPIRATION RATE: 16 BRPM | SYSTOLIC BLOOD PRESSURE: 112 MMHG

## 2025-09-20 LAB
ALBUMIN SERPL ELPH-MCNC: 4.1 G/DL
ALP BLD-CCNC: 94 U/L
ALT SERPL-CCNC: 38 U/L
ANION GAP SERPL CALC-SCNC: 17 MMOL/L
AST SERPL-CCNC: 38 U/L
BASOPHILS # BLD AUTO: 0.05 K/UL
BASOPHILS NFR BLD AUTO: 0.6 %
BILIRUB SERPL-MCNC: 0.3 MG/DL
BUN SERPL-MCNC: 30 MG/DL
CALCIUM SERPL-MCNC: 9.1 MG/DL
CHLORIDE SERPL-SCNC: 102 MMOL/L
CO2 SERPL-SCNC: 20 MMOL/L
CREAT SERPL-MCNC: 1.41 MG/DL
CRP SERPL-MCNC: 5 MG/L
EGFRCR SERPLBLD CKD-EPI 2021: 54 ML/MIN/1.73M2
EOSINOPHIL # BLD AUTO: 0.15 K/UL
EOSINOPHIL NFR BLD AUTO: 1.7 %
HCT VFR BLD CALC: 40.3 %
HGB BLD-MCNC: 13.7 G/DL
IMM GRANULOCYTES NFR BLD AUTO: 0.8 %
LYMPHOCYTES # BLD AUTO: 1.65 K/UL
LYMPHOCYTES NFR BLD AUTO: 18.6 %
MAN DIFF?: NORMAL
MCHC RBC-ENTMCNC: 30 PG
MCHC RBC-ENTMCNC: 34 G/DL
MCV RBC AUTO: 88.2 FL
MONOCYTES # BLD AUTO: 1.13 K/UL
MONOCYTES NFR BLD AUTO: 12.8 %
NEUTROPHILS # BLD AUTO: 5.8 K/UL
NEUTROPHILS NFR BLD AUTO: 65.5 %
PLATELET # BLD AUTO: 190 K/UL
POTASSIUM SERPL-SCNC: 4.7 MMOL/L
PROT SERPL-MCNC: 6.4 G/DL
RBC # BLD: 4.57 M/UL
RBC # FLD: 13.3 %
SODIUM SERPL-SCNC: 139 MMOL/L
WBC # FLD AUTO: 8.85 K/UL

## 2025-09-20 PROCEDURE — 36415 COLL VENOUS BLD VENIPUNCTURE: CPT

## 2025-09-20 PROCEDURE — 96365 THER/PROPH/DIAG IV INF INIT: CPT

## 2025-09-20 RX ORDER — ABATACEPT 250 MG/15ML
250 INJECTION, POWDER, LYOPHILIZED, FOR SOLUTION INTRAVENOUS
Qty: 0 | Refills: 0 | Status: COMPLETED
Start: 2025-07-10